# Patient Record
Sex: FEMALE | Race: WHITE | NOT HISPANIC OR LATINO | Employment: OTHER | URBAN - METROPOLITAN AREA
[De-identification: names, ages, dates, MRNs, and addresses within clinical notes are randomized per-mention and may not be internally consistent; named-entity substitution may affect disease eponyms.]

---

## 2017-03-13 ENCOUNTER — ALLSCRIPTS OFFICE VISIT (OUTPATIENT)
Dept: OTHER | Facility: OTHER | Age: 67
End: 2017-03-13

## 2017-04-26 ENCOUNTER — APPOINTMENT (OUTPATIENT)
Dept: LAB | Age: 67
End: 2017-04-26
Payer: MEDICARE

## 2017-04-26 ENCOUNTER — TRANSCRIBE ORDERS (OUTPATIENT)
Dept: LAB | Age: 67
End: 2017-04-26

## 2017-04-26 DIAGNOSIS — E78.5 HYPERLIPIDEMIA: ICD-10-CM

## 2017-04-26 DIAGNOSIS — E55.9 VITAMIN D DEFICIENCY: ICD-10-CM

## 2017-04-26 DIAGNOSIS — E78.5 OTHER AND UNSPECIFIED HYPERLIPIDEMIA: ICD-10-CM

## 2017-04-26 DIAGNOSIS — E78.5 OTHER AND UNSPECIFIED HYPERLIPIDEMIA: Primary | ICD-10-CM

## 2017-04-26 LAB
25(OH)D3 SERPL-MCNC: 33.4 NG/ML (ref 30–100)
ALBUMIN SERPL BCP-MCNC: 3.7 G/DL (ref 3.5–5)
ALP SERPL-CCNC: 42 U/L (ref 46–116)
ALT SERPL W P-5'-P-CCNC: 14 U/L (ref 12–78)
ANION GAP SERPL CALCULATED.3IONS-SCNC: 6 MMOL/L (ref 4–13)
AST SERPL W P-5'-P-CCNC: 15 U/L (ref 5–45)
BILIRUB SERPL-MCNC: 0.5 MG/DL (ref 0.2–1)
BUN SERPL-MCNC: 13 MG/DL (ref 5–25)
CALCIUM SERPL-MCNC: 9.2 MG/DL (ref 8.3–10.1)
CHLORIDE SERPL-SCNC: 100 MMOL/L (ref 100–108)
CHOLEST SERPL-MCNC: 169 MG/DL (ref 50–200)
CO2 SERPL-SCNC: 29 MMOL/L (ref 21–32)
CREAT SERPL-MCNC: 0.84 MG/DL (ref 0.6–1.3)
ERYTHROCYTE [DISTWIDTH] IN BLOOD BY AUTOMATED COUNT: 13.5 % (ref 11.6–15.1)
GFR SERPL CREATININE-BSD FRML MDRD: >60 ML/MIN/1.73SQ M
GLUCOSE P FAST SERPL-MCNC: 99 MG/DL (ref 65–99)
HCT VFR BLD AUTO: 38.2 % (ref 34.8–46.1)
HDLC SERPL-MCNC: 74 MG/DL (ref 40–60)
HGB BLD-MCNC: 12.8 G/DL (ref 11.5–15.4)
LDLC SERPL CALC-MCNC: 76 MG/DL (ref 0–100)
MCH RBC QN AUTO: 31.2 PG (ref 26.8–34.3)
MCHC RBC AUTO-ENTMCNC: 33.5 G/DL (ref 31.4–37.4)
MCV RBC AUTO: 93 FL (ref 82–98)
PLATELET # BLD AUTO: 230 THOUSANDS/UL (ref 149–390)
PMV BLD AUTO: 10.3 FL (ref 8.9–12.7)
POTASSIUM SERPL-SCNC: 4.1 MMOL/L (ref 3.5–5.3)
PROT SERPL-MCNC: 6.7 G/DL (ref 6.4–8.2)
RBC # BLD AUTO: 4.1 MILLION/UL (ref 3.81–5.12)
SODIUM SERPL-SCNC: 135 MMOL/L (ref 136–145)
T4 SERPL-MCNC: 10 UG/DL (ref 4.7–13.3)
TRIGL SERPL-MCNC: 93 MG/DL
TSH SERPL DL<=0.05 MIU/L-ACNC: 2 UIU/ML (ref 0.36–3.74)
WBC # BLD AUTO: 6.02 THOUSAND/UL (ref 4.31–10.16)

## 2017-04-26 PROCEDURE — 80061 LIPID PANEL: CPT

## 2017-04-26 PROCEDURE — 84436 ASSAY OF TOTAL THYROXINE: CPT

## 2017-04-26 PROCEDURE — 85027 COMPLETE CBC AUTOMATED: CPT

## 2017-04-26 PROCEDURE — 36415 COLL VENOUS BLD VENIPUNCTURE: CPT

## 2017-04-26 PROCEDURE — 80053 COMPREHEN METABOLIC PANEL: CPT

## 2017-04-26 PROCEDURE — 82306 VITAMIN D 25 HYDROXY: CPT

## 2017-04-26 PROCEDURE — 84443 ASSAY THYROID STIM HORMONE: CPT

## 2017-05-02 ENCOUNTER — TRANSCRIBE ORDERS (OUTPATIENT)
Dept: LAB | Age: 67
End: 2017-05-02

## 2017-05-02 DIAGNOSIS — E78.5 HYPERLIPIDEMIA: ICD-10-CM

## 2017-05-11 ENCOUNTER — ALLSCRIPTS OFFICE VISIT (OUTPATIENT)
Dept: OTHER | Facility: OTHER | Age: 67
End: 2017-05-11

## 2017-10-05 ENCOUNTER — GENERIC CONVERSION - ENCOUNTER (OUTPATIENT)
Dept: OTHER | Facility: OTHER | Age: 67
End: 2017-10-05

## 2018-01-13 NOTE — PROGRESS NOTES
Assessment    1  Acute bronchitis (466 0) (J20 9)   2  Tobacco abuse (305 1) (Z72 0)   3  Hyperlipidemia (272 4) (E78 5)   4  Encounter for preventive health examination (V70 0) (Z00 00)   5  Vitamin D deficiency (268 9) (E55 9)    Plan  Acute bronchitis    · Cheratussin -10 MG/5ML Oral Syrup; TAKE 1 TEASPOONFUL EVERY 4 TO  6 HOURS AS NEEDED   · LevoFLOXacin 500 MG Oral Tablet (Levaquin); Take 1 tablet daily  Vitamin D deficiency    · Follow-up visit in 4 Months Evaluation and Treatment  Follow-up  Status: Hold For -  Scheduling  Requested for: 43UIR9020   · Eat a low fat and low cholesterol diet ; Status:Complete;   Done: 85JGC4814 02:52PM   · You need to quit smoking ; Status:Complete;   Done: 32MQE3440 02:52PM    Discussion/Summary    #1  Acute bronchitis  Patient does have an acute upper respiratory tract infection that is producing a large amount of yellowish mucous in his having a very spastic cough  Patient states she is allergic to sulfa, erythromycin and penicillins  We did place the patient on Levaquin 500 milligrams daily for 10 days and also gave her a prescription for Cheratussin cough medicine to help with her cough  She was informed that she should call the office if she has worsening of her symptoms and would consider placing her on a tapering dose of prednisone which we have done in the past     #2  Tobacco abuse  We have had long discussions in the past about the importance of quitting smoking and patient is refusing  Patient was told when she decides that she needs to quit we be more than happy to provide her with more information about smoking aids, different methods in order to quit  #3  Hyperlipidemia  Patient's cholesterol is showing relatively good control  We did discuss with the patient importance of a low-fat, low-cholesterol diet  #4  Vitamin D deficiency with osteopenia  Patient is up-to-date with her routine DEXA scans and she does continue with her vitamin D supplements  We did discuss the importance of quitting smoking to prevent further progression to osteoporosis which is another risk factor with this patient  #5  Health maintenance  Patient is refusing to go for colonoscopy but did complete the Hemoccult and we do not have the results as of yet  Patient has not seen a gynecologist in over 2 years and we did stress the importance of routine examinations  Patient is up-to-date with her mammograms  We did review the results of recent labs with the patient and her   Impression: Subsequent Annual Wellness Visit, with preventive exam as well as age and risk appropriate counseling completed  Cardiovascular screening and counseling: the risks and benefits of screening were discussed and screening is current  Diabetes screening and counseling: the risks and benefits of screening were discussed and screening is current  Colorectal cancer screening and counseling: the risks and benefits of screening were discussed, due for fecal occult blood testing and Dx - V76 51 Screen for CRC  Breast cancer screening and counseling: the risks and benefits of screening were discussed and screening is current  Cervical cancer screening and counseling: the risks and benefits of screening were discussed and the patient declines screening  Osteoporosis screening and counseling: the risks and benefits of screening were discussed and screening is current  Abdominal aortic aneurysm screening and counseling: screening not indicated and Dx - V81 2 Screen for CV Disorder  Glaucoma screening and counseling: the risks and benefits of screening were discussed and screening is current  HIV screening and counseling: screening not indicated   Immunizations: the risks and benefits of influenza vaccination were discussed with the patient, influenza vaccine is up to date this year, the risks and benefits of pneumococcal vaccination were discussed with the patient, the lifetime pneumococcal vaccine has been completed, hepatitis B vaccination series is not indicated at this time due to the patient's low risk of jonathan the disease, the patient declines the Zostavax vaccine, the risks and benefits of the Td vaccine were discussed with the patient and Td vaccination status is unknown  Advance Directive Planning: not complete, she was encouraged to follow-up with me to discuss her questions and/or decisions  Patient Discussion: plan discussed with the patient, follow-up visit needed in one year  Educational resources provided: Information on low-fat, low-cholesterol diet, patient was told again given instruction to quit smoking now  Possible side effects of new medications were reviewed with the patient/guardian today  The treatment plan was reviewed with the patient/guardian  The patient/guardian understands and agrees with the treatment plan      Chief Complaint  Patient is here today for an annual wellness exam w/ labs      History of Present Illness  HPI: Patient is a 49-year-old female with a history of hyperlipidemia, chronic insomnia, vitamin D deficiency, history of classic migraine headaches with aura  Patient is here today for Medicare wellness visit  Patient did have complete labs prior to visit today and we did discuss the results  Her happy to tell the patient that all her labs were essentially normal  Patient states that she's doing relatively well and she has no new complaints or problems  She continues to work 3 days a week at [employer]  She's further relates that her migraines seem to be under control  She is complaining of an upper respiratory tract infection with the production of large amounts of yellow mucus  She denies any fever or chills or increasing shortness of breath but she does have a very spastic cough   Welcome to [employer] and Wellness Visits: The patient is being seen for the subsequent annual wellness visit     Co-Managers and Medical Equipment/Suppliers: See Patient Care Team   Reviewed Updated [employer]:   Last Medicare Wellness Visit Information was reviewed, patient interviewed, no change since last AW  Preventive Quality Program 65 and Older: Falls Risk: The patient fell times in the past 12 months  The patient is currently asymptomatic Symptoms Include: The patient currently has no urinary incontinence symptoms  Date of last glaucoma screen was Patient states she seen the eye doctor every 2 years and has glaucoma screening with each visit      Review of Systems    Constitutional: negative  Eyes: negative  ENT: scratchy throat and hoarseness, but no earache, no hearing loss, no nasal congestion, no nasal discharge, no sneezing, no sore throat and no white patches in the mouth  Cardiovascular: negative  Respiratory: cough, productive cough and colored sputum, but negative, no shortness of breath, no wheezing, not sleeping upright or with extra pillows, no dry cough, no clear sputum and not vomiting blood  Gastrointestinal: negative  Genitourinary: negative  Musculoskeletal: negative  Integumentary and Breasts: negative  Neurological: headache and Chronic migraines, but no confusion, no dizziness, no fainting, no paresthesias, no saddle paresthesia, no leg numbness, no leg weakness, no tingling and no difficulty walking  Psychiatric: negative  Endocrine: negative  Hematologic and Lymphatic: negative  Active Problems    1  Acute bronchitis (466 0) (J20 9)   2  Acute upper respiratory infection (465 9) (J06 9)   3  Chronic migraine without aura (346 70) (G43 709)   4  Classic migraine with aura (346 00) (G43 109)   5  Common migraine without aura (346 10) (G43 009)   6  Depression (311) (F32 9)   7  Dysuria (788 1) (R30 0)   8  Encounter for Pap smear (V76 9) (Z12 9)   9  Encounter for routine gynecological examination with Papanicolaou smear of cervix   (V72 31,V76 2) (Z01 419)   10   Encounter for screening mammogram for breast cancer (V76 12) (Z12 31)   11  Hyperlipidemia (272 4) (E78 5)   12  Insomnia disorder (780 52) (G47 00)   13  Need for pneumococcal vaccination (V03 82) (Z23)   14  Osteopenia (733 90) (M85 80)   15  Pruritus (698 9) (L29 9)   16  Sleep disorder (780 50) (G47 9)   17  Tobacco abuse (305 1) (Z72 0)   18  URI (upper respiratory infection) (465 9) (J06 9)   19  Vaginitis (616 10) (N76 0)   20  Visit for screening mammogram (V76 12) (Z12 31)   21  Vitamin D deficiency (268 9) (E55 9)    Past Medical History    · History of headache (V13 89) (Z87 898)   · History of migraine (V12 49) (Z86 69)    Surgical History    · History of Nasal Septal Deviation Repair   · History of Shoulder Surgery    Family History  Mother    · Family history of Cancer   · Family history of Mother  At Age ___  Father    · Family history of Father  At Age ___   · Family history of Heart Disease (V17 49)    Social History    · Being A Social Drinker   · Caffeine Use   · Current Every Day Smoker (305 1)   · Denied: History of Drug Use   · Denied: History of Home environment domestic violence    Current Meds   1  B2 TABS; Therapy: (Recorded:2016) to Recorded   2  Citalopram Hydrobromide 20 MG Oral Tablet; take 1 tablet by mouth daily; Therapy: 48XKC0331 to (Last ZD:36LGM0199)  Requested for: 50SED4653 Ordered   3  Divalproex Sodium  MG Oral Tablet Extended Release 24 Hour; TAKE TWO   TABLETS   BY MOUTH   DAILY; Therapy: 38YHV3111 to (Evaluate:82Ivx9310)  Requested for: 2017; Last   Rx:2017 Ordered   4  Magnesium 500 MG Oral Capsule; Take 2 pills daily Recorded   5  Naproxen 500 MG Oral Tablet; TAKE 1 TABLET as needed with sumatriptan; Therapy: 88AOU8961 to (Evaluate:69Wue0080)  Requested for: 67QEV9350; Last   Rx:2017 Ordered   6  Simvastatin 40 MG Oral Tablet; Take 1 tablet by mouth   daily; Therapy: 71QZX2334 to (Last Rx:2017)  Requested for: 2017 Ordered   7   SUMAtriptan Succinate 100 MG Oral Tablet; TAKE 1 TABLET AT ONSET OF MIGRAINE   HEADACHE  MAY REPEAT IN 2 HOURS IF NEEDED, no more than 2 in 24   and no more than 3 in week; Therapy: 50KSF7214 to (Last Rx:13Mar2017)  Requested for: 14ESJ4022 Ordered   8  Vitamin B12 TABS; Therapy: (Recorded:10Mar2016) to Recorded   9  Vitamin D3 TABS; Therapy: (Recorded:10Mar2016) to Recorded   10  Zolpidem Tartrate 5 MG Oral Tablet; TAKE 1 TABLET AT  BEDTIME AS NEEDED FOR    INSOMNIA; Therapy: 08HUL8482 to (Last Rx:23Ors5102) Ordered    Allergies    1  Penicillins   2  Sular TB24    Immunizations   1    PCV  31-Mar-2016     Vitals  Signs    Temperature: 98 F  Heart Rate: 71  Systolic: 456  Diastolic: 80  Height: 5 ft 5 in  Weight: 136 lb 8 oz  BMI Calculated: 22 71  BSA Calculated: 1 68  O2 Saturation: 98    Physical Exam    Constitutional Very energetic 61-year-old female who is awake alert in no acute distress and smells heavily of tobacco smoke  Head and Face   Head and face: Normal     Palpation of the face and sinuses: No sinus tenderness  Eyes   Conjunctiva and lids: No swelling, erythema or discharge  Pupils and irises: Equal, round, reactive to light  Ophthalmoscopic examination: Normal fundi and optic discs  Ears, Nose, Mouth, and Throat   External inspection of ears and nose: Normal     Otoscopic examination: Tympanic membranes translucent with normal light reflex  Canals patent without erythema  Hearing: Normal     Nasal mucosa, septum, and turbinates: Normal without edema or erythema  Lips, teeth, and gums: Normal, good dentition  Oropharynx: Abnormal   Some generalized erythema to oral mucosa  Neck   Neck: Supple, symmetric, trachea midline, no masses  Thyroid: Normal, no thyromegaly  Pulmonary   Respiratory effort: No increased work of breathing or signs of respiratory distress      Percussion of chest: Normal     Palpation of chest: Normal     Auscultation of lungs: Abnormal   Decreased breath sounds but clear to auscultation anterior and posteriorly  Cardiovascular   Palpation of heart: Normal PMI, no thrills  Auscultation of heart: Normal rate and rhythm, normal S1 and S2, no murmurs  Carotid pulses: 2+ bilaterally  Abdominal aorta: Normal     Femoral pulses: 2+ bilaterally  Pedal pulses: 2+ bilaterally  Peripheral vascular exam: Normal     Examination of extremities for edema and/or varicosities: Normal     Chest Patient states she is scheduled for routine mammogram and breast evaluation by her gynecologist    Abdomen   Abdomen: Non-tender, no masses  Liver and spleen: No hepatomegaly or splenomegaly  Examination for hernias: No hernia appreciated  Genitourinary Patient states she's making arrangements for gynecologic evaluation, routine Pap and pelvic exam    Lymphatic   Palpation of lymph nodes in neck: No lymphadenopathy  Palpation of lymph nodes in axillae: No lymphadenopathy  Palpation of lymph nodes in groin: No lymphadenopathy  Palpation of lymph nodes in other areas: No lymphadenopathy  Musculoskeletal   Gait and station: Normal     Digits and nails: Normal without clubbing or cyanosis  Joints, bones, and muscles: Abnormal   Some minor arthritic changes to the hands and digits  Range of motion: Normal     Stability: Normal     Muscle strength/tone: Normal     Skin   Skin and subcutaneous tissue: Normal without rashes or lesions  Palpation of skin and subcutaneous tissue: Normal turgor  Neurologic   Cranial nerves: Cranial nerves II-XII intact  Cortical function: Normal mental status  Reflexes: 2+ and symmetric  Sensation: No sensory loss  Coordination: Normal finger to nose and heel to shin  Psychiatric   Judgment and insight: Normal     Orientation to person, place, and time: Normal     Recent and remote memory: Intact      Mood and affect: Normal        Future Appointments    Date/Time Provider Specialty Site   05/17/2018 01:30 PM Steph Janice Mora DO Internal Medicine [facility] INTERNAL MED   03/15/2018 02:00 PM Laila Alvarez MD Neurology [address]     Signatures   Electronically signed by : Sabiha Silvestre DO; May 11 2017  2:57PM EST                       (Author)

## 2018-01-14 VITALS
SYSTOLIC BLOOD PRESSURE: 134 MMHG | HEART RATE: 74 BPM | BODY MASS INDEX: 22.39 KG/M2 | OXYGEN SATURATION: 97 % | WEIGHT: 134.56 LBS | DIASTOLIC BLOOD PRESSURE: 70 MMHG

## 2018-01-17 NOTE — PROGRESS NOTES
Assessment    1  Classic migraine with aura (346 00) (G43 109)   2  Tobacco abuse (305 1) (Z72 0)   3  Hyperlipidemia (272 4) (E78 5)    Plan  Acute bronchitis, Hyperlipidemia    · Follow-up visit in 1 month Evaluation and Treatment  Follow-up  Status: Hold For -  Scheduling  Requested for: 55THC7069   · Eat a low fat and low cholesterol diet ; Status:Complete;   Done: 72HHB6537 04:20PM   · We recommend you quit smoking  Time spent counseling today was greater than 10  minutes ; Status:Complete;   Done: 84OFX9427 04:20PM  Hyperlipidemia    · (1) CBC/PLT/DIFF; Status:Active; Requested for:31Mar2016;    · (1) COMPREHENSIVE METABOLIC PANEL; Status:Active; Requested for:31Mar2016;    · (1) LIPID PANEL, FASTING; Status:Active; Requested for:31Mar2016;    · (1) T4, FREE; Status:Active; Requested for:31Mar2016;    · (1) TSH; Status:Active; Requested for:31Mar2016;    · (Q) FECAL GLOBIN BY IMMUNOCHEM  (MEDICARE); Status:Active; Requested  for:31Mar2016;    · (Q) URINALYSIS, SCREEN; Status:Active; Requested for:31Mar2016;    · COLONOSCOPY; Status:Active; Requested for:31Mar2016;   Need for pneumococcal vaccination    · Prevnar 13 Intramuscular Suspension  Visit for screening mammogram    · MAMMO DIAGNOSTIC BILATERAL W CAD; Status:Hold For - Scheduling; Requested  for:31Mar2016;   Vitamin D deficiency    · * DXA BONE DENSITY SPINE HIP AND PELVIS; Status:Hold For - Scheduling;  Requested for:31Mar2016;     Discussion/Summary    #1  Hyperlipidemia  Patient states she's not been compliant with her diet and we have not had a recent lipid profile performed  Patient was given a slip to check a lipid profile and she was given instruction sheet on a low-fat, low-cholesterol diet  We will modify her treatment with the patient depending on the results of her testing  Most importantly patient is also a cigarette smoker so increase in cholesterol does make her at higher risk for heart disease  Patient understands      #2  Migraine headaches with aura  Patient is stable present medication but she states the medication is become very costly  We do not have any samples of her medication in the office today patient will check with her neurologist as to whether they have samples or not  #3  Osteopenia  Patient is going for both of vitamin D level and a DEXA scan and we will discuss the results with her next visit  #4  Chronic tobacco abuse  I discussed with the patient today her high risk for other medical problems they can be associated with long-term tobacco abuse  Patient understands but does not agreed to his quitting smoking now  I told her that if she wishes to have any resources as far as quitting smoking we'll be more than happy to provide them including medication  Impression: Subsequent Annual Wellness Visit, with preventive exam as well as age and risk appropriate counseling completed  Cardiovascular screening and counseling: the risks and benefits of screening were discussed, screening is current, counseling was given on maintaining a healthy diet, counseling was given on ways to improve cholesterol, counseling was given on tobacco cessation, due for a lipid panel and Dx - V81 2 Screen for CV Disorder  Diabetes screening and counseling: the risks and benefits of screening were discussed, screening is current, counseling was given on maintaining a healthy diet, counseling was given on maintaining a healthy weight, counseling was given on ways to improve physical activity, due for blood glucose and Dx - V77 1 Screen for DM  Colorectal cancer screening and counseling: the risks and benefits of screening were discussed, counseling was given on ways to eat a high fiber diet and due for a colonoscopy (low risk)     Breast cancer screening and counseling: the risks and benefits of screening were discussed and due for a screening mammogram    Cervical cancer screening and counseling: the risks and benefits of screening were discussed and due for a cervical pap smear  Osteoporosis screening and counseling: due for DXA axial skeleton  Abdominal aortic aneurysm screening and counseling: screening not indicated and Dx - V81 2 Screen for CV Disorder  Glaucoma screening and counseling: the risks and benefits of screening were discussed and ophthalmologist referral    HIV screening and counseling: screening not indicated  Immunizations: the risks and benefits of influenza vaccination were discussed with the patient, influenza vaccine is up to date this year, the risks and benefits of pneumococcal vaccination were discussed with the patient, pneumococcal vaccine due today, hepatitis B prevention counseling was provided, hepatitis B vaccination series is not indicated at this time due to the patient's low risk of jonathan the disease, the risks and benefits of the Zostavax vaccine were discussed with the patient, Zostavax vaccine needed today, the risks and benefits of the Td vaccine were discussed with the patient and the patient declines the Td vaccine  Advance Directive Planning: not complete, she was encouraged to follow-up with me to discuss her questions and/or decisions  Patient Discussion: plan discussed with the patient, follow-up visit needed in One month  Chief Complaint  patient is here for an annual wellness visit  History of Present Illness  HPI: Patient is a 68-year-old female with a history of hyperlipidemia, chronic migraine headaches, osteopenia, chronic tobacco abuse  Patient is here today for general Medicare wellness exam  Patient states that physically she is doing well but emotionally she's been under a lot of stress with difficulties that she's had with her son  Apparently he was involved in his very severe motor vehicle accident a few years ago and his been in extended treatment and rehabilitation and has just finally moved out of his own   Patient states that he did live with her  and her for approximately one year which was very taxing on the relationship  She states that she sometimes has problems sleeping secondary to anxiety and the Ambien that she takes does help  She denies any chest pain or pressure and no increasing shortness of breath with exertion  She continues to smoke approximately one pack per day and has no thoughts as far as quitting smoking  She is due for routine colonoscopy, mammogram, pelvic examination with her gynecologist and these hopefully will be arranged in the near future  Welcome to [employer] and Wellness Visits: The patient is being seen for the subsequent annual wellness visit  Medicare Screening and Risk Factors   Hospitalizations: no previous hospitalizations  Once per lifetime medicare screening tests: ECG has not been done  Medicare Screening Tests Risk Questions   Abdominal aortic aneurysm risk assessment: none indicated  Osteoporosis risk assessment: , female gender, over 48years of age and tobacco use  HIV risk assessment: none indicated  Drug and Alcohol Use: The patient is a current cigarette smoker  She has smoked for Approximately 50 pack years year(s)  She is not ready to quit using tobacco  The patient reports never drinking alcohol  Alcohol concern:   The patient has no concerns about alcohol abuse  She has never used illicit drugs  Diet and Physical Activity: Current diet includes unhealthy food choices  The patient does not exercise  Mood Disorder and Cognitive Impairment Screening: Geriatric Depression Scale   Depression screening score was Had a total score of 4 on the geriatric depression scale   mild to moderate symptoms  She denies feeling down, depressed, or hopeless over the past two weeks  She denies feeling little interest or pleasure in doing things over the past two weeks     Cognitive impairment screening: denies difficulty learning/retaining new information, denies difficulty handling complex tasks, denies difficulty with reasoning, denies difficulty with spatial ability and orientation, denies difficulty with language, denies difficulty with behavior and Total score 28 on the Mini-Mental Status exam  Patient failed with a short-term memory questions but states she was not paying attention  Functional Ability/Level of Safety: Hearing is normal bilaterally  The patient is currently able to do activities of daily living without limitations, able to do instrumental activities of daily living without limitations, able to participate in social activities without limitations and able to drive without limitations  Activities of daily living details: does not need help using the phone, no transportation help needed, does not need help shopping, no meal preparation help needed, does not need help doing housework, does not need help doing laundry, does not need help managing medications and does not need help managing money  Fall risk factors: The patient fell 0 times in the past 12 months , no polypharmacy, no alcohol use, no mobility impairment, no antidepressant use, no deconditioning, no postural hypotension, no sedative use, no visual impairment, no urinary incontinence, no antihypertensive use, no cognitive impairment, up and go test was normal and no previous fall  Home safety risk factors:  no unfamiliar surroundings, no loose rugs, no poor household lighting, no uneven floors, no household clutter, grab bars in the bathroom and handrails on the stairs  Advance Directives: Advance directives: no living will, no durable power of  for health care directives and no advance directives  end of life decisions were reviewed with the patient and I agree with the patient's decisions  Concerns with the patient's end of life decisions: Both the patient and her  have living blanco but they have not had them notarized as of yet     Co-Managers and Medical Equipment/Suppliers: See Patient Care Team      Review of Systems    Constitutional: negative  Eyes: negative  ENT: negative  Cardiovascular: negative  Respiratory: cough and dry cough, but negative, no shortness of breath, no wheezing, not sleeping upright or with extra pillows, no productive cough, no clear sputum, no colored sputum and not vomiting blood  Gastrointestinal: negative  Genitourinary: negative  Musculoskeletal: negative  Integumentary and Breasts: negative  Neurological: negative  Psychiatric: negative  Endocrine: negative  Hematologic and Lymphatic: negative  Active Problems    1  Acute bronchitis (466 0) (J20 9)   2  Acute upper respiratory infection (465 9) (J06 9)   3  Chronic migraine without aura (346 70) (G43 709)   4  Classic migraine with aura (346 00) (G43 109)   5  Common migraine without aura (346 10) (G43 009)   6  Depression (311) (F32 9)   7  Dysuria (788 1) (R30 0)   8  Encounter for Pap smear (V76 9) (Z12 9)   9  Encounter for routine gynecological examination with Papanicolaou smear of cervix   (V72 31,V76 2) (Z01 419)   10  Hyperlipidemia (272 4) (E78 5)   11  Osteopenia (733 90) (M85 80)   12  Pruritus (698 9) (L29 9)   13  Sleep disorder (780 50) (G47 9)   14  Tobacco abuse (305 1) (Z72 0)   15  URI (upper respiratory infection) (465 9) (J06 9)   16  Vaginitis (616 10) (N76 0)   17  Visit for screening mammogram (V76 12) (Z12 31)   18   Vitamin D deficiency (268 9) (E55 9)    Past Medical History    · History of headache (V13 89) (Z87 898)   · History of migraine (V12 49) (Z86 69)    Surgical History    · History of Nasal Septal Deviation Repair   · History of Shoulder Surgery    Family History    · Family history of Cancer   · Family history of Mother  At Age ___    · Family history of Father  At Age ___   · Family history of Heart Disease (V17 49)    Social History    · Being A Social Drinker   · Caffeine Use   · Current Every Day Smoker (305 1)   · Denied: History of Drug Use   · Denied: History of Home environment domestic violence    Current Meds   1  B2 TABS; Therapy: (Recorded:10Mar2016) to Recorded   2  Citalopram Hydrobromide 20 MG Oral Tablet; Take 1 tablet by mouth   daily; Therapy: 11HND3833 to (Last Rx:10Apr2015)  Requested for: 10Apr2015 Ordered   3  Divalproex Sodium  MG Oral Tablet Extended Release 24 Hour; Take 2 tablets   daily; Therapy: 50ASH4171 to (Last Salomon Jest)  Requested for: 15Iti1701 Ordered   4  Magnesium 500 MG Oral Capsule; Take 2 pills daily Recorded   5  Simvastatin 40 MG Oral Tablet; Take 1 tablet by mouth   daily; Therapy: 46LTP6295 to (Last Rx:14Jan2016)  Requested for: 41PYQ2395 Ordered   6  Treximet  MG Oral Tablet; TAKE 1 TABLET AT ONSET OF HEADACHE  MAY   REPEAT ONCE IN 2 HOURS AS NEEDED  MAXIMUM  2 TABLETS IN 24 HOURS; Therapy: 90MXA2826 to (Evaluate:85Ufn5665)  Requested for: 63KOZ2911; Last   Rx:15Mar2016 Ordered   7  Vitamin B12 TABS; Therapy: (Recorded:10Mar2016) to Recorded   8  Vitamin D3 TABS; Therapy: (Recorded:10Mar2016) to Recorded    Allergies    1  Penicillins   2  Sular TB24    Vitals  Signs [Data Includes: Current Encounter]    Temperature: 98 2 F  Heart Rate: 74  Respiration: 16  Systolic: 292  Diastolic: 66  Height: 5 ft 5 in  Weight: 136 lb 0 64 oz  BMI Calculated: 22 64  BSA Calculated: 1 68  O2 Saturation: 97    Physical Exam    Constitutional Very energetic 61-year-old female who is awake alert in no acute distress and smells heavily of tobacco smoke  Head and Face   Head and face: Normal     Palpation of the face and sinuses: No sinus tenderness  Eyes   Conjunctiva and lids: No swelling, erythema or discharge  Pupils and irises: Equal, round, reactive to light  Ophthalmoscopic examination: Normal fundi and optic discs  Ears, Nose, Mouth, and Throat   External inspection of ears and nose: Normal     Otoscopic examination: Tympanic membranes translucent with normal light reflex   Canals patent without erythema  Hearing: Normal     Nasal mucosa, septum, and turbinates: Normal without edema or erythema  Lips, teeth, and gums: Normal, good dentition  Oropharynx: Abnormal   Some generalized erythema to oral mucosa  Neck   Neck: Supple, symmetric, trachea midline, no masses  Thyroid: Normal, no thyromegaly  Pulmonary   Respiratory effort: No increased work of breathing or signs of respiratory distress  Percussion of chest: Normal     Palpation of chest: Normal     Auscultation of lungs: Abnormal   Decreased breath sounds but clear to auscultation anterior and posteriorly  Cardiovascular   Palpation of heart: Normal PMI, no thrills  Auscultation of heart: Normal rate and rhythm, normal S1 and S2, no murmurs  Carotid pulses: 2+ bilaterally  Abdominal aorta: Normal     Femoral pulses: 2+ bilaterally  Pedal pulses: 2+ bilaterally  Peripheral vascular exam: Normal     Examination of extremities for edema and/or varicosities: Normal     Chest Patient states she is scheduled for routine mammogram and breast evaluation by her gynecologist    Abdomen   Abdomen: Non-tender, no masses  Liver and spleen: No hepatomegaly or splenomegaly  Examination for hernias: No hernia appreciated  Genitourinary Patient states she's making arrangements for gynecologic evaluation, routine Pap and pelvic exam    Lymphatic   Palpation of lymph nodes in neck: No lymphadenopathy  Palpation of lymph nodes in axillae: No lymphadenopathy  Palpation of lymph nodes in groin: No lymphadenopathy  Palpation of lymph nodes in other areas: No lymphadenopathy  Musculoskeletal   Gait and station: Normal     Digits and nails: Normal without clubbing or cyanosis  Joints, bones, and muscles: Abnormal   Some minor arthritic changes to the hands and digits     Range of motion: Normal     Stability: Normal     Muscle strength/tone: Normal     Skin   Skin and subcutaneous tissue: Normal without rashes or lesions  Palpation of skin and subcutaneous tissue: Normal turgor  Neurologic   Cranial nerves: Cranial nerves II-XII intact  Cortical function: Normal mental status  Reflexes: 2+ and symmetric  Sensation: No sensory loss  Coordination: Normal finger to nose and heel to shin  Psychiatric   Judgment and insight: Normal     Orientation to person, place, and time: Normal     Recent and remote memory: Intact  Mood and affect: Normal        Procedure    Procedure: Visual Acuity Test    Indication: routine screening  Inforrmation supplied by jw a Snellen chart  Results: 20/50 in both eyes without corrective device, 20/50 in the right eye without corrective device, 20/50 in the left eye without corrective device, 20/30 in both eyes with corrective device, 20/40 in the right eye with corrective device, 20/25 in the left eye with corrective device normal in both eyes     Color vision was and the results were normal       Future Appointments    Date/Time Provider Specialty Site   04/28/2016 01:00 PM Quincy Blackburn DO Internal Medicine [facility] INTERNAL MED   03/13/2017 02:00 PM Yani Reyna [facility] Neurology [facility] [place]     Signatures   Electronically signed by : Renny Sy DO; Mar 31 2016  4:25PM EST                       (Author)

## 2018-01-22 VITALS
WEIGHT: 136.5 LBS | OXYGEN SATURATION: 98 % | HEIGHT: 65 IN | SYSTOLIC BLOOD PRESSURE: 116 MMHG | TEMPERATURE: 98 F | BODY MASS INDEX: 22.74 KG/M2 | HEART RATE: 71 BPM | DIASTOLIC BLOOD PRESSURE: 80 MMHG

## 2018-01-22 VITALS
BODY MASS INDEX: 23.35 KG/M2 | HEIGHT: 65 IN | WEIGHT: 140.13 LBS | OXYGEN SATURATION: 90 % | SYSTOLIC BLOOD PRESSURE: 134 MMHG | HEART RATE: 92 BPM | DIASTOLIC BLOOD PRESSURE: 80 MMHG | TEMPERATURE: 97.9 F

## 2018-02-08 ENCOUNTER — TELEPHONE (OUTPATIENT)
Dept: INTERNAL MEDICINE CLINIC | Facility: CLINIC | Age: 68
End: 2018-02-08

## 2018-02-11 ENCOUNTER — OFFICE VISIT (OUTPATIENT)
Dept: URGENT CARE | Age: 68
End: 2018-02-11
Payer: MEDICARE

## 2018-02-11 VITALS
RESPIRATION RATE: 20 BRPM | TEMPERATURE: 99.7 F | HEART RATE: 86 BPM | WEIGHT: 140 LBS | BODY MASS INDEX: 22.5 KG/M2 | OXYGEN SATURATION: 95 % | SYSTOLIC BLOOD PRESSURE: 136 MMHG | DIASTOLIC BLOOD PRESSURE: 64 MMHG | HEIGHT: 66 IN

## 2018-02-11 DIAGNOSIS — R68.89 FLU-LIKE SYMPTOMS: Primary | ICD-10-CM

## 2018-02-11 PROCEDURE — 99213 OFFICE O/P EST LOW 20 MIN: CPT | Performed by: FAMILY MEDICINE

## 2018-02-11 PROCEDURE — G0463 HOSPITAL OUTPT CLINIC VISIT: HCPCS | Performed by: FAMILY MEDICINE

## 2018-02-11 RX ORDER — DIVALPROEX SODIUM 250 MG/1
TABLET, EXTENDED RELEASE ORAL
COMMUNITY
Start: 2014-12-04 | End: 2018-07-20 | Stop reason: SDUPTHER

## 2018-02-11 RX ORDER — FOLIC ACID 0.8 MG
TABLET ORAL DAILY
COMMUNITY

## 2018-02-11 RX ORDER — OSELTAMIVIR PHOSPHATE 75 MG/1
75 CAPSULE ORAL EVERY 12 HOURS SCHEDULED
Qty: 10 CAPSULE | Refills: 0 | Status: SHIPPED | OUTPATIENT
Start: 2018-02-11 | End: 2018-02-16

## 2018-02-11 RX ORDER — THIAMINE HCL 100 MG
TABLET ORAL DAILY
COMMUNITY

## 2018-02-11 RX ORDER — CITALOPRAM 20 MG/1
20 TABLET ORAL
COMMUNITY
End: 2018-04-18 | Stop reason: SDUPTHER

## 2018-02-11 NOTE — PROGRESS NOTES
[address] [facility]        NAME: Wellington Ramsey is a 69 y o  female  : 1950    MRN: [id number]  DATE: 2018  TIME: 1:17 PM    Assessment and Plan   Flu-like symptoms [R68 89]  1  Flu-like symptoms           Patient Instructions     There are no Patient Instructions on file for this visit  Chief Complaint     Chief Complaint   Patient presents with    Fever     Last night she started with s/s-  had the flu last week    Cold Like Symptoms    Fatigue    Cough    Generalized Body Aches         History of Present Illness   Wellington Ramsey presents to the clinic c/o    This is a 69year old female here today with cough, body aches, chills, night sweats, fever and nasal congestion  She has headache but has history of headaches  She states cough is dry   diagnosed with influenza last week  Review of Systems   Review of Systems   Constitutional: Positive for activity change, chills, fatigue and fever  HENT: Positive for congestion and ear pain  Negative for sinus pain and sore throat  Respiratory: Positive for cough  Negative for chest tightness, shortness of breath and wheezing  Cardiovascular: Negative  Skin: Negative  Neurological: Positive for headaches           Current Medications     Long-Term Prescriptions   Medication Sig Dispense Refill    citalopram (CeleXA) 20 mg tablet Take 20 mg by mouth      cyanocobalamin ([employer] VITAMIN B-12) 1000 MCG tablet Take 1,000 mcg by mouth      divalproex sodium (DEPAKOTE ER) 250 mg 24 hr tablet Take by mouth      Magnesium 500 MG CAPS Take by mouth      Riboflavin (B2) 100 MG TABS Take by mouth         Current Allergies     Allergies as of 2018 - Reviewed 2018   Allergen Reaction Noted    Penicillins Anaphylaxis 10/16/2013    Nisoldipine  2012            The following portions of the patient's history were reviewed and updated as appropriate: allergies, current medications, past family history, past medical history, past social history, past surgical history and problem list     Objective   /64 (BP Location: Right arm, Patient Position: Sitting, Cuff Size: Standard)   Pulse 86   Temp 99 7 °F (37 6 °C) (Temporal)   Resp 20   Ht 5' 6" (1 676 m)   Wt 63 5 kg (140 lb)   SpO2 95%   BMI 22 60 kg/m²        Physical Exam     Physical Exam   Constitutional: She is oriented to person, place, and time  She appears well-developed and well-nourished  Appears mildly ill    HENT:   Right Ear: External ear normal    Left Ear: External ear normal    Mouth/Throat: Oropharynx is clear and moist  No oropharyngeal exudate  Posterior pharynx slightly erythemic   Neck: Normal range of motion  Neck supple  Cardiovascular: Normal rate and regular rhythm  Pulmonary/Chest: Effort normal and breath sounds normal    Neurological: She is alert and oriented to person, place, and time  Skin: Skin is warm  Psychiatric: She has a normal mood and affect  Nursing note and vitals reviewed

## 2018-02-11 NOTE — PATIENT INSTRUCTIONS
This appears to be the flu  Rest and drink extra fluids  Start Tamiflu  Tylenol as needed for pain or fever  OTC cough and cold medications as needed  Follow up with PCP if no improvement  Go to ER with worsening symptoms

## 2018-02-11 NOTE — LETTER
February 11, 2018     Patient: Sheila Miller   YOB: 1950   Date of Visit: 2/11/2018       To Whom It May Concern: It is my medical opinion that Sheila Miller may return to work on 02/19/2018  If you have any questions or concerns, please don't hesitate to call           Sincerely,        [facility]    CC: No Recipients

## 2018-03-15 ENCOUNTER — OFFICE VISIT (OUTPATIENT)
Dept: NEUROLOGY | Facility: CLINIC | Age: 68
End: 2018-03-15
Payer: MEDICARE

## 2018-03-15 VITALS
HEART RATE: 75 BPM | SYSTOLIC BLOOD PRESSURE: 132 MMHG | WEIGHT: 146.6 LBS | DIASTOLIC BLOOD PRESSURE: 60 MMHG | HEIGHT: 66 IN | BODY MASS INDEX: 23.56 KG/M2

## 2018-03-15 DIAGNOSIS — G43.009 MIGRAINE WITHOUT AURA AND WITHOUT STATUS MIGRAINOSUS, NOT INTRACTABLE: Primary | ICD-10-CM

## 2018-03-15 PROCEDURE — 99213 OFFICE O/P EST LOW 20 MIN: CPT | Performed by: PSYCHIATRY & NEUROLOGY

## 2018-03-15 RX ORDER — SUMATRIPTAN 100 MG/1
1 TABLET, FILM COATED ORAL
COMMUNITY
Start: 2016-11-01 | End: 2018-11-26 | Stop reason: SDUPTHER

## 2018-03-15 RX ORDER — DEXAMETHASONE 2 MG/1
TABLET ORAL
Qty: 5 TABLET | Refills: 2 | Status: SHIPPED | OUTPATIENT
Start: 2018-03-15 | End: 2019-11-05

## 2018-03-15 RX ORDER — NAPROXEN 500 MG/1
500 TABLET ORAL AS NEEDED
COMMUNITY
Start: 2016-11-01 | End: 2019-02-21 | Stop reason: SDUPTHER

## 2018-03-15 RX ORDER — ALBUTEROL SULFATE 90 UG/1
1-2 AEROSOL, METERED RESPIRATORY (INHALATION)
COMMUNITY
Start: 2017-10-05 | End: 2018-12-27 | Stop reason: SDUPTHER

## 2018-03-15 RX ORDER — ZOLPIDEM TARTRATE 5 MG/1
1 TABLET ORAL
COMMUNITY
Start: 2016-07-14 | End: 2018-06-28 | Stop reason: SDUPTHER

## 2018-03-15 RX ORDER — PYRIDOXINE HCL (VITAMIN B6) 100 MG
100 TABLET ORAL DAILY
COMMUNITY

## 2018-03-15 RX ORDER — OLANZAPINE 5 MG/1
TABLET ORAL
Qty: 10 TABLET | Refills: 3 | Status: SHIPPED | OUTPATIENT
Start: 2018-03-15 | End: 2022-05-10 | Stop reason: CLARIF

## 2018-03-15 RX ORDER — SIMVASTATIN 40 MG
1 TABLET ORAL DAILY
COMMUNITY
Start: 2012-09-07 | End: 2018-04-26 | Stop reason: SDUPTHER

## 2018-03-15 NOTE — PATIENT INSTRUCTIONS
Preventive therapy for headaches  -  She is to take Depakote 250 mg 2 tabs at bedtime daily  -  Continue magnesium 500 mg and vitamin B2 100 mg 2 tabs in a m  Daily    Abortive therapy for headaches   at the onset of headaches she is to take sumatriptan 100 mg and naproxen 500 mg  That night she has to take olanzapine 5 mg at bedtime along with her Depakote 500 mg  If she wakes up in the morning and still has a headache she has to take Decadron 2 mg  This should help abort the headache from coming on the next day and that same day  However if she feels like she has the need for sumatriptan she may repeat it that day if needed    I do not want her to have headache more than 1 day

## 2018-03-15 NOTE — PROGRESS NOTES
Patient ID: Kelsey Lennon is a 69 y o  female  Assessment/Plan:   Problem List Items Addressed This Visit     None      Visit Diagnoses     Migraine without aura and without status migrainosus, not intractable    -  Primary    Relevant Medications    naproxen (NAPROSYN) 500 mg tablet    SUMAtriptan (IMITREX) 100 mg tablet    OLANZapine (ZyPREXA) 5 mg tablet    dexamethasone (DECADRON) 2 mg tablet    Other Relevant Orders    Vitamin B12         Preventive therapy for headaches  -  She is to take Depakote 250 mg 2 tabs at bedtime daily  -  Continue magnesium 500 mg and vitamin B2 100 mg 2 tabs in a m  Daily     Abortive therapy for headaches   at the onset of headaches she is to take sumatriptan 100 mg and naproxen 500 mg  That night she has to take olanzapine 5 mg at bedtime along with her Depakote 500 mg  If she wakes up in the morning and still has a headache she has to take Decadron 2 mg  This should help abort the headache from coming on the next day and that same day  However if she feels like she has the need for sumatriptan she may repeat it that day if needed  I do not want her to have headache more than 1 day    Subjective:  HPI   We had the pleasure of evaluating Giovanna Rodriguez in neurological follow up today  As you know she is a 69year old right handed female  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 [employer]  Here today with her   Migraine headaches without aura:   PREVENTIVE: Citalopram, Topamax, depakote  ABORTIVE: Naratriptan, Sumavel, Dexamethasone, maxalt, imtrex   Headache trigger: Stress/Tension, Weather change    How often do the headaches occur - 5 - 7 month  What time of the day do the headaches start - morning   How long do the headaches last - with medication 30 min but then comes on the next day and this will occur for 3 days in a row     Location of headache: right temporal/frontal  What is the intensity of pain - average pain level 9/10  Describe your usual headache - Throbbing, Pounding, sharp  Associated symptoms: photophobia, phonophobia, nausea, vomiting    The following portions of the patient's history were reviewed and updated as appropriate: allergies, current medications, past family history, past medical history, past social history, past surgical history and problem list      Objective:    Blood pressure 132/60, pulse 75, height 5' 6" (1 676 m), weight 66 5 kg (146 lb 9 6 oz)  Physical Exam   Constitutional: She appears well-developed  Eyes: EOM are normal  Pupils are equal, round, and reactive to light  Neck: Normal range of motion  Neck supple  Cardiovascular: Normal rate  Pulmonary/Chest: Effort normal    Abdominal: Soft  Musculoskeletal: Normal range of motion  Neurological: She has normal strength and normal reflexes  Gait and coordination normal    Skin: Skin is warm  Psychiatric: She has a normal mood and affect  Her speech is normal        Neurological Exam    Mental Status  The patient is alert and oriented to person, place and time  Her speech is normal  Her language is fluent with no aphasia  She has normal attention span and concentration  Cranial Nerves    CN II: The patient's visual acuity and visual fields are normal   CN III, IV, VI: The patient's pupils are equally round and reactive to light and ocular movements are normal   CN V: The patient has normal facial sensation  CN VII:  The patient has symmetric facial movement  CN VIII:  The patient's hearing is normal   CN IX, X: The patient has symmetric palate movement and normal gag reflex  CN XI: The patient's shoulder shrug strength is normal   CN XII: The patient's tongue is midline without atrophy or fasciculations  Motor  The patient has normal muscle bulk throughout  Her overall muscle tone is normal throughout  Her strength is 5/5 throughout all four extremities  Sensory  The patient's sensation is normal in all four extremities      Reflexes  Deep tendon reflexes are 2+ and symmetric in all four extremities with downgoing toes bilaterally  Gait and Coordination  The patient has normal gait and station and normal casual, toe, heel, and tandem gait  She has normal coordination bilaterally  ROS:  Review of Systems   Constitutional: Negative  HENT: Positive for ear pain  Eyes: Negative  Respiratory: Negative  Cardiovascular: Negative  Gastrointestinal: Negative  Endocrine: Negative  Genitourinary: Negative  Musculoskeletal: Negative  Skin: Negative  Allergic/Immunologic: Negative  Neurological: Negative  Hematological: Negative  Psychiatric/Behavioral: Negative

## 2018-03-28 ENCOUNTER — OFFICE VISIT (OUTPATIENT)
Dept: INTERNAL MEDICINE CLINIC | Facility: CLINIC | Age: 68
End: 2018-03-28
Payer: MEDICARE

## 2018-03-28 ENCOUNTER — LAB (OUTPATIENT)
Dept: LAB | Age: 68
End: 2018-03-28
Payer: MEDICARE

## 2018-03-28 VITALS
BODY MASS INDEX: 23.46 KG/M2 | HEART RATE: 72 BPM | HEIGHT: 66 IN | SYSTOLIC BLOOD PRESSURE: 158 MMHG | DIASTOLIC BLOOD PRESSURE: 86 MMHG | WEIGHT: 146 LBS | OXYGEN SATURATION: 96 % | TEMPERATURE: 96.9 F

## 2018-03-28 DIAGNOSIS — Z11.59 NEED FOR HEPATITIS C SCREENING TEST: ICD-10-CM

## 2018-03-28 DIAGNOSIS — E78.01 FAMILIAL HYPERCHOLESTEROLEMIA: ICD-10-CM

## 2018-03-28 DIAGNOSIS — Z12.11 COLON CANCER SCREENING: ICD-10-CM

## 2018-03-28 DIAGNOSIS — Z72.0 TOBACCO ABUSE: ICD-10-CM

## 2018-03-28 DIAGNOSIS — J06.9 ACUTE UPPER RESPIRATORY INFECTION: ICD-10-CM

## 2018-03-28 DIAGNOSIS — I10 ESSENTIAL HYPERTENSION: ICD-10-CM

## 2018-03-28 DIAGNOSIS — G43.709 CHRONIC MIGRAINE WITHOUT AURA WITHOUT STATUS MIGRAINOSUS, NOT INTRACTABLE: ICD-10-CM

## 2018-03-28 DIAGNOSIS — J01.91 ACUTE RECURRENT SINUSITIS, UNSPECIFIED LOCATION: ICD-10-CM

## 2018-03-28 DIAGNOSIS — G43.009 MIGRAINE WITHOUT AURA AND WITHOUT STATUS MIGRAINOSUS, NOT INTRACTABLE: ICD-10-CM

## 2018-03-28 DIAGNOSIS — J01.91 ACUTE RECURRENT SINUSITIS, UNSPECIFIED LOCATION: Primary | ICD-10-CM

## 2018-03-28 DIAGNOSIS — J41.0 SIMPLE CHRONIC BRONCHITIS (HCC): ICD-10-CM

## 2018-03-28 LAB
ALBUMIN SERPL BCP-MCNC: 3.7 G/DL (ref 3.5–5)
ALP SERPL-CCNC: 45 U/L (ref 46–116)
ALT SERPL W P-5'-P-CCNC: 12 U/L (ref 12–78)
ANION GAP SERPL CALCULATED.3IONS-SCNC: 6 MMOL/L (ref 4–13)
AST SERPL W P-5'-P-CCNC: 11 U/L (ref 5–45)
BILIRUB SERPL-MCNC: 0.38 MG/DL (ref 0.2–1)
BUN SERPL-MCNC: 17 MG/DL (ref 5–25)
CALCIUM SERPL-MCNC: 9.1 MG/DL (ref 8.3–10.1)
CHLORIDE SERPL-SCNC: 105 MMOL/L (ref 100–108)
CHOLEST SERPL-MCNC: 159 MG/DL (ref 50–200)
CO2 SERPL-SCNC: 29 MMOL/L (ref 21–32)
CREAT SERPL-MCNC: 0.8 MG/DL (ref 0.6–1.3)
ERYTHROCYTE [DISTWIDTH] IN BLOOD BY AUTOMATED COUNT: 14 % (ref 11.6–15.1)
GFR SERPL CREATININE-BSD FRML MDRD: 75 ML/MIN/1.73SQ M
GLUCOSE P FAST SERPL-MCNC: 100 MG/DL (ref 65–99)
HCT VFR BLD AUTO: 41.7 % (ref 34.8–46.1)
HCV AB SER QL: NORMAL
HDLC SERPL-MCNC: 63 MG/DL (ref 40–60)
HGB BLD-MCNC: 13.4 G/DL (ref 11.5–15.4)
LDLC SERPL CALC-MCNC: 76 MG/DL (ref 0–100)
MCH RBC QN AUTO: 30.6 PG (ref 26.8–34.3)
MCHC RBC AUTO-ENTMCNC: 32.1 G/DL (ref 31.4–37.4)
MCV RBC AUTO: 95 FL (ref 82–98)
PLATELET # BLD AUTO: 254 THOUSANDS/UL (ref 149–390)
PMV BLD AUTO: 10.3 FL (ref 8.9–12.7)
POTASSIUM SERPL-SCNC: 4.2 MMOL/L (ref 3.5–5.3)
PROT SERPL-MCNC: 7 G/DL (ref 6.4–8.2)
RBC # BLD AUTO: 4.38 MILLION/UL (ref 3.81–5.12)
SODIUM SERPL-SCNC: 140 MMOL/L (ref 136–145)
TRIGL SERPL-MCNC: 100 MG/DL
TSH SERPL DL<=0.05 MIU/L-ACNC: 1.2 UIU/ML (ref 0.36–3.74)
VIT B12 SERPL-MCNC: 2181 PG/ML (ref 100–900)
WBC # BLD AUTO: 8.12 THOUSAND/UL (ref 4.31–10.16)

## 2018-03-28 PROCEDURE — 86803 HEPATITIS C AB TEST: CPT

## 2018-03-28 PROCEDURE — 82607 VITAMIN B-12: CPT

## 2018-03-28 PROCEDURE — 80061 LIPID PANEL: CPT

## 2018-03-28 PROCEDURE — 99214 OFFICE O/P EST MOD 30 MIN: CPT | Performed by: INTERNAL MEDICINE

## 2018-03-28 PROCEDURE — 85027 COMPLETE CBC AUTOMATED: CPT

## 2018-03-28 PROCEDURE — 80053 COMPREHEN METABOLIC PANEL: CPT

## 2018-03-28 PROCEDURE — 84443 ASSAY THYROID STIM HORMONE: CPT

## 2018-03-28 PROCEDURE — 36415 COLL VENOUS BLD VENIPUNCTURE: CPT

## 2018-03-28 RX ORDER — METHYLPREDNISOLONE 4 MG/1
TABLET ORAL
Qty: 21 TABLET | Refills: 0 | Status: SHIPPED | OUTPATIENT
Start: 2018-03-28 | End: 2019-11-05

## 2018-03-28 RX ORDER — AZITHROMYCIN 250 MG/1
TABLET, FILM COATED ORAL
Qty: 6 TABLET | Refills: 0 | Status: SHIPPED | OUTPATIENT
Start: 2018-03-28 | End: 2018-04-02

## 2018-03-28 NOTE — ASSESSMENT & PLAN NOTE
Hyperlipidemia  Patient admits the fact that she is not watching her diet  She was given a slip to check on a lipid profile and we will discuss the results with her when she returns to the office in a few months for Medicare wellness visit    She continues to take her simvastatin as directed

## 2018-03-28 NOTE — ASSESSMENT & PLAN NOTE
Upper respiratory infection/sinusitis/bronchitis  Patient states she has been ill for at least 5-6 days with nasal congestion, sore throat, production of a yellowish mucus from the nares  She states she has had some episodic low-grade temperatures  She has no GI symptoms and she states that she has had some mild shortness of breath and wheezing  Patient continues to smoke and has no commitment in order to quit  The decision today was to place the patient on a Zithromax Z-Ricky and because of her respiratory symptoms and wheezing she was also placed on a Medrol Dosepak, tapering dose of steroids    She is to call if not improving

## 2018-03-28 NOTE — ASSESSMENT & PLAN NOTE
Tobacco abuse  Patient continues to smoke up to 1 pack per day  She states she has cut down somewhat if just secondary to the cost of the cigarettes  Again patient on questioning has no intention of quitting smoking at this point time

## 2018-03-28 NOTE — ASSESSMENT & PLAN NOTE
Chronic migraine headaches  Patient was recently seen by Neurology and has had some mild changes in her medications which we reviewed with her    She is hoping the new changes will reduce her frequency of migraine headaches

## 2018-03-28 NOTE — PROGRESS NOTES
Assessment/Plan:    Acute upper respiratory infection  Upper respiratory infection/sinusitis/bronchitis  Patient states she has been ill for at least 5-6 days with nasal congestion, sore throat, production of a yellowish mucus from the nares  She states she has had some episodic low-grade temperatures  She has no GI symptoms and she states that she has had some mild shortness of breath and wheezing  Patient continues to smoke and has no commitment in order to quit  The decision today was to place the patient on a Zithromax Z-Ricky and because of her respiratory symptoms and wheezing she was also placed on a Medrol Dosepak, tapering dose of steroids  She is to call if not improving    Tobacco abuse  Tobacco abuse  Patient continues to smoke up to 1 pack per day  She states she has cut down somewhat if just secondary to the cost of the cigarettes  Again patient on questioning has no intention of quitting smoking at this point time  Migraine without aura and without status migrainosus, not intractable  Chronic migraine headaches  Patient was recently seen by Neurology and has had some mild changes in her medications which we reviewed with her  She is hoping the new changes will reduce her frequency of migraine headaches    Hyperlipidemia  Hyperlipidemia  Patient admits the fact that she is not watching her diet  She was given a slip to check on a lipid profile and we will discuss the results with her when she returns to the office in a few months for Medicare wellness visit  She continues to take her simvastatin as directed       Diagnoses and all orders for this visit:    Acute recurrent sinusitis, unspecified location  -     azithromycin (ZITHROMAX) 250 mg tablet; Take 2 tablets today then 1 tablet daily x 4 days  -     Comprehensive metabolic panel;  Future    Simple chronic bronchitis (HCC)  -     Methylprednisolone 4 MG TBPK; Use as directed on package    Essential hypertension  -     CBC and Platelet; Future  -     TSH, 3rd generation with T4 reflex; Future    Familial hypercholesterolemia  -     Lipid panel; Future  -     TSH, 3rd generation with T4 reflex; Future    Colon cancer screening  -     Occult blood 1-3, stool; Future    Need for hepatitis C screening test  -     Hepatitis C antibody; Future    Acute upper respiratory infection    Migraine without aura and without status migrainosus, not intractable    Chronic migraine without aura without status migrainosus, not intractable    Tobacco abuse          Subjective:      Patient ID: Liyah Toledo is a 69 y o  female  Patient is a 57-year-old female with a history of chronic tobacco abuse, chronic migraines without aura, recurrent upper respiratory tract infections, patient is here today for evaluation of an upper respiratory tract infection  As noted patient has been ill for approximately 5-6 days with nasal congestion, production of a thick, yellowish mucus from the nares, frontal sinus headache, low-grade temperatures, chronic cough  Patient is concerned because her  also has a similar symptoms and has been sick for approximately 2 weeks  Patient continues to smoke and patient relates that she was just seen by her neurologist and had some changes in her medication to help her with migraine headaches  Patient is due to have a Medicare wellness visit and we did give her slip for labs to have performed prior to the visit  The following portions of the patient's history were reviewed and updated as appropriate:   She  has no past medical history on file  She   Patient Active Problem List    Diagnosis Date Noted    Migraine without aura and without status migrainosus, not intractable 03/15/2018    Tobacco abuse 07/16/2015    Chronic migraine without aura 12/04/2014    Acute upper respiratory infection 10/16/2013    Hyperlipidemia 09/07/2012     She  has no past surgical history on file    Her family history includes Diabetes in her sister; Heart disease in her father; No Known Problems in her mother  She  reports that she has been smoking  She started smoking about 53 years ago  She has a 26 50 pack-year smoking history  She has never used smokeless tobacco  She reports that she does not drink alcohol or use drugs  Current Outpatient Prescriptions   Medication Sig Dispense Refill    albuterol (VENTOLIN HFA) 90 mcg/act inhaler Inhale 1-2 puffs      Cholecalciferol (VITAMIN D3 PO) Take by mouth      citalopram (CeleXA) 20 mg tablet Take 20 mg by mouth      cyanocobalamin ([facility] VITAMIN B-12) 1000 MCG tablet Take 1,000 mcg by mouth      dexamethasone (DECADRON) 2 mg tablet One in am as needed  5 tablet 2    divalproex sodium (DEPAKOTE ER) 250 mg 24 hr tablet Take by mouth      Magnesium 500 MG CAPS Take by mouth      naproxen (NAPROSYN) 500 mg tablet Take by mouth      OLANZapine (ZyPREXA) 5 mg tablet At bedtime only as needed 10 tablet 3    pyridoxine (B-6) 100 MG tablet Take 100 mg by mouth      Riboflavin (B2) 100 MG TABS Take by mouth      simvastatin (ZOCOR) 40 mg tablet Take 1 tablet by mouth daily      SUMAtriptan (IMITREX) 100 mg tablet Take 1 tablet by mouth      zolpidem (AMBIEN) 5 mg tablet Take 1 tablet by mouth      azithromycin (ZITHROMAX) 250 mg tablet Take 2 tablets today then 1 tablet daily x 4 days 6 tablet 0    Methylprednisolone 4 MG TBPK Use as directed on package 21 tablet 0     No current facility-administered medications for this visit  Current Outpatient Prescriptions on File Prior to Visit   Medication Sig    albuterol (VENTOLIN HFA) 90 mcg/act inhaler Inhale 1-2 puffs    Cholecalciferol (VITAMIN D3 PO) Take by mouth    citalopram (CeleXA) 20 mg tablet Take 20 mg by mouth    cyanocobalamin ([facility] VITAMIN B-12) 1000 MCG tablet Take 1,000 mcg by mouth    dexamethasone (DECADRON) 2 mg tablet One in am as needed      divalproex sodium (DEPAKOTE ER) 250 mg 24 hr tablet Take by mouth  Magnesium 500 MG CAPS Take by mouth    naproxen (NAPROSYN) 500 mg tablet Take by mouth    OLANZapine (ZyPREXA) 5 mg tablet At bedtime only as needed    pyridoxine (B-6) 100 MG tablet Take 100 mg by mouth    Riboflavin (B2) 100 MG TABS Take by mouth    simvastatin (ZOCOR) 40 mg tablet Take 1 tablet by mouth daily    SUMAtriptan (IMITREX) 100 mg tablet Take 1 tablet by mouth    zolpidem (AMBIEN) 5 mg tablet Take 1 tablet by mouth     No current facility-administered medications on file prior to visit  She is allergic to penicillins and nisoldipine       Review of Systems   Constitutional: Positive for fever  Negative for activity change, appetite change, chills, diaphoresis, fatigue and unexpected weight change  HENT: Positive for congestion, postnasal drip, rhinorrhea, sinus pain, sinus pressure and sore throat  Negative for dental problem, drooling, ear discharge, ear pain, facial swelling, hearing loss, mouth sores, nosebleeds, sneezing, tinnitus, trouble swallowing and voice change  Eyes: Negative for photophobia, pain, discharge, redness, itching and visual disturbance  Respiratory: Positive for cough and wheezing  Negative for apnea, choking, chest tightness and shortness of breath  Cardiovascular: Negative for chest pain, palpitations and leg swelling  Gastrointestinal: Negative for abdominal distention, abdominal pain, anal bleeding, blood in stool, constipation, diarrhea, nausea, rectal pain and vomiting  Endocrine: Negative  Genitourinary: Negative  Musculoskeletal: Positive for arthralgias  Negative for back pain, gait problem, joint swelling and myalgias  Skin: Negative for color change, pallor, rash and wound  Allergic/Immunologic: Negative for environmental allergies, food allergies and immunocompromised state  Neurological: Positive for headaches   Negative for dizziness, tremors, seizures, syncope, facial asymmetry, speech difficulty, light-headedness and numbness  Hematological: Negative for adenopathy  Does not bruise/bleed easily  Psychiatric/Behavioral: Negative for agitation, behavioral problems, confusion, decreased concentration, dysphoric mood, hallucinations, self-injury, sleep disturbance and suicidal ideas  The patient is not nervous/anxious and is not hyperactive  Objective:      /86   Pulse 72   Temp (!) 96 9 °F (36 1 °C)   Ht 5' 6" (1 676 m)   Wt 66 2 kg (146 lb)   SpO2 96%   BMI 23 57 kg/m²          Physical Exam   Constitutional: She is oriented to person, place, and time  She appears well-developed and well-nourished  No distress  Mildly ill-appearing, congested-sounding 58-year-old female who is awake alert   HENT:   Head: Normocephalic and atraumatic  Right Ear: External ear normal    Left Ear: External ear normal    Mouth/Throat: Oropharyngeal exudate present  Patient does have severe erythema to her nares with enlarged turbinates and a thick yellow to greenish mucus discharge  Patient again has diffuse erythema to posterior airway with a thick yellow postnasal drip   Eyes: Conjunctivae and EOM are normal  Pupils are equal, round, and reactive to light  Right eye exhibits no discharge  Left eye exhibits no discharge  No scleral icterus  Neck: Normal range of motion  No JVD present  No tracheal deviation present  No thyromegaly present  Cardiovascular: Normal rate, regular rhythm, normal heart sounds and intact distal pulses  Exam reveals no gallop and no friction rub  No murmur heard  Pulmonary/Chest: No stridor  No respiratory distress  She has wheezes  She has no rales  She exhibits no tenderness  Patient on examination has decreased breath sounds anterior and posteriorly with faint rhonchi heard anteriorly that dissipated with cough  Patient does have bilaterally a very soft and expiratory wheeze anterior and posteriorly   Abdominal: Soft  Bowel sounds are normal  She exhibits no distension   There is no tenderness  There is no rebound and no guarding  Musculoskeletal: Normal range of motion  She exhibits no edema, tenderness or deformity  Neurological: She is alert and oriented to person, place, and time  She has normal reflexes  No cranial nerve deficit  She exhibits normal muscle tone  Coordination normal    Skin: Skin is warm and dry  No rash noted  She is not diaphoretic  No erythema  No pallor  Psychiatric: She has a normal mood and affect  Her behavior is normal  Judgment and thought content normal    Nursing note and vitals reviewed

## 2018-04-18 DIAGNOSIS — F41.9 ANXIETY: Primary | ICD-10-CM

## 2018-04-18 RX ORDER — CITALOPRAM 20 MG/1
20 TABLET ORAL DAILY
Qty: 90 TABLET | Refills: 1 | Status: SHIPPED | OUTPATIENT
Start: 2018-04-18 | End: 2018-10-16 | Stop reason: SDUPTHER

## 2018-04-26 DIAGNOSIS — E78.00 PURE HYPERCHOLESTEROLEMIA: Primary | ICD-10-CM

## 2018-04-26 RX ORDER — SIMVASTATIN 40 MG
40 TABLET ORAL DAILY
Qty: 90 TABLET | Refills: 1 | Status: SHIPPED | OUTPATIENT
Start: 2018-04-26 | End: 2018-10-25 | Stop reason: SDUPTHER

## 2018-05-14 ENCOUNTER — TELEPHONE (OUTPATIENT)
Dept: INTERNAL MEDICINE CLINIC | Facility: CLINIC | Age: 68
End: 2018-05-14

## 2018-05-17 ENCOUNTER — OFFICE VISIT (OUTPATIENT)
Dept: INTERNAL MEDICINE CLINIC | Facility: CLINIC | Age: 68
End: 2018-05-17
Payer: MEDICARE

## 2018-05-17 VITALS
BODY MASS INDEX: 23.95 KG/M2 | TEMPERATURE: 98 F | SYSTOLIC BLOOD PRESSURE: 124 MMHG | OXYGEN SATURATION: 95 % | WEIGHT: 149 LBS | HEIGHT: 66 IN | HEART RATE: 67 BPM | DIASTOLIC BLOOD PRESSURE: 80 MMHG

## 2018-05-17 DIAGNOSIS — J06.9 ACUTE UPPER RESPIRATORY INFECTION: Primary | ICD-10-CM

## 2018-05-17 DIAGNOSIS — G43.709 CHRONIC MIGRAINE WITHOUT AURA WITHOUT STATUS MIGRAINOSUS, NOT INTRACTABLE: ICD-10-CM

## 2018-05-17 DIAGNOSIS — Z00.00 MEDICARE ANNUAL WELLNESS VISIT, SUBSEQUENT: ICD-10-CM

## 2018-05-17 DIAGNOSIS — E78.01 FAMILIAL HYPERCHOLESTEROLEMIA: ICD-10-CM

## 2018-05-17 DIAGNOSIS — Z72.0 TOBACCO ABUSE: ICD-10-CM

## 2018-05-17 DIAGNOSIS — Z00.00 HEALTHCARE MAINTENANCE: ICD-10-CM

## 2018-05-17 DIAGNOSIS — G43.009 MIGRAINE WITHOUT AURA AND WITHOUT STATUS MIGRAINOSUS, NOT INTRACTABLE: ICD-10-CM

## 2018-05-17 PROCEDURE — G0439 PPPS, SUBSEQ VISIT: HCPCS | Performed by: INTERNAL MEDICINE

## 2018-05-17 NOTE — ASSESSMENT & PLAN NOTE
Hyperlipidemia  Patient is not taking any medications for her elevated cholesterol level and at this point time her cholesterol showing excellent control with just diet alone  We will continue to monitor this and treat accordingly

## 2018-05-17 NOTE — ASSESSMENT & PLAN NOTE
Chronic migraine without aura  Patient continues to be followed closely by her neurologist   She was placed on Depakote to help reduce frequency of migraine headaches and she states the medication is not really food ineffective    Patient will continue present follow-up treatment and does have medication for acute episode specifically Imitrex which she states are healthy

## 2018-05-17 NOTE — ASSESSMENT & PLAN NOTE
Tobacco abuse  Patient continues to smoke and she states again as in the past that she has no thoughts of stopping at any point in time    We did discuss possible side effects of long-term tobacco abuse but patient still is not considering quitting at this time

## 2018-05-17 NOTE — PROGRESS NOTES
HPI:  Shira Beasley is a 77 y o  female here for her Subsequent Wellness Visit  Patient Active Problem List   Diagnosis    Migraine without aura and without status migrainosus, not intractable    Acute upper respiratory infection    Chronic migraine without aura    Hyperlipidemia    Tobacco abuse     Past Medical History:   Diagnosis Date    Migraine      Past Surgical History:   Procedure Laterality Date    NASAL SEPTUM SURGERY      deviation repair    SHOULDER SURGERY       Family History   Problem Relation Age of Onset    Cancer Mother     Heart disease Father     Diabetes Sister      History   Smoking Status    Current Every Day Smoker    Packs/day: 0 50    Years: 53 00    Start date: 1965   Smokeless Tobacco    Never Used     History   Alcohol Use    Yes     Comment: social       History   Drug Use No     /80   Pulse 67   Temp 98 °F (36 7 °C)   Ht 5' 6" (1 676 m)   Wt 67 6 kg (149 lb)   SpO2 95%   BMI 24 05 kg/m²       Current Outpatient Prescriptions   Medication Sig Dispense Refill    albuterol (VENTOLIN HFA) 90 mcg/act inhaler Inhale 1-2 puffs      Cholecalciferol (VITAMIN D3 PO) Take by mouth      citalopram (CeleXA) 20 mg tablet Take 1 tablet (20 mg total) by mouth daily 90 tablet 1    cyanocobalamin ([employer] VITAMIN B-12) 1000 MCG tablet Take 1,000 mcg by mouth      dexamethasone (DECADRON) 2 mg tablet One in am as needed   5 tablet 2    divalproex sodium (DEPAKOTE ER) 250 mg 24 hr tablet Take by mouth      Magnesium 500 MG CAPS Take by mouth      Methylprednisolone 4 MG TBPK Use as directed on package 21 tablet 0    naproxen (NAPROSYN) 500 mg tablet Take by mouth      OLANZapine (ZyPREXA) 5 mg tablet At bedtime only as needed 10 tablet 3    pyridoxine (B-6) 100 MG tablet Take 100 mg by mouth      Riboflavin (B2) 100 MG TABS Take by mouth      simvastatin (ZOCOR) 40 mg tablet Take 1 tablet (40 mg total) by mouth daily 90 tablet 1    SUMAtriptan (IMITREX) 100 mg tablet Take 1 tablet by mouth      zolpidem (AMBIEN) 5 mg tablet Take 1 tablet by mouth       No current facility-administered medications for this visit        Allergies   Allergen Reactions    Penicillins Anaphylaxis    Nisoldipine      Reaction Date: [id number];      Immunization History   Administered Date(s) Administered    Pneumococcal Conjugate 13-Valent 03/31/2016       Patient Care Team:  Talib Mcginnis DO as PCP - General    Medicare Screening Tests and Risk Assessments:  AWV Clinical     ISAR:   Previous hospitalizations?:  No       Once in a Lifetime Medicare Screening:       Medicare Screening Tests and Risk Assessment:   AAA Risk Assessment    None Indicated:  Yes    Osteoporosis Risk Assessment    HIV Risk Assessment        Drug and Alcohol Use:   Tobacco use    Cigarettes:  current smoker    Smokeless:  never used smokeless tobacco    Tobacco use duration    Tobacco Cessation Readiness    Alcohol use    Alcohol use:  occasional use    Alcohol Treatment Readiness   Illicit Drug Use    Drug use:  never        Diet & Exercise:   Diet   What is your diet?:  Regular   How many servings a day of the following:   Fruits and Vegetables:  3-4 Meat:  1-2   Whole Grains:  2 Simple Carbs:  1   Dairy:  1 Soda:  3   Coffee:  0 Tea:  0   Exercise    Do you currently exercise?:  currently not exercising       Cognitive Impairment Screening:   Depression screening preformed:  Yes Depression screen score:  0   Cognitive Impairment Screening    Do you have difficulty learning or retaining new information?:  No Do you have difficulty handling new tasks?:  No   Do you have difficulty with reasoning?:  No Do you have difficulty with spatial ability and orientation?:  No   Do you have difficulty with language?:  No Do you have difficulty with behavior?:  No       Functional Ability/Level of Safety:   Hearing    Hearing difficulties:  No    Hearing aid:  No    Hearing Impairment Assessment    Hearing status:  No impairment   Current Activities    Status:  unlimited driving, unlimited social activities, unlimited ADL's, unlimited IADL's   Help needed with the folllowing:    Using the phone:  No Transportation:  No   Shopping:  No Preparing Meals:  No   Doing Housework:  No Doing Laundry:  No   Managing Medications:  No Managing Money:  No   ADL    Feeding:  Independant   Oral hygiene and Facial grooming:  Independant   Bathing:  Independant   Upper Body Dressing:  Independant   Lower Body Dressing:  Independant   Toileting:  Independant   Bed Mobility:  Independant   Fall Risk   Have you fallen in the last 12 months?:  No Are you unsteady on your feet?:  No    Are you taking any medications that may cause fatigue or dizziness?:  No    Do you rush to the bathroom potentially risking a fall?:  No   Injury History       Home Safety:   Are there hazards in your environment?:  No   If you fell, would you need help to get back up from the ground?:  No Do you have problems or concerns getting in/out of a bed, chair, tub, or toilet?:  No   Do you feel unsteady when walking?:  No Is your activity limited by pain?:  No   Do you have handrails and grab-bars in the home?:  Yes    Are you and/or family members aware of the dangers of smoking in bed?:  No    Do you have working smoke alarms and fire extinguisher?:  Yes Do all household members know how to use them?:  Yes   Have you left the stove on unsupervised?:  No    Home Safety Risk Factors   Unfamilar with surroundings:  No Uneven floors:  No   Stairs or handrail saftey risk:  No Loose rugs:  No   Household clutter:  No Poor household lighting:  No   No grab bars in bathroom:  No Further evaluation needed:  No       Advanced Directives:   Advanced Directives    Living Will:  No Durable POA for healthcare:  No   Advanced directive:  No    Patient's End of Life Decisions        Urinary Incontinence:   Do you have urinary incontinence?:  No Do you have incomplete emptying?:  Yes   Do you urinate frequently?:  No Do you have urinary urgency?:  No   Do you have urinary hesitancy?:  No Do you have dysuria (painful and/or difficult urination)?:  No   Do you have nocturia (waking up to urinate)?:  Yes Do you strain when urinating (have to push to urinate)?:  No   Do you have a weak stream when urinating?:  No Do you have intermittent streaming when urinating?:  No   Do you dribble urine after finishing?:  No    Do you have vaginal pressure?:  No Do you have vaginal dryness?:  No       Glaucoma:            Provider Screening     Preventative Screening/Counseling:   Cardiovascular Screening/Counseling:   (Labs Q5 years, EKG optional one-time)   General:  Risks and Benefits Discussed, Screening Current Counseling:  Healthy Diet, Healthy Weight, Improve Cholesterol, Improve Blood Pressure, Improve Exercise Tolerance, Tobacco Cessation          Diabetes Screening/Counseling:   (2 tests/year if Pre-Diabetes or 1 test/year if no Diabetes)   General:  Risks and Benefits Discussed, Screening Current Counseling:  Healthy Diet, Healthy Weight, Improve Physical Activity          Colorectal Cancer Screening/Counseling:   (FOBT Q1 yr; Flex Sig Q4 yrs or Q10 yrs after Screening Colonoscopy; Screening Colonoscpy Q2 yrs High Risk or Q10 yrs Low Risk; Barium Enema Q2 yrs High Risk or Q4 yrs Low Risk)   General:  Risks and Benefits Discussed, Patient Declines           Prostate Cancer Screening/Counseling:   (Annual)          Breast Cancer Screening/Counseling:   (Baseline Age 28 - 43; Annual Age 36+)   General:  Risks and Benefits Discussed, Screening Current          Cervical Cancer Screening/Counseling:   (Annual for High Risk or Childbearing Age with Abnormal Pap in Last 3 yrs;  Every 2 all others)   General:  Risks and Benefits Discussed Due for: Studies:  Cervical Pap Smear, Vaginal Pap Smear          Osteoporosis Screening/Counseling:   (Every 2 Yrs if at risk or more if medically necessary)   General:  Risks and Benefits Discussed, Screening Current           AAA Screening/Counseling:   (Once per Lifetime with risk factors)    General:  Screening Not Indicated           Glaucoma Screening/Counseling:   (Annual)   General:  Patient Declines, Screening Not Indicated          HIV Screening/Counseling:   (Voluntary; Once annually for high risk OR 3 times for Pregnancy at diagnosis of IUP; 3rd trimester; and at Labor   General:  Screening Not Indicated           Hepatitis C Screening:             Immunizations:   Influenza (annual):  Risks & Benefits Discussed, Influenza UTD This Year   Pneumococcal (Once in a Lifetime):  Risks & Benefits Discussed, Lifetime Vaccine Completed   Hepatitis B Series (medium to high risk patients scheduled series):  Risks & Benefits Discussedj, Finish Hep B Series   Zostavax (Medicare D Coverage, Pt >66 yo):  Risks & Benefits Discussed, Vaccine Status Unknown   TD (Non-Medicare Wellness  Visit required):  Vaccine Status Unknown   Tdap (Non-Medicare Wellness Visit required):  Vaccine Status Unknown       Other Preventative Couseling (Non-Medicare Wellness Visit Required):       Referrals (Non-Medicare Wellness Visit Required):       Medical Equipment/Suppliers:           No exam data present    Physical Exam :  Physical Exam   Constitutional: She is oriented to person, place, and time  She appears well-developed and well-nourished  No distress  Pleasant, cheerful 55-year-old female who is alert in no acute distress x3   HENT:   Head: Normocephalic and atraumatic  Right Ear: External ear normal    Left Ear: External ear normal    Nose: Nose normal    Some diffuse erythema to oral airway but no lesions were seen  Patient continues to smoke   Eyes: Conjunctivae and EOM are normal  Pupils are equal, round, and reactive to light  Right eye exhibits no discharge  Left eye exhibits no discharge  No scleral icterus  Neck: Normal range of motion  No JVD present  No tracheal deviation present   No thyromegaly present  Cardiovascular: Normal rate, regular rhythm, normal heart sounds and intact distal pulses  Exam reveals no gallop and no friction rub  No murmur heard  Pulmonary/Chest: Effort normal  No stridor  No respiratory distress  She has no wheezes  She has no rales  She exhibits no tenderness  On exam patient has decreased breath sounds anterior and posteriorly with no rales rhonchi or wheezes heard on examination  Abdominal: Soft  Bowel sounds are normal  She exhibits no distension and no mass  There is no tenderness  There is no rebound and no guarding  No hernia  Musculoskeletal: She exhibits deformity  She exhibits no edema or tenderness  Patient has some mild arthritic changes of the hands and digits but complains of no acute arthritic aches or pains   Lymphadenopathy:     She has no cervical adenopathy  Neurological: She is alert and oriented to person, place, and time  She displays normal reflexes  No cranial nerve deficit or sensory deficit  She exhibits normal muscle tone  Coordination normal    Skin: Skin is warm and dry  Capillary refill takes less than 2 seconds  No rash noted  She is not diaphoretic  No erythema  No pallor  Psychiatric: She has a normal mood and affect  Her behavior is normal  Judgment and thought content normal    Nursing note and vitals reviewed  Reviewed Updated [facility] Prior Wellness Visits:   Last Medicare wellness visit information was reviewed, patient interviewed , no change since last AWVyes  Last Medicare wellness visit information was reviewed, patient interviewed and updates made to the record today yes    Assessment and Plan:  1  Acute upper respiratory infection     2  Migraine without aura and without status migrainosus, not intractable     3  Chronic migraine without aura without status migrainosus, not intractable     4  Familial hypercholesterolemia     5  Tobacco abuse     6   Medicare annual wellness visit, subsequent Health Maintenance Due   Topic Date Due    COLONOSCOPY  1950    DTaP,Tdap,and Td Vaccines (1 - Tdap) 04/20/1969    Urinary Incontinence Screening  04/20/2013    PNEUMOCOCCAL POLYSACCHARIDE VACCINE AGE 72 AND OVER  04/20/2013    GLAUCOMA SCREENING 67+ YR  04/20/2015

## 2018-05-17 NOTE — ASSESSMENT & PLAN NOTE
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

## 2018-05-17 NOTE — PATIENT INSTRUCTIONS
Thank you for enrolling in [facility]  Please follow the instructions below to securely access your online medical record  [employer] allows you to send messages to your doctor, view your test results, renew your prescriptions, schedule appointments, and more  [facility] uses Single Sign on (SSO) Technology for you to log in and access our [facility], including [employer]  No more remembering multiple user names and passwords! We are going to guide you through, step by step, to help you set up your [employer] account which will provide access to your DocVuet account  How Do I Sign Up? 1  In your Internet browser, go to [email or web address]       2  Click on the   [employer] patient account and then click Dont have an                 Account? Create one now      3  Enter your demographic information and chose a user name (email address) and password  Think of one that is secure and easy to remember  Enter a Referral code if you have one (this is not your [employer] code ) Accept the Terms and Conditions and the Privacy Policy  4  Select your security questions that you will use to reset your password should you forget it  Click Submit  5  Enter your DocVuet Activation Code exactly as it appears below  You will not need to use this code after you have completed the sign-up process  If you do not sign up before the expiration date, you must request a new code  [employer] Activation Code: [email or web address]  Expires: 5/20/2018  1:07 PM    6  Confirm your email address  An email confirmation was sent to you  Please open that email and click Confirm your Email   You should then be redirected to our [employer] Single sign on page, where you will log on with the user name and password you created! Proceed to the [employer] Icon to view your personal health information          Additional Information  If you have questions, you can e-mail [email or web address] or call [phone] to talk to our customer support staff  Remember, MyChart is NOT to be used for urgent needs  For medical emergencies, dial 911

## 2018-06-28 DIAGNOSIS — F51.01 PRIMARY INSOMNIA: Primary | ICD-10-CM

## 2018-06-28 RX ORDER — ZOLPIDEM TARTRATE 5 MG/1
5 TABLET ORAL
Qty: 30 TABLET | Refills: 2 | Status: SHIPPED | OUTPATIENT
Start: 2018-06-28 | End: 2018-11-26 | Stop reason: SDUPTHER

## 2018-07-20 DIAGNOSIS — G43.709 CHRONIC MIGRAINE WITHOUT AURA WITHOUT STATUS MIGRAINOSUS, NOT INTRACTABLE: Primary | ICD-10-CM

## 2018-07-20 RX ORDER — DIVALPROEX SODIUM 250 MG/1
250 TABLET, EXTENDED RELEASE ORAL 2 TIMES DAILY
Qty: 60 TABLET | Refills: 2 | Status: SHIPPED | OUTPATIENT
Start: 2018-07-20 | End: 2018-10-22 | Stop reason: SDUPTHER

## 2018-10-16 DIAGNOSIS — F41.9 ANXIETY: ICD-10-CM

## 2018-10-16 RX ORDER — CITALOPRAM 20 MG/1
20 TABLET ORAL DAILY
Qty: 90 TABLET | Refills: 0 | Status: SHIPPED | OUTPATIENT
Start: 2018-10-16 | End: 2019-01-16 | Stop reason: SDUPTHER

## 2018-10-22 DIAGNOSIS — G43.709 CHRONIC MIGRAINE WITHOUT AURA WITHOUT STATUS MIGRAINOSUS, NOT INTRACTABLE: ICD-10-CM

## 2018-10-22 RX ORDER — DIVALPROEX SODIUM 250 MG/1
250 TABLET, EXTENDED RELEASE ORAL 2 TIMES DAILY
Qty: 60 TABLET | Refills: 4 | Status: SHIPPED | OUTPATIENT
Start: 2018-10-22 | End: 2019-02-21 | Stop reason: SDUPTHER

## 2018-10-25 DIAGNOSIS — E78.00 PURE HYPERCHOLESTEROLEMIA: ICD-10-CM

## 2018-10-25 RX ORDER — SIMVASTATIN 40 MG
40 TABLET ORAL DAILY
Qty: 90 TABLET | Refills: 0 | Status: SHIPPED | OUTPATIENT
Start: 2018-10-25 | End: 2019-01-21 | Stop reason: SDUPTHER

## 2018-11-26 DIAGNOSIS — F51.01 PRIMARY INSOMNIA: ICD-10-CM

## 2018-11-26 DIAGNOSIS — G43.709 CHRONIC MIGRAINE WITHOUT AURA WITHOUT STATUS MIGRAINOSUS, NOT INTRACTABLE: Primary | ICD-10-CM

## 2018-11-26 RX ORDER — SUMATRIPTAN 100 MG/1
TABLET, FILM COATED ORAL
Qty: 12 TABLET | Refills: 1 | Status: SHIPPED | OUTPATIENT
Start: 2018-11-26 | End: 2019-02-21 | Stop reason: SDUPTHER

## 2018-11-26 RX ORDER — ZOLPIDEM TARTRATE 5 MG/1
5 TABLET ORAL
Qty: 30 TABLET | Refills: 0 | Status: SHIPPED | OUTPATIENT
Start: 2018-11-26 | End: 2018-12-21 | Stop reason: SDUPTHER

## 2018-12-21 ENCOUNTER — OFFICE VISIT (OUTPATIENT)
Dept: INTERNAL MEDICINE CLINIC | Facility: CLINIC | Age: 68
End: 2018-12-21
Payer: MEDICARE

## 2018-12-21 VITALS
OXYGEN SATURATION: 91 % | HEIGHT: 66 IN | DIASTOLIC BLOOD PRESSURE: 88 MMHG | BODY MASS INDEX: 23.95 KG/M2 | HEART RATE: 85 BPM | WEIGHT: 149 LBS | SYSTOLIC BLOOD PRESSURE: 146 MMHG | TEMPERATURE: 98.9 F

## 2018-12-21 DIAGNOSIS — J40 BRONCHITIS: Primary | ICD-10-CM

## 2018-12-21 DIAGNOSIS — F51.01 PRIMARY INSOMNIA: ICD-10-CM

## 2018-12-21 PROCEDURE — 99214 OFFICE O/P EST MOD 30 MIN: CPT | Performed by: NURSE PRACTITIONER

## 2018-12-21 RX ORDER — ALBUTEROL SULFATE 90 UG/1
2 AEROSOL, METERED RESPIRATORY (INHALATION) EVERY 6 HOURS PRN
Qty: 1 INHALER | Refills: 0 | Status: SHIPPED | OUTPATIENT
Start: 2018-12-21 | End: 2020-11-09 | Stop reason: SDUPTHER

## 2018-12-21 RX ORDER — PREDNISONE 1 MG/1
TABLET ORAL
Qty: 42 TABLET | Refills: 0 | Status: SHIPPED | OUTPATIENT
Start: 2018-12-21 | End: 2019-11-05

## 2018-12-21 RX ORDER — ALBUTEROL SULFATE 2.5 MG/3ML
2.5 SOLUTION RESPIRATORY (INHALATION) ONCE
Status: COMPLETED | OUTPATIENT
Start: 2018-12-21 | End: 2018-12-21

## 2018-12-21 RX ORDER — ZOLPIDEM TARTRATE 5 MG/1
5 TABLET ORAL
Qty: 90 TABLET | Refills: 0 | Status: SHIPPED | OUTPATIENT
Start: 2018-12-21 | End: 2019-06-27 | Stop reason: SDUPTHER

## 2018-12-21 RX ORDER — AZITHROMYCIN 250 MG/1
TABLET, FILM COATED ORAL
Qty: 6 TABLET | Refills: 0 | Status: SHIPPED | OUTPATIENT
Start: 2018-12-21 | End: 2018-12-25

## 2018-12-21 RX ADMIN — ALBUTEROL SULFATE 2.5 MG: 2.5 SOLUTION RESPIRATORY (INHALATION) at 16:41

## 2018-12-21 NOTE — PROGRESS NOTES
Assessment/Plan:    Bronchitis  Patient symptoms and exam consistent with acute bronchitis  Patient also appears statin overlying sinus infection as well  She is unable to take Augmentin due to penicillin allergy  I have prescribed a five-day course of azithromycin as well as a tapering dose prednisone  Patient is given a prescription for now or inhaler to use as needed for return of wheezing or chest tightness  Patient is encouraged to increase her fluid intake  She should call the office if his symptoms worsen or persist   She is scheduled to be seen for regular visit next week and will follow up with her at that time  Diagnoses and all orders for this visit:    Bronchitis  -     albuterol inhalation solution 2 5 mg; Take 3 mL (2 5 mg total) by nebulization once   -     predniSONE 5 mg tablet; Begin treatment with 30 mg (6 tablets) per day in divided doses  Decrease dose by 5 mg every 2 days  12 days total   Take with food  -     azithromycin (ZITHROMAX) 250 mg tablet; Take 2 tablets today then 1 tablet daily x 4 days  -     albuterol (VENTOLIN HFA) 90 mcg/act inhaler; Inhale 2 puffs every 6 (six) hours as needed for wheezing    Primary insomnia  -     zolpidem (AMBIEN) 5 mg tablet; Take 1 tablet (5 mg total) by mouth daily at bedtime as needed for sleep          Subjective:      Patient ID: Primo Gonzales is a 77 y o  female  Pt is a 77 y o  y/o female who is seen today for evaluation of URI symptoms that started Monday  Symptoms started with fever/chills and diarrhea  She also has sinus congestion with post nasal drip and a cough productive of clear sputum  She denies shortness of breath, headache, appetite loss, n/v            The following portions of the patient's history were reviewed and updated as appropriate: allergies, current medications, past family history, past medical history, past social history, past surgical history and problem list     Review of Systems   Constitutional: Positive for chills, fatigue and fever  Negative for appetite change  HENT: Positive for congestion, postnasal drip and sinus pressure  Negative for ear pain and sore throat  Respiratory: Positive for cough  Negative for chest tightness, shortness of breath and wheezing  Cardiovascular: Negative for chest pain, palpitations and leg swelling  Gastrointestinal: Positive for diarrhea  Negative for abdominal pain, nausea and vomiting  Genitourinary: Negative for dysuria  Musculoskeletal: Positive for myalgias  Neurological: Negative for dizziness and headaches  Hematological: Negative for adenopathy  Psychiatric/Behavioral: Negative for sleep disturbance  Objective:      /88 (BP Location: Left arm, Patient Position: Sitting, Cuff Size: Adult)   Pulse 85   Temp 98 9 °F (37 2 °C) (Tympanic)   Ht 5' 6" (1 676 m)   Wt 67 6 kg (149 lb)   SpO2 91%   BMI 24 05 kg/m²          Physical Exam   Constitutional: She is oriented to person, place, and time  Vital signs are normal  She appears well-developed and well-nourished  She is cooperative  HENT:   Right Ear: Hearing, tympanic membrane, external ear and ear canal normal  Tympanic membrane is not bulging  No middle ear effusion  Left Ear: Hearing, tympanic membrane, external ear and ear canal normal  Tympanic membrane is not bulging  No middle ear effusion  Nose: Mucosal edema and rhinorrhea present  Mouth/Throat: Mucous membranes are not dry  No oropharyngeal exudate, posterior oropharyngeal edema, posterior oropharyngeal erythema or tonsillar abscesses  Swelling and erythema of the nasal mucosa  Eyes: Conjunctivae are normal    Cardiovascular: Normal rate, regular rhythm, normal heart sounds and intact distal pulses  No murmur heard  Pulmonary/Chest: Effort normal  No accessory muscle usage  No respiratory distress  She has no decreased breath sounds   She has wheezes in the left upper field, the left middle field and the left lower field  She has no rhonchi  She has no rales  Wheezing throughout the left lung  Lungs are clear following albuterol nebulizer  Musculoskeletal: She exhibits no edema  Lymphadenopathy:        Head (right side): No submental, no submandibular, no tonsillar, no preauricular, no posterior auricular and no occipital adenopathy present  Head (left side): No submental, no submandibular, no tonsillar, no preauricular, no posterior auricular and no occipital adenopathy present  She has no cervical adenopathy  Neurological: She is alert and oriented to person, place, and time  Skin: Skin is warm, dry and intact  Psychiatric: She has a normal mood and affect  Her speech is normal and behavior is normal  Judgment and thought content normal  Cognition and memory are normal    Vitals reviewed

## 2018-12-22 NOTE — ASSESSMENT & PLAN NOTE
Patient symptoms and exam consistent with acute bronchitis  Patient also appears statin overlying sinus infection as well  She is unable to take Augmentin due to penicillin allergy  I have prescribed a five-day course of azithromycin as well as a tapering dose prednisone  Patient is given a prescription for now or inhaler to use as needed for return of wheezing or chest tightness  Patient is encouraged to increase her fluid intake  She should call the office if his symptoms worsen or persist   She is scheduled to be seen for regular visit next week and will follow up with her at that time

## 2018-12-27 ENCOUNTER — OFFICE VISIT (OUTPATIENT)
Dept: INTERNAL MEDICINE CLINIC | Facility: CLINIC | Age: 68
End: 2018-12-27
Payer: MEDICARE

## 2018-12-27 VITALS
HEART RATE: 73 BPM | DIASTOLIC BLOOD PRESSURE: 70 MMHG | WEIGHT: 146 LBS | TEMPERATURE: 97.5 F | HEIGHT: 66 IN | BODY MASS INDEX: 23.46 KG/M2 | SYSTOLIC BLOOD PRESSURE: 140 MMHG | OXYGEN SATURATION: 93 %

## 2018-12-27 DIAGNOSIS — F51.01 PRIMARY INSOMNIA: ICD-10-CM

## 2018-12-27 DIAGNOSIS — Z00.00 HEALTHCARE MAINTENANCE: ICD-10-CM

## 2018-12-27 DIAGNOSIS — G43.709 CHRONIC MIGRAINE WITHOUT AURA WITHOUT STATUS MIGRAINOSUS, NOT INTRACTABLE: ICD-10-CM

## 2018-12-27 DIAGNOSIS — J06.9 ACUTE UPPER RESPIRATORY INFECTION: ICD-10-CM

## 2018-12-27 DIAGNOSIS — E78.01 FAMILIAL HYPERCHOLESTEROLEMIA: ICD-10-CM

## 2018-12-27 DIAGNOSIS — Z12.11 COLON CANCER SCREENING: Primary | ICD-10-CM

## 2018-12-27 DIAGNOSIS — Z72.0 TOBACCO ABUSE: ICD-10-CM

## 2018-12-27 DIAGNOSIS — J40 BRONCHITIS: ICD-10-CM

## 2018-12-27 DIAGNOSIS — G43.009 MIGRAINE WITHOUT AURA AND WITHOUT STATUS MIGRAINOSUS, NOT INTRACTABLE: ICD-10-CM

## 2018-12-27 PROCEDURE — 99214 OFFICE O/P EST MOD 30 MIN: CPT | Performed by: INTERNAL MEDICINE

## 2018-12-27 RX ORDER — ZOLPIDEM TARTRATE 5 MG/1
5 TABLET ORAL
Qty: 30 TABLET | Refills: 2 | Status: SHIPPED | OUTPATIENT
Start: 2018-12-27 | End: 2019-06-27 | Stop reason: SDUPTHER

## 2018-12-27 NOTE — ASSESSMENT & PLAN NOTE
Patient did not have labs performed prior to being seen today  She remains on Zocor 40 mg daily because of her history of hyperlipidemia  We did discuss with the patient today the importance of also working on her diet and limiting her intake of fats and cholesterol with her diet  She was given a slip to check on a lipid profile with her next visit and we will adjust or modify treatment accordingly  Again we discussed with the patient that she is at higher risk for heart disease because of history of hyperlipidemia and her chronic tobacco abuse

## 2018-12-27 NOTE — ASSESSMENT & PLAN NOTE
Patient continues to smoke approximately a half a pack of cigarettes per day  We did discuss with the patient the importance of quitting now  We have offered her different modalities in order to help her quit the past but she is not interested in this  Again we reviewed with the patient today the risks involved with continued tobacco abuse including lung disease, cancer, heart disease, stroke

## 2018-12-27 NOTE — PROGRESS NOTES
Assessment/Plan:    Bronchitis  Patient was recently seen in the office for an upper respiratory tract infection/bronchitis  Patient was placed on azithromycin Z-Ricky but she states she was unable to complete this because she ended up with urticaria diffusely  She states despite the fact that she is taking prednisone in a tapering dose for her upper respiratory tract infection she still has some itching and areas of urticaria  She was told to keep herself well hydrated  As far as her respiratory tract infections concern she has improved and has less cough no fever or chills  She was told the most important part of treatment is for the patient to stop smoking now  Hyperlipidemia  Patient did not have labs performed prior to being seen today  She remains on Zocor 40 mg daily because of her history of hyperlipidemia  We did discuss with the patient today the importance of also working on her diet and limiting her intake of fats and cholesterol with her diet  She was given a slip to check on a lipid profile with her next visit and we will adjust or modify treatment accordingly  Again we discussed with the patient that she is at higher risk for heart disease because of history of hyperlipidemia and her chronic tobacco abuse  Tobacco abuse  Patient continues to smoke approximately a half a pack of cigarettes per day  We did discuss with the patient the importance of quitting now  We have offered her different modalities in order to help her quit the past but she is not interested in this  Again we reviewed with the patient today the risks involved with continued tobacco abuse including lung disease, cancer, heart disease, stroke         Diagnoses and all orders for this visit:    Colon cancer screening  -     Occult Blood, Fecal Immunochemical; Future    Acute upper respiratory infection    Bronchitis    Migraine without aura and without status migrainosus, not intractable    Chronic migraine without aura without status migrainosus, not intractable  -     Comprehensive metabolic panel; Future  -     TSH, 3rd generation with Free T4 reflex; Future    Familial hypercholesterolemia  -     Comprehensive metabolic panel; Future  -     CBC and differential; Future  -     Lipid panel; Future  -     TSH, 3rd generation with Free T4 reflex; Future  -     Urinalysis with reflex to microscopic; Future    Tobacco abuse    Healthcare maintenance  -     Comprehensive metabolic panel; Future  -     CBC and differential; Future  -     TSH, 3rd generation with Free T4 reflex; Future  -     Urinalysis with reflex to microscopic; Future    Primary insomnia  -     zolpidem (AMBIEN) 5 mg tablet; Take 1 tablet (5 mg total) by mouth daily at bedtime as needed for sleep  -     Lipid panel; Future          Subjective:      Patient ID: Nenita Mccullough is a 77 y o  female  Patient is a 40-year-old female with a history of multiple medical problems as outlined previously  Patient is here today for routine follow-up after 6 month period of time  Patient was recently seen in the office for an acute upper respiratory tract infection/bronchitis  She did have an adverse event including the development of urticaria from the antibiotic treatment  She states she still has a slight cough but overall is feeling improved        The following portions of the patient's history were reviewed and updated as appropriate:   She  has a past medical history of Migraine  She   Patient Active Problem List    Diagnosis Date Noted    Bronchitis 12/21/2018    Healthcare maintenance 05/17/2018    Migraine without aura and without status migrainosus, not intractable 03/15/2018    Tobacco abuse 07/16/2015    Chronic migraine without aura 12/04/2014    Acute upper respiratory infection 10/16/2013    Hyperlipidemia 09/07/2012     She  has a past surgical history that includes Nasal septum surgery and Shoulder surgery    Her family history includes Cancer in her mother; Diabetes in her sister; Heart disease in her father  She  reports that she has been smoking  She started smoking about 54 years ago  She has a 26 50 pack-year smoking history  She has never used smokeless tobacco  She reports that she drinks alcohol  She reports that she does not use drugs  Current Outpatient Prescriptions   Medication Sig Dispense Refill    albuterol (VENTOLIN HFA) 90 mcg/act inhaler Inhale 2 puffs every 6 (six) hours as needed for wheezing 1 Inhaler 0    Cholecalciferol (VITAMIN D3 PO) Take by mouth      citalopram (CeleXA) 20 mg tablet Take 1 tablet (20 mg total) by mouth daily 90 tablet 0    cyanocobalamin ([employer] VITAMIN B-12) 1000 MCG tablet Take 1,000 mcg by mouth      dexamethasone (DECADRON) 2 mg tablet One in am as needed  5 tablet 2    divalproex sodium (DEPAKOTE ER) 250 mg 24 hr tablet Take 1 tablet (250 mg total) by mouth 2 (two) times a day 60 tablet 4    Magnesium 500 MG CAPS Take by mouth      Methylprednisolone 4 MG TBPK Use as directed on package 21 tablet 0    naproxen (NAPROSYN) 500 mg tablet Take by mouth      OLANZapine (ZyPREXA) 5 mg tablet At bedtime only as needed 10 tablet 3    pyridoxine (B-6) 100 MG tablet Take 100 mg by mouth      Riboflavin (B2) 100 MG TABS Take by mouth      simvastatin (ZOCOR) 40 mg tablet Take 1 tablet (40 mg total) by mouth daily 90 tablet 0    SUMAtriptan (IMITREX) 100 mg tablet Take 1 tablet at onset of migraine headache  May repeat in 2 hours if needed, no more than 2 in 24 hours and no more than 3 in a week 12 tablet 1    zolpidem (AMBIEN) 5 mg tablet Take 1 tablet (5 mg total) by mouth daily at bedtime as needed for sleep 90 tablet 0    predniSONE 5 mg tablet Begin treatment with 30 mg (6 tablets) per day in divided doses  Decrease dose by 5 mg every 2 days  12 days total   Take with food   (Patient not taking: Reported on 12/27/2018 ) 42 tablet 0    zolpidem (AMBIEN) 5 mg tablet Take 1 tablet (5 mg total) by mouth daily at bedtime as needed for sleep 30 tablet 2     No current facility-administered medications for this visit  Current Outpatient Prescriptions on File Prior to Visit   Medication Sig    albuterol (VENTOLIN HFA) 90 mcg/act inhaler Inhale 2 puffs every 6 (six) hours as needed for wheezing    Cholecalciferol (VITAMIN D3 PO) Take by mouth    citalopram (CeleXA) 20 mg tablet Take 1 tablet (20 mg total) by mouth daily    cyanocobalamin ([employer] VITAMIN B-12) 1000 MCG tablet Take 1,000 mcg by mouth    dexamethasone (DECADRON) 2 mg tablet One in am as needed   divalproex sodium (DEPAKOTE ER) 250 mg 24 hr tablet Take 1 tablet (250 mg total) by mouth 2 (two) times a day    Magnesium 500 MG CAPS Take by mouth    Methylprednisolone 4 MG TBPK Use as directed on package    naproxen (NAPROSYN) 500 mg tablet Take by mouth    OLANZapine (ZyPREXA) 5 mg tablet At bedtime only as needed    pyridoxine (B-6) 100 MG tablet Take 100 mg by mouth    Riboflavin (B2) 100 MG TABS Take by mouth    simvastatin (ZOCOR) 40 mg tablet Take 1 tablet (40 mg total) by mouth daily    SUMAtriptan (IMITREX) 100 mg tablet Take 1 tablet at onset of migraine headache  May repeat in 2 hours if needed, no more than 2 in 24 hours and no more than 3 in a week    zolpidem (AMBIEN) 5 mg tablet Take 1 tablet (5 mg total) by mouth daily at bedtime as needed for sleep    [DISCONTINUED] albuterol (VENTOLIN HFA) 90 mcg/act inhaler Inhale 1-2 puffs    predniSONE 5 mg tablet Begin treatment with 30 mg (6 tablets) per day in divided doses  Decrease dose by 5 mg every 2 days  12 days total   Take with food  (Patient not taking: Reported on 12/27/2018 )     No current facility-administered medications on file prior to visit  She is allergic to penicillins and nisoldipine       Review of Systems   Constitutional: Negative  HENT: Positive for congestion (Some mild nasal congestion but not severe) and postnasal drip   Negative for dental problem, drooling, ear discharge, ear pain, facial swelling, hearing loss, mouth sores, nosebleeds, rhinorrhea, sinus pain, sinus pressure, sneezing, sore throat, tinnitus and trouble swallowing  Eyes: Negative  Respiratory: Positive for cough  Negative for apnea, choking, chest tightness, shortness of breath, wheezing and stridor  Cardiovascular: Negative  Gastrointestinal: Negative  Endocrine: Negative  Genitourinary: Negative  Musculoskeletal: Negative  Skin: Negative  Allergic/Immunologic: Negative  Neurological: Positive for headaches ( history of chronic migraine headaches but none recently)  Negative for dizziness, tremors, seizures, syncope, facial asymmetry, speech difficulty, weakness, light-headedness and numbness  Hematological: Negative  Psychiatric/Behavioral: Positive for sleep disturbance ( chronic history of insomnia and patient is taking Ambien at 5 mg nightly)  Negative for agitation, behavioral problems, confusion, decreased concentration, dysphoric mood, hallucinations, self-injury and suicidal ideas  The patient is not nervous/anxious and is not hyperactive  Objective:      /70   Pulse 73   Temp 97 5 °F (36 4 °C)   Ht 5' 6" (1 676 m)   Wt 66 2 kg (146 lb)   SpO2 93%   BMI 23 57 kg/m²          Physical Exam   Constitutional: She is oriented to person, place, and time  She appears well-developed and well-nourished  No distress  Pleasant, slightly hyperactive 49-year-old female who is awake alert in no acute distress, mildly congested-sounding   HENT:   Head: Normocephalic and atraumatic  Right Ear: External ear normal    Left Ear: External ear normal    Diffuse erythema to oral mucosa, erythema to posterior airway with a thick whitish postnasal drip, generalized erythema to nasal mucosa with slightly enlarged turbinates   Eyes: Pupils are equal, round, and reactive to light  Conjunctivae and EOM are normal  Right eye exhibits no discharge   Left eye exhibits no discharge  No scleral icterus  Neck: Normal range of motion  Neck supple  No JVD present  No tracheal deviation present  No thyromegaly present  Cardiovascular: Normal rate, regular rhythm, normal heart sounds and intact distal pulses  Exam reveals no gallop and no friction rub  No murmur heard  Pulmonary/Chest: Effort normal  No stridor  No respiratory distress  She has no wheezes  She has no rales  She exhibits no tenderness  Decreased breath sounds anteriorly and posteriorly but no rales rhonchi or wheezes could be appreciated today on exam   Abdominal: Soft  Bowel sounds are normal  She exhibits no distension and no mass  There is no tenderness  There is no rebound and no guarding  Musculoskeletal: Normal range of motion  She exhibits no edema, tenderness or deformity  Lymphadenopathy:     She has no cervical adenopathy  Neurological: She is alert and oriented to person, place, and time  She has normal reflexes  She displays normal reflexes  No cranial nerve deficit  She exhibits normal muscle tone  Coordination normal    Skin: Skin is warm and dry  No rash noted  She is not diaphoretic  No erythema  No pallor  Psychiatric: She has a normal mood and affect  Her behavior is normal  Judgment and thought content normal    Nursing note and vitals reviewed

## 2018-12-27 NOTE — ASSESSMENT & PLAN NOTE
Patient was recently seen in the office for an upper respiratory tract infection/bronchitis  Patient was placed on azithromycin Z-Ricky but she states she was unable to complete this because she ended up with urticaria diffusely  She states despite the fact that she is taking prednisone in a tapering dose for her upper respiratory tract infection she still has some itching and areas of urticaria  She was told to keep herself well hydrated  As far as her respiratory tract infections concern she has improved and has less cough no fever or chills  She was told the most important part of treatment is for the patient to stop smoking now

## 2019-01-16 DIAGNOSIS — F41.9 ANXIETY: ICD-10-CM

## 2019-01-17 RX ORDER — CITALOPRAM 20 MG/1
20 TABLET ORAL DAILY
Qty: 90 TABLET | Refills: 1 | Status: SHIPPED | OUTPATIENT
Start: 2019-01-17 | End: 2019-07-22 | Stop reason: SDUPTHER

## 2019-01-21 DIAGNOSIS — E78.00 PURE HYPERCHOLESTEROLEMIA: ICD-10-CM

## 2019-01-21 RX ORDER — SIMVASTATIN 40 MG
40 TABLET ORAL DAILY
Qty: 90 TABLET | Refills: 1 | Status: SHIPPED | OUTPATIENT
Start: 2019-01-21 | End: 2019-07-22 | Stop reason: SDUPTHER

## 2019-02-21 ENCOUNTER — OFFICE VISIT (OUTPATIENT)
Dept: NEUROLOGY | Facility: CLINIC | Age: 69
End: 2019-02-21
Payer: MEDICARE

## 2019-02-21 VITALS
BODY MASS INDEX: 24.59 KG/M2 | HEIGHT: 66 IN | WEIGHT: 153 LBS | SYSTOLIC BLOOD PRESSURE: 138 MMHG | HEART RATE: 78 BPM | DIASTOLIC BLOOD PRESSURE: 68 MMHG

## 2019-02-21 DIAGNOSIS — G43.009 MIGRAINE WITHOUT AURA AND WITHOUT STATUS MIGRAINOSUS, NOT INTRACTABLE: Primary | ICD-10-CM

## 2019-02-21 DIAGNOSIS — G43.709 CHRONIC MIGRAINE WITHOUT AURA WITHOUT STATUS MIGRAINOSUS, NOT INTRACTABLE: ICD-10-CM

## 2019-02-21 PROCEDURE — 99213 OFFICE O/P EST LOW 20 MIN: CPT | Performed by: PHYSICIAN ASSISTANT

## 2019-02-21 RX ORDER — DIVALPROEX SODIUM 250 MG/1
250 TABLET, EXTENDED RELEASE ORAL 2 TIMES DAILY
Qty: 180 TABLET | Refills: 3 | Status: SHIPPED | OUTPATIENT
Start: 2019-02-21 | End: 2019-09-19 | Stop reason: SDUPTHER

## 2019-02-21 RX ORDER — SUMATRIPTAN 100 MG/1
TABLET, FILM COATED ORAL
Qty: 12 TABLET | Refills: 0 | Status: SHIPPED | OUTPATIENT
Start: 2019-02-21 | End: 2019-03-27 | Stop reason: SDUPTHER

## 2019-02-21 RX ORDER — NAPROXEN 500 MG/1
500 TABLET ORAL AS NEEDED
Qty: 10 TABLET | Refills: 0 | Status: SHIPPED | OUTPATIENT
Start: 2019-02-21 | End: 2019-07-24 | Stop reason: SDUPTHER

## 2019-02-21 NOTE — PATIENT INSTRUCTIONS
Migraine without aura and without status migrainosus, not intractable  Pt reports stability of migraine headaches over the past year  She would like to try and stop the Depakote ER and see if the migraines are still controlled  She does understand that they may worsen off medication  If they do she will restart Depakote ER  She will continue Naproxen and sumatriptan for abortive treatment of migraines  Fifteen minutes were spent with patient reviewing history, examining and formulating a treatment plan  Pt was agreeable to the treatment plan  She will follow-up in a year

## 2019-02-21 NOTE — ASSESSMENT & PLAN NOTE
Pt reports stability of migraine headaches over the past year  She would like to try and stop the Depakote ER and see if the migraines are still controlled  She does understand that they may worsen off medication  If they do she will restart Depakote ER  She will continue Naproxen and sumatriptan for abortive treatment of migraines  Fifteen minutes were spent with patient reviewing history, examining and formulating a treatment plan  Pt was agreeable to the treatment plan  She will follow-up in a year

## 2019-02-21 NOTE — PROGRESS NOTES
Patient ID: David Majano is a 77 y o  female  Assessment/Plan:    Migraine without aura and without status migrainosus, not intractable  Pt reports stability of migraine headaches over the past year  She would like to try and stop the Depakote ER and see if the migraines are still controlled  She does understand that they may worsen off medication  If they do she will restart Depakote ER  She will continue Naproxen and sumatriptan for abortive treatment of migraines  Fifteen minutes were spent with patient reviewing history, examining and formulating a treatment plan  Pt was agreeable to the treatment plan  She will follow-up in a year  Problem List Items Addressed This Visit        Cardiovascular and Mediastinum    Migraine without aura and without status migrainosus, not intractable - Primary     Pt reports stability of migraine headaches over the past year  She would like to try and stop the Depakote ER and see if the migraines are still controlled  She does understand that they may worsen off medication  If they do she will restart Depakote ER  She will continue Naproxen and sumatriptan for abortive treatment of migraines  Fifteen minutes were spent with patient reviewing history, examining and formulating a treatment plan  Pt was agreeable to the treatment plan  She will follow-up in a year  Relevant Medications    divalproex sodium (DEPAKOTE ER) 250 mg 24 hr tablet    naproxen (NAPROSYN) 500 mg tablet    SUMAtriptan (IMITREX) 100 mg tablet    Chronic migraine without aura    Relevant Medications    divalproex sodium (DEPAKOTE ER) 250 mg 24 hr tablet    naproxen (NAPROSYN) 500 mg tablet    SUMAtriptan (IMITREX) 100 mg tablet             Subjective:    HPI    We had the pleasure of evaluating Rosemary in neurological follow up today  As you know she is a 77year old right handed female  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 [facility]   She is asking if she can try to come off of the Depakote ER  She believes that it is causing weight gain  Migraine headaches without aura:   PREVENTIVE: Citalopram, Topamax, depakote  ABORTIVE: Naratriptan, Sumavel, Dexamethasone, maxalt, imtrex   Headache trigger: Stress/Tension, Weather change     How often do the headaches occur - 5 - 7 month  What time of the day do the headaches start - morning   How long do the headaches last - with medication 30 min but then comes on the next day and this will occur for 3 days in a row  Location of headache: right temporal/frontal  What is the intensity of pain - average pain level 9/10  Describe your usual headache - Throbbing, Pounding, sharp  Associated symptoms: photophobia, phonophobia, nausea, vomiting      The following portions of the patient's history were reviewed and updated as appropriate: allergies, current medications, past family history, past medical history, past social history, past surgical history and problem list          Objective:    Blood pressure 138/68, pulse 78, height 5' 6" (1 676 m), weight 69 4 kg (153 lb)  Physical Exam   Eyes: Pupils are equal, round, and reactive to light  Lids are normal    Neurological: She has normal strength and normal reflexes  Coordination normal    Psychiatric: Her speech is normal    Vitals reviewed  Neurological Exam  Mental Status   Oriented to person, place, time and situation  Recent and remote memory are intact  Speech is normal  Language is fluent with no aphasia  Attention and concentration are normal  Fund of knowledge is appropriate for level of education  Apraxia absent  Cranial Nerves  CN II: Visual acuity is normal  Visual fields full to confrontation  CN III, IV, VI: Extraocular movements intact bilaterally  Normal lids and orbits bilaterally  Pupils equal round and reactive to light bilaterally  CN V: Facial sensation is normal   CN VII: Full and symmetric facial movement    CN VIII: Hearing is normal   CN IX, X: Palate elevates symmetrically  Normal gag reflex  CN XI: Shoulder shrug strength is normal   CN XII: Tongue midline without atrophy or fasciculations  Motor   Strength is 5/5 throughout all four extremities  Sensory  Sensation is intact to light touch, pinprick, vibration and proprioception in all four extremities  Reflexes  Deep tendon reflexes are 2+ and symmetric in all four extremities with downgoing toes bilaterally  Coordination  Finger-to-nose, rapid alternating movements and heel-to-shin normal bilaterally without dysmetria  Gait  Normal casual, toe, heel and tandem gait  ROS:    Review of Systems   Constitutional: Negative  Negative for appetite change and fever  HENT: Negative  Negative for hearing loss, tinnitus, trouble swallowing and voice change  Eyes: Negative  Negative for photophobia and pain  Respiratory: Negative  Negative for shortness of breath  Cardiovascular: Negative  Negative for palpitations  Gastrointestinal: Negative for vomiting  Endocrine: Negative  Negative for cold intolerance and heat intolerance  Genitourinary: Negative  Negative for dysuria, frequency and urgency  Musculoskeletal: Negative  Negative for myalgias and neck pain  Skin: Negative  Negative for rash  Neurological: Positive for headaches  Negative for dizziness, tremors, seizures, syncope, facial asymmetry, speech difficulty, weakness, light-headedness and numbness  Hematological: Negative  Does not bruise/bleed easily  Psychiatric/Behavioral: Negative  Negative for confusion, hallucinations and sleep disturbance  Review of systems personally reviewed

## 2019-03-22 ENCOUNTER — TELEPHONE (OUTPATIENT)
Dept: NEUROLOGY | Facility: CLINIC | Age: 69
End: 2019-03-22

## 2019-03-22 NOTE — TELEPHONE ENCOUNTER
Pt called requesting refill of divalproex  Script was printed on 2/21  Pt states that she does not know where this script is    rx called in to [facility]

## 2019-03-27 ENCOUNTER — TELEPHONE (OUTPATIENT)
Dept: NEUROLOGY | Facility: CLINIC | Age: 69
End: 2019-03-27

## 2019-03-27 DIAGNOSIS — G43.709 CHRONIC MIGRAINE WITHOUT AURA WITHOUT STATUS MIGRAINOSUS, NOT INTRACTABLE: ICD-10-CM

## 2019-03-27 RX ORDER — SUMATRIPTAN 100 MG/1
TABLET, FILM COATED ORAL
Qty: 9 TABLET | Refills: 0 | Status: SHIPPED | OUTPATIENT
Start: 2019-03-27 | End: 2019-07-24 | Stop reason: SDUPTHER

## 2019-03-27 NOTE — TELEPHONE ENCOUNTER
[facility] sent a fax stating pt has a quantity limit of #9 sumatriptan a month  You prescribed #12, they are asking if there is a medical reason she requires more? If not please send updated rx to pharmacy for #9 instead of 12 and we will call patient and make her aware

## 2019-04-18 DIAGNOSIS — F51.01 PRIMARY INSOMNIA: Primary | ICD-10-CM

## 2019-04-18 RX ORDER — ZOLPIDEM TARTRATE 5 MG/1
TABLET ORAL
Qty: 30 TABLET | Refills: 1 | Status: SHIPPED | OUTPATIENT
Start: 2019-04-18 | End: 2019-06-27 | Stop reason: SDUPTHER

## 2019-06-03 ENCOUNTER — APPOINTMENT (OUTPATIENT)
Dept: LAB | Age: 69
End: 2019-06-03
Payer: MEDICARE

## 2019-06-03 DIAGNOSIS — G43.709 CHRONIC MIGRAINE WITHOUT AURA WITHOUT STATUS MIGRAINOSUS, NOT INTRACTABLE: ICD-10-CM

## 2019-06-03 DIAGNOSIS — Z00.00 HEALTHCARE MAINTENANCE: ICD-10-CM

## 2019-06-03 DIAGNOSIS — F51.01 PRIMARY INSOMNIA: ICD-10-CM

## 2019-06-03 DIAGNOSIS — E78.01 FAMILIAL HYPERCHOLESTEROLEMIA: ICD-10-CM

## 2019-06-03 LAB
ALBUMIN SERPL BCP-MCNC: 3.6 G/DL (ref 3.5–5)
ALP SERPL-CCNC: 45 U/L (ref 46–116)
ALT SERPL W P-5'-P-CCNC: 16 U/L (ref 12–78)
ANION GAP SERPL CALCULATED.3IONS-SCNC: 6 MMOL/L (ref 4–13)
AST SERPL W P-5'-P-CCNC: 19 U/L (ref 5–45)
BACTERIA UR QL AUTO: ABNORMAL /HPF
BASOPHILS # BLD AUTO: 0.03 THOUSANDS/ΜL (ref 0–0.1)
BASOPHILS NFR BLD AUTO: 1 % (ref 0–1)
BILIRUB SERPL-MCNC: 0.41 MG/DL (ref 0.2–1)
BILIRUB UR QL STRIP: NEGATIVE
BUN SERPL-MCNC: 14 MG/DL (ref 5–25)
CALCIUM SERPL-MCNC: 9.2 MG/DL (ref 8.3–10.1)
CHLORIDE SERPL-SCNC: 98 MMOL/L (ref 100–108)
CHOLEST SERPL-MCNC: 163 MG/DL (ref 50–200)
CLARITY UR: CLEAR
CO2 SERPL-SCNC: 27 MMOL/L (ref 21–32)
COLOR UR: YELLOW
CREAT SERPL-MCNC: 0.83 MG/DL (ref 0.6–1.3)
EOSINOPHIL # BLD AUTO: 0.11 THOUSAND/ΜL (ref 0–0.61)
EOSINOPHIL NFR BLD AUTO: 2 % (ref 0–6)
ERYTHROCYTE [DISTWIDTH] IN BLOOD BY AUTOMATED COUNT: 13.3 % (ref 11.6–15.1)
GFR SERPL CREATININE-BSD FRML MDRD: 71 ML/MIN/1.73SQ M
GLUCOSE P FAST SERPL-MCNC: 96 MG/DL (ref 65–99)
GLUCOSE UR STRIP-MCNC: NEGATIVE MG/DL
HCT VFR BLD AUTO: 39.6 % (ref 34.8–46.1)
HDLC SERPL-MCNC: 59 MG/DL (ref 40–60)
HGB BLD-MCNC: 13 G/DL (ref 11.5–15.4)
HGB UR QL STRIP.AUTO: ABNORMAL
HYALINE CASTS #/AREA URNS LPF: ABNORMAL /LPF
IMM GRANULOCYTES # BLD AUTO: 0.03 THOUSAND/UL (ref 0–0.2)
IMM GRANULOCYTES NFR BLD AUTO: 1 % (ref 0–2)
KETONES UR STRIP-MCNC: NEGATIVE MG/DL
LDLC SERPL CALC-MCNC: 85 MG/DL (ref 0–100)
LEUKOCYTE ESTERASE UR QL STRIP: ABNORMAL
LYMPHOCYTES # BLD AUTO: 1.88 THOUSANDS/ΜL (ref 0.6–4.47)
LYMPHOCYTES NFR BLD AUTO: 31 % (ref 14–44)
MCH RBC QN AUTO: 30.9 PG (ref 26.8–34.3)
MCHC RBC AUTO-ENTMCNC: 32.8 G/DL (ref 31.4–37.4)
MCV RBC AUTO: 94 FL (ref 82–98)
MONOCYTES # BLD AUTO: 0.6 THOUSAND/ΜL (ref 0.17–1.22)
MONOCYTES NFR BLD AUTO: 10 % (ref 4–12)
NEUTROPHILS # BLD AUTO: 3.41 THOUSANDS/ΜL (ref 1.85–7.62)
NEUTS SEG NFR BLD AUTO: 55 % (ref 43–75)
NITRITE UR QL STRIP: NEGATIVE
NON-SQ EPI CELLS URNS QL MICRO: ABNORMAL /HPF
NONHDLC SERPL-MCNC: 104 MG/DL
NRBC BLD AUTO-RTO: 0 /100 WBCS
PH UR STRIP.AUTO: 6 [PH]
PLATELET # BLD AUTO: 235 THOUSANDS/UL (ref 149–390)
PMV BLD AUTO: 10.8 FL (ref 8.9–12.7)
POTASSIUM SERPL-SCNC: 4.5 MMOL/L (ref 3.5–5.3)
PROT SERPL-MCNC: 6.8 G/DL (ref 6.4–8.2)
PROT UR STRIP-MCNC: NEGATIVE MG/DL
RBC # BLD AUTO: 4.21 MILLION/UL (ref 3.81–5.12)
RBC #/AREA URNS AUTO: ABNORMAL /HPF
SODIUM SERPL-SCNC: 131 MMOL/L (ref 136–145)
SP GR UR STRIP.AUTO: 1.01 (ref 1–1.03)
TRIGL SERPL-MCNC: 94 MG/DL
TSH SERPL DL<=0.05 MIU/L-ACNC: 1.68 UIU/ML (ref 0.36–3.74)
UROBILINOGEN UR QL STRIP.AUTO: 0.2 E.U./DL
WBC # BLD AUTO: 6.06 THOUSAND/UL (ref 4.31–10.16)
WBC #/AREA URNS AUTO: ABNORMAL /HPF

## 2019-06-03 PROCEDURE — 80053 COMPREHEN METABOLIC PANEL: CPT

## 2019-06-03 PROCEDURE — 36415 COLL VENOUS BLD VENIPUNCTURE: CPT

## 2019-06-03 PROCEDURE — 85025 COMPLETE CBC W/AUTO DIFF WBC: CPT

## 2019-06-03 PROCEDURE — 81001 URINALYSIS AUTO W/SCOPE: CPT

## 2019-06-03 PROCEDURE — 80061 LIPID PANEL: CPT

## 2019-06-03 PROCEDURE — 84443 ASSAY THYROID STIM HORMONE: CPT

## 2019-06-18 ENCOUNTER — APPOINTMENT (OUTPATIENT)
Dept: LAB | Age: 69
End: 2019-06-18
Payer: MEDICARE

## 2019-06-18 DIAGNOSIS — Z12.11 COLON CANCER SCREENING: ICD-10-CM

## 2019-06-18 LAB — HEMOCCULT STL QL IA: POSITIVE

## 2019-06-18 PROCEDURE — G0328 FECAL BLOOD SCRN IMMUNOASSAY: HCPCS

## 2019-06-27 ENCOUNTER — OFFICE VISIT (OUTPATIENT)
Dept: INTERNAL MEDICINE CLINIC | Facility: CLINIC | Age: 69
End: 2019-06-27
Payer: MEDICARE

## 2019-06-27 VITALS
HEIGHT: 66 IN | WEIGHT: 149 LBS | HEART RATE: 68 BPM | BODY MASS INDEX: 23.95 KG/M2 | DIASTOLIC BLOOD PRESSURE: 70 MMHG | TEMPERATURE: 97.8 F | SYSTOLIC BLOOD PRESSURE: 136 MMHG | OXYGEN SATURATION: 97 %

## 2019-06-27 DIAGNOSIS — Z72.0 TOBACCO ABUSE: ICD-10-CM

## 2019-06-27 DIAGNOSIS — F51.01 PRIMARY INSOMNIA: ICD-10-CM

## 2019-06-27 DIAGNOSIS — R19.5 OCCULT BLOOD IN STOOLS: Primary | ICD-10-CM

## 2019-06-27 DIAGNOSIS — R09.89 BRUIT OF RIGHT CAROTID ARTERY: ICD-10-CM

## 2019-06-27 DIAGNOSIS — Z00.00 HEALTHCARE MAINTENANCE: ICD-10-CM

## 2019-06-27 DIAGNOSIS — E78.01 FAMILIAL HYPERCHOLESTEROLEMIA: ICD-10-CM

## 2019-06-27 DIAGNOSIS — G43.709 CHRONIC MIGRAINE WITHOUT AURA WITHOUT STATUS MIGRAINOSUS, NOT INTRACTABLE: ICD-10-CM

## 2019-06-27 PROCEDURE — G0439 PPPS, SUBSEQ VISIT: HCPCS | Performed by: INTERNAL MEDICINE

## 2019-06-27 PROCEDURE — 99214 OFFICE O/P EST MOD 30 MIN: CPT | Performed by: INTERNAL MEDICINE

## 2019-06-27 RX ORDER — ZOLPIDEM TARTRATE 5 MG/1
TABLET ORAL
Qty: 30 TABLET | Refills: 3 | Status: SHIPPED | OUTPATIENT
Start: 2019-06-27 | End: 2020-01-08 | Stop reason: SDUPTHER

## 2019-07-15 ENCOUNTER — HOSPITAL ENCOUNTER (OUTPATIENT)
Dept: NON INVASIVE DIAGNOSTICS | Facility: CLINIC | Age: 69
Discharge: HOME | End: 2019-07-15
Payer: MEDICARE

## 2019-07-15 DIAGNOSIS — R09.89 BRUIT OF RIGHT CAROTID ARTERY: ICD-10-CM

## 2019-07-15 PROCEDURE — 93880 EXTRACRANIAL BILAT STUDY: CPT | Performed by: SURGERY

## 2019-07-15 PROCEDURE — 93880 EXTRACRANIAL BILAT STUDY: CPT

## 2019-07-22 ENCOUNTER — OFFICE VISIT (OUTPATIENT)
Dept: INTERNAL MEDICINE CLINIC | Facility: CLINIC | Age: 69
End: 2019-07-22
Payer: MEDICARE

## 2019-07-22 VITALS
SYSTOLIC BLOOD PRESSURE: 144 MMHG | OXYGEN SATURATION: 96 % | TEMPERATURE: 98 F | BODY MASS INDEX: 23.78 KG/M2 | WEIGHT: 148 LBS | HEIGHT: 66 IN | DIASTOLIC BLOOD PRESSURE: 80 MMHG | HEART RATE: 77 BPM

## 2019-07-22 DIAGNOSIS — J40 BRONCHITIS: ICD-10-CM

## 2019-07-22 DIAGNOSIS — Z12.39 BREAST CANCER SCREENING: Primary | ICD-10-CM

## 2019-07-22 DIAGNOSIS — F41.9 ANXIETY: ICD-10-CM

## 2019-07-22 DIAGNOSIS — J41.0 SIMPLE CHRONIC BRONCHITIS (HCC): ICD-10-CM

## 2019-07-22 DIAGNOSIS — I65.23 BILATERAL CAROTID ARTERY STENOSIS: ICD-10-CM

## 2019-07-22 DIAGNOSIS — E78.00 PURE HYPERCHOLESTEROLEMIA: ICD-10-CM

## 2019-07-22 DIAGNOSIS — R19.5 OCCULT BLOOD IN STOOLS: ICD-10-CM

## 2019-07-22 DIAGNOSIS — Z72.0 TOBACCO ABUSE: ICD-10-CM

## 2019-07-22 DIAGNOSIS — R09.89 BRUIT OF RIGHT CAROTID ARTERY: ICD-10-CM

## 2019-07-22 PROCEDURE — 99214 OFFICE O/P EST MOD 30 MIN: CPT | Performed by: INTERNAL MEDICINE

## 2019-07-22 RX ORDER — CITALOPRAM 20 MG/1
20 TABLET ORAL DAILY
Qty: 90 TABLET | Refills: 0 | Status: SHIPPED | OUTPATIENT
Start: 2019-07-22 | End: 2019-10-14 | Stop reason: SDUPTHER

## 2019-07-22 RX ORDER — CITALOPRAM 20 MG/1
20 TABLET ORAL DAILY
Qty: 90 TABLET | Refills: 0 | Status: SHIPPED | OUTPATIENT
Start: 2019-07-22 | End: 2019-07-22 | Stop reason: SDUPTHER

## 2019-07-22 RX ORDER — SIMVASTATIN 40 MG
40 TABLET ORAL DAILY
Qty: 90 TABLET | Refills: 0 | Status: SHIPPED | OUTPATIENT
Start: 2019-07-22 | End: 2019-07-22 | Stop reason: SDUPTHER

## 2019-07-22 RX ORDER — SIMVASTATIN 40 MG
40 TABLET ORAL DAILY
Qty: 90 TABLET | Refills: 0 | Status: SHIPPED | OUTPATIENT
Start: 2019-07-22 | End: 2019-10-14 | Stop reason: SDUPTHER

## 2019-07-22 NOTE — ASSESSMENT & PLAN NOTE
Patient has scheduled a colonoscopy in will undergo the procedure in the near future    Patient denies any black stools or blood in the stools and she states her energy level is normal

## 2019-07-22 NOTE — PROGRESS NOTES
Assessment/Plan:    Bruit of right carotid artery  With her last appointment in the office patient was found to have a carotid bruit on the right   Patient did undergo carotid Dopplers which showed fibromuscular dysplasia without significant stenosis  I discussed the findings with the patient and patient will have an appointment made to be seen by vascular surgery for further evaluation if they feel is necessary patient was told this fibromuscular dysplasia may be associated with other abnormalities in her vascular system and they may wish for the patient to undergo further testing  Tobacco abuse  I did tell the patient that with her fibromuscular dysplasia she still is a candidate for further vascular disease secondary to her chronic tobacco abuse    Occult blood in stools  Patient has scheduled a colonoscopy in will undergo the procedure in the near future  Patient denies any black stools or blood in the stools and she states her energy level is normal    Bronchitis  Patient is complaining of cough over the past few weeks  On evaluation patient does have some rhonchi and wheezes heard throughout both lung fields  She has no fever or chills  Her O2 sat was adequate  Patient was told to restart using her albuterol inhaler at least twice a day to see if this helps and call if worsening symptoms  Quit smoking now       Diagnoses and all orders for this visit:    Breast cancer screening  -     Mammo screening bilateral w cad; Future    Simple chronic bronchitis (HCC)    Bilateral carotid artery stenosis  -     Ambulatory referral to Vascular Surgery; Future    Bruit of right carotid artery    Tobacco abuse    Occult blood in stools    Bronchitis          Subjective:      Patient ID: Jayesh Perez is a 68 y o  female  68-year-old female who is here today to discuss the results of recent carotid Dopplers after discovery of a bruit to the carotid artery on the right with recent visit    Patient also states that she has been having problems with cough recently but no fever or chills and no increasing shortness of breath      The following portions of the patient's history were reviewed and updated as appropriate: She  has a past medical history of Migraine  She   Patient Active Problem List    Diagnosis Date Noted    Simple chronic bronchitis ([address] ) 07/22/2019    Bilateral carotid artery stenosis 07/22/2019    Occult blood in stools 06/27/2019    Primary insomnia 06/27/2019    Bruit of right carotid artery 06/27/2019    Bronchitis 12/21/2018    Healthcare maintenance 05/17/2018    Migraine without aura and without status migrainosus, not intractable 03/15/2018    Tobacco abuse 07/16/2015    Chronic migraine without aura 12/04/2014    Acute upper respiratory infection 10/16/2013    Hyperlipidemia 09/07/2012     She  has a past surgical history that includes Nasal septum surgery and Shoulder surgery  Her family history includes Cancer in her mother; Diabetes in her sister; Heart disease in her father  She  reports that she has been smoking  She started smoking about 54 years ago  She has a 26 50 pack-year smoking history  She has never used smokeless tobacco  She reports that she drinks alcohol  She reports that she does not use drugs  Current Outpatient Medications   Medication Sig Dispense Refill    albuterol (VENTOLIN HFA) 90 mcg/act inhaler Inhale 2 puffs every 6 (six) hours as needed for wheezing 1 Inhaler 0    Cholecalciferol (VITAMIN D3 PO) Take by mouth      citalopram (CeleXA) 20 mg tablet Take 1 tablet (20 mg total) by mouth daily 90 tablet 0    cyanocobalamin ([employer] VITAMIN B-12) 1000 MCG tablet Take 1,000 mcg by mouth      dexamethasone (DECADRON) 2 mg tablet One in am as needed   5 tablet 2    divalproex sodium (DEPAKOTE ER) 250 mg 24 hr tablet Take 1 tablet (250 mg total) by mouth 2 (two) times a day 180 tablet 3    Magnesium 500 MG CAPS Take by mouth      naproxen (NAPROSYN) 500 mg tablet Take 1 tablet (500 mg total) by mouth as needed for headaches 10 tablet 0    pyridoxine (B-6) 100 MG tablet Take 100 mg by mouth      Riboflavin (B2) 100 MG TABS Take by mouth      simvastatin (ZOCOR) 40 mg tablet Take 1 tablet (40 mg total) by mouth daily 90 tablet 0    SUMAtriptan (IMITREX) 100 mg tablet Take 1 tablet at onset of migraine headache  May repeat in 2 hours if needed, no more than 2 in 24 hours and no more than 3 in a week 9 tablet 0    zolpidem (AMBIEN) 5 mg tablet One pill at bedtime as needed to help with sleep 30 tablet 3    Methylprednisolone 4 MG TBPK Use as directed on package (Patient not taking: Reported on 7/22/2019) 21 tablet 0    OLANZapine (ZyPREXA) 5 mg tablet At bedtime only as needed (Patient not taking: Reported on 7/22/2019) 10 tablet 3    predniSONE 5 mg tablet Begin treatment with 30 mg (6 tablets) per day in divided doses  Decrease dose by 5 mg every 2 days  12 days total   Take with food  (Patient not taking: Reported on 7/22/2019) 42 tablet 0     No current facility-administered medications for this visit  Current Outpatient Medications on File Prior to Visit   Medication Sig    albuterol (VENTOLIN HFA) 90 mcg/act inhaler Inhale 2 puffs every 6 (six) hours as needed for wheezing    Cholecalciferol (VITAMIN D3 PO) Take by mouth    cyanocobalamin ([employer] VITAMIN B-12) 1000 MCG tablet Take 1,000 mcg by mouth    dexamethasone (DECADRON) 2 mg tablet One in am as needed   divalproex sodium (DEPAKOTE ER) 250 mg 24 hr tablet Take 1 tablet (250 mg total) by mouth 2 (two) times a day    Magnesium 500 MG CAPS Take by mouth    naproxen (NAPROSYN) 500 mg tablet Take 1 tablet (500 mg total) by mouth as needed for headaches    pyridoxine (B-6) 100 MG tablet Take 100 mg by mouth    Riboflavin (B2) 100 MG TABS Take by mouth    SUMAtriptan (IMITREX) 100 mg tablet Take 1 tablet at onset of migraine headache   May repeat in 2 hours if needed, no more than 2 in 24 hours and no more than 3 in a week    zolpidem (AMBIEN) 5 mg tablet One pill at bedtime as needed to help with sleep    Methylprednisolone 4 MG TBPK Use as directed on package (Patient not taking: Reported on 7/22/2019)    OLANZapine (ZyPREXA) 5 mg tablet At bedtime only as needed (Patient not taking: Reported on 7/22/2019)    predniSONE 5 mg tablet Begin treatment with 30 mg (6 tablets) per day in divided doses  Decrease dose by 5 mg every 2 days  12 days total   Take with food  (Patient not taking: Reported on 7/22/2019)    [DISCONTINUED] citalopram (CeleXA) 20 mg tablet TAKE 1 TABLET (20 MG TOTAL) BY MOUTH DAILY    [DISCONTINUED] simvastatin (ZOCOR) 40 mg tablet Take 1 tablet (40 mg total) by mouth daily     No current facility-administered medications on file prior to visit  She is allergic to penicillins and nisoldipine       Review of Systems   Constitutional: Negative  HENT: Positive for postnasal drip (Patient does complain of some postnasal drip, slight sore throat) and sore throat  Negative for congestion, dental problem, drooling, ear discharge, ear pain, facial swelling, hearing loss, mouth sores, nosebleeds, rhinorrhea, sinus pressure, sinus pain, sneezing, tinnitus, trouble swallowing and voice change  Eyes: Negative  Respiratory: Positive for cough and wheezing  Negative for apnea, choking, chest tightness, shortness of breath and stridor  Cardiovascular: Negative  Gastrointestinal: Negative  Endocrine: Negative  Genitourinary: Negative  Musculoskeletal: Negative  Skin: Negative  Allergic/Immunologic: Negative  Neurological: Positive for headaches ( history of migraine headaches but no increase in symptoms or headaches recently)  Negative for dizziness, tremors, seizures, syncope, facial asymmetry, speech difficulty, weakness, light-headedness and numbness  Hematological: Negative  Psychiatric/Behavioral: Negative            Objective:      /80   Pulse 77   Temp 98 °F (36 7 °C)   Ht 5' 6" (1 676 m)   Wt 67 1 kg (148 lb)   SpO2 96%   BMI 23 89 kg/m²          Physical Exam   Constitutional: She is oriented to person, place, and time  She appears well-developed and well-nourished  No distress  Pleasant 64-year-old female who is awake alert no acute distress and oriented x3   HENT:   Head: Normocephalic and atraumatic  Right Ear: External ear normal    Left Ear: External ear normal    Mouth/Throat: Oropharyngeal exudate present  Patient on examination does have some mild diffuse erythema to oral mucosa, slight erythema to posterior airway with a thick whitish postnasal drip   Eyes: Pupils are equal, round, and reactive to light  Conjunctivae and EOM are normal  Right eye exhibits no discharge  Left eye exhibits no discharge  No scleral icterus  Neck: Normal range of motion  Neck supple  No JVD present  No tracheal deviation present  No thyromegaly present  Persistent carotid bruit on the right   Cardiovascular: Normal rate, regular rhythm, normal heart sounds and intact distal pulses  Exam reveals no gallop and no friction rub  No murmur heard  Pulmonary/Chest: Effort normal  No stridor  No respiratory distress  She has wheezes  She has no rales  She exhibits no tenderness  Decreased breath sounds anteriorly and posteriorly with faint rhonchi heard through all lung fields  Likewise end-expiratory wheezes   Abdominal: Soft  Bowel sounds are normal  She exhibits no distension and no mass  There is no tenderness  There is no rebound and no guarding  No hernia  Musculoskeletal: Normal range of motion  Lymphadenopathy:     She has no cervical adenopathy  Neurological: She is alert and oriented to person, place, and time  She displays normal reflexes  No cranial nerve deficit or sensory deficit  She exhibits normal muscle tone  Coordination normal    Skin: Skin is warm and dry  Capillary refill takes less than 2 seconds  No rash noted   She is not diaphoretic  No erythema  No pallor  Psychiatric: She has a normal mood and affect  Her behavior is normal  Judgment and thought content normal    Nursing note and vitals reviewed

## 2019-07-22 NOTE — ASSESSMENT & PLAN NOTE
Patient is complaining of cough over the past few weeks  On evaluation patient does have some rhonchi and wheezes heard throughout both lung fields  She has no fever or chills  Her O2 sat was adequate  Patient was told to restart using her albuterol inhaler at least twice a day to see if this helps and call if worsening symptoms    Quit smoking now

## 2019-07-22 NOTE — ASSESSMENT & PLAN NOTE
I did tell the patient that with her fibromuscular dysplasia she still is a candidate for further vascular disease secondary to her chronic tobacco abuse

## 2019-07-22 NOTE — ASSESSMENT & PLAN NOTE
With her last appointment in the office patient was found to have a carotid bruit on the right   Patient did undergo carotid Dopplers which showed fibromuscular dysplasia without significant stenosis  I discussed the findings with the patient and patient will have an appointment made to be seen by vascular surgery for further evaluation if they feel is necessary patient was told this fibromuscular dysplasia may be associated with other abnormalities in her vascular system and they may wish for the patient to undergo further testing

## 2019-07-24 DIAGNOSIS — G43.009 MIGRAINE WITHOUT AURA AND WITHOUT STATUS MIGRAINOSUS, NOT INTRACTABLE: ICD-10-CM

## 2019-07-24 DIAGNOSIS — G43.709 CHRONIC MIGRAINE WITHOUT AURA WITHOUT STATUS MIGRAINOSUS, NOT INTRACTABLE: ICD-10-CM

## 2019-07-24 RX ORDER — NAPROXEN 500 MG/1
500 TABLET ORAL AS NEEDED
Qty: 10 TABLET | Refills: 0 | Status: SHIPPED | OUTPATIENT
Start: 2019-07-24 | End: 2019-10-24 | Stop reason: SDUPTHER

## 2019-07-24 RX ORDER — SUMATRIPTAN 100 MG/1
TABLET, FILM COATED ORAL
Qty: 9 TABLET | Refills: 0 | Status: SHIPPED | OUTPATIENT
Start: 2019-07-24 | End: 2019-10-24 | Stop reason: SDUPTHER

## 2019-08-12 ENCOUNTER — APPOINTMENT (OUTPATIENT)
Dept: LAB | Age: 69
End: 2019-08-12
Payer: MEDICARE

## 2019-08-12 ENCOUNTER — CONSULT (OUTPATIENT)
Dept: VASCULAR SURGERY | Facility: CLINIC | Age: 69
End: 2019-08-12
Payer: MEDICARE

## 2019-08-12 VITALS
SYSTOLIC BLOOD PRESSURE: 128 MMHG | BODY MASS INDEX: 23.95 KG/M2 | WEIGHT: 149 LBS | TEMPERATURE: 98.3 F | HEIGHT: 66 IN | DIASTOLIC BLOOD PRESSURE: 72 MMHG | HEART RATE: 78 BPM

## 2019-08-12 DIAGNOSIS — I65.23 BILATERAL CAROTID ARTERY STENOSIS: ICD-10-CM

## 2019-08-12 LAB
ANION GAP SERPL CALCULATED.3IONS-SCNC: 4 MMOL/L (ref 4–13)
BUN SERPL-MCNC: 11 MG/DL (ref 5–25)
CALCIUM SERPL-MCNC: 9.2 MG/DL (ref 8.3–10.1)
CHLORIDE SERPL-SCNC: 97 MMOL/L (ref 100–108)
CO2 SERPL-SCNC: 30 MMOL/L (ref 21–32)
CREAT SERPL-MCNC: 0.84 MG/DL (ref 0.6–1.3)
GFR SERPL CREATININE-BSD FRML MDRD: 70 ML/MIN/1.73SQ M
GLUCOSE P FAST SERPL-MCNC: 89 MG/DL (ref 65–99)
POTASSIUM SERPL-SCNC: 4.2 MMOL/L (ref 3.5–5.3)
SODIUM SERPL-SCNC: 131 MMOL/L (ref 136–145)

## 2019-08-12 PROCEDURE — 36415 COLL VENOUS BLD VENIPUNCTURE: CPT

## 2019-08-12 PROCEDURE — 99203 OFFICE O/P NEW LOW 30 MIN: CPT | Performed by: SURGERY

## 2019-08-12 PROCEDURE — 80048 BASIC METABOLIC PNL TOTAL CA: CPT

## 2019-08-12 NOTE — PROGRESS NOTES
Assessment/Plan:    Bilateral carotid artery stenosis  Asymptomatic bilateral carotid stenosis with flow pattern suggestive of fibromuscular dysplasia  Will proceed with CTA to better delineate the vascular pathology present  Patient will return in 3 weeks to review the CTA results and discuss future plans  Patient takes Aleve daily for migraines  I have asked her to initiate 81 mg aspirin  She is on a statin  We have had a discussion regarding the importance of smoking cessation in helping to prevent disease progression  Diagnoses and all orders for this visit:    Bilateral carotid artery stenosis  -     Ambulatory referral to Vascular Surgery  -     CTA head and neck w wo contrast; Future  -     Basic metabolic panel; Future          Subjective:      Patient ID: Roya Han is a 68 y o  female  Pt is new to the practice  She had CV 7/15  Pt has chronic migraines  Pt is a current smoker 1/2 PPD  75-year-old female presents for evaluation of an abnormal carotid duplex performed on 07/15/2019  The carotid duplex was performed after a right cervical bruit was appreciated on clinical examination  Elevated velocities were noted an abnormal flow patterns consistent with fibromuscular dysplasia  The velocities within the right internal carotid artery were noted to be 220 cm per 2nd with an end-diastolic velocity of 51 centimeters/second  On the left the peak systolic velocity was 773 centimeters/second with an end-diastolic velocity of 89 centimeters/second  Patient denies history of amaurosis, unilateral weakness or numbness or other signs or symptoms of TIA/CVA  Patient denies history of hypertension  Will obtain CTA to better delineate the carotid pathology and to determine whether significant stenosis is in fact present  If fibromuscular dysplasia is confirmed on CTA will obtain a baseline renal artery duplex        The following portions of the patient's history were reviewed and updated as appropriate: allergies, current medications, past family history, past medical history, past social history, past surgical history and problem list     Review of Systems   Constitutional: Negative  HENT: Positive for ear pain  Eyes: Negative  Respiratory: Positive for shortness of breath (with activity)  Cardiovascular: Negative  Gastrointestinal: Negative  Endocrine: Negative  Genitourinary: Negative  Musculoskeletal: Negative  Skin: Negative  Allergic/Immunologic: Negative  Neurological: Positive for headaches  Hematological: Negative  Psychiatric/Behavioral: Negative  Objective:      /72 (BP Location: Left arm, Patient Position: Sitting, Cuff Size: Adult)   Pulse 78   Temp 98 3 °F (36 8 °C) (Tympanic)   Ht 5' 6" (1 676 m)   Wt 67 6 kg (149 lb)   BMI 24 05 kg/m²          Physical Exam   Constitutional: She is oriented to person, place, and time  She appears well-developed and well-nourished  HENT:   Head: Normocephalic and atraumatic  Mouth/Throat: Oropharynx is clear and moist    Eyes: Pupils are equal, round, and reactive to light  Conjunctivae and EOM are normal    Neck: Normal range of motion  Neck supple  No JVD present  Cardiovascular: Normal rate, regular rhythm, normal heart sounds and intact distal pulses  Pulmonary/Chest: Effort normal and breath sounds normal  No stridor  No respiratory distress  She has no wheezes  She has no rales  She exhibits no tenderness  Abdominal: Soft  She exhibits no distension and no mass  There is no tenderness  There is no rebound and no guarding  Musculoskeletal: Normal range of motion  She exhibits no tenderness or deformity  Neurological: She is alert and oriented to person, place, and time  Skin: Skin is warm and dry  No rash noted  No erythema  Psychiatric: She has a normal mood and affect  Her behavior is normal  Thought content normal    Nursing note and vitals reviewed

## 2019-08-12 NOTE — LETTER
August 12, 2019     Surekha IsraelDO  [address]  [address] [place] [address] [place] [address]    Patient: Marcio Rowell   YOB: 1950   Date of Visit: 8/12/2019       Dear Dr Rodriguez Del Rosario: Thank you for referring Marcio Rowell to me for evaluation  Below are the relevant portions of my assessment and plan of care  68-year-old female presents for evaluation of an abnormal carotid duplex performed on 07/15/2019  The carotid duplex was performed after a right cervical bruit was appreciated on clinical examination  Elevated velocities were noted an abnormal flow patterns consistent with fibromuscular dysplasia  The velocities within the right internal carotid artery were noted to be 220 cm per 2nd with an end-diastolic velocity of 51 centimeters/second  On the left the peak systolic velocity was 205 centimeters/second with an end-diastolic velocity of 89 centimeters/second  Patient denies history of amaurosis, unilateral weakness or numbness or other signs or symptoms of TIA/CVA  Patient denies history of hypertension  Will obtain CTA to better delineate the carotid pathology and to determine whether significant stenosis is in fact present  If fibromuscular dysplasia is confirmed on CTA will obtain a baseline renal artery duplex  Assessment/Plan:    Bilateral carotid artery stenosis  Asymptomatic bilateral carotid stenosis with flow pattern suggestive of fibromuscular dysplasia  Will proceed with CTA to better delineate the vascular pathology present  Patient will return in 3 weeks to review the CTA results and discuss future plans  Patient takes Aleve daily for migraines  I have asked her to initiate 81 mg aspirin  She is on a statin  We have had a discussion regarding the importance of smoking cessation in helping to prevent disease progression  If you have questions, please do not hesitate to call me  I look forward to following Nerissa Anderson along with you           Sincerely,        Tiago Rock MD Min        CC: No Recipients

## 2019-08-12 NOTE — ASSESSMENT & PLAN NOTE
Asymptomatic bilateral carotid stenosis with flow pattern suggestive of fibromuscular dysplasia  Will proceed with CTA to better delineate the vascular pathology present  Patient will return in 3 weeks to review the CTA results and discuss future plans  Patient takes Aleve daily for migraines  I have asked her to initiate 81 mg aspirin  She is on a statin  We have had a discussion regarding the importance of smoking cessation in helping to prevent disease progression

## 2019-08-21 ENCOUNTER — HOSPITAL ENCOUNTER (OUTPATIENT)
Dept: RADIOLOGY | Age: 69
Discharge: HOME | End: 2019-08-21
Payer: MEDICARE

## 2019-08-21 DIAGNOSIS — I65.23 BILATERAL CAROTID ARTERY STENOSIS: ICD-10-CM

## 2019-08-21 PROCEDURE — 70496 CT ANGIOGRAPHY HEAD: CPT

## 2019-08-21 PROCEDURE — 70498 CT ANGIOGRAPHY NECK: CPT

## 2019-08-21 RX ADMIN — IOHEXOL 85 ML: 350 INJECTION, SOLUTION INTRAVENOUS at 09:41

## 2019-08-26 NOTE — PROGRESS NOTES
Assessment/Plan:    Fibromuscular dysplasia ([facility] [address] )   CTA consistent with FMD, no atherosclerotic disease noted  There are subtle irregularities involving the walls of the mid cervical internal carotid arteries with tortuosity and small pseudoaneurysms are identified bilaterally  No evidence of dissection  No intracranial aneurysms are noted  [place] from [place] for FMD shows signifcant prevalence of synchronous associated arterial pathology, namely renal artery aneurysms  Thus, patients with FMD found to have aneurysmal or dissection are recommended  to undergo complete cross-sectional CTA from head to pelvis  Thus, we will obtain a CTA of the abdomen and pelvis  As for the cervical carotid pathology, the pseudoaneurysms are quite small, thus I would not recommend intervention at this time  She will undergo a repeat carotid duplex in 6 months  If the pseudoaneurysms are not visualized on the repeat duplex, will consider repeat cervical CTA at 1 year intervals  Will initiate antiplatelet therapy - discussed with pt and her accompanying   Discussed importance of smoking cessation  Diagnoses and all orders for this visit:    Fibromuscular dysplasia ([facility] [address] )  -     CTA abdomen pelvis w wo contrast; Future  -     Basic metabolic panel; Future          Subjective:      Patient ID: Lawyer Inman is a 68 y o  female  Ms Luisa Guzman returns to discuss her CTA head/neck performed on 8/21/19  She denies TIA/CVA symptoms  Pt is taking ASA and statin  Pt continues to smoke 1/2 PPD     70-year-old female underwent carotid duplex performed on 07/15/2019 for right cervical bruit, appreciated on clinical examination  Elevated velocities were noted with abnormal flow patterns consistent with fibromuscular dysplasia  The velocities within the right internal carotid artery were noted to be 220 cm/s with an end-diastolic velocity of 51 centimeters/second    On the left the peak systolic velocity was 261 centimeters/second with an end-diastolic velocity of 89 centimeters/second  Patient denies history of amaurosis, unilateral weakness or numbness or other signs or symptoms of TIA/CVA  Patient denies history of hypertension  CTA reveals: No atherosclerotic disease or occlusion noted within the cervical vasculature, however, there are subtle irregularities involving the walls of the mid cervical internal carotid arteries with tortuosity  Small pseudoaneurysms are identified bilaterally  Pattern is suggestive of fibromuscular dysplasia  No evidence of dissection  No intracranial aneurysms are noted  CT of the brain without contrast demonstrates a small area of low density within the superior aspect of the right lentiform nucleus which is new compared to the prior study from 9 years ago and may represent a chronic or subacute lacunar infarct  [place] from [place]  Registry for FMD shows signifcant prevalence of other associated pathology, namely renal artery aneurysms, it is recommended for patients to undergo complete cross-sectional CTA from head to pelvis  Thus, we will obtain a CTA of the abdomen and pelvis  As for the cervical carotid pathology, the pseudoaneurysms are quite small, thus I would not recommend intervention at this time  She will undergo a repeat duplex in 6 months time period  If the pseudoaneurysms are not visualized on the repeat duplex will consider repeat cervical CTA at 1 year intervals  The following portions of the patient's history were reviewed and updated as appropriate: allergies, current medications, past family history, past medical history, past social history, past surgical history and problem list     Review of Systems   Constitutional: Negative  HENT: Positive for tinnitus  Eyes: Negative  Respiratory: Positive for cough  Cardiovascular: Negative  Gastrointestinal: Negative  Endocrine: Negative  Genitourinary: Negative      Musculoskeletal: Negative  Skin: Negative  Allergic/Immunologic: Negative  Neurological: Positive for headaches  Hematological: Negative  Psychiatric/Behavioral: Negative  Objective:      /70 (BP Location: Left arm, Patient Position: Sitting, Cuff Size: Adult)   Pulse 97   Temp (!) 96 4 °F (35 8 °C) (Tympanic)   Ht 5' 6" (1 676 m)   Wt 68 kg (150 lb)   BMI 24 21 kg/m²          Physical Exam   Constitutional: She is oriented to person, place, and time  She appears well-developed and well-nourished  HENT:   Head: Normocephalic and atraumatic  Mouth/Throat: Oropharynx is clear and moist    Eyes: Pupils are equal, round, and reactive to light  Conjunctivae and EOM are normal    Neck: Normal range of motion  Neck supple  No JVD present  Cardiovascular: Normal rate, regular rhythm and normal heart sounds  Pulmonary/Chest: Effort normal and breath sounds normal  No stridor  No respiratory distress  She has no wheezes  She has no rales  She exhibits no tenderness  Abdominal: Soft  She exhibits no distension and no mass  There is no tenderness  There is no rebound and no guarding  Musculoskeletal: Normal range of motion  She exhibits no tenderness or deformity  Neurological: She is alert and oriented to person, place, and time  Skin: Skin is warm and dry  No rash noted  No erythema  Psychiatric: She has a normal mood and affect  Her behavior is normal  Thought content normal    Nursing note and vitals reviewed

## 2019-08-27 ENCOUNTER — APPOINTMENT (OUTPATIENT)
Dept: LAB | Age: 69
End: 2019-08-27
Payer: MEDICARE

## 2019-08-27 ENCOUNTER — OFFICE VISIT (OUTPATIENT)
Dept: VASCULAR SURGERY | Facility: CLINIC | Age: 69
End: 2019-08-27
Payer: MEDICARE

## 2019-08-27 VITALS
WEIGHT: 150 LBS | SYSTOLIC BLOOD PRESSURE: 138 MMHG | TEMPERATURE: 96.4 F | BODY MASS INDEX: 24.11 KG/M2 | DIASTOLIC BLOOD PRESSURE: 70 MMHG | HEART RATE: 97 BPM | HEIGHT: 66 IN

## 2019-08-27 DIAGNOSIS — I77.3 FIBROMUSCULAR DYSPLASIA (HCC): Primary | ICD-10-CM

## 2019-08-27 DIAGNOSIS — I77.3 FIBROMUSCULAR DYSPLASIA (HCC): ICD-10-CM

## 2019-08-27 LAB
ANION GAP SERPL CALCULATED.3IONS-SCNC: 6 MMOL/L (ref 4–13)
BUN SERPL-MCNC: 15 MG/DL (ref 5–25)
CALCIUM SERPL-MCNC: 9.6 MG/DL (ref 8.3–10.1)
CHLORIDE SERPL-SCNC: 105 MMOL/L (ref 100–108)
CO2 SERPL-SCNC: 29 MMOL/L (ref 21–32)
CREAT SERPL-MCNC: 0.79 MG/DL (ref 0.6–1.3)
GFR SERPL CREATININE-BSD FRML MDRD: 76 ML/MIN/1.73SQ M
GLUCOSE P FAST SERPL-MCNC: 95 MG/DL (ref 65–99)
POTASSIUM SERPL-SCNC: 4.2 MMOL/L (ref 3.5–5.3)
SODIUM SERPL-SCNC: 140 MMOL/L (ref 136–145)

## 2019-08-27 PROCEDURE — 80048 BASIC METABOLIC PNL TOTAL CA: CPT

## 2019-08-27 PROCEDURE — 36415 COLL VENOUS BLD VENIPUNCTURE: CPT

## 2019-08-27 PROCEDURE — 99214 OFFICE O/P EST MOD 30 MIN: CPT | Performed by: SURGERY

## 2019-08-27 NOTE — LETTER
August 28, 2019     Kenya AudreyDO  [address]  [address] [place] [address] [place] [address]    Patient: Hilda Lowe   YOB: 1950   Date of Visit: 8/27/2019       Dear Dr Cheyenne Fox: Thank you for referring Hilda Lowe to me for evaluation  Below are the relevant portions of my assessment and plan of care  Ms Judy Galdamez returns to discuss her CTA head/neck performed on 8/21/19  She denies TIA/CVA symptoms  Pt is taking ASA and statin  Pt continues to smoke 1/2 PPD          Ms Judy Galdamez is a 55-year-old female, accompanied by her , who underwent carotid duplex on 07/15/2019 for right cervical bruit, appreciated on clinical examination  Elevated velocities were noted with abnormal flow patterns consistent with fibromuscular dysplasia  The velocities within the right internal carotid artery were noted to be 220 cm/s with an end-diastolic velocity of 51 centimeters/second  On the left the peak systolic velocity was 127 centimeters/second with an end-diastolic velocity of 89 centimeters/second  Patient denies history of amaurosis, unilateral weakness or numbness or other signs or symptoms of TIA/CVA  Patient denies history of hypertension  CTA reveals: No atherosclerotic disease or occlusion noted within the cervical vasculature, however, there are subtle irregularities involving the walls of the mid cervical internal carotid arteries with tortuosity  Small pseudoaneurysms are identified bilaterally  Pattern is suggestive of fibromuscular dysplasia  No evidence of dissection  No intracranial aneurysms are noted  CT of the brain without contrast demonstrates a small area of low density within the superior aspect of the right lentiform nucleus which is new compared to the prior study from 9 years ago and may represent a chronic or subacute lacunar infarct  Assessment/Plan:    Fibromuscular dysplasia (HCC)  CTA consistent with FMD, no atherosclerotic disease noted   There are subtle irregularities involving the walls of the mid cervical internal carotid arteries with tortuosity and small pseudoaneurysms are identified bilaterally  No evidence of dissection  No intracranial aneurysms are noted  [place] from the [place] for FMD shows signifcant prevalence of synchronous associated arterial pathology, namely renal artery aneurysms  Thus, patients with FMD found to have aneurysmal or dissection are recommended  to undergo complete cross-sectional CTA from head to pelvis  Thus, we will obtain a CTA of the abdomen and pelvis  As for the cervical carotid pathology, the pseudoaneurysms are quite small, thus I would not recommend intervention at this time  She will undergo a repeat carotid duplex in 6 months  If the pseudoaneurysms are not visualized on the repeat duplex, will consider repeat cervical CTA at 1 year intervals  Will initiate antiplatelet therapy - discussed with pt and her accompanying   Discussed importance of smoking cessation  If you have questions, please do not hesitate to call me  I look forward to following Kyle Jacobson along with you           Sincerely,        Elana Miramontes MD        CC: No Recipients

## 2019-08-28 NOTE — ASSESSMENT & PLAN NOTE
CTA consistent with FMD, no atherosclerotic disease noted  There are subtle irregularities involving the walls of the mid cervical internal carotid arteries with tortuosity and small pseudoaneurysms are identified bilaterally  No evidence of dissection  No intracranial aneurysms are noted  Data from the [place]  Registry for FMD shows signifcant prevalence of synchronous associated arterial pathology, namely renal artery aneurysms  Thus, patients with FMD found to have aneurysmal or dissection are recommended  to undergo complete cross-sectional CTA from head to pelvis  Thus, we will obtain a CTA of the abdomen and pelvis  As for the cervical carotid pathology, the pseudoaneurysms are quite small, thus I would not recommend intervention at this time  She will undergo a repeat carotid duplex in 6 months  If the pseudoaneurysms are not visualized on the repeat duplex, will consider repeat cervical CTA at 1 year intervals  Will initiate antiplatelet therapy - discussed with pt and her accompanying   Discussed importance of smoking cessation

## 2019-09-09 ENCOUNTER — HOSPITAL ENCOUNTER (OUTPATIENT)
Dept: RADIOLOGY | Age: 69
Discharge: HOME | End: 2019-09-09
Payer: MEDICARE

## 2019-09-09 DIAGNOSIS — I77.3 FIBROMUSCULAR DYSPLASIA (HCC): ICD-10-CM

## 2019-09-09 PROCEDURE — 74174 CTA ABD&PLVS W/CONTRAST: CPT

## 2019-09-09 RX ADMIN — IOHEXOL 100 ML: 350 INJECTION, SOLUTION INTRAVENOUS at 11:00

## 2019-09-16 ENCOUNTER — OFFICE VISIT (OUTPATIENT)
Dept: VASCULAR SURGERY | Facility: CLINIC | Age: 69
End: 2019-09-16
Payer: MEDICARE

## 2019-09-16 VITALS
DIASTOLIC BLOOD PRESSURE: 60 MMHG | BODY MASS INDEX: 23.78 KG/M2 | HEIGHT: 66 IN | WEIGHT: 148 LBS | HEART RATE: 76 BPM | SYSTOLIC BLOOD PRESSURE: 102 MMHG

## 2019-09-16 DIAGNOSIS — I77.3 FIBROMUSCULAR DYSPLASIA OF CERVICOCRANIAL ARTERY (HCC): Primary | ICD-10-CM

## 2019-09-16 PROCEDURE — 99212 OFFICE O/P EST SF 10 MIN: CPT | Performed by: SURGERY

## 2019-09-16 NOTE — LETTER
September 16, 2019     Lamolly DO Jael  [address]  [address] [place] [address] [place] [address]    Patient: Aron Dancer   YOB: 1950   Date of Visit: 9/16/2019       Dear Dr Jose Carlos Brown: Thank you for referring Aron Dancer to me for evaluation  Below are the relevant portions of my assessment and plan of care  Pt had CTA abdomen/pelvis 9/9/19 after being diagnosed with carotid fibromuscular dysplasia with associated pseudoaneurysms  Patient remains asymptomatic  Patient underwent CT of the abdomen and pelvis to rule out renal and/or mesenteric fibromuscular dysplasia  CT performed on 09/09/2019 shows no evidence of intra-abdominal fibromuscular dysplasia  Assessment/Plan:    Fibromuscular dysplasia of cervicocranial artery (HCC)  Surveillance carotid duplex in 6 months  Continue aspirin/statin therapy  If aneurysms are not visualized on duplex, will repeat CTA at one year  If you have questions, please do not hesitate to call me  I look forward to following Marleni Carvajal along with you           Sincerely,        Bharath Martin MD        CC: No Recipients

## 2019-09-16 NOTE — ASSESSMENT & PLAN NOTE
Surveillance carotid duplex in 6 months  Continue aspirin/statin therapy  If aneurysms are not visualized on duplex, will repeat CTA at one year

## 2019-09-16 NOTE — PROGRESS NOTES
Assessment/Plan:    Fibromuscular dysplasia of cervicocranial artery (HCC)  Surveillance carotid duplex in 6 months  Continue aspirin/statin therapy  If aneurysms are not visualized on duplex, will repeat CTA at one year  Diagnoses and all orders for this visit:    Fibromuscular dysplasia of cervicocranial artery (HCC)          Subjective:      Patient ID: Zachary Cuba is a 68 y o  female  Pt had CTA abdomen/pelvis 9/9/19 after being diagnosed with carotid fibromuscular dysplasia with associated pseudoaneurysms  Patient remains asymptomatic  Patient underwent CT of the abdomen and pelvis to rule out renal and/or mesenteric fibromuscular dysplasia  CT performed on 09/09/2019 shows no evidence of intra-abdominal fibromuscular dysplasia  The following portions of the patient's history were reviewed and updated as appropriate: allergies, current medications, past family history, past medical history, past social history, past surgical history and problem list     Review of Systems   Constitutional: Negative  HENT: Negative  Eyes: Negative  Respiratory: Positive for cough  Cardiovascular: Negative  Gastrointestinal: Negative  Endocrine: Negative  Genitourinary: Negative  Musculoskeletal: Negative  Skin: Negative  Allergic/Immunologic: Negative  Neurological: Negative  Hematological: Negative  Psychiatric/Behavioral: Negative  Objective:      /60 (BP Location: Left arm, Patient Position: Sitting, Cuff Size: Adult)   Pulse 76   Ht 5' 6" (1 676 m)   Wt 67 1 kg (148 lb)   BMI 23 89 kg/m²          Physical Exam   Nursing note and vitals reviewed

## 2019-09-19 DIAGNOSIS — G43.709 CHRONIC MIGRAINE WITHOUT AURA WITHOUT STATUS MIGRAINOSUS, NOT INTRACTABLE: ICD-10-CM

## 2019-09-19 RX ORDER — DIVALPROEX SODIUM 250 MG/1
250 TABLET, EXTENDED RELEASE ORAL 2 TIMES DAILY
Qty: 180 TABLET | Refills: 1 | Status: SHIPPED | OUTPATIENT
Start: 2019-09-19 | End: 2019-12-23 | Stop reason: SDUPTHER

## 2019-10-14 DIAGNOSIS — F32.89 OTHER DEPRESSION: ICD-10-CM

## 2019-10-14 DIAGNOSIS — E78.00 PURE HYPERCHOLESTEROLEMIA: ICD-10-CM

## 2019-10-14 DIAGNOSIS — F41.9 ANXIETY: ICD-10-CM

## 2019-10-14 DIAGNOSIS — E78.01 FAMILIAL HYPERCHOLESTEROLEMIA: Primary | ICD-10-CM

## 2019-10-14 DIAGNOSIS — B00.9 HERPES SIMPLEX: ICD-10-CM

## 2019-10-14 RX ORDER — CITALOPRAM 20 MG/1
20 TABLET ORAL DAILY
Qty: 90 TABLET | Refills: 0 | Status: SHIPPED | OUTPATIENT
Start: 2019-10-14 | End: 2020-01-08 | Stop reason: SDUPTHER

## 2019-10-14 RX ORDER — SIMVASTATIN 40 MG
40 TABLET ORAL DAILY
Qty: 90 TABLET | Refills: 0 | Status: SHIPPED | OUTPATIENT
Start: 2019-10-14 | End: 2020-01-08 | Stop reason: SDUPTHER

## 2019-10-23 ENCOUNTER — TELEPHONE (OUTPATIENT)
Dept: INTERNAL MEDICINE CLINIC | Facility: CLINIC | Age: 69
End: 2019-10-23

## 2019-10-24 DIAGNOSIS — G43.009 MIGRAINE WITHOUT AURA AND WITHOUT STATUS MIGRAINOSUS, NOT INTRACTABLE: ICD-10-CM

## 2019-10-24 DIAGNOSIS — G43.709 CHRONIC MIGRAINE WITHOUT AURA WITHOUT STATUS MIGRAINOSUS, NOT INTRACTABLE: ICD-10-CM

## 2019-10-24 RX ORDER — NAPROXEN 500 MG/1
500 TABLET ORAL AS NEEDED
Qty: 10 TABLET | Refills: 0 | Status: SHIPPED | OUTPATIENT
Start: 2019-10-24 | End: 2019-12-11 | Stop reason: SDUPTHER

## 2019-10-24 RX ORDER — SUMATRIPTAN 100 MG/1
TABLET, FILM COATED ORAL
Qty: 9 TABLET | Refills: 0 | Status: SHIPPED | OUTPATIENT
Start: 2019-10-24 | End: 2019-12-11 | Stop reason: SDUPTHER

## 2019-10-24 NOTE — TELEPHONE ENCOUNTER
Medication refill check list    Correct patient? yes   Correct medication name, dose, and pill size? yes   Correct provider? yes   Last and Next appt  scheduled? Yes, last date 02/21/19  & next date 2/26/20   Right pharmacy listed? yes   Correct quantity for 30 or 90 days? yes   Is the patient out of refills? When was it last prescribed? Yes,    Directions match what the patient says they are taking?  yes   Enough refills? (none for controlled substances, 1 year for routine medications) yes

## 2019-11-05 ENCOUNTER — OFFICE VISIT (OUTPATIENT)
Dept: INTERNAL MEDICINE CLINIC | Facility: CLINIC | Age: 69
End: 2019-11-05
Payer: MEDICARE

## 2019-11-05 VITALS
TEMPERATURE: 97.9 F | HEIGHT: 66 IN | OXYGEN SATURATION: 97 % | WEIGHT: 147 LBS | BODY MASS INDEX: 23.63 KG/M2 | HEART RATE: 73 BPM | SYSTOLIC BLOOD PRESSURE: 130 MMHG | DIASTOLIC BLOOD PRESSURE: 62 MMHG

## 2019-11-05 DIAGNOSIS — G43.709 CHRONIC MIGRAINE WITHOUT AURA WITHOUT STATUS MIGRAINOSUS, NOT INTRACTABLE: ICD-10-CM

## 2019-11-05 DIAGNOSIS — M54.50 ACUTE LEFT-SIDED LOW BACK PAIN WITHOUT SCIATICA: ICD-10-CM

## 2019-11-05 DIAGNOSIS — Z72.0 TOBACCO ABUSE: ICD-10-CM

## 2019-11-05 DIAGNOSIS — I65.23 BILATERAL CAROTID ARTERY STENOSIS: ICD-10-CM

## 2019-11-05 DIAGNOSIS — H35.30 MACULAR DEGENERATION OF RIGHT EYE, UNSPECIFIED TYPE: Primary | ICD-10-CM

## 2019-11-05 DIAGNOSIS — E78.01 FAMILIAL HYPERCHOLESTEROLEMIA: ICD-10-CM

## 2019-11-05 PROCEDURE — 99214 OFFICE O/P EST MOD 30 MIN: CPT | Performed by: INTERNAL MEDICINE

## 2019-11-05 NOTE — ASSESSMENT & PLAN NOTE
History of bilateral carotid artery stenosis, first diagnosed when bruit was heard to the right carotid artery and patient did undergo vascular evaluation  Patient does have fibromuscular dysplasia and continues to follow-up with vascular surgery    She was told major risk factor is her continuing to smoke

## 2019-11-05 NOTE — ASSESSMENT & PLAN NOTE
Patient does have a history of hyperlipidemia  She is admitting that she is not always compliant with her diet and does not watch her intake of fats and cholesterol  Patient also was a smoker  , vascular disease  She was told the importance of watching her cholesterol closely, intake of fats and cholesterol in her diet

## 2019-11-05 NOTE — ASSESSMENT & PLAN NOTE
Patient states that she had recent eye evaluation by optometrist and was told that she has macular degeneration to right eye  Patient is requesting referral to be seen by Ophthalmology    She relates that she has had no visual changes

## 2019-11-05 NOTE — ASSESSMENT & PLAN NOTE
Patient continues to follow-up with Neurology regarding her migraine headaches  She states with present medication she is having less problems with migraines and does have relief with her Imitrex when she takes this medication for her migraines    She will continue to follow up with Neurology

## 2019-11-05 NOTE — ASSESSMENT & PLAN NOTE
Again we discussed with the patient the importance of quitting smoking now  Continue to smoke which are at higher risk for further progression of vascular disease, lung disease  Patient states he has no intention of stopping at this time

## 2019-11-05 NOTE — PROGRESS NOTES
Assessment/Plan:    Acute left-sided low back pain without sciatica  Patient is complaining of pain in the low back on the left hand side  The area of the SI joint  She felt was a problem with her hip but on examination patient has no evidence of any abnormalities associated with the hip but some low back pain  We discussed sending her to physical therapy for evaluation and she refuses a  Evaluation by Orthopedics but she refuses  Patient will go to pain management for evaluation  She was told that most likely they will start by having evaluation by physical therapy  She states the pain is worse when she sleeps on her left side  No difficulties with ambulation  No weakness in the leg  Chronic migraine without aura  Patient continues to follow-up with Neurology regarding her migraine headaches  She states with present medication she is having less problems with migraines and does have relief with her Imitrex when she takes this medication for her migraines  She will continue to follow up with Neurology    Bilateral carotid artery stenosis  History of bilateral carotid artery stenosis, first diagnosed when bruit was heard to the right carotid artery and patient did undergo vascular evaluation  Patient does have fibromuscular dysplasia and continues to follow-up with vascular surgery  She was told major risk factor is her continuing to smoke    Tobacco abuse  Again we discussed with the patient the importance of quitting smoking now  Continue to smoke which are at higher risk for further progression of vascular disease, lung disease  Patient states he has no intention of stopping at this time  Macular degeneration of right eye  Patient states that she had recent eye evaluation by optometrist and was told that she has macular degeneration to right eye  Patient is requesting referral to be seen by Ophthalmology    She relates that she has had no visual changes    Hyperlipidemia  Patient does have a history of hyperlipidemia  She is admitting that she is not always compliant with her diet and does not watch her intake of fats and cholesterol  Patient also was a smoker  , vascular disease  She was told the importance of watching her cholesterol closely, intake of fats and cholesterol in her diet  Diagnoses and all orders for this visit:    Macular degeneration of right eye, unspecified type  -     Ambulatory referral to Ophthalmology; Future    Familial hypercholesterolemia    Acute left-sided low back pain without sciatica  -     Ambulatory referral to Pain Management; Future    Tobacco abuse    Chronic migraine without aura without status migrainosus, not intractable    Bilateral carotid artery stenosis          Subjective:      Patient ID: Woody Baker is a 68 y o  female  Patient is a 57-year-old female with a history of multiple medical problems as outlined previously  Patient is here today for follow-up after four-month period of time  She states that she recently was seen by an optometrist who diagnosed her with macular degeneration in the right eye and she was to see an ophthalmologist   Patient is also complaining of right hip pain  Difficulty with sleeping at night with pain on the right side  No accident or injury  No weakness in the leg  The following portions of the patient's history were reviewed and updated as appropriate: She  has a past medical history of Migraine    She   Patient Active Problem List    Diagnosis Date Noted    Macular degeneration of right eye 11/05/2019    Acute left-sided low back pain without sciatica 11/05/2019    Herpes simplex 10/14/2019    Fibromuscular dysplasia of cervicocranial artery (HCC) 08/27/2019    Simple chronic bronchitis ([address] ) 07/22/2019    Bilateral carotid artery stenosis 07/22/2019    Occult blood in stools 06/27/2019    Primary insomnia 06/27/2019    Bruit of right carotid artery 06/27/2019    Bronchitis 12/21/2018    Healthcare maintenance 05/17/2018    Migraine without aura and without status migrainosus, not intractable 03/15/2018    Tobacco abuse 07/16/2015    Chronic migraine without aura 12/04/2014    Acute upper respiratory infection 10/16/2013    Hyperlipidemia 09/07/2012    Depression 09/07/2012     She  has a past surgical history that includes Nasal septum surgery and Shoulder surgery  Her family history includes Cancer in her mother; Diabetes in her sister; Heart disease in her father  She  reports that she has been smoking  She started smoking about 54 years ago  She has a 26 50 pack-year smoking history  She has never used smokeless tobacco  She reports that she drinks alcohol  She reports that she does not use drugs  Current Outpatient Medications   Medication Sig Dispense Refill    albuterol (VENTOLIN HFA) 90 mcg/act inhaler Inhale 2 puffs every 6 (six) hours as needed for wheezing 1 Inhaler 0    Cholecalciferol (VITAMIN D3 PO) Take by mouth      citalopram (CeleXA) 20 mg tablet Take 1 tablet (20 mg total) by mouth daily 90 tablet 0    cyanocobalamin ([employer] VITAMIN B-12) 1000 MCG tablet Take 1,000 mcg by mouth      divalproex sodium (DEPAKOTE ER) 250 mg 24 hr tablet Take 1 tablet (250 mg total) by mouth 2 (two) times a day 180 tablet 1    Magnesium 500 MG CAPS Take by mouth      naproxen (NAPROSYN) 500 mg tablet Take 1 tablet (500 mg total) by mouth as needed for headaches 10 tablet 0    OLANZapine (ZyPREXA) 5 mg tablet At bedtime only as needed 10 tablet 3    pyridoxine (B-6) 100 MG tablet Take 100 mg by mouth      Riboflavin (B2) 100 MG TABS Take by mouth      simvastatin (ZOCOR) 40 mg tablet Take 1 tablet (40 mg total) by mouth daily 90 tablet 0    SUMAtriptan (IMITREX) 100 mg tablet Take 1 tablet at onset of migraine headache   May repeat in 2 hours if needed, no more than 2 in 24 hours and no more than 3 in a week 9 tablet 0    zolpidem (AMBIEN) 5 mg tablet One pill at bedtime as needed to help with sleep 30 tablet 3     No current facility-administered medications for this visit  Current Outpatient Medications on File Prior to Visit   Medication Sig    albuterol (VENTOLIN HFA) 90 mcg/act inhaler Inhale 2 puffs every 6 (six) hours as needed for wheezing    Cholecalciferol (VITAMIN D3 PO) Take by mouth    citalopram (CeleXA) 20 mg tablet Take 1 tablet (20 mg total) by mouth daily    cyanocobalamin ([employer] VITAMIN B-12) 1000 MCG tablet Take 1,000 mcg by mouth    divalproex sodium (DEPAKOTE ER) 250 mg 24 hr tablet Take 1 tablet (250 mg total) by mouth 2 (two) times a day    Magnesium 500 MG CAPS Take by mouth    naproxen (NAPROSYN) 500 mg tablet Take 1 tablet (500 mg total) by mouth as needed for headaches    OLANZapine (ZyPREXA) 5 mg tablet At bedtime only as needed    pyridoxine (B-6) 100 MG tablet Take 100 mg by mouth    Riboflavin (B2) 100 MG TABS Take by mouth    simvastatin (ZOCOR) 40 mg tablet Take 1 tablet (40 mg total) by mouth daily    SUMAtriptan (IMITREX) 100 mg tablet Take 1 tablet at onset of migraine headache  May repeat in 2 hours if needed, no more than 2 in 24 hours and no more than 3 in a week    zolpidem (AMBIEN) 5 mg tablet One pill at bedtime as needed to help with sleep    [DISCONTINUED] dexamethasone (DECADRON) 2 mg tablet One in am as needed   [DISCONTINUED] Methylprednisolone 4 MG TBPK Use as directed on package    [DISCONTINUED] predniSONE 5 mg tablet Begin treatment with 30 mg (6 tablets) per day in divided doses  Decrease dose by 5 mg every 2 days  12 days total   Take with food  No current facility-administered medications on file prior to visit  She is allergic to penicillins; azithromycin; nisoldipine; and sulfa antibiotics       Review of Systems   Constitutional: Negative  HENT: Negative  Eyes: Negative  Respiratory: Negative  Cardiovascular: Negative  Gastrointestinal: Negative  Endocrine: Negative      Genitourinary: Negative  Musculoskeletal: Positive for back pain  Negative for arthralgias, gait problem, joint swelling, myalgias, neck pain and neck stiffness  Skin: Negative  Allergic/Immunologic: Negative  Neurological: Negative  Hematological: Negative  Psychiatric/Behavioral: Negative  Objective:      /62   Pulse 73   Temp 97 9 °F (36 6 °C)   Ht 5' 6" (1 676 m)   Wt 66 7 kg (147 lb)   SpO2 97%   BMI 23 73 kg/m²          Physical Exam   Constitutional: She is oriented to person, place, and time  She appears well-developed and well-nourished  No distress  Cheerful 17-year-old female who is awake alert no acute distress  Accompanied to the visit today by her    HENT:   Head: Normocephalic and atraumatic  Right Ear: External ear normal    Left Ear: External ear normal    Nose: Nose normal    Mouth/Throat: No oropharyngeal exudate  Mild diffuse erythema to oral mucosa   Eyes: Pupils are equal, round, and reactive to light  Conjunctivae and EOM are normal  Right eye exhibits no discharge  Left eye exhibits no discharge  No scleral icterus  Neck: Normal range of motion  Neck supple  No JVD present  No tracheal deviation present  No thyromegaly present  Cardiovascular: Normal rate, regular rhythm, normal heart sounds and intact distal pulses  Exam reveals no gallop and no friction rub  No murmur heard  Pulmonary/Chest: Effort normal  No stridor  No respiratory distress  She has no wheezes  She has no rales  She exhibits no tenderness  Decreased breath sounds anteriorly and posteriorly but no rales rhonchi or wheezes could be appreciated on exam   Abdominal: Soft  Bowel sounds are normal  She exhibits no distension and no mass  There is no tenderness  There is no rebound and no guarding  No hernia  Musculoskeletal: Normal range of motion  She exhibits tenderness and deformity  She exhibits no edema     Evaluation of her back shows a flattening to her lumbar curve, with maneuvers to the back itself patient does have some discomfort with side bending to the right with pain low back on the left approximately at the SI joint  Otherwise freely movable  With maneuvers with her hip she has no pain with internal-external rotation, no pain to the greater trochanter on palpation  Patient does have some mild diffuse degenerative changes to the hands and digits but nothing acute   Lymphadenopathy:     She has no cervical adenopathy  Neurological: She is alert and oriented to person, place, and time  She displays normal reflexes  No cranial nerve deficit or sensory deficit  She exhibits normal muscle tone  Coordination normal    Skin: Skin is warm and dry  Capillary refill takes less than 2 seconds  No rash noted  She is not diaphoretic  No erythema  No pallor  Psychiatric: She has a normal mood and affect  Her behavior is normal  Judgment and thought content normal    Nursing note and vitals reviewed

## 2019-11-05 NOTE — ASSESSMENT & PLAN NOTE
Patient is complaining of pain in the low back on the left hand side  The area of the SI joint  She felt was a problem with her hip but on examination patient has no evidence of any abnormalities associated with the hip but some low back pain  We discussed sending her to physical therapy for evaluation and she refuses a  Evaluation by Orthopedics but she refuses  Patient will go to pain management for evaluation  She was told that most likely they will start by having evaluation by physical therapy  She states the pain is worse when she sleeps on her left side  No difficulties with ambulation  No weakness in the leg

## 2019-12-11 DIAGNOSIS — G43.709 CHRONIC MIGRAINE WITHOUT AURA WITHOUT STATUS MIGRAINOSUS, NOT INTRACTABLE: ICD-10-CM

## 2019-12-11 DIAGNOSIS — G43.009 MIGRAINE WITHOUT AURA AND WITHOUT STATUS MIGRAINOSUS, NOT INTRACTABLE: ICD-10-CM

## 2019-12-11 RX ORDER — SUMATRIPTAN 100 MG/1
TABLET, FILM COATED ORAL
Qty: 12 TABLET | Refills: 0 | Status: SHIPPED | OUTPATIENT
Start: 2019-12-11 | End: 2020-02-03 | Stop reason: SDUPTHER

## 2019-12-11 RX ORDER — NAPROXEN 500 MG/1
500 TABLET ORAL AS NEEDED
Qty: 10 TABLET | Refills: 0 | Status: SHIPPED | OUTPATIENT
Start: 2019-12-11 | End: 2020-02-03 | Stop reason: SDUPTHER

## 2019-12-16 ENCOUNTER — HOSPITAL ENCOUNTER (OUTPATIENT)
Dept: RADIOLOGY | Age: 69
Discharge: HOME | End: 2019-12-16
Payer: MEDICARE

## 2019-12-16 VITALS — WEIGHT: 147 LBS | HEIGHT: 66 IN | BODY MASS INDEX: 23.63 KG/M2

## 2019-12-16 DIAGNOSIS — Z12.39 BREAST CANCER SCREENING: ICD-10-CM

## 2019-12-16 PROCEDURE — 77067 SCR MAMMO BI INCL CAD: CPT

## 2019-12-23 DIAGNOSIS — G43.709 CHRONIC MIGRAINE WITHOUT AURA WITHOUT STATUS MIGRAINOSUS, NOT INTRACTABLE: ICD-10-CM

## 2019-12-24 RX ORDER — DIVALPROEX SODIUM 250 MG/1
250 TABLET, EXTENDED RELEASE ORAL 2 TIMES DAILY
Qty: 180 TABLET | Refills: 1 | Status: SHIPPED | OUTPATIENT
Start: 2019-12-24 | End: 2020-03-24 | Stop reason: SDUPTHER

## 2020-01-08 DIAGNOSIS — F51.01 PRIMARY INSOMNIA: ICD-10-CM

## 2020-01-08 DIAGNOSIS — F41.9 ANXIETY: ICD-10-CM

## 2020-01-08 DIAGNOSIS — E78.00 PURE HYPERCHOLESTEROLEMIA: ICD-10-CM

## 2020-01-08 RX ORDER — SIMVASTATIN 40 MG
40 TABLET ORAL DAILY
Qty: 90 TABLET | Refills: 0 | Status: SHIPPED | OUTPATIENT
Start: 2020-01-08 | End: 2020-04-14 | Stop reason: SDUPTHER

## 2020-01-08 RX ORDER — CITALOPRAM 20 MG/1
20 TABLET ORAL DAILY
Qty: 90 TABLET | Refills: 0 | Status: SHIPPED | OUTPATIENT
Start: 2020-01-08 | End: 2020-04-14 | Stop reason: SDUPTHER

## 2020-01-08 RX ORDER — ZOLPIDEM TARTRATE 5 MG/1
TABLET ORAL
Qty: 30 TABLET | Refills: 3 | Status: SHIPPED | OUTPATIENT
Start: 2020-01-08 | End: 2020-06-22 | Stop reason: SDUPTHER

## 2020-02-03 DIAGNOSIS — G43.009 MIGRAINE WITHOUT AURA AND WITHOUT STATUS MIGRAINOSUS, NOT INTRACTABLE: ICD-10-CM

## 2020-02-03 DIAGNOSIS — G43.709 CHRONIC MIGRAINE WITHOUT AURA WITHOUT STATUS MIGRAINOSUS, NOT INTRACTABLE: ICD-10-CM

## 2020-02-03 RX ORDER — SUMATRIPTAN 100 MG/1
TABLET, FILM COATED ORAL
Qty: 12 TABLET | Refills: 0 | Status: SHIPPED | OUTPATIENT
Start: 2020-02-03 | End: 2020-03-17 | Stop reason: SDUPTHER

## 2020-02-03 RX ORDER — NAPROXEN 500 MG/1
500 TABLET ORAL AS NEEDED
Qty: 10 TABLET | Refills: 0 | Status: SHIPPED | OUTPATIENT
Start: 2020-02-03 | End: 2020-03-17 | Stop reason: SDUPTHER

## 2020-02-11 ENCOUNTER — TELEPHONE (OUTPATIENT)
Dept: NEUROLOGY | Facility: CLINIC | Age: 70
End: 2020-02-11

## 2020-02-11 NOTE — TELEPHONE ENCOUNTER
Received a letter from [facility] in regards to Sumatriptan, explaining they will only approve a temporary supply of this medication  Scanned copy of letter into media attached to this encounter

## 2020-02-21 ENCOUNTER — HOSPITAL ENCOUNTER (OUTPATIENT)
Dept: RADIOLOGY | Facility: HOSPITAL | Age: 70
Discharge: HOME | End: 2020-02-21
Payer: MEDICARE

## 2020-02-21 ENCOUNTER — TRANSCRIBE ORDERS (OUTPATIENT)
Dept: LAB | Facility: HOSPITAL | Age: 70
End: 2020-02-21

## 2020-02-21 ENCOUNTER — OFFICE VISIT (OUTPATIENT)
Dept: INTERNAL MEDICINE CLINIC | Facility: CLINIC | Age: 70
End: 2020-02-21
Payer: MEDICARE

## 2020-02-21 ENCOUNTER — TELEPHONE (OUTPATIENT)
Dept: NEUROLOGY | Facility: CLINIC | Age: 70
End: 2020-02-21

## 2020-02-21 ENCOUNTER — APPOINTMENT (OUTPATIENT)
Dept: LAB | Facility: HOSPITAL | Age: 70
End: 2020-02-21
Payer: MEDICARE

## 2020-02-21 VITALS
SYSTOLIC BLOOD PRESSURE: 120 MMHG | TEMPERATURE: 99.4 F | OXYGEN SATURATION: 96 % | HEIGHT: 66 IN | BODY MASS INDEX: 23.63 KG/M2 | WEIGHT: 147 LBS | RESPIRATION RATE: 16 BRPM | DIASTOLIC BLOOD PRESSURE: 76 MMHG | HEART RATE: 89 BPM

## 2020-02-21 DIAGNOSIS — R10.32 LLQ ABDOMINAL PAIN: ICD-10-CM

## 2020-02-21 DIAGNOSIS — K57.92 ACUTE DIVERTICULITIS: Primary | ICD-10-CM

## 2020-02-21 DIAGNOSIS — R10.32 LLQ ABDOMINAL PAIN: Primary | ICD-10-CM

## 2020-02-21 LAB
ANION GAP SERPL CALCULATED.3IONS-SCNC: 5 MMOL/L (ref 4–13)
BUN SERPL-MCNC: 10 MG/DL (ref 5–25)
CALCIUM SERPL-MCNC: 9.1 MG/DL (ref 8.3–10.1)
CHLORIDE SERPL-SCNC: 98 MMOL/L (ref 100–108)
CO2 SERPL-SCNC: 29 MMOL/L (ref 21–32)
CREAT SERPL-MCNC: 0.81 MG/DL (ref 0.6–1.3)
GFR SERPL CREATININE-BSD FRML MDRD: 73 ML/MIN/1.73SQ M
GLUCOSE SERPL-MCNC: 93 MG/DL (ref 65–140)
POTASSIUM SERPL-SCNC: 4.5 MMOL/L (ref 3.5–5.3)
SODIUM SERPL-SCNC: 132 MMOL/L (ref 136–145)

## 2020-02-21 PROCEDURE — 3074F SYST BP LT 130 MM HG: CPT | Performed by: NURSE PRACTITIONER

## 2020-02-21 PROCEDURE — 3008F BODY MASS INDEX DOCD: CPT | Performed by: NURSE PRACTITIONER

## 2020-02-21 PROCEDURE — 80048 BASIC METABOLIC PNL TOTAL CA: CPT

## 2020-02-21 PROCEDURE — 3078F DIAST BP <80 MM HG: CPT | Performed by: NURSE PRACTITIONER

## 2020-02-21 PROCEDURE — 36415 COLL VENOUS BLD VENIPUNCTURE: CPT

## 2020-02-21 PROCEDURE — 4040F PNEUMOC VAC/ADMIN/RCVD: CPT | Performed by: NURSE PRACTITIONER

## 2020-02-21 PROCEDURE — 1160F RVW MEDS BY RX/DR IN RCRD: CPT | Performed by: NURSE PRACTITIONER

## 2020-02-21 PROCEDURE — 99214 OFFICE O/P EST MOD 30 MIN: CPT | Performed by: NURSE PRACTITIONER

## 2020-02-21 PROCEDURE — 74177 CT ABD & PELVIS W/CONTRAST: CPT

## 2020-02-21 RX ORDER — METRONIDAZOLE 500 MG/1
500 TABLET ORAL EVERY 8 HOURS SCHEDULED
Qty: 21 TABLET | Refills: 0 | Status: SHIPPED | OUTPATIENT
Start: 2020-02-21 | End: 2020-02-28

## 2020-02-21 RX ORDER — CIPROFLOXACIN 500 MG/1
500 TABLET, FILM COATED ORAL EVERY 12 HOURS SCHEDULED
Qty: 14 TABLET | Refills: 0 | Status: SHIPPED | OUTPATIENT
Start: 2020-02-21 | End: 2020-02-28

## 2020-02-21 RX ADMIN — IOHEXOL 50 ML: 240 INJECTION, SOLUTION INTRATHECAL; INTRAVASCULAR; INTRAVENOUS; ORAL at 14:30

## 2020-02-21 RX ADMIN — IOHEXOL 100 ML: 350 INJECTION, SOLUTION INTRAVENOUS at 16:40

## 2020-02-21 NOTE — ASSESSMENT & PLAN NOTE
6 day hx of llq pain, cramping, and diarrhea  Tender on exam with mildly elevated temperature  Normal bowel sounds  Reviewed her colonoscopy from September which showed severe diverticulosis of the sigmoid colon  Based on her symptoms and exam, would like to get a CT to evaluate for diverticulitis  Will treat accordingly based on report  For the time, encouraged fluid replacement and PO intake as tolerated

## 2020-02-21 NOTE — LETTER
February 21, 2020     Patient: Crow Diaz   YOB: 1950   Date of Visit: 2/21/2020       To Whom it May Concern:    Crow Diaz is under my professional care  She was seen in my office on 2/21/2020, unable to work 2/20-2/21/20  She may return to work on 2/24/20  If you have any questions or concerns, please don't hesitate to call           Sincerely,          JOSEY Deutsch        CC: No Recipients

## 2020-02-21 NOTE — PROGRESS NOTES
Reviewed ct scan with pt, acute diverticulitis  Abx prescribed cipro/flagyl x 7 days  Advised clear liquids through the weekend, advance as tolerated  Will call the office Monday for update and to schedule f/u

## 2020-02-21 NOTE — PROGRESS NOTES
Assessment/Plan:    No problem-specific Assessment & Plan notes found for this encounter  There are no diagnoses linked to this encounter  Subjective:      Patient ID: Nichelle Brown is a 68 y o  female  Pt is a 68 y o  y/o female who is seen today for evaluation of LLQ pain  She reports LLQ cramping and diarrhea 7-10 x per day, symptoms started on Saturday  She continued with symptoms all week long, though she has not had diarrhea yet today  No blood in stool  She is tolerating PO intake, denies nausea/vomiting  She started with low grade temperature yesterday and today and body aches which started 2-3 days ago  She has a headache currently  She does have a history of severe diverticulosis but never diverticulitis  The following portions of the patient's history were reviewed and updated as appropriate: allergies, current medications, past family history, past medical history, past social history, past surgical history and problem list     Review of Systems   Constitutional: Positive for chills, fatigue and fever  Negative for activity change, appetite change and unexpected weight change  Respiratory: Negative for cough, chest tightness and shortness of breath  Cardiovascular: Negative for chest pain, palpitations and leg swelling  Gastrointestinal: Positive for abdominal pain and diarrhea  Negative for abdominal distention, blood in stool, constipation, nausea and vomiting  Genitourinary: Negative for difficulty urinating, dysuria and frequency  Musculoskeletal: Positive for myalgias  Allergic/Immunologic: Negative for environmental allergies  Neurological: Positive for headaches  Negative for dizziness, weakness and numbness  Psychiatric/Behavioral: Negative for sleep disturbance           Objective:      /76   Pulse 89   Temp 99 4 °F (37 4 °C)   Resp 16   Ht 5' 6" (1 676 m)   Wt 66 7 kg (147 lb)   SpO2 96%   BMI 23 73 kg/m²          Physical Exam Constitutional: She is oriented to person, place, and time  Vital signs are normal  She appears well-developed and well-nourished  She is cooperative  Cardiovascular: Normal rate, regular rhythm, normal heart sounds and intact distal pulses  No murmur heard  Pulmonary/Chest: Effort normal and breath sounds normal    Abdominal: Soft  Normal appearance and bowel sounds are normal  There is no hepatosplenomegaly  There is tenderness in the left lower quadrant  There is no rigidity, no rebound, no guarding, no tenderness at McBurney's point and negative Pimentel's sign  Neurological: She is alert and oriented to person, place, and time  Skin: Skin is warm, dry and intact  Psychiatric: She has a normal mood and affect  Her behavior is normal  Judgment and thought content normal    Vitals reviewed

## 2020-02-24 ENCOUNTER — TELEPHONE (OUTPATIENT)
Dept: INTERNAL MEDICINE CLINIC | Facility: CLINIC | Age: 70
End: 2020-02-24

## 2020-02-24 NOTE — TELEPHONE ENCOUNTER
Pt would like a work note to excuse her from 2/20-2/26  Pt would appreciate a call back at [phone] once ready for p/u  Thank you!

## 2020-02-26 ENCOUNTER — OFFICE VISIT (OUTPATIENT)
Dept: NEUROLOGY | Facility: CLINIC | Age: 70
End: 2020-02-26
Payer: MEDICARE

## 2020-02-26 VITALS
SYSTOLIC BLOOD PRESSURE: 140 MMHG | HEIGHT: 66 IN | HEART RATE: 73 BPM | WEIGHT: 146 LBS | DIASTOLIC BLOOD PRESSURE: 63 MMHG | BODY MASS INDEX: 23.46 KG/M2

## 2020-02-26 DIAGNOSIS — I77.3 FIBROMUSCULAR DYSPLASIA OF CERVICOCRANIAL ARTERY (HCC): ICD-10-CM

## 2020-02-26 DIAGNOSIS — G43.009 MIGRAINE WITHOUT AURA AND WITHOUT STATUS MIGRAINOSUS, NOT INTRACTABLE: Primary | ICD-10-CM

## 2020-02-26 PROCEDURE — 3078F DIAST BP <80 MM HG: CPT | Performed by: PHYSICIAN ASSISTANT

## 2020-02-26 PROCEDURE — 1160F RVW MEDS BY RX/DR IN RCRD: CPT | Performed by: PHYSICIAN ASSISTANT

## 2020-02-26 PROCEDURE — 4040F PNEUMOC VAC/ADMIN/RCVD: CPT | Performed by: PHYSICIAN ASSISTANT

## 2020-02-26 PROCEDURE — 3008F BODY MASS INDEX DOCD: CPT | Performed by: PHYSICIAN ASSISTANT

## 2020-02-26 PROCEDURE — 99214 OFFICE O/P EST MOD 30 MIN: CPT | Performed by: PHYSICIAN ASSISTANT

## 2020-02-26 PROCEDURE — 3077F SYST BP >= 140 MM HG: CPT | Performed by: PHYSICIAN ASSISTANT

## 2020-02-26 RX ORDER — ASPIRIN 325 MG
325 TABLET, DELAYED RELEASE (ENTERIC COATED) ORAL DAILY
Qty: 30 TABLET | Refills: 0
Start: 2020-02-26

## 2020-02-26 NOTE — PROGRESS NOTES
Patient ID: Wendy Guaman is a 68 y o  female  Assessment/Plan:    Migraine without aura and without status migrainosus, not intractable  · Pt will continue Depakote for migraine prevention  · She will continue naproxen and sumatriptan to treat acute migraines  · Cruz Haddad was discussed and she took information to review  I have spent 25 minutes with Patient  today in which greater than 50% of this time was spent in counseling/coordination of care regarding Prognosis, Risks and benefits of tx options, Intructions for management, Patient and family education, Importance of tx compliance, Risk factor reductions and Impressions  She will follow-up in 1 year  Fibromuscular dysplasia of cervicocranial artery (HCC)  · Continue daily Aspirin and yearly carotid dopplers  Problem List Items Addressed This Visit        Cardiovascular and Mediastinum    Migraine without aura and without status migrainosus, not intractable - Primary     · Pt will continue Depakote for migraine prevention  · She will continue naproxen and sumatriptan to treat acute migraines  · Ingaxanderhaylee Tovarwendy was discussed and she took information to review  I have spent 25 minutes with Patient  today in which greater than 50% of this time was spent in counseling/coordination of care regarding Prognosis, Risks and benefits of tx options, Intructions for management, Patient and family education, Importance of tx compliance, Risk factor reductions and Impressions  She will follow-up in 1 year  Relevant Medications    aspirin (ECOTRIN) 325 mg EC tablet    Fibromuscular dysplasia of cervicocranial artery (HCC)     · Continue daily Aspirin and yearly carotid dopplers  Relevant Medications    aspirin (ECOTRIN) 325 mg EC tablet             Subjective:    HPI    We had the pleasure of evaluating Rosemary in neurological follow up today  As you know she is a 68year old right handed female   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  She remains on Depakote  Migraine headaches without aura:   PREVENTIVE: Citalopram, Topamax, depakote  ABORTIVE: Naratriptan, Sumavel, Dexamethasone, maxalt, imtrex   Headache trigger: Stress/Tension, Weather change     How often do the headaches occur - 6-8 per month  What time of the day do the headaches start - morning   How long do the headaches last - Improves with sumatriptan and naproxen but returns the next 2 days  Sumatriptan and naproxen do work the following 2 days as well  Location of headache: right temporal/frontal  What is the intensity of pain - average pain level 9/10  Describe your usual headache - Throbbing, Pounding, sharp  Associated symptoms: photophobia, phonophobia, nausea, vomiting    The following portions of the patient's history were reviewed and updated as appropriate: allergies, current medications, past family history, past medical history, past social history, past surgical history and problem list          Objective:    Blood pressure 140/63, pulse 73, height 5' 6" (1 676 m), weight 66 2 kg (146 lb), not currently breastfeeding  Physical Exam   Eyes: Pupils are equal, round, and reactive to light  Lids are normal    Neurological: She has normal strength and normal reflexes  Coordination normal    Psychiatric: Her speech is normal    Vitals reviewed  Neurological Exam  Mental Status   Oriented to person, place, time and situation  Recent and remote memory are intact  Speech is normal  Language is fluent with no aphasia  Attention and concentration are normal  Fund of knowledge is appropriate for level of education  Apraxia absent  Cranial Nerves  CN II: Visual acuity is normal  Visual fields full to confrontation  CN III, IV, VI: Extraocular movements intact bilaterally  Normal lids and orbits bilaterally  Pupils equal round and reactive to light bilaterally  CN V: Facial sensation is normal   CN VII: Full and symmetric facial movement    CN VIII: Hearing is normal   CN IX, X: Palate elevates symmetrically  Normal gag reflex  CN XI: Shoulder shrug strength is normal   CN XII: Tongue midline without atrophy or fasciculations  Motor   Strength is 5/5 throughout all four extremities  Sensory  Sensation is intact to light touch, pinprick, vibration and proprioception in all four extremities  Reflexes  Deep tendon reflexes are 2+ and symmetric in all four extremities with downgoing toes bilaterally  Coordination  Finger-to-nose, rapid alternating movements and heel-to-shin normal bilaterally without dysmetria  Gait  Normal casual, toe, heel and tandem gait  ROS:    Review of Systems   Constitutional: Negative  Negative for appetite change and fever  HENT: Negative  Negative for hearing loss, tinnitus, trouble swallowing and voice change  Eyes: Negative  Negative for photophobia and pain  Respiratory: Negative  Negative for shortness of breath  Cardiovascular: Negative  Negative for palpitations  Gastrointestinal: Negative  Negative for nausea (when she get headaches) and vomiting  Endocrine: Negative  Negative for cold intolerance and heat intolerance  Genitourinary: Negative  Negative for dysuria, frequency and urgency  Musculoskeletal: Negative  Negative for myalgias and neck pain  Skin: Negative  Negative for rash  Neurological: Negative  Negative for dizziness, tremors, seizures, syncope, facial asymmetry, speech difficulty, weakness, light-headedness, numbness and headaches (little better)  Hematological: Negative  Does not bruise/bleed easily  Psychiatric/Behavioral: Negative  Negative for confusion, hallucinations and sleep disturbance  Review of systems personally reviewed

## 2020-02-26 NOTE — ASSESSMENT & PLAN NOTE
· Pt will continue Depakote for migraine prevention  · She will continue naproxen and sumatriptan to treat acute migraines  · Lizz Blanco was discussed and she took information to review  I have spent 25 minutes with Patient  today in which greater than 50% of this time was spent in counseling/coordination of care regarding Prognosis, Risks and benefits of tx options, Intructions for management, Patient and family education, Importance of tx compliance, Risk factor reductions and Impressions  She will follow-up in 1 year

## 2020-02-26 NOTE — PATIENT INSTRUCTIONS
Migraine without aura and without status migrainosus, not intractable  · Pt will continue Depakote for migraine prevention  · She will continue naproxen and sumatriptan to treat acute migraines  · Martine Lv was discussed and she took information to review  I have spent 25 minutes with Patient  today in which greater than 50% of this time was spent in counseling/coordination of care regarding Prognosis, Risks and benefits of tx options, Intructions for management, Patient and family education, Importance of tx compliance, Risk factor reductions and Impressions  She will follow-up in 1 year  Fibromuscular dysplasia of cervicocranial artery (HCC)  · Continue daily Aspirin and yearly carotid dopplers

## 2020-03-08 ENCOUNTER — ANESTHESIA (EMERGENCY)
Dept: PERIOP | Facility: HOSPITAL | Age: 70
DRG: 329 | End: 2020-03-08
Payer: MEDICARE

## 2020-03-08 ENCOUNTER — ANESTHESIA EVENT (EMERGENCY)
Dept: PERIOP | Facility: HOSPITAL | Age: 70
DRG: 329 | End: 2020-03-08
Payer: MEDICARE

## 2020-03-08 ENCOUNTER — HOSPITAL ENCOUNTER (INPATIENT)
Facility: HOSPITAL | Age: 70
LOS: 3 days | Discharge: HOME | DRG: 329 | End: 2020-03-11
Attending: EMERGENCY MEDICINE | Admitting: COLON & RECTAL SURGERY
Payer: MEDICARE

## 2020-03-08 ENCOUNTER — APPOINTMENT (EMERGENCY)
Dept: RADIOLOGY | Facility: HOSPITAL | Age: 70
DRG: 329 | End: 2020-03-08
Payer: MEDICARE

## 2020-03-08 DIAGNOSIS — K57.80 PERFORATED DIVERTICULUM: Primary | ICD-10-CM

## 2020-03-08 LAB
ABO GROUP BLD: NORMAL
ABO GROUP BLD: NORMAL
ALBUMIN SERPL BCP-MCNC: 3.5 G/DL (ref 3.5–5)
ALP SERPL-CCNC: 37 U/L (ref 46–116)
ALT SERPL W P-5'-P-CCNC: 12 U/L (ref 12–78)
ANION GAP SERPL CALCULATED.3IONS-SCNC: 8 MMOL/L (ref 4–13)
AST SERPL W P-5'-P-CCNC: 11 U/L (ref 5–45)
BACTERIA UR QL AUTO: ABNORMAL /HPF
BASOPHILS # BLD AUTO: 0.03 THOUSANDS/ΜL (ref 0–0.1)
BASOPHILS NFR BLD AUTO: 0 % (ref 0–1)
BILIRUB SERPL-MCNC: 0.57 MG/DL (ref 0.2–1)
BILIRUB UR QL STRIP: NEGATIVE
BLD GP AB SCN SERPL QL: NEGATIVE
BUN SERPL-MCNC: 20 MG/DL (ref 5–25)
CALCIUM SERPL-MCNC: 8.7 MG/DL (ref 8.3–10.1)
CHLORIDE SERPL-SCNC: 100 MMOL/L (ref 100–108)
CLARITY UR: CLEAR
CO2 SERPL-SCNC: 27 MMOL/L (ref 21–32)
COLOR UR: ABNORMAL
CREAT SERPL-MCNC: 0.89 MG/DL (ref 0.6–1.3)
EOSINOPHIL # BLD AUTO: 0 THOUSAND/ΜL (ref 0–0.61)
EOSINOPHIL NFR BLD AUTO: 0 % (ref 0–6)
ERYTHROCYTE [DISTWIDTH] IN BLOOD BY AUTOMATED COUNT: 13.2 % (ref 11.6–15.1)
GFR SERPL CREATININE-BSD FRML MDRD: 65 ML/MIN/1.73SQ M
GLUCOSE SERPL-MCNC: 120 MG/DL (ref 65–140)
GLUCOSE UR STRIP-MCNC: NEGATIVE MG/DL
HCT VFR BLD AUTO: 37.3 % (ref 34.8–46.1)
HGB BLD-MCNC: 12.3 G/DL (ref 11.5–15.4)
HGB UR QL STRIP.AUTO: ABNORMAL
HYALINE CASTS #/AREA URNS LPF: ABNORMAL /LPF
IMM GRANULOCYTES # BLD AUTO: 0.08 THOUSAND/UL (ref 0–0.2)
IMM GRANULOCYTES NFR BLD AUTO: 1 % (ref 0–2)
KETONES UR STRIP-MCNC: NEGATIVE MG/DL
LEUKOCYTE ESTERASE UR QL STRIP: NEGATIVE
LIPASE SERPL-CCNC: 140 U/L (ref 73–393)
LYMPHOCYTES # BLD AUTO: 1.69 THOUSANDS/ΜL (ref 0.6–4.47)
LYMPHOCYTES NFR BLD AUTO: 11 % (ref 14–44)
MCH RBC QN AUTO: 30.8 PG (ref 26.8–34.3)
MCHC RBC AUTO-ENTMCNC: 33 G/DL (ref 31.4–37.4)
MCV RBC AUTO: 93 FL (ref 82–98)
MONOCYTES # BLD AUTO: 0.64 THOUSAND/ΜL (ref 0.17–1.22)
MONOCYTES NFR BLD AUTO: 4 % (ref 4–12)
NEUTROPHILS # BLD AUTO: 12.97 THOUSANDS/ΜL (ref 1.85–7.62)
NEUTS SEG NFR BLD AUTO: 84 % (ref 43–75)
NITRITE UR QL STRIP: NEGATIVE
NON-SQ EPI CELLS URNS QL MICRO: ABNORMAL /HPF
NRBC BLD AUTO-RTO: 0 /100 WBCS
PH UR STRIP.AUTO: 6 [PH]
PLATELET # BLD AUTO: 242 THOUSANDS/UL (ref 149–390)
PMV BLD AUTO: 9.8 FL (ref 8.9–12.7)
POTASSIUM SERPL-SCNC: 4.4 MMOL/L (ref 3.5–5.3)
PROT SERPL-MCNC: 6.5 G/DL (ref 6.4–8.2)
PROT UR STRIP-MCNC: NEGATIVE MG/DL
RBC # BLD AUTO: 4 MILLION/UL (ref 3.81–5.12)
RBC #/AREA URNS AUTO: ABNORMAL /HPF
RH BLD: POSITIVE
RH BLD: POSITIVE
SODIUM SERPL-SCNC: 135 MMOL/L (ref 136–145)
SP GR UR STRIP.AUTO: 1.02 (ref 1–1.03)
SPECIMEN EXPIRATION DATE: NORMAL
UROBILINOGEN UR QL STRIP.AUTO: 0.2 E.U./DL
WBC # BLD AUTO: 15.41 THOUSAND/UL (ref 4.31–10.16)
WBC #/AREA URNS AUTO: ABNORMAL /HPF

## 2020-03-08 PROCEDURE — 99284 EMERGENCY DEPT VISIT MOD MDM: CPT | Performed by: INTERNAL MEDICINE

## 2020-03-08 PROCEDURE — 81001 URINALYSIS AUTO W/SCOPE: CPT | Performed by: INTERNAL MEDICINE

## 2020-03-08 PROCEDURE — 44207 L COLECTOMY/COLOPROCTOSTOMY: CPT | Performed by: COLON & RECTAL SURGERY

## 2020-03-08 PROCEDURE — 96376 TX/PRO/DX INJ SAME DRUG ADON: CPT

## 2020-03-08 PROCEDURE — 96367 TX/PROPH/DG ADDL SEQ IV INF: CPT

## 2020-03-08 PROCEDURE — 99223 1ST HOSP IP/OBS HIGH 75: CPT | Performed by: COLON & RECTAL SURGERY

## 2020-03-08 PROCEDURE — 49060 DRAIN OPEN RETROPERI ABSCESS: CPT | Performed by: COLON & RECTAL SURGERY

## 2020-03-08 PROCEDURE — 45330 DIAGNOSTIC SIGMOIDOSCOPY: CPT | Performed by: COLON & RECTAL SURGERY

## 2020-03-08 PROCEDURE — 74177 CT ABD & PELVIS W/CONTRAST: CPT

## 2020-03-08 PROCEDURE — 96365 THER/PROPH/DIAG IV INF INIT: CPT

## 2020-03-08 PROCEDURE — 0W9G4ZZ DRAINAGE OF PERITONEAL CAVITY, PERCUTANEOUS ENDOSCOPIC APPROACH: ICD-10-PCS | Performed by: COLON & RECTAL SURGERY

## 2020-03-08 PROCEDURE — 80053 COMPREHEN METABOLIC PANEL: CPT | Performed by: EMERGENCY MEDICINE

## 2020-03-08 PROCEDURE — 86901 BLOOD TYPING SEROLOGIC RH(D): CPT | Performed by: SURGERY

## 2020-03-08 PROCEDURE — 86850 RBC ANTIBODY SCREEN: CPT | Performed by: SURGERY

## 2020-03-08 PROCEDURE — 83690 ASSAY OF LIPASE: CPT | Performed by: EMERGENCY MEDICINE

## 2020-03-08 PROCEDURE — 0DTN4ZZ RESECTION OF SIGMOID COLON, PERCUTANEOUS ENDOSCOPIC APPROACH: ICD-10-PCS | Performed by: COLON & RECTAL SURGERY

## 2020-03-08 PROCEDURE — 96361 HYDRATE IV INFUSION ADD-ON: CPT

## 2020-03-08 PROCEDURE — 88307 TISSUE EXAM BY PATHOLOGIST: CPT | Performed by: PATHOLOGY

## 2020-03-08 PROCEDURE — 36415 COLL VENOUS BLD VENIPUNCTURE: CPT | Performed by: EMERGENCY MEDICINE

## 2020-03-08 PROCEDURE — 86920 COMPATIBILITY TEST SPIN: CPT

## 2020-03-08 PROCEDURE — 99285 EMERGENCY DEPT VISIT HI MDM: CPT

## 2020-03-08 PROCEDURE — 0DJD8ZZ INSPECTION OF LOWER INTESTINAL TRACT, VIA NATURAL OR ARTIFICIAL OPENING ENDOSCOPIC: ICD-10-PCS | Performed by: COLON & RECTAL SURGERY

## 2020-03-08 PROCEDURE — 86900 BLOOD TYPING SEROLOGIC ABO: CPT | Performed by: SURGERY

## 2020-03-08 PROCEDURE — 85025 COMPLETE CBC W/AUTO DIFF WBC: CPT | Performed by: EMERGENCY MEDICINE

## 2020-03-08 PROCEDURE — 96375 TX/PRO/DX INJ NEW DRUG ADDON: CPT

## 2020-03-08 RX ORDER — MORPHINE SULFATE 4 MG/ML
4 INJECTION, SOLUTION INTRAMUSCULAR; INTRAVENOUS ONCE
Status: COMPLETED | OUTPATIENT
Start: 2020-03-08 | End: 2020-03-08

## 2020-03-08 RX ORDER — HYDROMORPHONE HCL/PF 1 MG/ML
0.5 SYRINGE (ML) INJECTION ONCE
Status: COMPLETED | OUTPATIENT
Start: 2020-03-08 | End: 2020-03-08

## 2020-03-08 RX ORDER — SODIUM CHLORIDE, SODIUM LACTATE, POTASSIUM CHLORIDE, CALCIUM CHLORIDE 600; 310; 30; 20 MG/100ML; MG/100ML; MG/100ML; MG/100ML
INJECTION, SOLUTION INTRAVENOUS CONTINUOUS PRN
Status: DISCONTINUED | OUTPATIENT
Start: 2020-03-08 | End: 2020-03-08 | Stop reason: SURG

## 2020-03-08 RX ORDER — SODIUM CHLORIDE 9 MG/ML
INJECTION, SOLUTION INTRAVENOUS CONTINUOUS PRN
Status: DISCONTINUED | OUTPATIENT
Start: 2020-03-08 | End: 2020-03-08 | Stop reason: SURG

## 2020-03-08 RX ORDER — ALBUMIN, HUMAN INJ 5% 5 %
SOLUTION INTRAVENOUS CONTINUOUS PRN
Status: DISCONTINUED | OUTPATIENT
Start: 2020-03-08 | End: 2020-03-08 | Stop reason: SURG

## 2020-03-08 RX ORDER — CITALOPRAM 20 MG/1
20 TABLET ORAL DAILY
Status: DISCONTINUED | OUTPATIENT
Start: 2020-03-08 | End: 2020-03-11 | Stop reason: HOSPADM

## 2020-03-08 RX ORDER — SODIUM CHLORIDE, SODIUM LACTATE, POTASSIUM CHLORIDE, CALCIUM CHLORIDE 600; 310; 30; 20 MG/100ML; MG/100ML; MG/100ML; MG/100ML
125 INJECTION, SOLUTION INTRAVENOUS CONTINUOUS
Status: DISCONTINUED | OUTPATIENT
Start: 2020-03-08 | End: 2020-03-09

## 2020-03-08 RX ORDER — FENTANYL CITRATE 50 UG/ML
INJECTION, SOLUTION INTRAMUSCULAR; INTRAVENOUS AS NEEDED
Status: DISCONTINUED | OUTPATIENT
Start: 2020-03-08 | End: 2020-03-08 | Stop reason: SURG

## 2020-03-08 RX ORDER — ONDANSETRON 2 MG/ML
4 INJECTION INTRAMUSCULAR; INTRAVENOUS ONCE AS NEEDED
Status: DISCONTINUED | OUTPATIENT
Start: 2020-03-08 | End: 2020-03-08 | Stop reason: HOSPADM

## 2020-03-08 RX ORDER — DIVALPROEX SODIUM 250 MG/1
250 TABLET, EXTENDED RELEASE ORAL 2 TIMES DAILY
Status: DISCONTINUED | OUTPATIENT
Start: 2020-03-08 | End: 2020-03-11 | Stop reason: HOSPADM

## 2020-03-08 RX ORDER — ROCURONIUM BROMIDE 10 MG/ML
INJECTION, SOLUTION INTRAVENOUS AS NEEDED
Status: DISCONTINUED | OUTPATIENT
Start: 2020-03-08 | End: 2020-03-08 | Stop reason: SURG

## 2020-03-08 RX ORDER — ONDANSETRON 2 MG/ML
INJECTION INTRAMUSCULAR; INTRAVENOUS AS NEEDED
Status: DISCONTINUED | OUTPATIENT
Start: 2020-03-08 | End: 2020-03-08 | Stop reason: SURG

## 2020-03-08 RX ORDER — OLANZAPINE 2.5 MG/1
2.5 TABLET ORAL
Status: DISCONTINUED | OUTPATIENT
Start: 2020-03-08 | End: 2020-03-11 | Stop reason: HOSPADM

## 2020-03-08 RX ORDER — CIPROFLOXACIN 2 MG/ML
400 INJECTION, SOLUTION INTRAVENOUS EVERY 12 HOURS
Status: DISCONTINUED | OUTPATIENT
Start: 2020-03-08 | End: 2020-03-10

## 2020-03-08 RX ORDER — HEPARIN SODIUM 5000 [USP'U]/ML
5000 INJECTION, SOLUTION INTRAVENOUS; SUBCUTANEOUS EVERY 8 HOURS SCHEDULED
Status: DISCONTINUED | OUTPATIENT
Start: 2020-03-08 | End: 2020-03-11 | Stop reason: HOSPADM

## 2020-03-08 RX ORDER — DEXAMETHASONE SODIUM PHOSPHATE 4 MG/ML
INJECTION, SOLUTION INTRA-ARTICULAR; INTRALESIONAL; INTRAMUSCULAR; INTRAVENOUS; SOFT TISSUE AS NEEDED
Status: DISCONTINUED | OUTPATIENT
Start: 2020-03-08 | End: 2020-03-08 | Stop reason: SURG

## 2020-03-08 RX ORDER — ASPIRIN 325 MG
325 TABLET, DELAYED RELEASE (ENTERIC COATED) ORAL DAILY
Status: DISCONTINUED | OUTPATIENT
Start: 2020-03-09 | End: 2020-03-11 | Stop reason: HOSPADM

## 2020-03-08 RX ORDER — LABETALOL 20 MG/4 ML (5 MG/ML) INTRAVENOUS SYRINGE
AS NEEDED
Status: DISCONTINUED | OUTPATIENT
Start: 2020-03-08 | End: 2020-03-08 | Stop reason: SURG

## 2020-03-08 RX ORDER — PRAVASTATIN SODIUM 80 MG/1
80 TABLET ORAL
Status: DISCONTINUED | OUTPATIENT
Start: 2020-03-08 | End: 2020-03-11 | Stop reason: HOSPADM

## 2020-03-08 RX ORDER — ALBUTEROL SULFATE 90 UG/1
2 AEROSOL, METERED RESPIRATORY (INHALATION) EVERY 6 HOURS PRN
Status: DISCONTINUED | OUTPATIENT
Start: 2020-03-08 | End: 2020-03-11 | Stop reason: HOSPADM

## 2020-03-08 RX ORDER — MAGNESIUM HYDROXIDE 1200 MG/15ML
LIQUID ORAL AS NEEDED
Status: DISCONTINUED | OUTPATIENT
Start: 2020-03-08 | End: 2020-03-08 | Stop reason: HOSPADM

## 2020-03-08 RX ORDER — ONDANSETRON 2 MG/ML
4 INJECTION INTRAMUSCULAR; INTRAVENOUS EVERY 6 HOURS PRN
Status: DISCONTINUED | OUTPATIENT
Start: 2020-03-08 | End: 2020-03-11 | Stop reason: HOSPADM

## 2020-03-08 RX ORDER — HYDROMORPHONE HCL/PF 1 MG/ML
SYRINGE (ML) INJECTION AS NEEDED
Status: DISCONTINUED | OUTPATIENT
Start: 2020-03-08 | End: 2020-03-08 | Stop reason: SURG

## 2020-03-08 RX ORDER — SUCCINYLCHOLINE/SOD CL,ISO/PF 100 MG/5ML
SYRINGE (ML) INTRAVENOUS AS NEEDED
Status: DISCONTINUED | OUTPATIENT
Start: 2020-03-08 | End: 2020-03-08 | Stop reason: SURG

## 2020-03-08 RX ORDER — LIDOCAINE HYDROCHLORIDE 10 MG/ML
INJECTION, SOLUTION EPIDURAL; INFILTRATION; INTRACAUDAL; PERINEURAL AS NEEDED
Status: DISCONTINUED | OUTPATIENT
Start: 2020-03-08 | End: 2020-03-08 | Stop reason: SURG

## 2020-03-08 RX ORDER — PROPOFOL 10 MG/ML
INJECTION, EMULSION INTRAVENOUS AS NEEDED
Status: DISCONTINUED | OUTPATIENT
Start: 2020-03-08 | End: 2020-03-08 | Stop reason: SURG

## 2020-03-08 RX ORDER — CIPROFLOXACIN 2 MG/ML
400 INJECTION, SOLUTION INTRAVENOUS ONCE
Status: COMPLETED | OUTPATIENT
Start: 2020-03-08 | End: 2020-03-08

## 2020-03-08 RX ORDER — HYDROMORPHONE HCL/PF 1 MG/ML
0.5 SYRINGE (ML) INJECTION
Status: DISCONTINUED | OUTPATIENT
Start: 2020-03-08 | End: 2020-03-08 | Stop reason: HOSPADM

## 2020-03-08 RX ORDER — EPHEDRINE SULFATE 50 MG/ML
INJECTION INTRAVENOUS AS NEEDED
Status: DISCONTINUED | OUTPATIENT
Start: 2020-03-08 | End: 2020-03-08 | Stop reason: SURG

## 2020-03-08 RX ADMIN — PHENYLEPHRINE HYDROCHLORIDE 200 MCG: 10 INJECTION INTRAVENOUS at 09:50

## 2020-03-08 RX ADMIN — LABETALOL 20 MG/4 ML (5 MG/ML) INTRAVENOUS SYRINGE 10 MG: at 10:30

## 2020-03-08 RX ADMIN — MORPHINE SULFATE 4 MG: 4 INJECTION INTRAVENOUS at 04:32

## 2020-03-08 RX ADMIN — PHENYLEPHRINE HYDROCHLORIDE 200 MCG: 10 INJECTION INTRAVENOUS at 10:15

## 2020-03-08 RX ADMIN — PHENYLEPHRINE HYDROCHLORIDE 50 MCG/MIN: 10 INJECTION INTRAVENOUS at 09:52

## 2020-03-08 RX ADMIN — ROCURONIUM BROMIDE 20 MG: 50 INJECTION, SOLUTION INTRAVENOUS at 10:22

## 2020-03-08 RX ADMIN — ONDANSETRON 4 MG: 2 INJECTION INTRAMUSCULAR; INTRAVENOUS at 12:04

## 2020-03-08 RX ADMIN — ROCURONIUM BROMIDE 20 MG: 50 INJECTION, SOLUTION INTRAVENOUS at 11:17

## 2020-03-08 RX ADMIN — FENTANYL CITRATE 100 MCG: 50 INJECTION, SOLUTION INTRAMUSCULAR; INTRAVENOUS at 09:45

## 2020-03-08 RX ADMIN — SODIUM CHLORIDE, SODIUM LACTATE, POTASSIUM CHLORIDE, AND CALCIUM CHLORIDE: .6; .31; .03; .02 INJECTION, SOLUTION INTRAVENOUS at 11:29

## 2020-03-08 RX ADMIN — LIDOCAINE HYDROCHLORIDE 50 MG: 10 INJECTION, SOLUTION EPIDURAL; INFILTRATION; INTRACAUDAL; PERINEURAL at 09:50

## 2020-03-08 RX ADMIN — CIPROFLOXACIN 400 MG: 2 INJECTION, SOLUTION INTRAVENOUS at 07:33

## 2020-03-08 RX ADMIN — METRONIDAZOLE 500 MG: 500 INJECTION, SOLUTION INTRAVENOUS at 07:01

## 2020-03-08 RX ADMIN — HEPARIN SODIUM 5000 UNITS: 5000 INJECTION INTRAVENOUS; SUBCUTANEOUS at 17:12

## 2020-03-08 RX ADMIN — Medication: at 13:00

## 2020-03-08 RX ADMIN — ALBUMIN (HUMAN): 12.5 SOLUTION INTRAVENOUS at 11:17

## 2020-03-08 RX ADMIN — HYDROMORPHONE HYDROCHLORIDE 1 MG: 1 INJECTION, SOLUTION INTRAMUSCULAR; INTRAVENOUS; SUBCUTANEOUS at 10:11

## 2020-03-08 RX ADMIN — SUGAMMADEX 300 MG: 100 INJECTION, SOLUTION INTRAVENOUS at 12:20

## 2020-03-08 RX ADMIN — EPHEDRINE SULFATE 10 MG: 50 INJECTION, SOLUTION INTRAVENOUS at 12:17

## 2020-03-08 RX ADMIN — PRAVASTATIN SODIUM 80 MG: 80 TABLET ORAL at 15:56

## 2020-03-08 RX ADMIN — PROPOFOL 200 MG: 10 INJECTION, EMULSION INTRAVENOUS at 09:50

## 2020-03-08 RX ADMIN — DIVALPROEX SODIUM 250 MG: 250 TABLET, EXTENDED RELEASE ORAL at 17:12

## 2020-03-08 RX ADMIN — CIPROFLOXACIN 400 MG: 2 INJECTION, SOLUTION INTRAVENOUS at 15:59

## 2020-03-08 RX ADMIN — HYDROMORPHONE HYDROCHLORIDE 0.5 MG: 1 INJECTION, SOLUTION INTRAMUSCULAR; INTRAVENOUS; SUBCUTANEOUS at 06:59

## 2020-03-08 RX ADMIN — ALBUMIN (HUMAN): 12.5 SOLUTION INTRAVENOUS at 10:13

## 2020-03-08 RX ADMIN — CITALOPRAM HYDROBROMIDE 20 MG: 20 TABLET, FILM COATED ORAL at 15:57

## 2020-03-08 RX ADMIN — IOHEXOL 100 ML: 350 INJECTION, SOLUTION INTRAVENOUS at 06:10

## 2020-03-08 RX ADMIN — KETAMINE HYDROCHLORIDE 0.2 MG/KG/HR: 50 INJECTION, SOLUTION INTRAMUSCULAR; INTRAVENOUS at 10:28

## 2020-03-08 RX ADMIN — SODIUM CHLORIDE, SODIUM LACTATE, POTASSIUM CHLORIDE, AND CALCIUM CHLORIDE 125 ML/HR: .6; .31; .03; .02 INJECTION, SOLUTION INTRAVENOUS at 15:37

## 2020-03-08 RX ADMIN — DEXAMETHASONE SODIUM PHOSPHATE 4 MG: 4 INJECTION, SOLUTION INTRAMUSCULAR; INTRAVENOUS at 10:13

## 2020-03-08 RX ADMIN — EPHEDRINE SULFATE 10 MG: 50 INJECTION, SOLUTION INTRAVENOUS at 10:03

## 2020-03-08 RX ADMIN — METRONIDAZOLE 500 MG: 500 INJECTION, SOLUTION INTRAVENOUS at 15:37

## 2020-03-08 RX ADMIN — PHENYLEPHRINE HYDROCHLORIDE 200 MCG: 10 INJECTION INTRAVENOUS at 10:08

## 2020-03-08 RX ADMIN — ROCURONIUM BROMIDE 50 MG: 50 INJECTION, SOLUTION INTRAVENOUS at 09:58

## 2020-03-08 RX ADMIN — Medication 100 MG: at 09:50

## 2020-03-08 RX ADMIN — SODIUM CHLORIDE 1000 ML: 0.9 INJECTION, SOLUTION INTRAVENOUS at 07:01

## 2020-03-08 RX ADMIN — SODIUM CHLORIDE: 0.9 INJECTION, SOLUTION INTRAVENOUS at 09:53

## 2020-03-08 RX ADMIN — PHENYLEPHRINE HYDROCHLORIDE 200 MCG: 10 INJECTION INTRAVENOUS at 09:53

## 2020-03-08 RX ADMIN — HYDROMORPHONE HYDROCHLORIDE 0.5 MG: 1 INJECTION, SOLUTION INTRAMUSCULAR; INTRAVENOUS; SUBCUTANEOUS at 08:52

## 2020-03-08 RX ADMIN — HYDROMORPHONE HYDROCHLORIDE 0.5 MG: 1 INJECTION, SOLUTION INTRAMUSCULAR; INTRAVENOUS; SUBCUTANEOUS at 05:16

## 2020-03-08 RX ADMIN — METRONIDAZOLE 500 MG: 500 INJECTION, SOLUTION INTRAVENOUS at 23:08

## 2020-03-08 RX ADMIN — PHENYLEPHRINE HYDROCHLORIDE 200 MCG: 10 INJECTION INTRAVENOUS at 09:59

## 2020-03-08 RX ADMIN — EPHEDRINE SULFATE 10 MG: 50 INJECTION, SOLUTION INTRAVENOUS at 11:23

## 2020-03-08 NOTE — ANESTHESIA PROCEDURE NOTES
Arterial Line Insertion  Performed by: Pinky Brown MD  Authorized by: Pinky Brown MD   Consent: The procedure was performed in an emergent situation  Verbal consent obtained  Written consent obtained  Risks and benefits: risks, benefits and alternatives were discussed  Consent given by: patient and parent  Patient understanding: patient states understanding of the procedure being performed  Patient consent: the patient's understanding of the procedure matches consent given  Patient identity confirmed: verbally with patient and arm band  Preparation: Patient was prepped and draped in the usual sterile fashion    Indications: hemodynamic monitoring  Orientation:  Left  Location: radial artery  Procedure Details:  Needle gauge: 20    Post-procedure:  Post-procedure: dressing applied  Patient tolerance: Patient tolerated the procedure well with no immediate complications

## 2020-03-08 NOTE — PROGRESS NOTES
Progress Note - Kiana Trujillo 68 y o  female MRN: [id number]    Unit/Bed#: [facility] 093-58 Encounter: [id number]      Assessment:  Patient is a 58-year-old female with perforated diverticulitis, status post laparoscopic drainage of intraperitoneal abscess, sigmoid resection earlier today  Patient is doing well, vital signs stable  Afebrile  Dressings are clean dry and intact, abdomen is soft, nontender, nondistended  NG tube is in place, with 0 output  Plan:  Maintain NPO  Continue iv abx  Continue IV fluids  Maintain NG tube  Maintain Johnson, document strict I/Os  Maintain pca dilaudid  Prn nausea control  SCD/ dvt ppx sqh  Subjective:   No complaints, sitting up in bed, denied any fevers, chills, chest pain, shortness of breath, nausea, or vomiting status post surgery  Objective:     Vitals: Blood pressure 105/56, pulse 83, temperature 98 5 °F (36 9 °C), resp  rate 14, height 5' 6" (1 676 m), weight 66 7 kg (147 lb), SpO2 94 %, not currently breastfeeding  ,Body mass index is 23 73 kg/m²  Intake/Output Summary (Last 24 hours) at 3/8/2020 1511  Last data filed at 3/8/2020 1351  Gross per 24 hour   Intake 3200 ml   Output 375 ml   Net 2825 ml       Physical Exam  General: NAD  HEENT: NC/AT  MMM  NGT in place  Cv: RRR     Lungs: normal effort  Ab: Soft, NT/ND  Ex: no CCE  Neuro: AAOx3    Scheduled Meds:  Current Facility-Administered Medications:  albuterol 2 puff Inhalation Q6H PRN Yaneth Orona MD   [START ON 3/9/2020] aspirin 325 mg Oral Daily Yaneth Orona MD   ciprofloxacin 400 mg Intravenous Q12H Yaneth Orona MD   citalopram 20 mg Oral Daily Yaneth Orona MD   divalproex sodium 250 mg Oral BID Yaneth Orona MD   heparin (porcine) 5,000 Units Subcutaneous [facility] Yaneth Orona MD   HYDROmorphone  Intravenous Continuous Yaneth Orona MD   lactated ringers 1,000 mL Intravenous Once PRN Yaneth Orona MD   And       lactated ringers 1,000 mL Intravenous Once PRN Lawrence Piter Chakraborty MD   lactated ringers 125 mL/hr Intravenous Continuous Joey Nickerson MD   metroNIDAZOLE 500 mg Intravenous Priandre Boswell MD   OLANZapine 2 5 mg Oral HS PRN Joey Nickerson MD   ondansetron 4 mg Intravenous Q6H PRN Joey Nickerson MD   pravastatin 80 mg Oral Daily With Juan Benavidez MD   sodium chloride 1,000 mL Intravenous Once PRN Joey Nickerson MD   And       sodium chloride 1,000 mL Intravenous Once PRN Joey Nickerson MD     Continuous Infusions:  HYDROmorphone    lactated ringers 125 mL/hr     PRN Meds:   albuterol    lactated ringers **AND** lactated ringers    OLANZapine    ondansetron    sodium chloride **AND** sodium chloride      Invasive Devices     Peripheral Intravenous Line            Peripheral IV 03/08/20 Right Antecubital less than 1 day          Drain            NG/OG/Enteral Tube Nasogastric 18 Fr Right nares less than 1 day    Urethral Catheter Latex; Double-lumen 16 Fr  less than 1 day                Lab, Imaging and other studies: I have personally reviewed pertinent reports      VTE Pharmacologic Prophylaxis: Heparin  VTE Mechanical Prophylaxis: sequential compression device

## 2020-03-08 NOTE — ED ATTENDING ATTESTATION
3/8/2020  IChante MD, saw and evaluated the patient  I have discussed the patient with the resident/non-physician practitioner and agree with the resident's/non-physician practitioner's findings, Plan of Care, and MDM as documented in the resident's/non-physician practitioner's note, except where noted  All available labs and Radiology studies were reviewed  I was present for key portions of any procedure(s) performed by the resident/non-physician practitioner and I was immediately available to provide assistance  At this point I agree with the current assessment done in the Emergency Department  I have conducted an independent evaluation of this patient a history and physical is as follows:    ED Course     Emergency Department Note- Zoie Zee 68 y o  female MRN: [id number]    Unit/Bed#: ED 24 Encounter: [id number]    Zoie Zee is a 68 y o  female who presents with   Chief Complaint   Patient presents with    Abdominal Pain     pt c/o abd pain getting more severe tonight          History of Present Illness   HPI:  Zoie Zee is a 68 y o  female who presents for evaluation of:  Abdominal pain for several days that is worse tonight  Patient also notes some dysuria  Patient has had some associated diarrhea but no nausea or vomiting  Abdominal pain is moderate to severe in intensity; patient denies provoking and palliating factors  Review of Systems   Constitutional: Positive for chills  Negative for fever  HENT: Negative for congestion and sinus pain  Respiratory: Negative for cough and shortness of breath  Cardiovascular: Negative for chest pain and palpitations  Gastrointestinal: Positive for abdominal pain and diarrhea  Negative for nausea and vomiting  Genitourinary: Negative for dysuria and hematuria  All other systems reviewed and are negative        Historical Information   Past Medical History:   Diagnosis Date    Migraine      Past Surgical History:   Procedure Laterality Date    BREAST EXCISIONAL BIOPSY Right 1986    HYSTERECTOMY  1978    age 25   Shahzad Bautista NASAL SEPTUM SURGERY      deviation repair    SHOULDER SURGERY       Social History   Social History     Substance and Sexual Activity   Alcohol Use Yes    Comment: social      Social History     Substance and Sexual Activity   Drug Use No     Social History     Tobacco Use   Smoking Status Current Every Day Smoker    Packs/day: 0 50    Years: 53 00    Pack years: 26 50    Start date: 1965   Smokeless Tobacco Never Used     Family History: non-contributory    Meds/Allergies   all medications and allergies reviewed  Allergies   Allergen Reactions    Penicillins Anaphylaxis    Azithromycin Hives    Nisoldipine      Reaction Date: 14Apr2011;     Sulfa Antibiotics Hives       Objective   First Vitals:   Blood Pressure: 142/65 (03/08/20 0338)  Pulse: 94 (03/08/20 0338)  Temperature: 98 1 °F (36 7 °C) (03/08/20 0338)  Temp Source: Oral (03/08/20 0338)  Respirations: 18 (03/08/20 0338)  Weight - Scale: 66 7 kg (147 lb) (03/08/20 0338)  SpO2: 98 % (03/08/20 0338)    Current Vitals:   Blood Pressure: 142/65 (03/08/20 0338)  Pulse: 94 (03/08/20 0338)  Temperature: 98 1 °F (36 7 °C) (03/08/20 0338)  Temp Source: Oral (03/08/20 0338)  Respirations: 18 (03/08/20 0338)  Weight - Scale: 66 7 kg (147 lb) (03/08/20 0338)  SpO2: 98 % (03/08/20 0338)    No intake or output data in the 24 hours ending 03/08/20 0427    Invasive Devices     Peripheral Intravenous Line            Peripheral IV 03/08/20 Right Antecubital less than 1 day                Physical Exam   Constitutional: She is oriented to person, place, and time  She appears well-developed and well-nourished  HENT:   Head: Normocephalic and atraumatic  Abdominal: Soft  Bowel sounds are increased  There is generalized tenderness  Neurological: She is alert and oriented to person, place, and time  Skin: Skin is warm and dry  Capillary refill takes less than 2 seconds  Psychiatric: She has a normal mood and affect  Her behavior is normal    Nursing note and vitals reviewed  69 yo female presents in discomfort    Medical Decision Makin   Acute abdominal pain: CTAP r/o diverticulitis and SBO    Recent Results (from the past 36 hour(s))   CBC and differential    Collection Time: 20  3:48 AM   Result Value Ref Range    WBC 15 41 (H) 4 31 - 10 16 Thousand/uL    RBC 4 00 3 81 - 5 12 Million/uL    Hemoglobin 12 3 11 5 - 15 4 g/dL    Hematocrit 37 3 34 8 - 46 1 %    MCV 93 82 - 98 fL    MCH 30 8 26 8 - 34 3 pg    MCHC 33 0 31 4 - 37 4 g/dL    RDW 13 2 11 6 - 15 1 %    MPV 9 8 8 9 - 12 7 fL    Platelets 781 120 - 643 Thousands/uL    nRBC 0 /100 WBCs    Neutrophils Relative 84 (H) 43 - 75 %    Immat GRANS % 1 0 - 2 %    Lymphocytes Relative 11 (L) 14 - 44 %    Monocytes Relative 4 4 - 12 %    Eosinophils Relative 0 0 - 6 %    Basophils Relative 0 0 - 1 %    Neutrophils Absolute 12 97 (H) 1 85 - 7 62 Thousands/µL    Immature Grans Absolute 0 08 0 00 - 0 20 Thousand/uL    Lymphocytes Absolute 1 69 0 60 - 4 47 Thousands/µL    Monocytes Absolute 0 64 0 17 - 1 22 Thousand/µL    Eosinophils Absolute 0 00 0 00 - 0 61 Thousand/µL    Basophils Absolute 0 03 0 00 - 0 10 Thousands/µL   Comprehensive metabolic panel    Collection Time: 20  3:48 AM   Result Value Ref Range    Sodium 135 (L) 136 - 145 mmol/L    Potassium 4 4 3 5 - 5 3 mmol/L    Chloride 100 100 - 108 mmol/L    CO2 27 21 - 32 mmol/L    ANION GAP 8 4 - 13 mmol/L    BUN 20 5 - 25 mg/dL    Creatinine 0 89 0 60 - 1 30 mg/dL    Glucose 120 65 - 140 mg/dL    Calcium 8 7 8 3 - 10 1 mg/dL    AST 11 5 - 45 U/L    ALT 12 12 - 78 U/L    Alkaline Phosphatase 37 (L) 46 - 116 U/L    Total Protein 6 5 6 4 - 8 2 g/dL    Albumin 3 5 3 5 - 5 0 g/dL    Total Bilirubin 0 57 0 20 - 1 00 mg/dL    eGFR 65 ml/min/1 73sq m   Lipase    Collection Time: 20  3:48 AM   Result Value Ref Range    Lipase 140 73 - 393 u/L     No orders to display         Portions of the record may have been created with voice recognition software  Occasional wrong word or "sound a like" substitutions may have occurred due to the inherent limitations of voice recognition software  Read the chart carefully and recognize, using context, where substitutions have occurred          Critical Care Time  Procedures

## 2020-03-08 NOTE — H&P
H&P Exam - Colorectal Surgery   Jocelyn Cantu 68 y o  female MRN: [id number]  Unit/Bed#: ED 24 Encounter: [id number]    Assessment/Plan     Assessment:  69F w/perforated diverticulitis    Plan:  - OR for diagnostic laparoscopy, possible exploratory laparotomy with Dunaway's procedure  - admit to colorectal surgery  - broad spectrum abx  - PRN analgesia  - wound care to be consulted post op  - type and screen      History of Present Illness      HPI:  Jocelyn Cantu is a 68 y o  female who presents with abdominal pain which started two days ago  She describes it as starting in the left lower quadrant of her abdomen, but has since started to become painful throughout  No other associated symptoms; she denies fever/chills, nausea/vomiting, constipation/diarrhea, lightheadedness, SOB, CP  She has had a hysterectomy in the past but no other surgical history  She does have a history of diverticulosis and has had one prior episode of diverticulitis last month which did not require hospitalization  Last colonoscopy was in 8/2019 with Dr Jonnathan Johnson and demonstrated sigmoid diverticular disease but otherwise unremarkable  She does take an  daily for history of stroke  Review of Systems   Constitutional: Negative  Negative for chills and fever  HENT: Negative  Eyes: Negative  Respiratory: Negative  Negative for cough and shortness of breath  Cardiovascular: Negative  Negative for chest pain and palpitations  Gastrointestinal: Positive for abdominal pain  Negative for blood in stool, constipation, diarrhea, nausea and vomiting  Endocrine: Negative  Genitourinary: Negative  Negative for difficulty urinating  Musculoskeletal: Negative  Skin: Negative  Allergic/Immunologic: Negative  Neurological: Negative  Negative for dizziness, weakness and light-headedness  Hematological: Negative  Psychiatric/Behavioral: Negative          Historical Information   Past Medical History:   Diagnosis Date    Migraine      Past Surgical History:   Procedure Laterality Date    BREAST EXCISIONAL BIOPSY Right 1986    HYSTERECTOMY  1978    age 25   [facility] NASAL SEPTUM SURGERY      deviation repair    SHOULDER SURGERY       Social History   Social History     Substance and Sexual Activity   Alcohol Use Yes    Comment: social      Social History     Substance and Sexual Activity   Drug Use No     Social History     Tobacco Use   Smoking Status Current Every Day Smoker    Packs/day: 0 50    Years: 53 00    Pack years: 26 50    Start date: 1965   Smokeless Tobacco Never Used     E-Cigarette/Vaping     E-Cigarette/Vaping Substances     Family History: non-contributory    Meds/Allergies   all medications and allergies reviewed  Allergies   Allergen Reactions    Penicillins Anaphylaxis    Azithromycin Hives    Nisoldipine      Reaction Date: 14Apr2011;     Sulfa Antibiotics Hives       Objective   First Vitals:   Blood Pressure: 142/65 (03/08/20 0338)  Pulse: 94 (03/08/20 0338)  Temperature: 98 1 °F (36 7 °C) (03/08/20 0338)  Temp Source: Oral (03/08/20 0338)  Respirations: 18 (03/08/20 0338)  Weight - Scale: 66 7 kg (147 lb) (03/08/20 0338)  SpO2: 98 % (03/08/20 0338)    Current Vitals:   Blood Pressure: 153/67 (03/08/20 0700)  Pulse: 82 (03/08/20 0700)  Temperature: 98 1 °F (36 7 °C) (03/08/20 0338)  Temp Source: Oral (03/08/20 0338)  Respirations: 16 (03/08/20 0700)  Weight - Scale: 66 7 kg (147 lb) (03/08/20 0338)  SpO2: 97 % (03/08/20 0700)    No intake or output data in the 24 hours ending 03/08/20 0816    Invasive Devices     Peripheral Intravenous Line            Peripheral IV 03/08/20 Right Antecubital less than 1 day                Physical Exam   Constitutional: She is oriented to person, place, and time  She appears well-developed and well-nourished  She appears distressed (uncomfortable)  HENT:   Head: Normocephalic and atraumatic  Eyes: Pupils are equal, round, and reactive to light   EOM are normal  No scleral icterus  Neck: No JVD present  Cardiovascular: Normal rate, regular rhythm, normal heart sounds and intact distal pulses  Pulmonary/Chest: Effort normal  No stridor  No respiratory distress  Abdominal: Soft  She exhibits no distension  There is tenderness  There is rebound and guarding  Patient with dora peritonitis   Musculoskeletal: She exhibits no edema  Neurological: She is alert and oriented to person, place, and time  No cranial nerve deficit  Skin: Skin is warm and dry  She is not diaphoretic  Psychiatric: She has a normal mood and affect  Lab Results:   CBC:   Lab Results   Component Value Date    WBC 15 41 (H) 03/08/2020    HGB 12 3 03/08/2020    HCT 37 3 03/08/2020    MCV 93 03/08/2020     03/08/2020    MCH 30 8 03/08/2020    MCHC 33 0 03/08/2020    RDW 13 2 03/08/2020    MPV 9 8 03/08/2020    NRBC 0 03/08/2020   , CMP:   Lab Results   Component Value Date    SODIUM 135 (L) 03/08/2020    K 4 4 03/08/2020     03/08/2020    CO2 27 03/08/2020    BUN 20 03/08/2020    CREATININE 0 89 03/08/2020    CALCIUM 8 7 03/08/2020    AST 11 03/08/2020    ALT 12 03/08/2020    ALKPHOS 37 (L) 03/08/2020    EGFR 65 03/08/2020     Imaging: I have personally reviewed pertinent reports  CT abdomen pelvis with contrast  Narrative: CT ABDOMEN AND PELVIS WITH IV CONTRAST    INDICATION:   Abdominal pain, acute, nonlocalized  Suspected diverticulitis  COMPARISON:  CT abdomen/pelvis dated February 21, 2020  TECHNIQUE:  CT examination of the abdomen and pelvis was performed  Axial, sagittal, and coronal 2D reformatted images were created from the source data and submitted for interpretation  Radiation dose length product (DLP) for this visit:  339 mGy-cm     This examination, like all CT scans performed in the [facility], was performed utilizing techniques to minimize radiation dose exposure, including the use of iterative   reconstruction and automated exposure control  IV Contrast:  100 mL of iohexol (OMNIPAQUE)  Enteric Contrast:  Enteric contrast was not administered  FINDINGS:    ABDOMEN    LOWER CHEST:  Atelectatic changes are noted at the lung bases  LIVER/BILIARY TREE:  Unremarkable  GALLBLADDER:  No calcified gallstones  No pericholecystic inflammatory change  SPLEEN:  Unremarkable  PANCREAS:  Unremarkable  ADRENAL GLANDS:  Unremarkable  KIDNEYS/URETERS:  One or more simple renal cyst(s) is noted  Otherwise unremarkable kidneys  No hydronephrosis  STOMACH AND BOWEL:  Again noted is mild to moderate wall thickening of the proximal sigmoid colon with multiple associated diverticula most compatible with the patient's known history of acute diverticulitis  There is no evidence of large or small bowel   obstruction  There is a small amount of liquid stool within the colon  APPENDIX:  No findings to suggest appendicitis  ABDOMINOPELVIC CAVITY:  There is a small amount of free intraperitoneal air noted within the upper and anterior abdomen suspicious for a perforated viscus  A small amount of pelvic free fluid  No encapsulated fluid collection to suggest an abscess  VESSELS:  Unremarkable for patient's age  PELVIS    REPRODUCTIVE ORGANS:  Unremarkable for patient's age  URINARY BLADDER:  Unremarkable  ABDOMINAL WALL/INGUINAL REGIONS:  Unremarkable  OSSEOUS STRUCTURES:  No acute fracture or destructive osseous lesion  Again noted is grade 2 anterolisthesis of L5 on S1  Impression: Small amount of free intraperitoneal air within the anterior upper abdomen suspicious for a perforated viscus  Reidentified is mild to moderate colonic wall thickening and pericolonic inflammatory stranding at the sigmoid colon with multiple associated   diverticula most compatible with acute diverticulitis  This is the suspected site of perforation  Surgical consultation is advised  Small amount of pelvic free fluid    No encapsulated fluid collection to suggest an abscess  I personally discussed this study with Dr Abbie Bautista on 3/8/2020 at 6:29 AM     Workstation performed: [id number]      EKG, Pathology, and Other Studies: I have personally reviewed pertinent reports  Code Status: No Order  Advance Directive and Living Will:      Power of :    POLST:      Counseling / Coordination of Care  Total floor / unit time spent today 30 minutes  Greater than 50% of total time was spent with the patient and / or family counseling and / or coordination of care  A description of the counseling / coordination of care: discussion with patient and her  at bedside

## 2020-03-08 NOTE — ANESTHESIA POSTPROCEDURE EVALUATION
Post-Op Assessment Note    CV Status:  Stable  Pain Score: 0    Pain management: adequate     Mental Status:  Sleepy   Hydration Status:  Euvolemic   PONV Controlled:  Controlled   Airway Patency:  Patent   Post Op Vitals Reviewed: Yes      Staff: CRNA   Comments: report to RN, 117/48, 88, 16  93%  6 L FM  exchanging well          BP      Temp      Pulse     Resp      SpO2

## 2020-03-08 NOTE — ANESTHESIA PREPROCEDURE EVALUATION
Review of Systems/Medical History  Patient summary reviewed  Chart reviewed  No history of anesthetic complications     Cardiovascular  Hyperlipidemia,    Pulmonary  Smoker , Asthma ,        GI/Hepatic      Comment: Perforated diverticulum     Negative  ROS        Endo/Other  Negative endo/other ROS      GYN  Negative gynecology ROS          Hematology  Negative hematology ROS      Musculoskeletal  Negative musculoskeletal ROS        Neurology  Negative neurology ROS   Headaches,    Psychology   Depression ,              Physical Exam    Airway    Mallampati score: II  TM Distance: >3 FB  Neck ROM: full     Dental       Cardiovascular      Pulmonary      Other Findings        Anesthesia Plan  ASA Score- 2 Emergent    Anesthesia Type- general with ASA Monitors  Additional Monitors: arterial line  Airway Plan: ETT  Comment: Possible TAP Block for post op pain per surgeon request      Plan Factors-    Induction- intravenous  Postoperative Plan-     Informed Consent- Anesthetic plan and risks discussed with patient  I personally reviewed this patient with the CRNA  Discussed and agreed on the Anesthesia Plan with the CRNA  Sergio Cedeño

## 2020-03-08 NOTE — OP NOTE
OPERATIVE REPORT  PATIENT NAME: Juan Manuel Hernández    :  1950  MRN: [id number]  Pt Location: [facility] OR ROOM 05    SURGERY DATE: 3/8/2020    Surgeon(s) and Role:     * Bandar Altman MD - Primary     * Cadence Woods MD - Assisting    Preop Diagnosis:  Perforated diverticulum [K57 80]  Intraperitoneal abscess    Post-Op Diagnosis Codes:     * Perforated diverticulum [K57 80]  Intraperitoneal abscess    Procedure(s) (LRB):  Laparoscopic drainage of intra peritoneal abscess, sigmoid rescetion, (N/A)  LAPAROSCOPY DIAGNOSTIC (N/A)  SIGMOIDOSCOPY FLEXIBLE (N/A)    Specimen(s):  ID Type Source Tests Collected by Time Destination   1 : sigmoid colon  Tissue Large Intestine, Sigmoid Colon TISSUE EXAM Bandar Altman MD 3/8/2020 1156        Estimated Blood Loss:   Minimal    Drains:  Urethral Catheter Latex; Double-lumen 16 Fr  (Active)   Number of days: 0       Anesthesia Type:   General    Operative Indications:  Perforated diverticulum [K57 80]    Operative Findings:     * Perforated diverticulum [K57 80], sigmoid  Sigmoid diverticulitis with phlegmon and scarring  Intraperitoneal abscess  Turbid fluid throughout peritoneum and including the right subdiaphragmatic space  Complications:   None    Procedure and Technique:  The patient was placed in a supine position with the legs in [employer]  The arms were wrapped at her sides around egg crate material at the elbows and wrists and another wrap layer of soft egg crate material inside sheet  She was taped into position using a bandolier technique to prevent slippage on the table  The abdomen was prepped widely using ChloraPrep and allowed to dry completely  She was draped in a sterile manner  A time-out was done  A low midline, short incision was created to place a GelPort device  The skin and fascia were opened using cautery  The peritoneum was entered and a blunt expansion of the incision was used  No significant fluid was seen    Passing a suction catheter into the pelvis revealed turbid but not particulate or feculent liquid in the suction tubing  The GelPort was positioned and trocars were positioned  A left lower quadrant, a right supraumbilical, and a right lateral umbilical level trocar were positioned  A 12 mm trocar was position of the right lower quadrant  These were all placed after incising the skin alone  Blunt placement of muscle-splitting trocars were used  Laparoscopic surgery was then performed  We inspected the abdomen for gross contamination and found none  The sigmoid colon was clearly inflamed and phlegmonous  Mild adhesions between the small intestine and sigmoid were easily  with simple passage of the hand  No other intra-abdominal adhesions were present except for at the sigmoid colon itself where there was dense adhesion to the lateral pelvic sidewall  Because of these dense adhesions, a medial approach was attempted  The mesentery of the sigmoid colon was relatively supple, despite the nearby infection  The peritoneum was somewhat thickened but was not severe at all  The rectum was palpable distally and was supple  The bowel proximal to the phlegmon was supple  All the small bowel was unremarkable except for minimal exudative rind on the bowel that was adjacent to the sigmoid  This was very minor  The medial approach of the sigmoid colon mesentery allowed for opening an avascular window posterior to the superior rectal artery  Blunt dissection allowed identification of the left ureter  We extended dissection along the avascular plane between the retrorectal space and the area lateral to the inferior mesenteric artery  Dissection was kept close to the superior rectal artery to avoid damage to retroperitoneal structures  The lateral peritoneum of the sigmoid mesentery was penetrated and were able to separate tissues away 1 from the other      Lateral dissection then allowed for mobilization of the sigmoid colon and descending colon away from the retroperitoneum  Just medial to the white line of Toldt, the peritoneum was incised and the avascular plane was identified  The descending and proximal sigmoid colon were rolled medially, reexpose Ng left ureter area  Dissection was then directed to the area of the sigmoid attachment to the lateral pelvic sidewall  There was dense adhesion to the pelvic and abdominal sidewall in that region, including the left side tubo-ovarian complex  Meticulous dissection allowed for separation of these structures without damage to the ureter  The retro rectum was re-entered from the left side and the lateral peritoneal attachments of the rectum were dissected just a few cm into the pelvis to separate the surrounding structures and allow for mobility of the rectum  The mesorectum was then sequentially divided using EnSeal technique  The rectosigmoid colon was skeletonized for creation of a transection and a distal side of the anastomosis  A blue loaded echelon stapler was used to pass over the relatively small rectum  The tissue was supple and otherwise unremarkable here  No evidence of acute inflammation was present and there was no thickening or exudate on the surface of the rectum  An echelon stapler was fired across the bowel in a single fire, completing the transection  The superior rectal artery was then surrounded at the takeoff from the inferior mesenteric artery and was transected using a vascular loaded stapler  The mesentery was sequentially divided out to the descending/sigmoid colon junction, proximal to the staple line on the superior rectal artery         Bowel was delivered through the GelPort sheath acting as a wound protector  The bowel was clamped using a pursestring device and a 2 0 Prolene pursestring was applied to the proximal side of the anastomosis    The bowel was then transected proximal to a Kocher clamp and this specimen was sent from the field for pathologic evaluation  The anvil of a 29 mm [facility] stapler device was positioned in the proximal side of the anastomosis and was tied into position using a double wrap of full-thickness, lengthy collar of bowel without any difficulty or inflammatory changes  The bowel was supple  The bowel was cleansed and returned to the abdominal cavity  An anastomosis was created using the [facility] 29 stapler  We had to empty the rectum of some feces to allow for an easy anastomosis  We were unable to pass the handle of the stapler to the staple line at the rectosigmoid junction due to narrowing of the rectum that was not associated with acute inflammation  For that reason, an anti mesenteric rectal side to descending/sigmoid colon and anastomosis was created  This was done without any difficulty and under no tension whatsoever  The bowel proximal and distal to the anastomosis was supple and well-vascularized  Sigmoidoscopy revealed pink and viable tissue proximal and distal to the anastomosis with minimal feces in the lumen  The anastomosis was circumferentially intact and had a wide lumen  The donuts were circumferentially intact  Sigmoidoscopy was done with the anastomosis under water and no air leak was identified  At this time, the GelPort lid was removed  I positioned the hand and the pool tip suction up to the level of the right subdiaphragmatic space and the left subdiaphragmatic space after irrigating in cleansing the area is  There were re-irrigated and dried  Irrigation was poured into the abdomen and the interloop areas were inspected for any evidence of turbid fluid or other contents  None was seen  We reinspected the subdiaphragmatic spaces and found them to be dry  The dissection planes were inspected and were found to be free of surgical bleeding  The omentum was brought inferiorly and the small bowel was position in a fashion to avoid torsion or herniation      Trocars and the [employer] were removed  The right lower quadrant incision was closed at the fascial level using a figure-of-eight 0 Vicryl suture  The midline fascia was closed using running 1 PDS suture  Wounds were irrigated and dried  There closed using skin staples  Sponge needle instrument counts were correct  Wand technique revealed no retained gauze      I was present for the entire procedure    Patient Disposition:  PACU     SIGNATURE: Genesis Sanchez MD  DATE: March 8, 2020  TIME: 12:31 PM

## 2020-03-08 NOTE — ED PROVIDER NOTES
History  Chief Complaint   Patient presents with    Abdominal Pain     pt c/o abd pain getting more severe tonight      HPI  10-year-old woman presents with lower abdominal pain and diarrhea  Patient reports symptoms for the last 30 hours  She states the pain is in her lower abdomen and feels very similar to the last time she has had diverticulitis which was 3 weeks ago  She also believes she may have a UTI  She reports fevers and chills  She reports her pain is severe and cramping  She reports some nausea without any emesis  She denies any sick contacts or recent travels  Prior to Admission Medications   Prescriptions Last Dose Informant Patient Reported? Taking? Cholecalciferol (VITAMIN D3 PO)  Self Yes Yes   Sig: Take by mouth   Magnesium 500 MG CAPS  Self Yes Yes   Sig: Take by mouth   OLANZapine (ZyPREXA) 5 mg tablet  Self No Yes   Sig: At bedtime only as needed   Riboflavin (B2) 100 MG TABS  Self Yes Yes   Sig: Take by mouth   SUMAtriptan (IMITREX) 100 mg tablet   No Yes   Sig: Take 1 tablet at onset of migraine headache   May repeat in 2 hours if needed, no more than 2 in 24 hours and no more than 3 in a week   albuterol (VENTOLIN HFA) 90 mcg/act inhaler  Self No Yes   Sig: Inhale 2 puffs every 6 (six) hours as needed for wheezing   aspirin (ECOTRIN) 325 mg EC tablet   No Yes   Sig: Take 1 tablet (325 mg total) by mouth daily   citalopram (CeleXA) 20 mg tablet   No Yes   Sig: Take 1 tablet (20 mg total) by mouth daily   cyanocobalamin ([employer] VITAMIN B-12) 1000 MCG tablet  Self Yes Yes   Sig: Take 1,000 mcg by mouth   divalproex sodium (DEPAKOTE ER) 250 mg 24 hr tablet   No Yes   Sig: Take 1 tablet (250 mg total) by mouth 2 (two) times a day   naproxen (NAPROSYN) 500 mg tablet   No Yes   Sig: Take 1 tablet (500 mg total) by mouth as needed for headaches   pyridoxine (B-6) 100 MG tablet  Self Yes Yes   Sig: Take 100 mg by mouth   simvastatin (ZOCOR) 40 mg tablet   No Yes   Sig: Take 1 tablet (40 mg total) by mouth daily   zolpidem (AMBIEN) 5 mg tablet   No Yes   Sig: One pill at bedtime as needed to help with sleep      Facility-Administered Medications: None       Past Medical History:   Diagnosis Date    Migraine        Past Surgical History:   Procedure Laterality Date    BREAST EXCISIONAL BIOPSY Right 1986    HYSTERECTOMY  1978    age 25   Raegan Granger NASAL SEPTUM SURGERY      deviation repair    SHOULDER SURGERY         Family History   Problem Relation Age of Onset    Breast cancer Mother 48    Heart disease Father     Diabetes Sister     No Known Problems Maternal Grandmother     No Known Problems Maternal Grandfather     No Known Problems Paternal Grandmother     No Known Problems Paternal Grandfather     No Known Problems Sister     Breast cancer Maternal Aunt 48    No Known Problems Maternal Aunt     No Known Problems Paternal Aunt     Colon cancer Maternal Uncle     No Known Problems Son     No Known Problems Son     Lung cancer Other 52     I have reviewed and agree with the history as documented  E-Cigarette/Vaping     E-Cigarette/Vaping Substances     Social History     Tobacco Use    Smoking status: Current Every Day Smoker     Packs/day: 0 50     Years: 53 00     Pack years: 26 50     Start date: 1965    Smokeless tobacco: Never Used   Substance Use Topics    Alcohol use: Yes     Comment: social     Drug use: No        Review of Systems  REVIEW OF SYSTEMS  Constitutional:  Reports fever and chills  Denies fatigue or rash   HEENT:  Denies change in visual acuity  Denies nasal congestion or sore throat   Respiratory:  Denies cough or shortness of breath   Cardiovascular:  Denies chest pain or edema   GI:  Reports abdominal pain and diarrhea  Denies nausea or emesis  :  Reports dysuria  Denies frequency, hesitancy      Musculoskeletal:  Denies back pain or joint pain   Neurologic:  Denies headache, focal weakness or sensory changes   Endocrine:  Denies polyuria or polydipsia Lymphatic:  Denies swollen glands   Psychiatric:  Denies depression or anxiety     Physical Exam  ED Triage Vitals   Temperature Pulse Respirations Blood Pressure SpO2   03/08/20 0338 03/08/20 0338 03/08/20 0338 03/08/20 0338 03/08/20 0338   98 1 °F (36 7 °C) 94 18 142/65 98 %      Temp Source Heart Rate Source Patient Position - Orthostatic VS BP Location FiO2 (%)   03/08/20 0338 03/08/20 0338 -- -- --   Oral Monitor         Pain Score       03/08/20 0339       9             Orthostatic Vital Signs  Vitals:    03/08/20 0338 03/08/20 0430   BP: 142/65    Pulse: 94 80       Physical Exam   PHYSICAL EXAM  Constitutional:  Acutely distressed, uncomfortable  HEENT:  Conjunctiva normal  Oropharynx moist  Respiratory:  No respiratory distress, normal breath sounds  Cardiovascular:  Normal rate, normal rhythm, no murmurs  GI:  Soft, nondistended, normal bowel sounds, TTP over lower abdomen   :  No costovertebral angle tenderness   Musculoskeletal:  No edema, no tenderness, no deformities     Integument:  Well hydrated, no rash   Lymphatic:  No lymphadenopathy noted   Neurologic:  Alert & oriented, normal motor function, normal sensory function, no focal deficits noted   Psychiatric:  Anxious       ED Medications  Medications   metroNIDAZOLE (FLAGYL) IVPB (premix) 500 mg (has no administration in time range)   ciprofloxacin (CIPRO) IVPB (premix) 400 mg (has no administration in time range)   sodium chloride 0 9 % bolus 1,000 mL (has no administration in time range)   HYDROmorphone (DILAUDID) injection 0 5 mg (has no administration in time range)   morphine (PF) 4 mg/mL injection 4 mg (4 mg Intravenous Given 3/8/20 0432)   HYDROmorphone (DILAUDID) injection 0 5 mg (0 5 mg Intravenous Given 3/8/20 0516)   iohexol (OMNIPAQUE) 350 MG/ML injection (SINGLE-DOSE) 100 mL (100 mL Intravenous Given 3/8/20 0610)       Diagnostic Studies  Results Reviewed     Procedure Component Value Units Date/Time    Urine Microscopic [[id number]] (Abnormal) Collected:  03/08/20 0501    Lab Status:  Final result Specimen:  Urine, Clean Catch Updated:  03/08/20 0515     RBC, UA 4-10 /hpf      WBC, UA None Seen /hpf      Epithelial Cells None Seen /hpf      Bacteria, UA None Seen /hpf      Hyaline Casts, UA None Seen /lpf     UA w Reflex to Microscopic w Reflex to Culture [[id number]]  (Abnormal) Collected:  03/08/20 0501    Lab Status:  Final result Specimen:  Urine, Clean Catch Updated:  03/08/20 0514     Color, UA Dk Yellow     Clarity, UA Clear     Specific Gravity, UA 1 024     pH, UA 6 0     Leukocytes, UA Negative     Nitrite, UA Negative     Protein, UA Negative mg/dl      Glucose, UA Negative mg/dl      Ketones, UA Negative mg/dl      Urobilinogen, UA 0 2 E U /dl      Bilirubin, UA Negative     Blood, UA Small    Comprehensive metabolic panel [[id number]]  (Abnormal) Collected:  03/08/20 0348    Lab Status:  Final result Specimen:  Blood from Arm, Right Updated:  03/08/20 0410     Sodium 135 mmol/L      Potassium 4 4 mmol/L      Chloride 100 mmol/L      CO2 27 mmol/L      ANION GAP 8 mmol/L      BUN 20 mg/dL      Creatinine 0 89 mg/dL      Glucose 120 mg/dL      Calcium 8 7 mg/dL      AST 11 U/L      ALT 12 U/L      Alkaline Phosphatase 37 U/L      Total Protein 6 5 g/dL      Albumin 3 5 g/dL      Total Bilirubin 0 57 mg/dL      eGFR 65 ml/min/1 73sq m     Narrative:       [place] guidelines for Chronic Kidney Disease (CKD):     Stage 1 with normal or high GFR (GFR > 90 mL/min/1 73 square meters)    Stage 2 Mild CKD (GFR = 60-89 mL/min/1 73 square meters)    Stage 3A Moderate CKD (GFR = 45-59 mL/min/1 73 square meters)    Stage 3B Moderate CKD (GFR = 30-44 mL/min/1 73 square meters)    Stage 4 Severe CKD (GFR = 15-29 mL/min/1 73 square meters)    Stage 5 End Stage CKD (GFR <15 mL/min/1 73 square meters)  Note: GFR calculation is accurate only with a steady state creatinine    Lipase [[id number]]  (Normal) Collected: 03/08/20 0348    Lab Status:  Final result Specimen:  Blood from Arm, Right Updated:  03/08/20 0410     Lipase 140 u/L     CBC and differential [[id number]]  (Abnormal) Collected:  03/08/20 0348    Lab Status:  Final result Specimen:  Blood from Arm, Right Updated:  03/08/20 0355     WBC 15 41 Thousand/uL      RBC 4 00 Million/uL      Hemoglobin 12 3 g/dL      Hematocrit 37 3 %      MCV 93 fL      MCH 30 8 pg      MCHC 33 0 g/dL      RDW 13 2 %      MPV 9 8 fL      Platelets 946 Thousands/uL      nRBC 0 /100 WBCs      Neutrophils Relative 84 %      Immat GRANS % 1 %      Lymphocytes Relative 11 %      Monocytes Relative 4 %      Eosinophils Relative 0 %      Basophils Relative 0 %      Neutrophils Absolute 12 97 Thousands/µL      Immature Grans Absolute 0 08 Thousand/uL      Lymphocytes Absolute 1 69 Thousands/µL      Monocytes Absolute 0 64 Thousand/µL      Eosinophils Absolute 0 00 Thousand/µL      Basophils Absolute 0 03 Thousands/µL                  CT abdomen pelvis with contrast   Final Result by Omkar Phipps MD (03/08 0636)      Small amount of free intraperitoneal air within the anterior upper abdomen suspicious for a perforated viscus  Reidentified is mild to moderate colonic wall thickening and pericolonic inflammatory stranding at the sigmoid colon with multiple associated    diverticula most compatible with acute diverticulitis  This is the suspected site of perforation  Surgical consultation is advised  Small amount of pelvic free fluid  No encapsulated fluid collection to suggest an abscess  I personally discussed this study with Dr Bryon Disla on 3/8/2020 at 6:29 AM                Workstation performed: [id number]               Procedures  Procedures      ED Course                               MDM  Number of Diagnoses or Management Options  Diagnosis management comments: 58-year-old female presents with lower abdominal pain and diarrhea    She has found have a leukocytosis and perforated diverticulitis with a small amount of intra-abdominal air  VSS, abdomen tender but benign  Started IV fluids, IV antibiotics, pain control and antipyretics in the ED  Surgery team consulted, pt will likely need admission  Amount and/or Complexity of Data Reviewed  Clinical lab tests: ordered and reviewed  Tests in the radiology section of CPT®: ordered and reviewed  Discussion of test results with the performing providers: yes  Review and summarize past medical records: yes  Discuss the patient with other providers: yes    Risk of Complications, Morbidity, and/or Mortality  Presenting problems: moderate  Diagnostic procedures: moderate  Management options: moderate    Patient Progress  Patient progress: stable        Disposition  Final diagnoses:   None     ED Disposition     None      Follow-up Information    None         Patient's Medications   Discharge Prescriptions    No medications on file     No discharge procedures on file  PDMP Review       Value Time User    PDMP Reviewed  Yes 1/8/2020 12:48 PM Edgar Guerin DO           ED Provider  Attending physically available and evaluated Rosina Rodríguez I managed the patient along with the ED Attending      Electronically Signed by         Brooks Taylor MD  03/08/20 9118

## 2020-03-09 LAB
ANION GAP SERPL CALCULATED.3IONS-SCNC: 4 MMOL/L (ref 4–13)
BASOPHILS # BLD AUTO: 0.01 THOUSANDS/ΜL (ref 0–0.1)
BASOPHILS NFR BLD AUTO: 0 % (ref 0–1)
BUN SERPL-MCNC: 12 MG/DL (ref 5–25)
CALCIUM SERPL-MCNC: 7.8 MG/DL (ref 8.3–10.1)
CHLORIDE SERPL-SCNC: 107 MMOL/L (ref 100–108)
CO2 SERPL-SCNC: 25 MMOL/L (ref 21–32)
CREAT SERPL-MCNC: 0.61 MG/DL (ref 0.6–1.3)
EOSINOPHIL # BLD AUTO: 0 THOUSAND/ΜL (ref 0–0.61)
EOSINOPHIL NFR BLD AUTO: 0 % (ref 0–6)
ERYTHROCYTE [DISTWIDTH] IN BLOOD BY AUTOMATED COUNT: 13.7 % (ref 11.6–15.1)
GFR SERPL CREATININE-BSD FRML MDRD: 92 ML/MIN/1.73SQ M
GLUCOSE SERPL-MCNC: 124 MG/DL (ref 65–140)
HCT VFR BLD AUTO: 27.3 % (ref 34.8–46.1)
HGB BLD-MCNC: 8.1 G/DL (ref 11.5–15.4)
HGB BLD-MCNC: 8.6 G/DL (ref 11.5–15.4)
IMM GRANULOCYTES # BLD AUTO: 0.03 THOUSAND/UL (ref 0–0.2)
IMM GRANULOCYTES NFR BLD AUTO: 0 % (ref 0–2)
LYMPHOCYTES # BLD AUTO: 0.95 THOUSANDS/ΜL (ref 0.6–4.47)
LYMPHOCYTES NFR BLD AUTO: 10 % (ref 14–44)
MCH RBC QN AUTO: 31 PG (ref 26.8–34.3)
MCHC RBC AUTO-ENTMCNC: 31.5 G/DL (ref 31.4–37.4)
MCV RBC AUTO: 99 FL (ref 82–98)
MONOCYTES # BLD AUTO: 0.52 THOUSAND/ΜL (ref 0.17–1.22)
MONOCYTES NFR BLD AUTO: 6 % (ref 4–12)
NEUTROPHILS # BLD AUTO: 7.71 THOUSANDS/ΜL (ref 1.85–7.62)
NEUTS SEG NFR BLD AUTO: 84 % (ref 43–75)
NRBC BLD AUTO-RTO: 0 /100 WBCS
PLATELET # BLD AUTO: 168 THOUSANDS/UL (ref 149–390)
PLATELET # BLD AUTO: 179 THOUSANDS/UL (ref 149–390)
PMV BLD AUTO: 10.4 FL (ref 8.9–12.7)
PMV BLD AUTO: 10.5 FL (ref 8.9–12.7)
POTASSIUM SERPL-SCNC: 4 MMOL/L (ref 3.5–5.3)
RBC # BLD AUTO: 2.77 MILLION/UL (ref 3.81–5.12)
SODIUM SERPL-SCNC: 136 MMOL/L (ref 136–145)
WBC # BLD AUTO: 9.22 THOUSAND/UL (ref 4.31–10.16)

## 2020-03-09 PROCEDURE — 85025 COMPLETE CBC W/AUTO DIFF WBC: CPT | Performed by: SURGERY

## 2020-03-09 PROCEDURE — 97167 OT EVAL HIGH COMPLEX 60 MIN: CPT

## 2020-03-09 PROCEDURE — 85018 HEMOGLOBIN: CPT | Performed by: PHYSICIAN ASSISTANT

## 2020-03-09 PROCEDURE — 97163 PT EVAL HIGH COMPLEX 45 MIN: CPT

## 2020-03-09 PROCEDURE — 85049 AUTOMATED PLATELET COUNT: CPT | Performed by: SURGERY

## 2020-03-09 PROCEDURE — 80048 BASIC METABOLIC PNL TOTAL CA: CPT | Performed by: SURGERY

## 2020-03-09 RX ORDER — DEXTROSE, SODIUM CHLORIDE, AND POTASSIUM CHLORIDE 5; .45; .15 G/100ML; G/100ML; G/100ML
50 INJECTION INTRAVENOUS CONTINUOUS
Status: DISCONTINUED | OUTPATIENT
Start: 2020-03-09 | End: 2020-03-11

## 2020-03-09 RX ORDER — LANOLIN ALCOHOL/MO/W.PET/CERES
6 CREAM (GRAM) TOPICAL
Status: DISCONTINUED | OUTPATIENT
Start: 2020-03-09 | End: 2020-03-11 | Stop reason: HOSPADM

## 2020-03-09 RX ADMIN — HEPARIN SODIUM 5000 UNITS: 5000 INJECTION INTRAVENOUS; SUBCUTANEOUS at 05:12

## 2020-03-09 RX ADMIN — DIVALPROEX SODIUM 250 MG: 250 TABLET, EXTENDED RELEASE ORAL at 17:38

## 2020-03-09 RX ADMIN — DEXTROSE, SODIUM CHLORIDE, AND POTASSIUM CHLORIDE 125 ML/HR: 5; .45; .15 INJECTION INTRAVENOUS at 08:01

## 2020-03-09 RX ADMIN — METRONIDAZOLE 500 MG: 500 INJECTION, SOLUTION INTRAVENOUS at 07:50

## 2020-03-09 RX ADMIN — MELATONIN 6 MG: at 22:01

## 2020-03-09 RX ADMIN — CITALOPRAM HYDROBROMIDE 20 MG: 20 TABLET, FILM COATED ORAL at 08:10

## 2020-03-09 RX ADMIN — ASPIRIN 325 MG: 325 TABLET, COATED ORAL at 08:10

## 2020-03-09 RX ADMIN — DIVALPROEX SODIUM 250 MG: 250 TABLET, EXTENDED RELEASE ORAL at 08:10

## 2020-03-09 RX ADMIN — CIPROFLOXACIN 400 MG: 2 INJECTION, SOLUTION INTRAVENOUS at 04:09

## 2020-03-09 RX ADMIN — DEXTROSE, SODIUM CHLORIDE, AND POTASSIUM CHLORIDE 125 ML/HR: 5; .45; .15 INJECTION INTRAVENOUS at 22:50

## 2020-03-09 RX ADMIN — SODIUM CHLORIDE, SODIUM LACTATE, POTASSIUM CHLORIDE, AND CALCIUM CHLORIDE 125 ML/HR: .6; .31; .03; .02 INJECTION, SOLUTION INTRAVENOUS at 00:58

## 2020-03-09 RX ADMIN — DEXTROSE, SODIUM CHLORIDE, AND POTASSIUM CHLORIDE 125 ML/HR: 5; .45; .15 INJECTION INTRAVENOUS at 13:57

## 2020-03-09 RX ADMIN — METRONIDAZOLE 500 MG: 500 INJECTION, SOLUTION INTRAVENOUS at 16:25

## 2020-03-09 RX ADMIN — SODIUM CHLORIDE 1000 ML: 0.9 INJECTION, SOLUTION INTRAVENOUS at 03:09

## 2020-03-09 RX ADMIN — CIPROFLOXACIN 400 MG: 2 INJECTION, SOLUTION INTRAVENOUS at 17:35

## 2020-03-09 RX ADMIN — SODIUM CHLORIDE 1000 ML: 0.9 INJECTION, SOLUTION INTRAVENOUS at 00:00

## 2020-03-09 RX ADMIN — PRAVASTATIN SODIUM 80 MG: 80 TABLET ORAL at 17:37

## 2020-03-09 RX ADMIN — HEPARIN SODIUM 5000 UNITS: 5000 INJECTION INTRAVENOUS; SUBCUTANEOUS at 13:19

## 2020-03-09 RX ADMIN — METRONIDAZOLE 500 MG: 500 INJECTION, SOLUTION INTRAVENOUS at 22:47

## 2020-03-09 RX ADMIN — HEPARIN SODIUM 5000 UNITS: 5000 INJECTION INTRAVENOUS; SUBCUTANEOUS at 21:44

## 2020-03-09 NOTE — OCCUPATIONAL THERAPY NOTE
Occupational Therapy Evaluation     Patient Name: Timmy Gary  [facility] Date: 3/9/2020  Problem List  Principal Problem:    Perforated diverticulum    Past Medical History  Past Medical History:   Diagnosis Date    Migraine      Past Surgical History  Past Surgical History:   Procedure Laterality Date    BREAST EXCISIONAL BIOPSY Right 1986    HARTMANS PROCEDURE N/A 3/8/2020    Procedure: Laparoscopic drainage of intra peritoneal abscess, sigmoid rescetion,;  Surgeon: Genesis Sanchez MD;  [facility];  Service: Colorectal    HYSTERECTOMY  1978    age 25   Joyce Hurd NASAL SEPTUM SURGERY      deviation repair    NM LAP,DIAGNOSTIC ABDOMEN N/A 3/8/2020    Procedure: LAPAROSCOPY DIAGNOSTIC;  Surgeon: Genesis Sanchez MD;  [facility];  Service: Colorectal    NM SIGMOIDOSCOPY FLX DX W/COLLJ SPEC BR/WA IF PFRMD N/A 3/8/2020    Procedure: Te Walsh;  Surgeon: Genesis Sanchez MD;  [facility];  Service: Colorectal    SHOULDER SURGERY               03/09/20 0830   Note Type   Note type Eval only  (+PCA pump, urethral catheter)   Restrictions/Precautions   Weight Bearing Precautions Per Order No   Pain Assessment   Pain Assessment No/denies pain   Pain Score No Pain   Home Living   Type of Home Other (Comment)  (Modular home )   Home Layout One level;Ramped entrance; Able to live on main level with bedroom/bathroom; Performs ADLs on one level   Bathroom Shower/Tub Tub/shower unit   Bathroom Toilet Standard   Bathroom Equipment Grab bars in shower   Bathroom Accessibility Accessible   Prior Function   Level of [place] Independent with ADLs and functional mobility   Lives With Spouse   Receives Help From Family   ADL Assistance Independent   IADLs Independent   Falls in the last 6 months 0   Vocational Part time employment   Lifestyle   Autonomy I ADL's/IADL's shares homemaking tasks with retired spouse, +drives, works part time   Reciprocal Relationships spouse   Service to Others part time -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   [address] ([address]) W[address]   ADL   Where Assessed Chair   Eating Assistance 7  Independent   Grooming Assistance 7  Independent   Grooming Deficit Brushing hair   UB Bathing Assistance 5  Supervision/Setup   UB Bathing Deficit Setup   LB Bathing Assistance 5  Supervision/Setup   LB Bathing Deficit Setup   UB Dressing Assistance 5  Supervision/Setup   UB Dressing Deficit Setup   LB Dressing Assistance 5  Supervision/Setup   LB Dressing Deficit Setup;Don/doff R sock; Don/doff L sock   Toileting Assistance  5  Supervision/Setup   Bed Mobility   Additional Comments pt left in chair as received with all needs met   Transfers   Sit to Stand 7  Independent   Stand to Sit 7  Independent   Functional Mobility   Functional Mobility 5  Supervision   Additional Comments S with RW short distance functional mobility   Additional items   (no AD)   Balance   Static Sitting Normal   Dynamic Sitting Good   Static Standing Fair +   Dynamic Standing Fair +   Ambulatory Fair   Activity Tolerance   Activity Tolerance Patient tolerated treatment well   Medical Staff Made Aware P    Nurse Made Aware RN cleared pt for therapy   RUE Assessment   RUE Assessment WFL   LUE Assessment   LUE Assessment WFL   Hand Function   Gross Motor Coordination Functional   Fine Motor Coordination Functional   Vision-Basic Assessment   Current Vision Wears glasses all the time   Cognition   Overall Cognitive Status [facility]   Arousal/Participation Alert; Cooperative   Attention Within functional limits   Orientation Level Oriented X4   Memory Within functional limits   Following Commands Follows all commands and directions without difficulty   Comments pleasant and cooperative, good memory, direction following, and safety  Assessment   Limitation Decreased high-level ADLs; Decreased endurance   Prognosis Good   Assessment Pt is a 68 y o  female who was admitted to [facility] [place] on 3/8/2020 with Perforated diverticulum s/p sigmoid resection, laprascopic drainage of intra peritoneal abcess on 3/8/20  Pt's problem list also includes PMH of  Migraine, hyperlipidemia, tobacco use, primary insomnia, depression, herpes simplex, macular degeneration, LLQ abdominal pain   At baseline pt was completing ADL's/IADL's with I, no AD with functional ambulation, +drives, works full time  Pt lives with spouse in a one story modular  Home with ramped entrance  Currently pt requires S/set-up for overall ADLS and S without AD  for functional mobility, mod I with out AD transfers  Pt currently presents with impairments in the following categories -difficulty performing IADLS  activity tolerance and endurance  These impairments, as well as pt's fatigue and pain  limit pt's ability to safely engage in all baseline areas of occupation, includingcommunity mobility, laundry , driving, house maintenance, medication management, meal prep, cleaning and work/volunteer work  From [employer] standpoint, recommend home with family support no bathroom DME needs upon D/C  No further acute OT needs indicated at this time - Recommend continued oob for meals, ambulation to/from BR, setup for self care tasks and mobility in hallway with nursing/restorative - d/c from caseload with above recommendations     Goals   Patient Goals go home   Recommendation   OT Discharge Recommendation Home with family support   OT - OK to Discharge Yes   Sean Tyson MOT, OTR/L

## 2020-03-09 NOTE — SOCIAL WORK
Patient reviewed in care coordination rounds  Patient not medically clear  Johnson discontinued  Likely d/c Wednesday

## 2020-03-09 NOTE — PROGRESS NOTES
Progress Note - Colorectal   Wendy Port 68 y o  female MRN: [id number]  Unit/Bed#: [facility] 815-01 Encounter: [id number]      Objective:  Patient states she feels fine this morning  Her pain is controlled with her PCA of Dilaudid  Does complain of pain and soreness of her throat from the nasogastric tube  Minimal drainage from NG tube  No flatus, patient's urine is clear vicky yellow  [address]    Blood pressure 116/52, pulse 87, temperature 98 8 °F (37 1 °C), resp  rate 18, height 5' 6" (1 676 m), weight 66 7 kg (147 lb), SpO2 95 %, not currently breastfeeding  ,Body mass index is 23 73 kg/m²  Intake/Output Summary (Last 24 hours) at 3/9/2020 0519  Last data filed at 3/9/2020 0501  Gross per 24 hour   Intake 4368 75 ml   Output 925 ml   Net 3443 75 ml       Invasive Devices     Peripheral Intravenous Line            Peripheral IV 03/08/20 Right Antecubital 1 day    Peripheral IV 03/08/20 Right Wrist less than 1 day          Drain            NG/OG/Enteral Tube Nasogastric 18 Fr Right nares less than 1 day    Urethral Catheter Latex; Double-lumen 16 Fr  less than 1 day                Physical Exam:   Abdomen:  Soft, slightly distended, abdominal incisions are clean and dry, staples are intact, incisional tenderness midline wound no bowel sounds  Extremities:  No calf tenderness, no pedal edema    Lab, Imaging and other studies:  Pending  VTE Pharmacologic Prophylaxis: Heparin  VTE Mechanical Prophylaxis: sequential compression device    Assessment:  POD # 1 laparoscopic hand assisted sigmoidectomy for perforated diverticulitis    Plan:  1  Continue IV fluids  2  Continue NG tube at the present time  3  Do not hold the patient's Depakote  4  Incentive spirometry  5  Out of bed today and ambulating the halls  6  Check a m  Labs  7   Continue the Cipro Flagyl for 4 more days

## 2020-03-09 NOTE — PHYSICAL THERAPY NOTE
Physical Therapy Evaluation    Patient's Name: Jasmyne Marie    Admitting Diagnosis  Abdominal pain [R10 9]  Perforated diverticulum [K57 80]    Problem List  Patient Active Problem List   Diagnosis    Migraine without aura and without status migrainosus, not intractable    Acute upper respiratory infection    Chronic migraine without aura    Hyperlipidemia    Tobacco abuse    Healthcare maintenance    Bronchitis    Occult blood in stools    Primary insomnia    Bruit of right carotid artery    Simple chronic bronchitis (HCC)    Bilateral carotid artery stenosis    Fibromuscular dysplasia of cervicocranial artery (HCC)    Depression    Herpes simplex    Macular degeneration of right eye    Acute left-sided low back pain without sciatica    LLQ abdominal pain    Perforated diverticulum       Past Medical History  Past Medical History:   Diagnosis Date    Migraine        Past Surgical History  Past Surgical History:   Procedure Laterality Date    BREAST EXCISIONAL BIOPSY Right 1986    HYSTERECTOMY  1978    age 25   [facility]      deviation repair    SHOULDER SURGERY          03/09/20 0841   Note Type   Note type Eval only   Pain Assessment   Pain Assessment No/denies pain   Pain Score No Pain   Home Living   Type of [address] [place] [address] One level;Ramped entrance   Prior Function   Level of [place] Independent with ADLs and functional mobility   Lives With Spouse   ADL Assistance Independent   IADLs Independent   Falls in the last 6 months 0   Vocational Part time employment   Comments Works part time as CNA, 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      Restrictions/Precautions   Weight Bearing Precautions Per Order No   Cognition   Overall Cognitive Status WFL   Arousal/Participation Alert   Orientation Level  --    Memory Within functional limits   Following Commands Follows all commands and directions without difficulty   RLE Assessment   RLE Assessment WNL   LLE Assessment   LLE Assessment WNL   Light Touch   RLE Light Touch Grossly intact   LLE Light Touch Grossly intact   Bed Mobility   Additional Comments Pt up in chair upon PT arrival    Transfers   Sit to Stand 7  Independent   Stand to Sit 7  Independent   Ambulation/Elevation   Gait pattern   (increased pace )   Gait Assistance 5  Supervision   Additional items Assist x 1   Assistive Device None   Distance 250 ftx1, 100 ftx1, occassional drop in O2 level to 85%, pt asymptomatic and quickly returned to 94% with stop of mobility  Balance   Static Sitting Normal   Dynamic Sitting Normal   Static Standing Good   Dynamic Standing Fair +   Ambulatory Fair +   Higher level balance   (able to  object off the ground)   Endurance Deficit   Endurance Deficit No   Activity Tolerance   Activity Tolerance Patient tolerated treatment well   Medical Staff Made Aware OT, [address] communication with RN before and after PT session, pt seated upright in chair upon departure, O2 97% on RA  Assessment   Assessment Pt is a 68 y o  female seen for PT evaluation s/p admit to [facility] on 3/8/2020  Pt was admitted with a primary dx of: perforated diverticulum s/p laparoscopic drainage of abscess, sigmoid resection performed on 3/8  PT now consulted for assessment of mobility and d/c needs  Pt with Up as tolerated orders  Pts current co morbidities and personal factors effecting treatment include: CVA, diverticulitis, works as a CNA involving lifting/transfering patients  Pts current clinical presentation is Unstable/ Unpredictable (high complexity) due to Ongoing medical management for primary dx, Ongoing telemetry monitoring, Continuous pulse oximetry monitoring    Prior to admission, pt was independent with all mobility  Upon evaluation, pt currently is independent with transfers and requires supervision for ambulation 250 ft     Pt is near functional baseline, encouraged continued mobility with nursing staff during hospitalization  No further acute PT needs, pt in agreement  At conclusion of PT session pt returned back in chair with phone and call bell within reach  Pt denies any further questions at this time  Recommend home with family care upon hospital D/C     Goals   Patient Goals "to go home"   Plan   PT Frequency One time visit   Recommendation   Recommendation Home with family support   PT - OK to Discharge Yes   Barthel Index   Feeding 10   Bathing 5   Grooming Score 5   Dressing Score 10   Bladder Score 0   Bowels Score 10   Toilet Use Score 10   Transfers (Bed/Chair) Score 15   Mobility (Level Surface) Score 10   Stairs Score 5   Barthel Index Score 80       Victoria Lamas, PT, DPT

## 2020-03-10 LAB
ANION GAP SERPL CALCULATED.3IONS-SCNC: 5 MMOL/L (ref 4–13)
BASOPHILS # BLD AUTO: 0.01 THOUSANDS/ΜL (ref 0–0.1)
BASOPHILS NFR BLD AUTO: 0 % (ref 0–1)
BUN SERPL-MCNC: 5 MG/DL (ref 5–25)
CALCIUM SERPL-MCNC: 8.3 MG/DL (ref 8.3–10.1)
CHLORIDE SERPL-SCNC: 105 MMOL/L (ref 100–108)
CO2 SERPL-SCNC: 27 MMOL/L (ref 21–32)
CREAT SERPL-MCNC: 0.63 MG/DL (ref 0.6–1.3)
EOSINOPHIL # BLD AUTO: 0.03 THOUSAND/ΜL (ref 0–0.61)
EOSINOPHIL NFR BLD AUTO: 1 % (ref 0–6)
ERYTHROCYTE [DISTWIDTH] IN BLOOD BY AUTOMATED COUNT: 13.6 % (ref 11.6–15.1)
GFR SERPL CREATININE-BSD FRML MDRD: 91 ML/MIN/1.73SQ M
GLUCOSE SERPL-MCNC: 141 MG/DL (ref 65–140)
HCT VFR BLD AUTO: 24.1 % (ref 34.8–46.1)
HGB BLD-MCNC: 7.6 G/DL (ref 11.5–15.4)
IMM GRANULOCYTES # BLD AUTO: 0.03 THOUSAND/UL (ref 0–0.2)
IMM GRANULOCYTES NFR BLD AUTO: 1 % (ref 0–2)
LYMPHOCYTES # BLD AUTO: 0.74 THOUSANDS/ΜL (ref 0.6–4.47)
LYMPHOCYTES NFR BLD AUTO: 11 % (ref 14–44)
MAGNESIUM SERPL-MCNC: 2 MG/DL (ref 1.6–2.6)
MCH RBC QN AUTO: 30.8 PG (ref 26.8–34.3)
MCHC RBC AUTO-ENTMCNC: 31.5 G/DL (ref 31.4–37.4)
MCV RBC AUTO: 98 FL (ref 82–98)
MONOCYTES # BLD AUTO: 0.55 THOUSAND/ΜL (ref 0.17–1.22)
MONOCYTES NFR BLD AUTO: 8 % (ref 4–12)
NEUTROPHILS # BLD AUTO: 5.21 THOUSANDS/ΜL (ref 1.85–7.62)
NEUTS SEG NFR BLD AUTO: 79 % (ref 43–75)
NRBC BLD AUTO-RTO: 0 /100 WBCS
PLATELET # BLD AUTO: 155 THOUSANDS/UL (ref 149–390)
PMV BLD AUTO: 10.7 FL (ref 8.9–12.7)
POTASSIUM SERPL-SCNC: 3.9 MMOL/L (ref 3.5–5.3)
RBC # BLD AUTO: 2.47 MILLION/UL (ref 3.81–5.12)
SODIUM SERPL-SCNC: 137 MMOL/L (ref 136–145)
WBC # BLD AUTO: 6.57 THOUSAND/UL (ref 4.31–10.16)

## 2020-03-10 PROCEDURE — 85025 COMPLETE CBC W/AUTO DIFF WBC: CPT | Performed by: PHYSICIAN ASSISTANT

## 2020-03-10 PROCEDURE — 83735 ASSAY OF MAGNESIUM: CPT | Performed by: PHYSICIAN ASSISTANT

## 2020-03-10 PROCEDURE — 80048 BASIC METABOLIC PNL TOTAL CA: CPT | Performed by: PHYSICIAN ASSISTANT

## 2020-03-10 RX ORDER — NAPROXEN 500 MG/1
500 TABLET ORAL AS NEEDED
Status: DISCONTINUED | OUTPATIENT
Start: 2020-03-10 | End: 2020-03-10

## 2020-03-10 RX ORDER — POTASSIUM CHLORIDE 20 MEQ/1
20 TABLET, EXTENDED RELEASE ORAL ONCE
Status: COMPLETED | OUTPATIENT
Start: 2020-03-10 | End: 2020-03-10

## 2020-03-10 RX ORDER — NAPROXEN 500 MG/1
500 TABLET ORAL 2 TIMES DAILY PRN
Status: DISCONTINUED | OUTPATIENT
Start: 2020-03-10 | End: 2020-03-11 | Stop reason: HOSPADM

## 2020-03-10 RX ORDER — SUMATRIPTAN 50 MG/1
100 TABLET, FILM COATED ORAL 2 TIMES DAILY PRN
Status: DISCONTINUED | OUTPATIENT
Start: 2020-03-10 | End: 2020-03-11 | Stop reason: HOSPADM

## 2020-03-10 RX ORDER — SUMATRIPTAN 50 MG/1
100 TABLET, FILM COATED ORAL ONCE
Status: COMPLETED | OUTPATIENT
Start: 2020-03-10 | End: 2020-03-10

## 2020-03-10 RX ORDER — CIPROFLOXACIN 2 MG/ML
400 INJECTION, SOLUTION INTRAVENOUS EVERY 12 HOURS
Status: DISCONTINUED | OUTPATIENT
Start: 2020-03-10 | End: 2020-03-11 | Stop reason: HOSPADM

## 2020-03-10 RX ORDER — NAPROXEN 500 MG/1
500 TABLET ORAL ONCE
Status: COMPLETED | OUTPATIENT
Start: 2020-03-10 | End: 2020-03-10

## 2020-03-10 RX ADMIN — CIPROFLOXACIN 400 MG: 2 INJECTION, SOLUTION INTRAVENOUS at 03:40

## 2020-03-10 RX ADMIN — DIVALPROEX SODIUM 250 MG: 250 TABLET, EXTENDED RELEASE ORAL at 18:20

## 2020-03-10 RX ADMIN — MELATONIN 6 MG: at 21:05

## 2020-03-10 RX ADMIN — SUMATRIPTAN SUCCINATE 100 MG: 50 TABLET ORAL at 08:37

## 2020-03-10 RX ADMIN — DIVALPROEX SODIUM 250 MG: 250 TABLET, EXTENDED RELEASE ORAL at 08:35

## 2020-03-10 RX ADMIN — NAPROXEN 500 MG: 500 TABLET ORAL at 04:40

## 2020-03-10 RX ADMIN — HEPARIN SODIUM 5000 UNITS: 5000 INJECTION INTRAVENOUS; SUBCUTANEOUS at 21:05

## 2020-03-10 RX ADMIN — NAPROXEN 500 MG: 500 TABLET ORAL at 08:37

## 2020-03-10 RX ADMIN — Medication: at 15:45

## 2020-03-10 RX ADMIN — METRONIDAZOLE 500 MG: 500 INJECTION, SOLUTION INTRAVENOUS at 22:30

## 2020-03-10 RX ADMIN — METRONIDAZOLE 500 MG: 500 INJECTION, SOLUTION INTRAVENOUS at 10:39

## 2020-03-10 RX ADMIN — METRONIDAZOLE 500 MG: 500 INJECTION, SOLUTION INTRAVENOUS at 15:55

## 2020-03-10 RX ADMIN — ONDANSETRON 4 MG: 2 INJECTION INTRAMUSCULAR; INTRAVENOUS at 03:57

## 2020-03-10 RX ADMIN — POTASSIUM CHLORIDE 20 MEQ: 1500 TABLET, EXTENDED RELEASE ORAL at 10:35

## 2020-03-10 RX ADMIN — CIPROFLOXACIN 400 MG: 2 INJECTION, SOLUTION INTRAVENOUS at 17:07

## 2020-03-10 RX ADMIN — SUMATRIPTAN SUCCINATE 100 MG: 50 TABLET ORAL at 04:40

## 2020-03-10 RX ADMIN — PRAVASTATIN SODIUM 80 MG: 80 TABLET ORAL at 18:20

## 2020-03-10 RX ADMIN — DEXTROSE, SODIUM CHLORIDE, AND POTASSIUM CHLORIDE 100 ML/HR: 5; .45; .15 INJECTION INTRAVENOUS at 09:16

## 2020-03-10 RX ADMIN — HEPARIN SODIUM 5000 UNITS: 5000 INJECTION INTRAVENOUS; SUBCUTANEOUS at 16:15

## 2020-03-10 RX ADMIN — ASPIRIN 325 MG: 325 TABLET, COATED ORAL at 08:35

## 2020-03-10 RX ADMIN — HEPARIN SODIUM 5000 UNITS: 5000 INJECTION INTRAVENOUS; SUBCUTANEOUS at 05:00

## 2020-03-10 RX ADMIN — CITALOPRAM HYDROBROMIDE 20 MG: 20 TABLET, FILM COATED ORAL at 08:35

## 2020-03-10 NOTE — SOCIAL WORK
Pt reviewed with physician who reported that pt is likely to discharge home on Thursday  Pt was put on clear liquids diet today  No anticipated discharge needs,  department to follow

## 2020-03-10 NOTE — PROGRESS NOTES
Progress Note - Colorectal   Jasmyne Marie 68 y o  female MRN: [id number]  Unit/Bed#: [facility] 815-01 Encounter: [id number]      Objective:  Patient having severe migraine  The only thing that helps  is Naprosyn and Imitrex  Sometimes she has to take another dose of each 2 hours after  Some nausea, she states this from the migraine, feels slightly bloated  Is ambulating the halls well and often  No flatus as of yet  Tolerating only sips  Is voiding well on her own since Johnson was removed  Patient is not heavy coffee drinker  Patient Cipro Flagyl for 3 more days  1650 + 2 UA's    Blood pressure 122/59, pulse 88, temperature 99 8 °F (37 7 °C), resp  rate 18, height 5' 6" (1 676 m), weight 66 7 kg (147 lb), SpO2 93 %, not currently breastfeeding  ,Body mass index is 23 73 kg/m²  Intake/Output Summary (Last 24 hours) at 3/10/2020 0515  Last data filed at 3/10/2020 0454  Gross per 24 hour   Intake 2729 89 ml   Output 1650 ml   Net 1079 89 ml       Invasive Devices     Peripheral Intravenous Line            Peripheral IV 03/09/20 Left Hand less than 1 day                Physical Exam:   Abdomen:  Soft, appears slightly distended, midline surgical incision is clean and dry, trocar site left lower quadrant has stopped draining blood  All staples are intact  Extremities:Venadynes on and functioning, no edema, no calf tenderness    Lab, Imaging and other studies:  Pending  VTE Pharmacologic Prophylaxis: Heparin  VTE Mechanical Prophylaxis: sequential compression device    Assessment:  POD # 1 laparoscopic hand assisted sigmoidectomy for perforated diverticulitis    Plan:  1  Clear liquid diet as tolerates  2  Decrease IV fluids 200 mL/hour  3  Check a m  Labs  4  Continue Cipro Flagyl  5  Continue incentive spirometry  6  Continue ambulating the halls throughout the day  7  Imitrex and Naprosyn ordered for migraine  8   Consider the hemoglobin dilutional and not acute blood loss anemia being there was only 100 mL of blood loss and patient most likely was dehydrated prior to admission

## 2020-03-10 NOTE — PLAN OF CARE
Problem: PAIN - ADULT  Goal: Verbalizes/displays adequate comfort level or baseline comfort level  Description  Interventions:  - Encourage patient to monitor pain and request assistance  - Assess pain using appropriate pain scale  - Administer analgesics based on type and severity of pain and evaluate response  - Implement non-pharmacological measures as appropriate and evaluate response  - Consider cultural and social influences on pain and pain management  - Notify physician/advanced practitioner if interventions unsuccessful or patient reports new pain  Outcome: Progressing     Problem: Potential for Falls  Goal: Patient will remain free of falls  Description  INTERVENTIONS:  - Assess patient frequently for physical needs  -  Identify cognitive and physical deficits and behaviors that affect risk of falls    -  [place] fall precautions as indicated by assessment   - Educate patient/family on patient safety including physical limitations  - Instruct patient to call for assistance with activity based on assessment  - Modify environment to reduce risk of injury  - Consider OT/PT consult to assist with strengthening/mobility  Outcome: Progressing

## 2020-03-10 NOTE — RESTORATIVE TECHNICIAN NOTE
Restorative Specialist Mobility Note       Activity: Ambulate in jang     Assistive Device: Other (Comment)(Pushed IV pole)        Repositioned: Supine              Anti-Embolism Device On:  Bilateral, Sequential compression devices, below knee

## 2020-03-10 NOTE — PLAN OF CARE
Problem: PAIN - ADULT  Goal: Verbalizes/displays adequate comfort level or baseline comfort level  Description  Interventions:  - Encourage patient to monitor pain and request assistance  - Assess pain using appropriate pain scale  - Administer analgesics based on type and severity of pain and evaluate response  - Implement non-pharmacological measures as appropriate and evaluate response  - Consider cultural and social influences on pain and pain management  - Notify physician/advanced practitioner if interventions unsuccessful or patient reports new pain  Outcome: Progressing     Problem: Potential for Falls  Goal: Patient will remain free of falls  Description  INTERVENTIONS:  - Assess patient frequently for physical needs  -  Identify cognitive and physical deficits and behaviors that affect risk of falls    -  [place] fall precautions as indicated by assessment   - Educate patient/family on patient safety including physical limitations  - Instruct patient to call for assistance with activity based on assessment  - Modify environment to reduce risk of injury  - Consider OT/PT consult to assist with strengthening/mobility  Outcome: Progressing

## 2020-03-11 VITALS
OXYGEN SATURATION: 96 % | WEIGHT: 147 LBS | TEMPERATURE: 99.5 F | SYSTOLIC BLOOD PRESSURE: 114 MMHG | HEART RATE: 74 BPM | HEIGHT: 66 IN | DIASTOLIC BLOOD PRESSURE: 58 MMHG | RESPIRATION RATE: 18 BRPM | BODY MASS INDEX: 23.63 KG/M2

## 2020-03-11 LAB
ABO GROUP BLD BPU: NORMAL
ABO GROUP BLD BPU: NORMAL
ANION GAP SERPL CALCULATED.3IONS-SCNC: 2 MMOL/L (ref 4–13)
BASOPHILS # BLD AUTO: 0.01 THOUSANDS/ΜL (ref 0–0.1)
BASOPHILS NFR BLD AUTO: 0 % (ref 0–1)
BPU ID: NORMAL
BPU ID: NORMAL
BUN SERPL-MCNC: 3 MG/DL (ref 5–25)
CALCIUM SERPL-MCNC: 8.5 MG/DL (ref 8.3–10.1)
CHLORIDE SERPL-SCNC: 106 MMOL/L (ref 100–108)
CO2 SERPL-SCNC: 31 MMOL/L (ref 21–32)
CREAT SERPL-MCNC: 0.65 MG/DL (ref 0.6–1.3)
CROSSMATCH: NORMAL
CROSSMATCH: NORMAL
EOSINOPHIL # BLD AUTO: 0.1 THOUSAND/ΜL (ref 0–0.61)
EOSINOPHIL NFR BLD AUTO: 2 % (ref 0–6)
ERYTHROCYTE [DISTWIDTH] IN BLOOD BY AUTOMATED COUNT: 13.5 % (ref 11.6–15.1)
GFR SERPL CREATININE-BSD FRML MDRD: 90 ML/MIN/1.73SQ M
GLUCOSE SERPL-MCNC: 104 MG/DL (ref 65–140)
HCT VFR BLD AUTO: 24.5 % (ref 34.8–46.1)
HGB BLD-MCNC: 7.8 G/DL (ref 11.5–15.4)
IMM GRANULOCYTES # BLD AUTO: 0.03 THOUSAND/UL (ref 0–0.2)
IMM GRANULOCYTES NFR BLD AUTO: 1 % (ref 0–2)
LYMPHOCYTES # BLD AUTO: 1.12 THOUSANDS/ΜL (ref 0.6–4.47)
LYMPHOCYTES NFR BLD AUTO: 20 % (ref 14–44)
MAGNESIUM SERPL-MCNC: 2 MG/DL (ref 1.6–2.6)
MCH RBC QN AUTO: 30.8 PG (ref 26.8–34.3)
MCHC RBC AUTO-ENTMCNC: 31.8 G/DL (ref 31.4–37.4)
MCV RBC AUTO: 97 FL (ref 82–98)
MONOCYTES # BLD AUTO: 0.61 THOUSAND/ΜL (ref 0.17–1.22)
MONOCYTES NFR BLD AUTO: 11 % (ref 4–12)
NEUTROPHILS # BLD AUTO: 3.79 THOUSANDS/ΜL (ref 1.85–7.62)
NEUTS SEG NFR BLD AUTO: 66 % (ref 43–75)
NRBC BLD AUTO-RTO: 0 /100 WBCS
PLATELET # BLD AUTO: 168 THOUSANDS/UL (ref 149–390)
PMV BLD AUTO: 10.4 FL (ref 8.9–12.7)
POTASSIUM SERPL-SCNC: 4 MMOL/L (ref 3.5–5.3)
RBC # BLD AUTO: 2.53 MILLION/UL (ref 3.81–5.12)
SODIUM SERPL-SCNC: 139 MMOL/L (ref 136–145)
UNIT DISPENSE STATUS: NORMAL
UNIT DISPENSE STATUS: NORMAL
UNIT PRODUCT CODE: NORMAL
UNIT PRODUCT CODE: NORMAL
UNIT RH: NORMAL
UNIT RH: NORMAL
WBC # BLD AUTO: 5.66 THOUSAND/UL (ref 4.31–10.16)

## 2020-03-11 PROCEDURE — 80048 BASIC METABOLIC PNL TOTAL CA: CPT | Performed by: PHYSICIAN ASSISTANT

## 2020-03-11 PROCEDURE — 83735 ASSAY OF MAGNESIUM: CPT | Performed by: PHYSICIAN ASSISTANT

## 2020-03-11 PROCEDURE — 85025 COMPLETE CBC W/AUTO DIFF WBC: CPT | Performed by: PHYSICIAN ASSISTANT

## 2020-03-11 RX ORDER — HYDROCODONE BITARTRATE AND ACETAMINOPHEN 5; 325 MG/1; MG/1
2 TABLET ORAL EVERY 6 HOURS PRN
Status: DISCONTINUED | OUTPATIENT
Start: 2020-03-11 | End: 2020-03-11 | Stop reason: HOSPADM

## 2020-03-11 RX ORDER — METRONIDAZOLE 500 MG/1
500 TABLET ORAL EVERY 8 HOURS SCHEDULED
Qty: 5 TABLET | Refills: 0 | Status: SHIPPED | OUTPATIENT
Start: 2020-03-11 | End: 2020-03-13

## 2020-03-11 RX ORDER — CIPROFLOXACIN 500 MG/1
500 TABLET, FILM COATED ORAL EVERY 12 HOURS SCHEDULED
Qty: 3 TABLET | Refills: 0 | Status: SHIPPED | OUTPATIENT
Start: 2020-03-11 | End: 2020-03-13

## 2020-03-11 RX ORDER — HYDROMORPHONE HCL/PF 1 MG/ML
0.5 SYRINGE (ML) INJECTION
Status: DISCONTINUED | OUTPATIENT
Start: 2020-03-11 | End: 2020-03-11 | Stop reason: HOSPADM

## 2020-03-11 RX ORDER — HYDROCODONE BITARTRATE AND ACETAMINOPHEN 5; 325 MG/1; MG/1
1 TABLET ORAL EVERY 6 HOURS PRN
Qty: 20 TABLET | Refills: 0 | Status: SHIPPED | OUTPATIENT
Start: 2020-03-11 | End: 2020-03-16

## 2020-03-11 RX ORDER — HYDROCODONE BITARTRATE AND ACETAMINOPHEN 5; 325 MG/1; MG/1
1 TABLET ORAL EVERY 6 HOURS PRN
Status: DISCONTINUED | OUTPATIENT
Start: 2020-03-11 | End: 2020-03-11 | Stop reason: HOSPADM

## 2020-03-11 RX ADMIN — ASPIRIN 325 MG: 325 TABLET, COATED ORAL at 08:08

## 2020-03-11 RX ADMIN — CITALOPRAM HYDROBROMIDE 20 MG: 20 TABLET, FILM COATED ORAL at 08:08

## 2020-03-11 RX ADMIN — NAPROXEN 500 MG: 500 TABLET ORAL at 03:22

## 2020-03-11 RX ADMIN — METRONIDAZOLE 500 MG: 500 INJECTION, SOLUTION INTRAVENOUS at 08:00

## 2020-03-11 RX ADMIN — DEXTROSE, SODIUM CHLORIDE, AND POTASSIUM CHLORIDE 50 ML/HR: 5; .45; .15 INJECTION INTRAVENOUS at 01:37

## 2020-03-11 RX ADMIN — HEPARIN SODIUM 5000 UNITS: 5000 INJECTION INTRAVENOUS; SUBCUTANEOUS at 05:07

## 2020-03-11 RX ADMIN — DIVALPROEX SODIUM 250 MG: 250 TABLET, EXTENDED RELEASE ORAL at 08:08

## 2020-03-11 RX ADMIN — SUMATRIPTAN SUCCINATE 100 MG: 50 TABLET ORAL at 03:22

## 2020-03-11 RX ADMIN — CIPROFLOXACIN 400 MG: 2 INJECTION, SOLUTION INTRAVENOUS at 05:04

## 2020-03-11 NOTE — SOCIAL WORK
Patient reviewed in care coordination rounds  Patient will likely d/c home Thursday  No anticipated CM needs  CM met with patient at bedside to discuss CM role in dcp  Patient independent pta with adl's/ambulation, no DME, and drives self  , august emergency contact: [phone] and transport home  Patient has no hx with IP STR or HHC  Patient confirms PCP: Andrés Montana DO and pharmacy: [facility]  Patient is using [facility] for d/c, spouse will p/u medications  Patient denies hx of IPMH, MH and drug/alcohol tx  CM reviewed d/c planning process including the following: identifying help at home, patient preference for d/c planning needs, Discharge Lounge, [facility] Meds to Bed program, availability of treatment team to discuss questions or concerns patient and/or family may have regarding understanding medications and recognizing signs and symptoms once discharged  CM also encouraged patient to follow up with all recommended appointments after discharge  Patient advised of importance for patient and family to participate in managing patients medical well being

## 2020-03-11 NOTE — PLAN OF CARE
Problem: PAIN - ADULT  Goal: Verbalizes/displays adequate comfort level or baseline comfort level  Description  Interventions:  - Encourage patient to monitor pain and request assistance  - Assess pain using appropriate pain scale  - Administer analgesics based on type and severity of pain and evaluate response  - Implement non-pharmacological measures as appropriate and evaluate response  - Consider cultural and social influences on pain and pain management  - Notify physician/advanced practitioner if interventions unsuccessful or patient reports new pain  Outcome: Progressing     Problem: Potential for Falls  Goal: Patient will remain free of falls  Description  INTERVENTIONS:  - Assess patient frequently for physical needs  -  Identify cognitive and physical deficits and behaviors that affect risk of falls    -  [place] fall precautions as indicated by assessment   - Educate patient/family on patient safety including physical limitations  - Instruct patient to call for assistance with activity based on assessment  - Modify environment to reduce risk of injury  - Consider OT/PT consult to assist with strengthening/mobility  Outcome: Progressing

## 2020-03-11 NOTE — PLAN OF CARE
Problem: PAIN - ADULT  Goal: Verbalizes/displays adequate comfort level or baseline comfort level  Description  Interventions:  - Encourage patient to monitor pain and request assistance  - Assess pain using appropriate pain scale  - Administer analgesics based on type and severity of pain and evaluate response  - Implement non-pharmacological measures as appropriate and evaluate response  - Consider cultural and social influences on pain and pain management  - Notify physician/advanced practitioner if interventions unsuccessful or patient reports new pain  Outcome: Progressing     Problem: Potential for Falls  Goal: Patient will remain free of falls  Description  INTERVENTIONS:  - Assess patient frequently for physical needs  -  Identify cognitive and physical deficits and behaviors that affect risk of falls    -  [place] fall precautions as indicated by assessment   - Educate patient/family on patient safety including physical limitations  - Instruct patient to call for assistance with activity based on assessment  - Modify environment to reduce risk of injury  - Consider OT/PT consult to assist with strengthening/mobility  Outcome: Progressing

## 2020-03-11 NOTE — DISCHARGE SUMMARY
Discharge Summary - Colorectal Surgery   Eloy Fischer 68 y o  female MRN: [id number]  Unit/Bed#: [facility] 815-01 Encounter: [id number]        Admitting Diagnosis:  Abdominal pain    Admit Date:  March 8th, 2020    Discharge Diagnosis:  Perforated diverticulitis    Discharge Date:  March 11th, 2020    HPI:Rosemary Bhakta is a 68 y o  female who presents with abdominal pain which started two days ago  She describes it as starting in the left lower quadrant of her abdomen, but has since started to become painful throughout  No other associated symptoms; she denies fever/chills, nausea/vomiting, constipation/diarrhea, lightheadedness, SOB, CP  She has had a hysterectomy in the past but no other surgical history  She does have a history of diverticulosis and has had one prior episode of diverticulitis last month which did not require hospitalization  Last colonoscopy was in 8/2019 with Dr Arlene Alex and demonstrated sigmoid diverticular disease but otherwise unremarkable  She does take an  daily for history of stroke  Procedures Performed:  March 8th, 2020 laparoscopic hand assisted sigmoidectomy - Dr Alissa White Course:  Patient has done extremely well postoperatively  She has had no fevers, no chills postoperatively  We have her on Cipro Flagyl intravenously since surgery  Patient's abdominal wounds of stayed clean and dry  There is ecchymoses throughout the incisional areas  Patient was restarted on her 325 mg of aspirin postoperatively  Her abdomen has not been distended, is soft, staples are intact  Patient has been ambulating the halls immediately postoperatively  She is tolerating a low residue diet without any nausea or vomiting  She is using Vicodin for pain management  Patient will be discharged home with 36 hours oral Cipro 500 mg q 12 hours along with Flagyl 500 mg every 8 hours  A script was also sent to [address] [place] [address] for Vicodin 5/325 mg every 4-6 hours as needed for pain  Twenty pills were prescribed with no refills  Patient will follow with Dr Ogden in 7-10 days for staple removal   Discharge instructions were given to the patient  If there is any fevers of 101 5 or higher increasing abdominal pain, shaking chills, she is not hesitate to call the office  She is to finish dollar antibiotics  Pathology pending    Complications:  None      Condition at Discharge: good     Discharge instructions/Information to patient and family:   See after visit summary for information provided to patient and family  Provisions for Follow-Up Care:  See after visit summary for information related to follow-up care and any pertinent home health orders  Disposition: Home    Planned Readmission: No    Discharge Statement   I spent 30 minutes discharging the patient  This time was spent on the day of discharge  I had direct contact with the patient on the day of discharge  Additional documentation is required if more than 30 minutes were spent on discharge  Discharge Medications:  See after visit summary for reconciled discharge medications provided to patient and family

## 2020-03-11 NOTE — PROGRESS NOTES
Progress Note - Colorectal   Vahid Fox 68 y o  female MRN: [id number]  Unit/Bed#: [facility] 815-01 Encounter: [id number]      Objective:  Doing well this morning  Awake alert and watching TV  Patient's sleep well last evening  No nausea, no emesis  Migraine headache has dissipated  Patient is passing lots of flatus  No bowel movement as of yet  She is tolerating clears and toast   She is up and ambulating the halls well  No further bleeding from left lower quadrant trocar site  Patient prefers Vicodin since oxycodone makes her nauseated  3550 UA    Blood pressure 113/60, pulse 78, temperature 99 4 °F (37 4 °C), resp  rate 18, height 5' 6" (1 676 m), weight 66 7 kg (147 lb), SpO2 96 %, not currently breastfeeding  ,Body mass index is 23 73 kg/m²  Intake/Output Summary (Last 24 hours) at 3/11/2020 0516  Last data filed at 3/11/2020 0451  Gross per 24 hour   Intake 3597 83 ml   Output 3700 ml   Net -102 17 ml       Invasive Devices     Peripheral Intravenous Line            Peripheral IV 03/09/20 Left Hand 1 day    Peripheral IV 03/10/20 Right;Ventral (anterior) Hand less than 1 day                Physical Exam:   Abdomen:  Soft, does not appear distended this morning  Trocar site an small incision sites are ecchymotic, staples are intact, minimal incisional tenderness  Extremities:  No calf tenderness, no pedal edema    Lab, Imaging and other studies:  Pending  VTE Pharmacologic Prophylaxis: Heparin  VTE Mechanical Prophylaxis: sequential compression device    Assessment:  POD # 3 laparoscopic hand assisted sigmoidectomy for perforated diverticulitis    Plan:  1  One more day of IV antibiotics  2  Low residue diet  3  Discontinue the PCA of Dilaudid  4  Discontinue IV fluids  5  Vicodin for oral pain control  6  Dilaudid 0 5 mg IV q 3 hours p r n  For breakthrough  7  Continue the incentive spirometry and ambulating halls  8   Possible home tomorrow

## 2020-03-12 ENCOUNTER — TRANSITIONAL CARE MANAGEMENT (OUTPATIENT)
Dept: INTERNAL MEDICINE CLINIC | Facility: CLINIC | Age: 70
End: 2020-03-12

## 2020-03-17 DIAGNOSIS — G43.709 CHRONIC MIGRAINE WITHOUT AURA WITHOUT STATUS MIGRAINOSUS, NOT INTRACTABLE: ICD-10-CM

## 2020-03-17 DIAGNOSIS — G43.009 MIGRAINE WITHOUT AURA AND WITHOUT STATUS MIGRAINOSUS, NOT INTRACTABLE: ICD-10-CM

## 2020-03-17 RX ORDER — SUMATRIPTAN 100 MG/1
TABLET, FILM COATED ORAL
Qty: 12 TABLET | Refills: 0 | Status: SHIPPED | OUTPATIENT
Start: 2020-03-17 | End: 2020-05-19 | Stop reason: SDUPTHER

## 2020-03-17 RX ORDER — NAPROXEN 500 MG/1
500 TABLET ORAL AS NEEDED
Qty: 10 TABLET | Refills: 0 | Status: SHIPPED | OUTPATIENT
Start: 2020-03-17 | End: 2020-05-19 | Stop reason: SDUPTHER

## 2020-03-24 ENCOUNTER — TELEMEDICINE (OUTPATIENT)
Dept: INTERNAL MEDICINE CLINIC | Facility: CLINIC | Age: 70
End: 2020-03-24
Payer: MEDICARE

## 2020-03-24 DIAGNOSIS — G43.709 CHRONIC MIGRAINE WITHOUT AURA WITHOUT STATUS MIGRAINOSUS, NOT INTRACTABLE: ICD-10-CM

## 2020-03-24 DIAGNOSIS — Z72.0 TOBACCO ABUSE: ICD-10-CM

## 2020-03-24 DIAGNOSIS — K57.80 PERFORATED DIVERTICULUM: Primary | ICD-10-CM

## 2020-03-24 PROCEDURE — 99495 TRANSJ CARE MGMT MOD F2F 14D: CPT | Performed by: INTERNAL MEDICINE

## 2020-03-24 RX ORDER — DIVALPROEX SODIUM 250 MG/1
250 TABLET, EXTENDED RELEASE ORAL 2 TIMES DAILY
Qty: 180 TABLET | Refills: 5 | Status: SHIPPED | OUTPATIENT
Start: 2020-03-24 | End: 2021-03-03

## 2020-03-24 NOTE — ASSESSMENT & PLAN NOTE
Despite being sick and hospitalized patient continues to smoke but states she has cut down significantly on her cigarettes consumption    Patient is urged to quit now

## 2020-03-24 NOTE — ASSESSMENT & PLAN NOTE
Patient recently hospitalized with acute abdominal pain discomfort  Ruptured sigmoid diverticuli  Underwent urgent surgical procedure both open and laparoscopic Renetta, resection of the diverticuli  Patient did well postoperatively  [address] [place]  Since home has followed up with the surgeon  No evidence of infection to the operative site healing well except for some mild serosanguineous drainage from the wound site  Patient's appetite is improving  Is slowly increasing her activity level  Patient has no nausea vomiting, fever chills, bowels or yellowish in color which is unusual for her but this may be secondary to the antibiotics

## 2020-03-24 NOTE — PROGRESS NOTES
Virtual Regular Visit    Problem List Items Addressed This Visit        Digestive    Perforated diverticulum - Primary     Patient recently hospitalized with acute abdominal pain discomfort  Ruptured sigmoid diverticuli  Underwent urgent surgical procedure both open and laparoscopic Renetta, resection of the diverticuli  Patient did well postoperatively  [address] [place]  Since home has followed up with the surgeon  No evidence of infection to the operative site healing well except for some mild serosanguineous drainage from the wound site  Patient's appetite is improving  Is slowly increasing her activity level  Patient has no nausea vomiting, fever chills, bowels or yellowish in color which is unusual for her but this may be secondary to the antibiotics  Other    Tobacco abuse     Despite being sick and hospitalized patient continues to smoke but states she has cut down significantly on her cigarettes consumption  Patient is urged to quit now                    Reason for visit is patient is here today for transition of care visit, recent hospitalization, acute a abdominal pain with laparoscopic sigmoid resection  TCM Call (since 2/22/2020)     Date and time call was made  3/13/2020  5:25 PM    Hospital care reviewed  Records reviewed    Patient was hospitial[facility] at  [facility]    Date of Admission  03/08/20    Date of discharge  03/11/20    Diagnosis  Perforated Diverticulum    Disposition  Home    Current Symptoms  None      TCM Call (since 2/22/2020)     Scheduled for follow up?   Yes    I have advised the patient to call PCP with any new or worsening symptoms  Tete Goldsmith CMA    Counseling  Patient    Comments  Patient is scheduled for a virtual visit 3/24/20          Encounter provider Christofer Maloney DO    Provider located at [address]      Recent Visits  No visits were found meeting these conditions  Showing recent visits within past 7 days and meeting all other requirements     Future Appointments  No visits were found meeting these conditions  Showing future appointments within next 150 days and meeting all other requirements        After connecting through Sm[employer], the patient was identified by name and date of birth  Jessica Cam was informed that this is a telemedicine visit and that the visit is being conducted through telephone which may not be secure and therefore, might not be HIPAA-compliant  My office door was closed  No one else was in the room  She acknowledged consent and understanding of privacy and security of the video platform  The patient has agreed to participate and understands they can discontinue the visit at any time  Subjective  Jessica Cam is a 68 y o  female patient is a 25-year-old female  Seen acutely in the hospital for abdominal pain discomfort  Patient having a ruptured diverticulitis  Underwent resection combination of laparoscopic and open  Patient did well postoperatively was on IV antibiotics  Patient placed on 2 weeks antibiotics to take at home  Patient already has had a visit postoperatively with the physician who did the surgery  Only minimal serous sanguinous drainage from the operative site  No evidence of infection  Patient states slowly she is increasing her activity level  No abdominal pain or cramps the bowels are moving  Has not modify diet and there was no modification made in her regime as far as medication was concerned except for placing on antibiotics        Past Medical History:   Diagnosis Date    Migraine        Past Surgical History:   Procedure Laterality Date    BREAST EXCISIONAL BIOPSY Right 1986    HARTMANS PROCEDURE N/A 3/8/2020    Procedure: Laparoscopic drainage of intra peritoneal abscess, sigmoid rescetion,;  Surgeon: Dewayne Jamison MD;  [facility]: [facility];  Service: Colorectal   [facility] age 25   [place] NASAL SEPTUM SURGERY      deviation repair    MI LAP,DIAGNOSTIC ABDOMEN N/A 3/8/2020    Procedure: LAPAROSCOPY DIAGNOSTIC;  Surgeon: Donald Malloy MD;  [facility]: [facility];  Service: Colorectal    MI SIGMOIDOSCOPY FLX DX W/COLLJ SPEC BR/WA IF PFRMD N/A 3/8/2020    Procedure: Lebron Beckford;  Surgeon: Donald Malloy MD;  Location: [facility];  Service: Colorectal    SHOULDER SURGERY         Current Outpatient Medications   Medication Sig Dispense Refill    albuterol (VENTOLIN HFA) 90 mcg/act inhaler Inhale 2 puffs every 6 (six) hours as needed for wheezing 1 Inhaler 0    aspirin (ECOTRIN) 325 mg EC tablet Take 1 tablet (325 mg total) by mouth daily 30 tablet 0    Cholecalciferol (VITAMIN D3 PO) Take by mouth      citalopram (CeleXA) 20 mg tablet Take 1 tablet (20 mg total) by mouth daily 90 tablet 0    cyanocobalamin ([employer] VITAMIN B-12) 1000 MCG tablet Take 1,000 mcg by mouth      divalproex sodium (DEPAKOTE ER) 250 mg 24 hr tablet Take 1 tablet (250 mg total) by mouth 2 (two) times a day 180 tablet 5    Magnesium 500 MG CAPS Take by mouth      naproxen (NAPROSYN) 500 mg tablet Take 1 tablet (500 mg total) by mouth as needed for headaches 10 tablet 0    OLANZapine (ZyPREXA) 5 mg tablet At bedtime only as needed 10 tablet 3    pyridoxine (B-6) 100 MG tablet Take 100 mg by mouth      Riboflavin (B2) 100 MG TABS Take by mouth      simvastatin (ZOCOR) 40 mg tablet Take 1 tablet (40 mg total) by mouth daily 90 tablet 0    SUMAtriptan (IMITREX) 100 mg tablet Take 1 tablet at onset of migraine headache  May repeat in 2 hours if needed, no more than 2 in 24 hours and no more than 3 in a week 12 tablet 0    zolpidem (AMBIEN) 5 mg tablet One pill at bedtime as needed to help with sleep 30 tablet 3     No current facility-administered medications for this visit           Allergies   Allergen Reactions    Penicillins Anaphylaxis    Azithromycin Hives    Nisoldipine      Reaction Date: [id number];     Sulfa Antibiotics Hives       Review of Systems patient states that she having no abdominal pain or cramps  Only took the pain pills for approximately 8 doses and now is without pain or discomfort  Did have follow-up with her colon rectal surgeon  Is slowly increasing her ambulation  A fever chills  Physical Exam   Because the patient was not seen in person unable to do a complete physical examination  I did review the examination performed recently by her colon rectal surgeon  I spent 20 minutes with the patient during this visit

## 2020-03-30 ENCOUNTER — TELEPHONE (OUTPATIENT)
Dept: NEUROLOGY | Facility: CLINIC | Age: 70
End: 2020-03-30

## 2020-03-30 NOTE — TELEPHONE ENCOUNTER
Patient calling to say that she received a letter from [place] [employer] for us to fill out regarding her sumatriptan  States that they need a letter from our office  States that she will drop letter off for us to scan into chart

## 2020-04-02 NOTE — TELEPHONE ENCOUNTER
Ko Ryan, spoke w/ [place] Nolan and states that insurance limits to 12 tabs per 30 days  Last filled on 3/17/20, copay $3 96  Next fill date scheduled 4/17/20, no PA is needed  [address], spoke w/ Elizabeth Calderon and advised of the below and above  States that pt has quantity limit of #9 per month  You prescribed #12  States that if pt requires 12 tabs per month, it will require PA  Called pt and states 9 tabs per 30 days would be ok  Advised pt when she calls to request for future refills to mention that she only need quantity of 9, not 12  Pt verbalized understanding

## 2020-04-14 ENCOUNTER — TELEMEDICINE (OUTPATIENT)
Dept: INTERNAL MEDICINE CLINIC | Facility: CLINIC | Age: 70
End: 2020-04-14
Payer: MEDICARE

## 2020-04-14 DIAGNOSIS — K57.80 PERFORATED DIVERTICULUM: Primary | ICD-10-CM

## 2020-04-14 DIAGNOSIS — F41.9 ANXIETY: ICD-10-CM

## 2020-04-14 DIAGNOSIS — F32.89 OTHER DEPRESSION: ICD-10-CM

## 2020-04-14 DIAGNOSIS — E78.00 PURE HYPERCHOLESTEROLEMIA: ICD-10-CM

## 2020-04-14 PROCEDURE — 99442 PR PHYS/QHP TELEPHONE EVALUATION 11-20 MIN: CPT | Performed by: INTERNAL MEDICINE

## 2020-04-14 RX ORDER — CITALOPRAM 20 MG/1
20 TABLET ORAL DAILY
Qty: 90 TABLET | Refills: 0 | Status: SHIPPED | OUTPATIENT
Start: 2020-04-14 | End: 2020-07-16 | Stop reason: SDUPTHER

## 2020-04-14 RX ORDER — SIMVASTATIN 40 MG
40 TABLET ORAL DAILY
Qty: 90 TABLET | Refills: 0 | Status: SHIPPED | OUTPATIENT
Start: 2020-04-14 | End: 2020-07-16 | Stop reason: SDUPTHER

## 2020-04-17 ENCOUNTER — TELEMEDICINE (OUTPATIENT)
Dept: INTERNAL MEDICINE CLINIC | Facility: CLINIC | Age: 70
End: 2020-04-17
Payer: MEDICARE

## 2020-04-17 DIAGNOSIS — K57.80 PERFORATED DIVERTICULUM: Primary | ICD-10-CM

## 2020-04-17 PROCEDURE — 99441 PR PHYS/QHP TELEPHONE EVALUATION 5-10 MIN: CPT | Performed by: INTERNAL MEDICINE

## 2020-05-19 DIAGNOSIS — G43.709 CHRONIC MIGRAINE WITHOUT AURA WITHOUT STATUS MIGRAINOSUS, NOT INTRACTABLE: ICD-10-CM

## 2020-05-19 DIAGNOSIS — G43.009 MIGRAINE WITHOUT AURA AND WITHOUT STATUS MIGRAINOSUS, NOT INTRACTABLE: ICD-10-CM

## 2020-05-20 RX ORDER — SUMATRIPTAN 100 MG/1
TABLET, FILM COATED ORAL
Qty: 12 TABLET | Refills: 0 | Status: SHIPPED | OUTPATIENT
Start: 2020-05-20 | End: 2020-06-22 | Stop reason: SDUPTHER

## 2020-05-20 RX ORDER — NAPROXEN 500 MG/1
500 TABLET ORAL AS NEEDED
Qty: 10 TABLET | Refills: 0 | Status: SHIPPED | OUTPATIENT
Start: 2020-05-20 | End: 2020-06-22 | Stop reason: SDUPTHER

## 2020-06-22 DIAGNOSIS — G43.009 MIGRAINE WITHOUT AURA AND WITHOUT STATUS MIGRAINOSUS, NOT INTRACTABLE: ICD-10-CM

## 2020-06-22 DIAGNOSIS — G43.709 CHRONIC MIGRAINE WITHOUT AURA WITHOUT STATUS MIGRAINOSUS, NOT INTRACTABLE: ICD-10-CM

## 2020-06-22 DIAGNOSIS — F51.01 PRIMARY INSOMNIA: ICD-10-CM

## 2020-06-22 RX ORDER — ZOLPIDEM TARTRATE 5 MG/1
TABLET ORAL
Qty: 30 TABLET | Refills: 3 | Status: SHIPPED | OUTPATIENT
Start: 2020-06-22 | End: 2020-10-26 | Stop reason: SDUPTHER

## 2020-06-22 RX ORDER — SUMATRIPTAN 100 MG/1
TABLET, FILM COATED ORAL
Qty: 12 TABLET | Refills: 0 | Status: SHIPPED | OUTPATIENT
Start: 2020-06-22 | End: 2020-07-22 | Stop reason: SDUPTHER

## 2020-06-22 RX ORDER — NAPROXEN 500 MG/1
500 TABLET ORAL AS NEEDED
Qty: 10 TABLET | Refills: 0 | Status: SHIPPED | OUTPATIENT
Start: 2020-06-22 | End: 2020-07-22 | Stop reason: SDUPTHER

## 2020-07-16 DIAGNOSIS — E78.00 PURE HYPERCHOLESTEROLEMIA: ICD-10-CM

## 2020-07-16 DIAGNOSIS — F41.9 ANXIETY: ICD-10-CM

## 2020-07-16 RX ORDER — SIMVASTATIN 40 MG
40 TABLET ORAL DAILY
Qty: 90 TABLET | Refills: 3 | Status: SHIPPED | OUTPATIENT
Start: 2020-07-16 | End: 2021-07-08 | Stop reason: SDUPTHER

## 2020-07-16 RX ORDER — CITALOPRAM 20 MG/1
20 TABLET ORAL DAILY
Qty: 90 TABLET | Refills: 0 | Status: SHIPPED | OUTPATIENT
Start: 2020-07-16 | End: 2020-10-12 | Stop reason: SDUPTHER

## 2020-07-20 ENCOUNTER — OFFICE VISIT (OUTPATIENT)
Dept: INTERNAL MEDICINE CLINIC | Facility: CLINIC | Age: 70
End: 2020-07-20
Payer: MEDICARE

## 2020-07-20 VITALS
HEIGHT: 66 IN | OXYGEN SATURATION: 97 % | HEART RATE: 82 BPM | SYSTOLIC BLOOD PRESSURE: 120 MMHG | TEMPERATURE: 98.9 F | BODY MASS INDEX: 23.78 KG/M2 | WEIGHT: 148 LBS | DIASTOLIC BLOOD PRESSURE: 72 MMHG

## 2020-07-20 DIAGNOSIS — Z72.0 TOBACCO ABUSE: ICD-10-CM

## 2020-07-20 DIAGNOSIS — K57.80 PERFORATED DIVERTICULUM: ICD-10-CM

## 2020-07-20 DIAGNOSIS — F51.01 PRIMARY INSOMNIA: ICD-10-CM

## 2020-07-20 DIAGNOSIS — M54.42 ACUTE LEFT-SIDED LOW BACK PAIN WITH LEFT-SIDED SCIATICA: ICD-10-CM

## 2020-07-20 DIAGNOSIS — Z00.00 HEALTHCARE MAINTENANCE: Primary | ICD-10-CM

## 2020-07-20 DIAGNOSIS — E78.01 FAMILIAL HYPERCHOLESTEROLEMIA: ICD-10-CM

## 2020-07-20 DIAGNOSIS — J41.0 SIMPLE CHRONIC BRONCHITIS (HCC): ICD-10-CM

## 2020-07-20 DIAGNOSIS — I65.23 BILATERAL CAROTID ARTERY STENOSIS: ICD-10-CM

## 2020-07-20 PROBLEM — M54.50 ACUTE LEFT-SIDED LOW BACK PAIN WITHOUT SCIATICA: Status: RESOLVED | Noted: 2019-11-05 | Resolved: 2020-07-20

## 2020-07-20 PROBLEM — M54.40 ACUTE LEFT-SIDED LOW BACK PAIN WITH SCIATICA: Status: ACTIVE | Noted: 2020-07-20

## 2020-07-20 PROCEDURE — 1160F RVW MEDS BY RX/DR IN RCRD: CPT | Performed by: INTERNAL MEDICINE

## 2020-07-20 PROCEDURE — 3078F DIAST BP <80 MM HG: CPT | Performed by: INTERNAL MEDICINE

## 2020-07-20 PROCEDURE — 3074F SYST BP LT 130 MM HG: CPT | Performed by: INTERNAL MEDICINE

## 2020-07-20 PROCEDURE — 99214 OFFICE O/P EST MOD 30 MIN: CPT | Performed by: INTERNAL MEDICINE

## 2020-07-20 PROCEDURE — 3008F BODY MASS INDEX DOCD: CPT | Performed by: INTERNAL MEDICINE

## 2020-07-20 PROCEDURE — 4040F PNEUMOC VAC/ADMIN/RCVD: CPT | Performed by: INTERNAL MEDICINE

## 2020-07-20 NOTE — ASSESSMENT & PLAN NOTE
Does have a history of stenosis bilateral carotid arteries  Again she was told chronic tobacco abuse is an aggravating factor and it is extremely important that she work on some type of modality in order to quit smoking to hopefully prevent progression of disease

## 2020-07-20 NOTE — ASSESSMENT & PLAN NOTE
Patient is status post perforated diverticulum with surgical procedure, resection partially of large bowel  Patient does have a follow-up appointment to be seen by her colon rectal specialist in the near future  Patient states that she is having no problems with her bowels, appetite is return to normal, no significant weight loss  Has gained 5 lb since her last visit    Surgical site is clean and dry no evidence of infection

## 2020-07-20 NOTE — ASSESSMENT & PLAN NOTE
Patient does have a chronic bronchitis  She continues to smoke which is an aggravating factor  Patient states that she does use her inhaler but only intermittently and not on a daily basis    We again urged the patient to consider modalities in order to quit smoking how but she refuses

## 2020-07-20 NOTE — ASSESSMENT & PLAN NOTE
Again as noted we have had discussions today in the past about modalities in order to help quit smoking    She states that she is not interested

## 2020-07-20 NOTE — PROGRESS NOTES
Assessment/Plan:    Perforated diverticulum  Patient is status post perforated diverticulum with surgical procedure, resection partially of large bowel  Patient does have a follow-up appointment to be seen by her colon rectal specialist in the near future  Patient states that she is having no problems with her bowels, appetite is return to normal, no significant weight loss  Has gained 5 lb since her last visit  Surgical site is clean and dry no evidence of infection    Simple chronic bronchitis ([address] )  Patient does have a chronic bronchitis  She continues to smoke which is an aggravating factor  Patient states that she does use her inhaler but only intermittently and not on a daily basis  We again urged the patient to consider modalities in order to quit smoking how but she refuses    Bilateral carotid artery stenosis  Does have a history of stenosis bilateral carotid arteries  Again she was told chronic tobacco abuse is an aggravating factor and it is extremely important that she work on some type of modality in order to quit smoking to hopefully prevent progression of disease  Acute left-sided low back pain with sciatica  Patient states that over the past few weeks she has been bothered by some slight low back pain radiation of discomfort down anterior thigh on the left down her left calf  On maneuvers today no pain could be elicited  Other than a flattening to her lumbar spine no gross abnormalities  Patient has no weakness to lower extremities  I did discuss with the patient's sending her for evaluation by physical therapy but she is refusing  Healthcare maintenance  Patient is to be scheduled for routine physical with her next visit  She does have a slip for complete labs to be performed prior to that  She was told in the interim if she has any medical complaints or concerns to please call  Hyperlipidemia  History of hyperlipidemia    Patient admits that she is not watching her diet closely, intake of fats and cholesterol  The she was told that this is extremely important especially with her being at high risk for not only stenosis to carotid arteries but also coronary artery disease  Again we also stressed the importance of quitting smoking  We will check a lipid profile with her next visit    Tobacco abuse  Again as noted we have had discussions today in the past about modalities in order to help quit smoking  She states that she is not interested    Primary insomnia  Patient continues to take Ambien on a nightly basis  He states without this particular medication she cannot sleep  Patient has had no ill effects from the medication  Diagnoses and all orders for this visit:    Healthcare maintenance  -     Comprehensive metabolic panel; Future  -     CBC and differential; Future  -     Lipid panel; Future  -     UA (URINE) with reflex to Scope; Future  -     TSH, 3rd generation with Free T4 reflex; Future    Tobacco abuse    Familial hypercholesterolemia  -     Comprehensive metabolic panel; Future  -     CBC and differential; Future  -     Lipid panel; Future  -     TSH, 3rd generation with Free T4 reflex; Future    Acute left-sided low back pain with left-sided sciatica    Perforated diverticulum    Simple chronic bronchitis (HCC)    Bilateral carotid artery stenosis    Primary insomnia          Subjective:      Patient ID: Liyah Toledo is a 65 y o  female  70-year-old female history of hyperlipidemia, chronic bronchitis secondary to continued tobacco abuse, DJD, peripheral vascular disease  Patient is here today for routine follow-up  The patient did not have any labs prior to the visit today  Patient states in general she is doing well except for some low back pain on the left radiating down her leg over the past few weeks  She has no complaints of weakness  She had no accident or injury  Patient is continuing to work at hospice    Does have concerns over possible exposure to COVID virus  Has had no direct contact with any of the patient's with the virus      The following portions of the patient's history were reviewed and updated as appropriate: She  has a past medical history of Migraine  She   Patient Active Problem List    Diagnosis Date Noted    Acute left-sided low back pain with sciatica 07/20/2020    Perforated diverticulum 03/08/2020    LLQ abdominal pain 02/21/2020    Macular degeneration of right eye 11/05/2019    Herpes simplex 10/14/2019    Fibromuscular dysplasia of cervicocranial artery (HCC) 08/27/2019    Simple chronic bronchitis ([address] ) 07/22/2019    Bilateral carotid artery stenosis 07/22/2019    Occult blood in stools 06/27/2019    Primary insomnia 06/27/2019    Bruit of right carotid artery 06/27/2019    Bronchitis 12/21/2018    Healthcare maintenance 05/17/2018    Migraine without aura and without status migrainosus, not intractable 03/15/2018    Tobacco abuse 07/16/2015    Chronic migraine without aura 12/04/2014    Acute upper respiratory infection 10/16/2013    Hyperlipidemia 09/07/2012    Depression 09/07/2012     She  has a past surgical history that includes Nasal septum surgery; Shoulder surgery; Hysterectomy (1978); Breast excisional biopsy (Right, 1986); pr lap,diagnostic abdomen (N/A, 3/8/2020); pr sigmoidoscopy flx dx w/collj spec br/wa if pfrmd (N/A, 3/8/2020); and HARTMANS PROCEDURE (N/A, 3/8/2020)  Her family history includes Breast cancer (age of onset: 48) in her maternal aunt and mother; Colon cancer in her maternal uncle; Diabetes in her sister; Heart disease in her father; Lung cancer (age of onset: 52) in her other; No Known Problems in her maternal aunt, maternal grandfather, maternal grandmother, paternal aunt, paternal grandfather, paternal grandmother, sister, son, and son  She  reports that she has been smoking  She started smoking about 55 years ago  She has a 26 50 pack-year smoking history   She has never used smokeless tobacco  She reports that she drinks alcohol  She reports that she does not use drugs  Current Outpatient Medications   Medication Sig Dispense Refill    albuterol (VENTOLIN HFA) 90 mcg/act inhaler Inhale 2 puffs every 6 (six) hours as needed for wheezing 1 Inhaler 0    aspirin (ECOTRIN) 325 mg EC tablet Take 1 tablet (325 mg total) by mouth daily 30 tablet 0    Cholecalciferol (VITAMIN D3 PO) Take by mouth      citalopram (CeleXA) 20 mg tablet Take 1 tablet (20 mg total) by mouth daily 90 tablet 0    cyanocobalamin ([employer]S VITAMIN B-12) 1000 MCG tablet Take 1,000 mcg by mouth      divalproex sodium (DEPAKOTE ER) 250 mg 24 hr tablet Take 1 tablet (250 mg total) by mouth 2 (two) times a day 180 tablet 5    Magnesium 500 MG CAPS Take by mouth      naproxen (NAPROSYN) 500 mg tablet Take 1 tablet (500 mg total) by mouth as needed for headaches 10 tablet 0    OLANZapine (ZyPREXA) 5 mg tablet At bedtime only as needed 10 tablet 3    pyridoxine (B-6) 100 MG tablet Take 100 mg by mouth      Riboflavin (B2) 100 MG TABS Take by mouth      simvastatin (ZOCOR) 40 mg tablet Take 1 tablet (40 mg total) by mouth daily 90 tablet 3    zolpidem (AMBIEN) 5 mg tablet One pill at bedtime as needed to help with sleep 30 tablet 3    SUMAtriptan (IMITREX) 100 mg tablet Take 1 tablet at onset of migraine headache  May repeat in 2 hours if needed, no more than 2 in 24 hours and no more than 3 in a week (Patient not taking: Reported on 7/20/2020) 12 tablet 0     No current facility-administered medications for this visit        Current Outpatient Medications on File Prior to Visit   Medication Sig    albuterol (VENTOLIN HFA) 90 mcg/act inhaler Inhale 2 puffs every 6 (six) hours as needed for wheezing    aspirin (ECOTRIN) 325 mg EC tablet Take 1 tablet (325 mg total) by mouth daily    Cholecalciferol (VITAMIN D3 PO) Take by mouth    citalopram (CeleXA) 20 mg tablet Take 1 tablet (20 mg total) by mouth daily    cyanocobalamin (CVS VITAMIN B-12) 1000 MCG tablet Take 1,000 mcg by mouth    divalproex sodium (DEPAKOTE ER) 250 mg 24 hr tablet Take 1 tablet (250 mg total) by mouth 2 (two) times a day    Magnesium 500 MG CAPS Take by mouth    naproxen (NAPROSYN) 500 mg tablet Take 1 tablet (500 mg total) by mouth as needed for headaches    OLANZapine (ZyPREXA) 5 mg tablet At bedtime only as needed    pyridoxine (B-6) 100 MG tablet Take 100 mg by mouth    Riboflavin (B2) 100 MG TABS Take by mouth    simvastatin (ZOCOR) 40 mg tablet Take 1 tablet (40 mg total) by mouth daily    zolpidem (AMBIEN) 5 mg tablet One pill at bedtime as needed to help with sleep    SUMAtriptan (IMITREX) 100 mg tablet Take 1 tablet at onset of migraine headache  May repeat in 2 hours if needed, no more than 2 in 24 hours and no more than 3 in a week (Patient not taking: Reported on 7/20/2020)     No current facility-administered medications on file prior to visit  She is allergic to penicillins; azithromycin; nisoldipine; and sulfa antibiotics       Review of Systems   Constitutional: Negative  HENT: Negative  Eyes: Negative  Respiratory: Positive for cough (Patient does have a chronic cough no complaint of shortness of breath or wheezing)  Negative for apnea, choking, chest tightness, shortness of breath, wheezing and stridor  Cardiovascular: Negative  Gastrointestinal: Negative  Endocrine: Negative  Genitourinary: Negative  Musculoskeletal: Positive for back pain  Negative for arthralgias, gait problem, joint swelling, myalgias, neck pain and neck stiffness  Skin: Negative  Allergic/Immunologic: Negative  Neurological: Negative  Hematological: Negative  Psychiatric/Behavioral: Negative            Objective:      /72   Pulse 82   Temp 98 9 °F (37 2 °C)   Ht 5' 6" (1 676 m)   Wt 67 1 kg (148 lb)   SpO2 97%   BMI 23 89 kg/m²          Physical Exam   Constitutional: She is oriented to person, place, and time  She appears well-developed and well-nourished  No distress  Pleasant 26-year-old female who is awake alert no acute distress, cheerful   HENT:   Head: Normocephalic and atraumatic  Right Ear: External ear normal    Left Ear: External ear normal    Nose: Nose normal    Mouth/Throat: Oropharynx is clear and moist  No oropharyngeal exudate  Eyes: Pupils are equal, round, and reactive to light  Conjunctivae and EOM are normal  Right eye exhibits no discharge  Left eye exhibits no discharge  No scleral icterus  Neck: Normal range of motion  Neck supple  No JVD present  No tracheal deviation present  No thyromegaly present  Bilateral carotid bruits   Cardiovascular: Normal rate, regular rhythm, normal heart sounds and intact distal pulses  Exam reveals no gallop and no friction rub  No murmur heard  Pulmonary/Chest: Effort normal  No stridor  No respiratory distress  She has no wheezes  She has no rales  She exhibits no tenderness  Some decreased breath sounds anteriorly and posteriorly with dry cough episodically  Nonproductive   Abdominal: Soft  Bowel sounds are normal  She exhibits no distension and no mass  There is no tenderness  There is no rebound and no guarding  No hernia  Musculoskeletal: Normal range of motion  She exhibits no edema, tenderness or deformity  Lymphadenopathy:     She has no cervical adenopathy  Neurological: She is alert and oriented to person, place, and time  She displays normal reflexes  No cranial nerve deficit or sensory deficit  She exhibits normal muscle tone  Coordination normal    Skin: Skin is warm and dry  Capillary refill takes less than 2 seconds  No rash noted  She is not diaphoretic  No erythema  No pallor  Psychiatric: She has a normal mood and affect  Her behavior is normal  Judgment and thought content normal    Nursing note and vitals reviewed

## 2020-07-20 NOTE — ASSESSMENT & PLAN NOTE
Patient states that over the past few weeks she has been bothered by some slight low back pain radiation of discomfort down anterior thigh on the left down her left calf  On maneuvers today no pain could be elicited  Other than a flattening to her lumbar spine no gross abnormalities  Patient has no weakness to lower extremities  I did discuss with the patient's sending her for evaluation by physical therapy but she is refusing

## 2020-07-20 NOTE — ASSESSMENT & PLAN NOTE
Patient continues to take Ambien on a nightly basis  He states without this particular medication she cannot sleep  Patient has had no ill effects from the medication

## 2020-07-20 NOTE — ASSESSMENT & PLAN NOTE
Patient is to be scheduled for routine physical with her next visit  She does have a slip for complete labs to be performed prior to that  She was told in the interim if she has any medical complaints or concerns to please call

## 2020-07-22 DIAGNOSIS — G43.009 MIGRAINE WITHOUT AURA AND WITHOUT STATUS MIGRAINOSUS, NOT INTRACTABLE: ICD-10-CM

## 2020-07-22 DIAGNOSIS — G43.709 CHRONIC MIGRAINE WITHOUT AURA WITHOUT STATUS MIGRAINOSUS, NOT INTRACTABLE: ICD-10-CM

## 2020-07-22 RX ORDER — SUMATRIPTAN 100 MG/1
TABLET, FILM COATED ORAL
Qty: 12 TABLET | Refills: 0 | Status: SHIPPED | OUTPATIENT
Start: 2020-07-22 | End: 2020-09-02 | Stop reason: SDUPTHER

## 2020-07-22 RX ORDER — NAPROXEN 500 MG/1
500 TABLET ORAL AS NEEDED
Qty: 10 TABLET | Refills: 0 | Status: SHIPPED | OUTPATIENT
Start: 2020-07-22 | End: 2020-09-02 | Stop reason: SDUPTHER

## 2020-07-22 NOTE — TELEPHONE ENCOUNTER
Pt called requesting sumatriptan and naproxen refill be sent to [facility] drug pharmacy  Rxs entered   Pls review and sign off      thanks

## 2020-07-24 DIAGNOSIS — R10.32 LLQ ABDOMINAL PAIN: ICD-10-CM

## 2020-07-24 DIAGNOSIS — R19.5 OCCULT BLOOD IN STOOLS: ICD-10-CM

## 2020-07-24 PROCEDURE — U0003 INFECTIOUS AGENT DETECTION BY NUCLEIC ACID (DNA OR RNA); SEVERE ACUTE RESPIRATORY SYNDROME CORONAVIRUS 2 (SARS-COV-2) (CORONAVIRUS DISEASE [COVID-19]), AMPLIFIED PROBE TECHNIQUE, MAKING USE OF HIGH THROUGHPUT TECHNOLOGIES AS DESCRIBED BY CMS-2020-01-R: HCPCS

## 2020-07-25 LAB — SARS-COV-2 RNA SPEC QL NAA+PROBE: NOT DETECTED

## 2020-08-02 ENCOUNTER — ANESTHESIA EVENT (OUTPATIENT)
Dept: GASTROENTEROLOGY | Facility: HOSPITAL | Age: 70
End: 2020-08-02

## 2020-08-02 RX ORDER — LIDOCAINE HYDROCHLORIDE 10 MG/ML
0.5 INJECTION, SOLUTION EPIDURAL; INFILTRATION; INTRACAUDAL; PERINEURAL ONCE AS NEEDED
Status: CANCELLED | OUTPATIENT
Start: 2020-08-02

## 2020-08-02 RX ORDER — SODIUM CHLORIDE, SODIUM LACTATE, POTASSIUM CHLORIDE, CALCIUM CHLORIDE 600; 310; 30; 20 MG/100ML; MG/100ML; MG/100ML; MG/100ML
125 INJECTION, SOLUTION INTRAVENOUS CONTINUOUS
Status: CANCELLED | OUTPATIENT
Start: 2020-08-02

## 2020-08-03 ENCOUNTER — ANESTHESIA (OUTPATIENT)
Dept: GASTROENTEROLOGY | Facility: HOSPITAL | Age: 70
End: 2020-08-03

## 2020-08-03 ENCOUNTER — HOSPITAL ENCOUNTER (OUTPATIENT)
Dept: GASTROENTEROLOGY | Facility: HOSPITAL | Age: 70
Setting detail: OUTPATIENT SURGERY
Discharge: HOME | End: 2020-08-03
Attending: COLON & RECTAL SURGERY | Admitting: COLON & RECTAL SURGERY
Payer: MEDICARE

## 2020-08-03 VITALS
BODY MASS INDEX: 23.78 KG/M2 | RESPIRATION RATE: 18 BRPM | WEIGHT: 148 LBS | SYSTOLIC BLOOD PRESSURE: 152 MMHG | TEMPERATURE: 98.5 F | HEIGHT: 66 IN | DIASTOLIC BLOOD PRESSURE: 67 MMHG | HEART RATE: 84 BPM | OXYGEN SATURATION: 95 %

## 2020-08-03 DIAGNOSIS — R19.5 OCCULT BLOOD IN STOOLS: ICD-10-CM

## 2020-08-03 DIAGNOSIS — R10.32 LLQ ABDOMINAL PAIN: ICD-10-CM

## 2020-08-03 PROCEDURE — 45378 DIAGNOSTIC COLONOSCOPY: CPT | Performed by: COLON & RECTAL SURGERY

## 2020-08-03 RX ORDER — SODIUM CHLORIDE, SODIUM LACTATE, POTASSIUM CHLORIDE, CALCIUM CHLORIDE 600; 310; 30; 20 MG/100ML; MG/100ML; MG/100ML; MG/100ML
INJECTION, SOLUTION INTRAVENOUS CONTINUOUS PRN
Status: DISCONTINUED | OUTPATIENT
Start: 2020-08-03 | End: 2020-08-03

## 2020-08-03 RX ORDER — PROPOFOL 10 MG/ML
INJECTION, EMULSION INTRAVENOUS AS NEEDED
Status: DISCONTINUED | OUTPATIENT
Start: 2020-08-03 | End: 2020-08-03

## 2020-08-03 RX ADMIN — PROPOFOL 50 MG: 10 INJECTION, EMULSION INTRAVENOUS at 09:30

## 2020-08-03 RX ADMIN — PROPOFOL 100 MG: 10 INJECTION, EMULSION INTRAVENOUS at 09:18

## 2020-08-03 RX ADMIN — SODIUM CHLORIDE, SODIUM LACTATE, POTASSIUM CHLORIDE, AND CALCIUM CHLORIDE: .6; .31; .03; .02 INJECTION, SOLUTION INTRAVENOUS at 09:09

## 2020-08-03 RX ADMIN — PROPOFOL 50 MG: 10 INJECTION, EMULSION INTRAVENOUS at 09:27

## 2020-08-03 RX ADMIN — PROPOFOL 50 MG: 10 INJECTION, EMULSION INTRAVENOUS at 09:22

## 2020-08-03 NOTE — ANESTHESIA PREPROCEDURE EVALUATION
Procedure:  COLONOSCOPY    Relevant Problems   CARDIO   (+) Chronic migraine without aura   (+) Hyperlipidemia      MUSCULOSKELETAL   (+) Acute left-sided low back pain with sciatica      NEURO/PSYCH   (+) Chronic migraine without aura   (+) Depression      PULMONARY   (+) Acute upper respiratory infection   (+) Smoking        Physical Exam    Airway    Mallampati score: II         Dental   No notable dental hx     Cardiovascular      Pulmonary      Other Findings        Anesthesia Plan  ASA Score- 3     Anesthesia Type- IV sedation with anesthesia with ASA Monitors  Additional Monitors:   Airway Plan:     Comment: I, Dr Xi Murillo, the attending physician, have personally seen and evaluated the patient prior to anesthetic care  I have reviewed the pre-anesthetic record, and other medical records if appropriate to the anesthetic care  If a CRNA is involved in the case, I have reviewed the CRNA assessment, if present, and agree  The patient is in a suitable condition to proceed with my formulated anesthetic plan          Plan Factors-    Chart reviewed  Induction- intravenous  Postoperative Plan-     Informed Consent- Anesthetic plan and risks discussed with patient  I personally reviewed this patient with the CRNA  Discussed and agreed on the Anesthesia Plan with the CRNA  Colton Stevenson

## 2020-08-03 NOTE — ANESTHESIA POSTPROCEDURE EVALUATION
Post-Op Assessment Note    CV Status:  Stable  Pain Score: 0    Pain management: adequate     Mental Status:  Alert   Hydration Status:  Stable   PONV Controlled:  None   Airway Patency:  Patent      Post Op Vitals Reviewed: Yes      Staff: Anesthesiologist, CRNA         No complications documented      /67 (08/03/20 0937)    Temp      Pulse (!) 107 (08/03/20 0937)   Resp 16 (08/03/20 0937)    SpO2 97 % (08/03/20 0937)

## 2020-09-02 DIAGNOSIS — G43.009 MIGRAINE WITHOUT AURA AND WITHOUT STATUS MIGRAINOSUS, NOT INTRACTABLE: ICD-10-CM

## 2020-09-02 DIAGNOSIS — G43.709 CHRONIC MIGRAINE WITHOUT AURA WITHOUT STATUS MIGRAINOSUS, NOT INTRACTABLE: ICD-10-CM

## 2020-09-02 RX ORDER — NAPROXEN 500 MG/1
500 TABLET ORAL AS NEEDED
Qty: 10 TABLET | Refills: 0 | Status: SHIPPED | OUTPATIENT
Start: 2020-09-02 | End: 2020-10-07 | Stop reason: SDUPTHER

## 2020-09-02 RX ORDER — SUMATRIPTAN 100 MG/1
TABLET, FILM COATED ORAL
Qty: 12 TABLET | Refills: 0 | Status: SHIPPED | OUTPATIENT
Start: 2020-09-02 | End: 2020-10-07 | Stop reason: SDUPTHER

## 2020-10-07 DIAGNOSIS — G43.709 CHRONIC MIGRAINE WITHOUT AURA WITHOUT STATUS MIGRAINOSUS, NOT INTRACTABLE: ICD-10-CM

## 2020-10-07 DIAGNOSIS — G43.009 MIGRAINE WITHOUT AURA AND WITHOUT STATUS MIGRAINOSUS, NOT INTRACTABLE: ICD-10-CM

## 2020-10-10 RX ORDER — NAPROXEN 500 MG/1
500 TABLET ORAL AS NEEDED
Qty: 10 TABLET | Refills: 0 | Status: SHIPPED | OUTPATIENT
Start: 2020-10-10 | End: 2020-12-16 | Stop reason: SDUPTHER

## 2020-10-10 RX ORDER — SUMATRIPTAN 100 MG/1
TABLET, FILM COATED ORAL
Qty: 12 TABLET | Refills: 0 | Status: SHIPPED | OUTPATIENT
Start: 2020-10-10 | End: 2020-11-13 | Stop reason: SDUPTHER

## 2020-10-12 DIAGNOSIS — F41.9 ANXIETY: ICD-10-CM

## 2020-10-12 RX ORDER — CITALOPRAM 20 MG/1
20 TABLET ORAL DAILY
Qty: 90 TABLET | Refills: 0 | Status: SHIPPED | OUTPATIENT
Start: 2020-10-12 | End: 2021-01-11 | Stop reason: SDUPTHER

## 2020-10-26 ENCOUNTER — APPOINTMENT (OUTPATIENT)
Dept: LAB | Age: 70
End: 2020-10-26
Payer: MEDICARE

## 2020-10-26 DIAGNOSIS — Z00.00 HEALTHCARE MAINTENANCE: ICD-10-CM

## 2020-10-26 DIAGNOSIS — E78.01 FAMILIAL HYPERCHOLESTEROLEMIA: ICD-10-CM

## 2020-10-26 DIAGNOSIS — F51.01 PRIMARY INSOMNIA: ICD-10-CM

## 2020-10-26 LAB
ALBUMIN SERPL BCP-MCNC: 3.6 G/DL (ref 3.5–5)
ALP SERPL-CCNC: 50 U/L (ref 46–116)
ALT SERPL W P-5'-P-CCNC: 13 U/L (ref 12–78)
ANION GAP SERPL CALCULATED.3IONS-SCNC: 4 MMOL/L (ref 4–13)
AST SERPL W P-5'-P-CCNC: 12 U/L (ref 5–45)
BACTERIA UR QL AUTO: ABNORMAL /HPF
BASOPHILS # BLD AUTO: 0.03 THOUSANDS/ΜL (ref 0–0.1)
BASOPHILS NFR BLD AUTO: 1 % (ref 0–1)
BILIRUB SERPL-MCNC: 0.38 MG/DL (ref 0.2–1)
BILIRUB UR QL STRIP: NEGATIVE
BUN SERPL-MCNC: 22 MG/DL (ref 5–25)
CALCIUM SERPL-MCNC: 9.6 MG/DL (ref 8.3–10.1)
CHLORIDE SERPL-SCNC: 104 MMOL/L (ref 100–108)
CHOLEST SERPL-MCNC: 180 MG/DL (ref 50–200)
CLARITY UR: ABNORMAL
CO2 SERPL-SCNC: 29 MMOL/L (ref 21–32)
COLOR UR: YELLOW
CREAT SERPL-MCNC: 0.85 MG/DL (ref 0.6–1.3)
EOSINOPHIL # BLD AUTO: 0.19 THOUSAND/ΜL (ref 0–0.61)
EOSINOPHIL NFR BLD AUTO: 3 % (ref 0–6)
ERYTHROCYTE [DISTWIDTH] IN BLOOD BY AUTOMATED COUNT: 13.2 % (ref 11.6–15.1)
GFR SERPL CREATININE-BSD FRML MDRD: 69 ML/MIN/1.73SQ M
GLUCOSE P FAST SERPL-MCNC: 96 MG/DL (ref 65–99)
GLUCOSE UR STRIP-MCNC: NEGATIVE MG/DL
HCT VFR BLD AUTO: 40.8 % (ref 34.8–46.1)
HDLC SERPL-MCNC: 62 MG/DL
HGB BLD-MCNC: 13.7 G/DL (ref 11.5–15.4)
HGB UR QL STRIP.AUTO: ABNORMAL
HYALINE CASTS #/AREA URNS LPF: ABNORMAL /LPF
IMM GRANULOCYTES # BLD AUTO: 0.03 THOUSAND/UL (ref 0–0.2)
IMM GRANULOCYTES NFR BLD AUTO: 1 % (ref 0–2)
KETONES UR STRIP-MCNC: NEGATIVE MG/DL
LDLC SERPL CALC-MCNC: 91 MG/DL (ref 0–100)
LEUKOCYTE ESTERASE UR QL STRIP: ABNORMAL
LYMPHOCYTES # BLD AUTO: 2.68 THOUSANDS/ΜL (ref 0.6–4.47)
LYMPHOCYTES NFR BLD AUTO: 41 % (ref 14–44)
MCH RBC QN AUTO: 32.2 PG (ref 26.8–34.3)
MCHC RBC AUTO-ENTMCNC: 33.6 G/DL (ref 31.4–37.4)
MCV RBC AUTO: 96 FL (ref 82–98)
MONOCYTES # BLD AUTO: 0.62 THOUSAND/ΜL (ref 0.17–1.22)
MONOCYTES NFR BLD AUTO: 10 % (ref 4–12)
NEUTROPHILS # BLD AUTO: 2.95 THOUSANDS/ΜL (ref 1.85–7.62)
NEUTS SEG NFR BLD AUTO: 44 % (ref 43–75)
NITRITE UR QL STRIP: NEGATIVE
NON-SQ EPI CELLS URNS QL MICRO: ABNORMAL /HPF
NONHDLC SERPL-MCNC: 118 MG/DL
NRBC BLD AUTO-RTO: 0 /100 WBCS
PH UR STRIP.AUTO: 6 [PH]
PLATELET # BLD AUTO: 269 THOUSANDS/UL (ref 149–390)
PMV BLD AUTO: 10.1 FL (ref 8.9–12.7)
POTASSIUM SERPL-SCNC: 4.5 MMOL/L (ref 3.5–5.3)
PROT SERPL-MCNC: 7.2 G/DL (ref 6.4–8.2)
PROT UR STRIP-MCNC: NEGATIVE MG/DL
RBC # BLD AUTO: 4.26 MILLION/UL (ref 3.81–5.12)
RBC #/AREA URNS AUTO: ABNORMAL /HPF
SODIUM SERPL-SCNC: 137 MMOL/L (ref 136–145)
SP GR UR STRIP.AUTO: 1.02 (ref 1–1.03)
T4 FREE SERPL-MCNC: 0.76 NG/DL (ref 0.76–1.46)
TRIGL SERPL-MCNC: 133 MG/DL
TSH SERPL DL<=0.05 MIU/L-ACNC: 3.79 UIU/ML (ref 0.36–3.74)
UROBILINOGEN UR QL STRIP.AUTO: 0.2 E.U./DL
WBC # BLD AUTO: 6.5 THOUSAND/UL (ref 4.31–10.16)
WBC #/AREA URNS AUTO: ABNORMAL /HPF

## 2020-10-26 PROCEDURE — 85025 COMPLETE CBC W/AUTO DIFF WBC: CPT

## 2020-10-26 PROCEDURE — 36415 COLL VENOUS BLD VENIPUNCTURE: CPT

## 2020-10-26 PROCEDURE — 80053 COMPREHEN METABOLIC PANEL: CPT

## 2020-10-26 PROCEDURE — 84439 ASSAY OF FREE THYROXINE: CPT

## 2020-10-26 PROCEDURE — 84443 ASSAY THYROID STIM HORMONE: CPT

## 2020-10-26 PROCEDURE — 80061 LIPID PANEL: CPT

## 2020-10-26 PROCEDURE — 81001 URINALYSIS AUTO W/SCOPE: CPT

## 2020-10-26 RX ORDER — ZOLPIDEM TARTRATE 5 MG/1
TABLET ORAL
Qty: 30 TABLET | Refills: 3 | Status: SHIPPED | OUTPATIENT
Start: 2020-10-26 | End: 2021-02-19 | Stop reason: SDUPTHER

## 2020-11-09 ENCOUNTER — OFFICE VISIT (OUTPATIENT)
Dept: INTERNAL MEDICINE CLINIC | Facility: CLINIC | Age: 70
End: 2020-11-09
Payer: MEDICARE

## 2020-11-09 VITALS
BODY MASS INDEX: 24.91 KG/M2 | HEART RATE: 77 BPM | SYSTOLIC BLOOD PRESSURE: 136 MMHG | OXYGEN SATURATION: 93 % | TEMPERATURE: 98.4 F | WEIGHT: 155 LBS | DIASTOLIC BLOOD PRESSURE: 74 MMHG | HEIGHT: 66 IN

## 2020-11-09 DIAGNOSIS — R82.81 PYURIA: ICD-10-CM

## 2020-11-09 DIAGNOSIS — J41.0 SIMPLE CHRONIC BRONCHITIS (HCC): ICD-10-CM

## 2020-11-09 DIAGNOSIS — M54.42 ACUTE LEFT-SIDED LOW BACK PAIN WITH LEFT-SIDED SCIATICA: ICD-10-CM

## 2020-11-09 DIAGNOSIS — E03.9 ACQUIRED HYPOTHYROIDISM: Primary | ICD-10-CM

## 2020-11-09 DIAGNOSIS — E78.01 FAMILIAL HYPERCHOLESTEROLEMIA: ICD-10-CM

## 2020-11-09 DIAGNOSIS — E66.3 OVERWEIGHT: ICD-10-CM

## 2020-11-09 DIAGNOSIS — G43.709 CHRONIC MIGRAINE WITHOUT AURA WITHOUT STATUS MIGRAINOSUS, NOT INTRACTABLE: ICD-10-CM

## 2020-11-09 DIAGNOSIS — J40 BRONCHITIS: ICD-10-CM

## 2020-11-09 DIAGNOSIS — Z23 ENCOUNTER FOR IMMUNIZATION: ICD-10-CM

## 2020-11-09 DIAGNOSIS — Z00.00 HEALTHCARE MAINTENANCE: ICD-10-CM

## 2020-11-09 DIAGNOSIS — F17.200 SMOKING: ICD-10-CM

## 2020-11-09 DIAGNOSIS — F32.89 OTHER DEPRESSION: ICD-10-CM

## 2020-11-09 PROCEDURE — G0438 PPPS, INITIAL VISIT: HCPCS | Performed by: INTERNAL MEDICINE

## 2020-11-09 PROCEDURE — 99214 OFFICE O/P EST MOD 30 MIN: CPT | Performed by: INTERNAL MEDICINE

## 2020-11-09 PROCEDURE — G0009 ADMIN PNEUMOCOCCAL VACCINE: HCPCS

## 2020-11-09 PROCEDURE — 90732 PPSV23 VACC 2 YRS+ SUBQ/IM: CPT

## 2020-11-09 RX ORDER — ALBUTEROL SULFATE 90 UG/1
2 AEROSOL, METERED RESPIRATORY (INHALATION) EVERY 6 HOURS PRN
Qty: 1 INHALER | Refills: 0 | Status: SHIPPED | OUTPATIENT
Start: 2020-11-09 | End: 2021-11-15 | Stop reason: SDUPTHER

## 2020-11-09 RX ORDER — BENZONATATE 100 MG/1
100 CAPSULE ORAL 3 TIMES DAILY PRN
Qty: 60 CAPSULE | Refills: 3 | Status: SHIPPED | OUTPATIENT
Start: 2020-11-09 | End: 2020-11-19

## 2020-11-12 ENCOUNTER — APPOINTMENT (OUTPATIENT)
Dept: LAB | Age: 70
End: 2020-11-12
Payer: MEDICARE

## 2020-11-12 DIAGNOSIS — R82.81 PYURIA: ICD-10-CM

## 2020-11-12 PROCEDURE — 87086 URINE CULTURE/COLONY COUNT: CPT

## 2020-11-13 DIAGNOSIS — G43.709 CHRONIC MIGRAINE WITHOUT AURA WITHOUT STATUS MIGRAINOSUS, NOT INTRACTABLE: ICD-10-CM

## 2020-11-13 LAB — BACTERIA UR CULT: NORMAL

## 2020-11-13 RX ORDER — SUMATRIPTAN 100 MG/1
TABLET, FILM COATED ORAL
Qty: 12 TABLET | Refills: 0 | Status: SHIPPED | OUTPATIENT
Start: 2020-11-13 | End: 2020-12-16 | Stop reason: SDUPTHER

## 2020-11-18 ENCOUNTER — TELEPHONE (OUTPATIENT)
Dept: INTERNAL MEDICINE CLINIC | Facility: CLINIC | Age: 70
End: 2020-11-18

## 2020-12-07 PROBLEM — K43.9 VENTRAL HERNIA WITHOUT OBSTRUCTION OR GANGRENE: Status: ACTIVE | Noted: 2020-12-07

## 2020-12-16 DIAGNOSIS — G43.009 MIGRAINE WITHOUT AURA AND WITHOUT STATUS MIGRAINOSUS, NOT INTRACTABLE: ICD-10-CM

## 2020-12-16 DIAGNOSIS — G43.709 CHRONIC MIGRAINE WITHOUT AURA WITHOUT STATUS MIGRAINOSUS, NOT INTRACTABLE: ICD-10-CM

## 2020-12-16 RX ORDER — NAPROXEN 500 MG/1
500 TABLET ORAL AS NEEDED
Qty: 10 TABLET | Refills: 0 | Status: SHIPPED | OUTPATIENT
Start: 2020-12-16 | End: 2021-01-18 | Stop reason: SDUPTHER

## 2020-12-16 RX ORDER — SUMATRIPTAN 100 MG/1
TABLET, FILM COATED ORAL
Qty: 12 TABLET | Refills: 0 | Status: SHIPPED | OUTPATIENT
Start: 2020-12-16 | End: 2021-01-18 | Stop reason: SDUPTHER

## 2020-12-21 ENCOUNTER — HOSPITAL ENCOUNTER (OUTPATIENT)
Dept: RADIOLOGY | Age: 70
Discharge: HOME | End: 2020-12-21
Payer: MEDICARE

## 2020-12-21 VITALS — HEIGHT: 66 IN | WEIGHT: 155 LBS | BODY MASS INDEX: 24.91 KG/M2

## 2020-12-21 DIAGNOSIS — Z12.31 ENCOUNTER FOR SCREENING MAMMOGRAM FOR MALIGNANT NEOPLASM OF BREAST: ICD-10-CM

## 2020-12-21 PROCEDURE — 77067 SCR MAMMO BI INCL CAD: CPT

## 2020-12-21 PROCEDURE — 77063 BREAST TOMOSYNTHESIS BI: CPT

## 2020-12-22 ENCOUNTER — IMMUNIZATIONS (OUTPATIENT)
Dept: FAMILY MEDICINE CLINIC | Facility: HOSPITAL | Age: 70
End: 2020-12-22
Payer: MEDICARE

## 2020-12-22 DIAGNOSIS — Z23 ENCOUNTER FOR IMMUNIZATION: ICD-10-CM

## 2020-12-22 PROCEDURE — 0001A SARS-COV-2 / COVID-19 MRNA VACCINE (PFIZER-BIONTECH) 30 MCG: CPT

## 2020-12-22 PROCEDURE — 91300 SARS-COV-2 / COVID-19 MRNA VACCINE (PFIZER-BIONTECH) 30 MCG: CPT

## 2021-01-11 ENCOUNTER — LAB (OUTPATIENT)
Dept: LAB | Age: 71
End: 2021-01-11
Payer: MEDICARE

## 2021-01-11 ENCOUNTER — OFFICE VISIT (OUTPATIENT)
Dept: LAB | Age: 71
End: 2021-01-11
Payer: MEDICARE

## 2021-01-11 DIAGNOSIS — F41.9 ANXIETY: ICD-10-CM

## 2021-01-11 DIAGNOSIS — K43.9 VENTRAL HERNIA WITHOUT OBSTRUCTION OR GANGRENE: ICD-10-CM

## 2021-01-11 LAB
ANION GAP SERPL CALCULATED.3IONS-SCNC: 1 MMOL/L (ref 4–13)
ATRIAL RATE: 68 BPM
BASOPHILS # BLD AUTO: 0.02 THOUSANDS/ΜL (ref 0–0.1)
BASOPHILS NFR BLD AUTO: 0 % (ref 0–1)
BUN SERPL-MCNC: 16 MG/DL (ref 5–25)
CALCIUM SERPL-MCNC: 9.3 MG/DL (ref 8.3–10.1)
CHLORIDE SERPL-SCNC: 103 MMOL/L (ref 100–108)
CO2 SERPL-SCNC: 31 MMOL/L (ref 21–32)
CREAT SERPL-MCNC: 0.82 MG/DL (ref 0.6–1.3)
EOSINOPHIL # BLD AUTO: 0.12 THOUSAND/ΜL (ref 0–0.61)
EOSINOPHIL NFR BLD AUTO: 2 % (ref 0–6)
ERYTHROCYTE [DISTWIDTH] IN BLOOD BY AUTOMATED COUNT: 13.2 % (ref 11.6–15.1)
GFR SERPL CREATININE-BSD FRML MDRD: 72 ML/MIN/1.73SQ M
GLUCOSE P FAST SERPL-MCNC: 93 MG/DL (ref 65–99)
HCT VFR BLD AUTO: 42.6 % (ref 34.8–46.1)
HGB BLD-MCNC: 13.4 G/DL (ref 11.5–15.4)
IMM GRANULOCYTES # BLD AUTO: 0.01 THOUSAND/UL (ref 0–0.2)
IMM GRANULOCYTES NFR BLD AUTO: 0 % (ref 0–2)
LYMPHOCYTES # BLD AUTO: 2 THOUSANDS/ΜL (ref 0.6–4.47)
LYMPHOCYTES NFR BLD AUTO: 34 % (ref 14–44)
MCH RBC QN AUTO: 30.5 PG (ref 26.8–34.3)
MCHC RBC AUTO-ENTMCNC: 31.5 G/DL (ref 31.4–37.4)
MCV RBC AUTO: 97 FL (ref 82–98)
MONOCYTES # BLD AUTO: 0.62 THOUSAND/ΜL (ref 0.17–1.22)
MONOCYTES NFR BLD AUTO: 10 % (ref 4–12)
NEUTROPHILS # BLD AUTO: 3.19 THOUSANDS/ΜL (ref 1.85–7.62)
NEUTS SEG NFR BLD AUTO: 54 % (ref 43–75)
NRBC BLD AUTO-RTO: 0 /100 WBCS
P AXIS: 77 DEGREES
PLATELET # BLD AUTO: 270 THOUSANDS/UL (ref 149–390)
PMV BLD AUTO: 10.1 FL (ref 8.9–12.7)
POTASSIUM SERPL-SCNC: 4.6 MMOL/L (ref 3.5–5.3)
PR INTERVAL: 172 MS
QRS AXIS: -12 DEGREES
QRSD INTERVAL: 80 MS
QT INTERVAL: 412 MS
QTC INTERVAL: 438 MS
RBC # BLD AUTO: 4.4 MILLION/UL (ref 3.81–5.12)
SODIUM SERPL-SCNC: 135 MMOL/L (ref 136–145)
T WAVE AXIS: 56 DEGREES
VENTRICULAR RATE: 68 BPM
WBC # BLD AUTO: 5.96 THOUSAND/UL (ref 4.31–10.16)

## 2021-01-11 PROCEDURE — 80048 BASIC METABOLIC PNL TOTAL CA: CPT

## 2021-01-11 PROCEDURE — 93005 ELECTROCARDIOGRAM TRACING: CPT

## 2021-01-11 PROCEDURE — 36415 COLL VENOUS BLD VENIPUNCTURE: CPT

## 2021-01-11 PROCEDURE — 85025 COMPLETE CBC W/AUTO DIFF WBC: CPT

## 2021-01-11 PROCEDURE — 93010 ELECTROCARDIOGRAM REPORT: CPT | Performed by: INTERNAL MEDICINE

## 2021-01-11 RX ORDER — CITALOPRAM 20 MG/1
20 TABLET ORAL DAILY
Qty: 90 TABLET | Refills: 3 | Status: SHIPPED | OUTPATIENT
Start: 2021-01-11 | End: 2021-07-08 | Stop reason: SDUPTHER

## 2021-01-12 ENCOUNTER — IMMUNIZATIONS (OUTPATIENT)
Dept: FAMILY MEDICINE CLINIC | Facility: HOSPITAL | Age: 71
End: 2021-01-12

## 2021-01-12 DIAGNOSIS — Z23 ENCOUNTER FOR IMMUNIZATION: ICD-10-CM

## 2021-01-12 PROCEDURE — 0002A SARS-COV-2 / COVID-19 MRNA VACCINE (PFIZER-BIONTECH) 30 MCG: CPT

## 2021-01-12 PROCEDURE — 91300 SARS-COV-2 / COVID-19 MRNA VACCINE (PFIZER-BIONTECH) 30 MCG: CPT

## 2021-01-18 DIAGNOSIS — G43.709 CHRONIC MIGRAINE WITHOUT AURA WITHOUT STATUS MIGRAINOSUS, NOT INTRACTABLE: ICD-10-CM

## 2021-01-18 DIAGNOSIS — G43.009 MIGRAINE WITHOUT AURA AND WITHOUT STATUS MIGRAINOSUS, NOT INTRACTABLE: ICD-10-CM

## 2021-01-18 NOTE — TELEPHONE ENCOUNTER
Patient calling in requesting refill of sumatriptan and naproxen  Orders pended below, please sign if agreeable

## 2021-01-19 DIAGNOSIS — K43.9 VENTRAL HERNIA WITHOUT OBSTRUCTION OR GANGRENE: ICD-10-CM

## 2021-01-19 PROCEDURE — U0003 INFECTIOUS AGENT DETECTION BY NUCLEIC ACID (DNA OR RNA); SEVERE ACUTE RESPIRATORY SYNDROME CORONAVIRUS 2 (SARS-COV-2) (CORONAVIRUS DISEASE [COVID-19]), AMPLIFIED PROBE TECHNIQUE, MAKING USE OF HIGH THROUGHPUT TECHNOLOGIES AS DESCRIBED BY CMS-2020-01-R: HCPCS

## 2021-01-19 PROCEDURE — U0005 INFEC AGEN DETEC AMPLI PROBE: HCPCS

## 2021-01-19 RX ORDER — SUMATRIPTAN 100 MG/1
TABLET, FILM COATED ORAL
Qty: 12 TABLET | Refills: 0 | Status: SHIPPED | OUTPATIENT
Start: 2021-01-19 | End: 2021-03-03 | Stop reason: SDUPTHER

## 2021-01-19 RX ORDER — NAPROXEN 500 MG/1
500 TABLET ORAL AS NEEDED
Qty: 10 TABLET | Refills: 0 | Status: SHIPPED | OUTPATIENT
Start: 2021-01-19 | End: 2021-03-03 | Stop reason: SDUPTHER

## 2021-01-20 NOTE — PRE-PROCEDURE INSTRUCTIONS
Pre-Surgery Instructions:   Medication Instructions    albuterol (Ventolin HFA) 90 mcg/act inhaler pt can use day of surgery prn     aspirin (ECOTRIN) 325 mg EC tablet pt instructed to stop 7 days prior to surgery    Cholecalciferol (VITAMIN D3 PO) pt instructed to stop 7 days prior to surgery    citalopram (CeleXA) 20 mg tablet pt can take day of surgery with 1-2 sips of water    cyanocobalamin ([employer]S VITAMIN B-12) 1000 MCG tablet pt instructed to stop 7 days prior to surgery    divalproex sodium (DEPAKOTE ER) 250 mg 24 hr tablet pt instructed to take on day of surgery with 1-2 sips of water    Magnesium 500 MG CAPS pt instructed to stop 7 days prior to surgery    naproxen (NAPROSYN) 500 mg tablet pt instructed to stop 7 days prior to surgery    OLANZapine (ZyPREXA) 5 mg tablet pt takes at hs    pyridoxine (B-6) 100 MG tablet pt instructed to stop 7 days prior to surgery    Riboflavin (B2) 100 MG TABS pt instructed to stop 7 days prior to surgery     simvastatin (ZOCOR) 40 mg tablet pt takes in the evening     SUMAtriptan (IMITREX) 100 mg tablet pt instructed to take day of surgery prn     zolpidem (AMBIEN) 5 mg tablet pt takes at hs     Pt instructed to stop nsaids and supplements one week prior to surgery  Pt received antibiotic and bowel cleansing instructions from the surgeon  Pt denies fever, sob, sore throat and cough  Pt verbalized understanding of COVID visitor and shower instructions

## 2021-01-21 LAB — SARS-COV-2 RNA RESP QL NAA+PROBE: NEGATIVE

## 2021-01-25 ENCOUNTER — ANESTHESIA EVENT (OUTPATIENT)
Dept: PERIOP | Facility: HOSPITAL | Age: 71
End: 2021-01-25
Payer: MEDICARE

## 2021-01-25 NOTE — ANESTHESIA PREPROCEDURE EVALUATION
Procedure:  REPAIR HERNIA VENTRAL LAPAROSCOPIC (N/A Abdomen)    Relevant Problems   CARDIO   (+) Bilateral carotid artery stenosis   (+) Chronic migraine without aura      ENDO   (+) Acquired hypothyroidism      MUSCULOSKELETAL   (+) Acute left-sided low back pain with sciatica      NEURO/PSYCH   (+) Chronic migraine without aura   (+) Depression      PULMONARY   (+) Simple chronic bronchitis (HCC)   (+) Smoking      Other   (+) Bruit of right carotid artery        Physical Exam    Airway    Mallampati score: II  TM Distance: >3 FB  Neck ROM: full     Dental   No notable dental hx     Cardiovascular      Pulmonary      Other Findings        Anesthesia Plan  ASA Score- 3     Anesthesia Type- general with ASA Monitors  Additional Monitors:   Airway Plan: ETT  Plan Factors-Exercise tolerance (METS): >4 METS  Chart reviewed  EKG reviewed  Patient is a current smoker  Patient not instructed to abstain from smoking on day of procedure  Patient smoked on day of surgery (2 this am)  Induction- intravenous  Postoperative Plan-     Informed Consent- Anesthetic plan and risks discussed with patient and spouse  I personally reviewed this patient with the CRNA  Discussed and agreed on the Anesthesia Plan with the CRNA             Lab Results   Component Value Date    GLUC 104 03/11/2020    GLUF 93 01/11/2021    ALT 13 10/26/2020    AST 12 10/26/2020    BUN 16 01/11/2021    CALCIUM 9 3 01/11/2021     01/11/2021    CO2 31 01/11/2021    CREATININE 0 82 01/11/2021    HDL 62 10/26/2020    HCT 42 6 01/11/2021    HGB 13 4 01/11/2021    PROT 7 0 12/08/2014    MG 2 0 03/11/2020     01/11/2021    K 4 6 01/11/2021     12/08/2014    TRIG 133 10/26/2020    WBC 5 96 01/11/2021

## 2021-01-26 ENCOUNTER — HOSPITAL ENCOUNTER (OUTPATIENT)
Facility: HOSPITAL | Age: 71
Setting detail: OUTPATIENT SURGERY
Discharge: HOME | End: 2021-01-26
Attending: COLON & RECTAL SURGERY | Admitting: COLON & RECTAL SURGERY
Payer: MEDICARE

## 2021-01-26 ENCOUNTER — ANESTHESIA (OUTPATIENT)
Dept: PERIOP | Facility: HOSPITAL | Age: 71
End: 2021-01-26
Payer: MEDICARE

## 2021-01-26 VITALS
TEMPERATURE: 97.6 F | RESPIRATION RATE: 18 BRPM | BODY MASS INDEX: 24.59 KG/M2 | DIASTOLIC BLOOD PRESSURE: 64 MMHG | OXYGEN SATURATION: 95 % | SYSTOLIC BLOOD PRESSURE: 136 MMHG | HEART RATE: 72 BPM | WEIGHT: 153 LBS | HEIGHT: 66 IN

## 2021-01-26 VITALS — HEART RATE: 86 BPM

## 2021-01-26 DIAGNOSIS — K43.9 VENTRAL HERNIA WITHOUT OBSTRUCTION OR GANGRENE: Primary | ICD-10-CM

## 2021-01-26 PROCEDURE — C1781 MESH (IMPLANTABLE): HCPCS | Performed by: COLON & RECTAL SURGERY

## 2021-01-26 PROCEDURE — 49654 PR LAP, INCISIONAL HERNIA REPAIR,REDUCIBLE: CPT | Performed by: COLON & RECTAL SURGERY

## 2021-01-26 DEVICE — VENTRALIGHT ST MESH WITH ECHO PS POSITIONING SYSTEM
Type: IMPLANTABLE DEVICE | Site: ABDOMEN | Status: FUNCTIONAL
Brand: VENTRALIGHT ST MESH WITH ECHO PS POSITIONING SYSTEM

## 2021-01-26 DEVICE — SORBAFIX ABSORBABLE FIXATION SYSTEM 30 ABSORBABLE FASTENERS
Type: IMPLANTABLE DEVICE | Site: ABDOMEN | Status: FUNCTIONAL
Brand: SORBAFIX ABSORBABLE FIXATION SYSTEM

## 2021-01-26 RX ORDER — ONDANSETRON 2 MG/ML
4 INJECTION INTRAMUSCULAR; INTRAVENOUS ONCE AS NEEDED
Status: DISCONTINUED | OUTPATIENT
Start: 2021-01-26 | End: 2021-01-26 | Stop reason: HOSPADM

## 2021-01-26 RX ORDER — OXYCODONE HYDROCHLORIDE AND ACETAMINOPHEN 5; 325 MG/1; MG/1
1 TABLET ORAL EVERY 4 HOURS PRN
Qty: 20 TABLET | Refills: 0 | Status: SHIPPED | OUTPATIENT
Start: 2021-01-26 | End: 2021-01-31

## 2021-01-26 RX ORDER — OXYCODONE HYDROCHLORIDE 5 MG/1
5 TABLET ORAL EVERY 4 HOURS PRN
Status: DISCONTINUED | OUTPATIENT
Start: 2021-01-26 | End: 2021-01-26 | Stop reason: HOSPADM

## 2021-01-26 RX ORDER — NEOMYCIN SULFATE 500 MG/1
1000 TABLET ORAL 3 TIMES DAILY
Status: DISCONTINUED | OUTPATIENT
Start: 2021-01-26 | End: 2021-01-26

## 2021-01-26 RX ORDER — DEXAMETHASONE SODIUM PHOSPHATE 10 MG/ML
INJECTION, SOLUTION INTRAMUSCULAR; INTRAVENOUS AS NEEDED
Status: DISCONTINUED | OUTPATIENT
Start: 2021-01-26 | End: 2021-01-26

## 2021-01-26 RX ORDER — SODIUM CHLORIDE, SODIUM LACTATE, POTASSIUM CHLORIDE, CALCIUM CHLORIDE 600; 310; 30; 20 MG/100ML; MG/100ML; MG/100ML; MG/100ML
125 INJECTION, SOLUTION INTRAVENOUS CONTINUOUS
Status: DISCONTINUED | OUTPATIENT
Start: 2021-01-26 | End: 2021-01-26 | Stop reason: HOSPADM

## 2021-01-26 RX ORDER — ROCURONIUM BROMIDE 10 MG/ML
INJECTION, SOLUTION INTRAVENOUS AS NEEDED
Status: DISCONTINUED | OUTPATIENT
Start: 2021-01-26 | End: 2021-01-26

## 2021-01-26 RX ORDER — OXYCODONE HYDROCHLORIDE 5 MG/1
10 TABLET ORAL EVERY 4 HOURS PRN
Status: DISCONTINUED | OUTPATIENT
Start: 2021-01-26 | End: 2021-01-26 | Stop reason: HOSPADM

## 2021-01-26 RX ORDER — LIDOCAINE HYDROCHLORIDE 10 MG/ML
0.5 INJECTION, SOLUTION EPIDURAL; INFILTRATION; INTRACAUDAL; PERINEURAL ONCE AS NEEDED
Status: DISCONTINUED | OUTPATIENT
Start: 2021-01-26 | End: 2021-01-26 | Stop reason: HOSPADM

## 2021-01-26 RX ORDER — MAGNESIUM HYDROXIDE 1200 MG/15ML
LIQUID ORAL AS NEEDED
Status: DISCONTINUED | OUTPATIENT
Start: 2021-01-26 | End: 2021-01-26 | Stop reason: HOSPADM

## 2021-01-26 RX ORDER — HEPARIN SODIUM 5000 [USP'U]/ML
INJECTION, SOLUTION INTRAVENOUS; SUBCUTANEOUS AS NEEDED
Status: DISCONTINUED | OUTPATIENT
Start: 2021-01-26 | End: 2021-01-26

## 2021-01-26 RX ORDER — LIDOCAINE HYDROCHLORIDE 10 MG/ML
INJECTION, SOLUTION EPIDURAL; INFILTRATION; INTRACAUDAL; PERINEURAL AS NEEDED
Status: DISCONTINUED | OUTPATIENT
Start: 2021-01-26 | End: 2021-01-26

## 2021-01-26 RX ORDER — SODIUM CHLORIDE 9 MG/ML
INJECTION, SOLUTION INTRAVENOUS CONTINUOUS PRN
Status: DISCONTINUED | OUTPATIENT
Start: 2021-01-26 | End: 2021-01-26

## 2021-01-26 RX ORDER — METRONIDAZOLE 500 MG/1
1000 TABLET ORAL 3 TIMES DAILY
Status: DISCONTINUED | OUTPATIENT
Start: 2021-01-26 | End: 2021-01-26

## 2021-01-26 RX ORDER — ACETAMINOPHEN 325 MG/1
650 TABLET ORAL EVERY 6 HOURS PRN
Status: DISCONTINUED | OUTPATIENT
Start: 2021-01-26 | End: 2021-01-26 | Stop reason: HOSPADM

## 2021-01-26 RX ORDER — HYDROMORPHONE HCL/PF 1 MG/ML
SYRINGE (ML) INJECTION AS NEEDED
Status: DISCONTINUED | OUTPATIENT
Start: 2021-01-26 | End: 2021-01-26

## 2021-01-26 RX ORDER — CEFAZOLIN SODIUM 1 G/50ML
1000 SOLUTION INTRAVENOUS ONCE
Status: COMPLETED | OUTPATIENT
Start: 2021-01-26 | End: 2021-01-26

## 2021-01-26 RX ORDER — ALBUTEROL SULFATE 2.5 MG/3ML
SOLUTION RESPIRATORY (INHALATION) AS NEEDED
Status: DISCONTINUED | OUTPATIENT
Start: 2021-01-26 | End: 2021-01-26

## 2021-01-26 RX ORDER — FENTANYL CITRATE/PF 50 MCG/ML
25 SYRINGE (ML) INJECTION
Status: DISCONTINUED | OUTPATIENT
Start: 2021-01-26 | End: 2021-01-26 | Stop reason: HOSPADM

## 2021-01-26 RX ORDER — SODIUM CHLORIDE, SODIUM LACTATE, POTASSIUM CHLORIDE, CALCIUM CHLORIDE 600; 310; 30; 20 MG/100ML; MG/100ML; MG/100ML; MG/100ML
75 INJECTION, SOLUTION INTRAVENOUS CONTINUOUS
Status: DISCONTINUED | OUTPATIENT
Start: 2021-01-26 | End: 2021-01-26 | Stop reason: HOSPADM

## 2021-01-26 RX ORDER — METOCLOPRAMIDE HYDROCHLORIDE 5 MG/ML
5 INJECTION INTRAMUSCULAR; INTRAVENOUS ONCE AS NEEDED
Status: DISCONTINUED | OUTPATIENT
Start: 2021-01-26 | End: 2021-01-26 | Stop reason: HOSPADM

## 2021-01-26 RX ORDER — HYDROMORPHONE HCL/PF 1 MG/ML
0.2 SYRINGE (ML) INJECTION
Status: DISCONTINUED | OUTPATIENT
Start: 2021-01-26 | End: 2021-01-26

## 2021-01-26 RX ORDER — FENTANYL CITRATE 50 UG/ML
INJECTION, SOLUTION INTRAMUSCULAR; INTRAVENOUS AS NEEDED
Status: DISCONTINUED | OUTPATIENT
Start: 2021-01-26 | End: 2021-01-26

## 2021-01-26 RX ORDER — PROPOFOL 10 MG/ML
INJECTION, EMULSION INTRAVENOUS AS NEEDED
Status: DISCONTINUED | OUTPATIENT
Start: 2021-01-26 | End: 2021-01-26

## 2021-01-26 RX ORDER — HYDROMORPHONE HCL/PF 1 MG/ML
0.2 SYRINGE (ML) INJECTION
Status: DISCONTINUED | OUTPATIENT
Start: 2021-01-26 | End: 2021-01-26 | Stop reason: HOSPADM

## 2021-01-26 RX ORDER — ALBUMIN, HUMAN INJ 5% 5 %
SOLUTION INTRAVENOUS CONTINUOUS PRN
Status: DISCONTINUED | OUTPATIENT
Start: 2021-01-26 | End: 2021-01-26

## 2021-01-26 RX ORDER — ONDANSETRON 2 MG/ML
INJECTION INTRAMUSCULAR; INTRAVENOUS AS NEEDED
Status: DISCONTINUED | OUTPATIENT
Start: 2021-01-26 | End: 2021-01-26

## 2021-01-26 RX ADMIN — ONDANSETRON 4 MG: 2 INJECTION INTRAMUSCULAR; INTRAVENOUS at 14:02

## 2021-01-26 RX ADMIN — PHENYLEPHRINE HYDROCHLORIDE 200 MCG: 10 INJECTION INTRAVENOUS at 14:29

## 2021-01-26 RX ADMIN — PROPOFOL 200 MG: 10 INJECTION, EMULSION INTRAVENOUS at 14:01

## 2021-01-26 RX ADMIN — ALBUMIN (HUMAN): 12.5 INJECTION, SOLUTION INTRAVENOUS at 14:30

## 2021-01-26 RX ADMIN — CEFAZOLIN SODIUM 1000 MG: 1 SOLUTION INTRAVENOUS at 14:06

## 2021-01-26 RX ADMIN — Medication 25 MCG: at 17:05

## 2021-01-26 RX ADMIN — HYDROMORPHONE HYDROCHLORIDE 1 MG: 1 INJECTION, SOLUTION INTRAMUSCULAR; INTRAVENOUS; SUBCUTANEOUS at 15:51

## 2021-01-26 RX ADMIN — DEXAMETHASONE SODIUM PHOSPHATE 10 MG: 10 INJECTION, SOLUTION INTRAMUSCULAR; INTRAVENOUS at 14:05

## 2021-01-26 RX ADMIN — PHENYLEPHRINE HYDROCHLORIDE 200 MCG: 10 INJECTION INTRAVENOUS at 14:19

## 2021-01-26 RX ADMIN — HEPARIN SODIUM 5000 UNITS: 5000 INJECTION INTRAVENOUS; SUBCUTANEOUS at 14:34

## 2021-01-26 RX ADMIN — ROCURONIUM BROMIDE 50 MG: 50 INJECTION, SOLUTION INTRAVENOUS at 14:02

## 2021-01-26 RX ADMIN — Medication 25 MCG: at 16:30

## 2021-01-26 RX ADMIN — SODIUM CHLORIDE, SODIUM LACTATE, POTASSIUM CHLORIDE, AND CALCIUM CHLORIDE: .6; .31; .03; .02 INJECTION, SOLUTION INTRAVENOUS at 15:59

## 2021-01-26 RX ADMIN — LIDOCAINE HYDROCHLORIDE 50 MG: 10 INJECTION, SOLUTION EPIDURAL; INFILTRATION; INTRACAUDAL; PERINEURAL at 14:01

## 2021-01-26 RX ADMIN — ALBUTEROL SULFATE 2.5 MG: 2.5 SOLUTION RESPIRATORY (INHALATION) at 13:53

## 2021-01-26 RX ADMIN — SODIUM CHLORIDE, SODIUM LACTATE, POTASSIUM CHLORIDE, AND CALCIUM CHLORIDE 125 ML/HR: .6; .31; .03; .02 INJECTION, SOLUTION INTRAVENOUS at 13:23

## 2021-01-26 RX ADMIN — FENTANYL CITRATE 50 MCG: 50 INJECTION INTRAMUSCULAR; INTRAVENOUS at 14:17

## 2021-01-26 RX ADMIN — SODIUM CHLORIDE: 0.9 INJECTION, SOLUTION INTRAVENOUS at 14:06

## 2021-01-26 RX ADMIN — Medication 25 MCG: at 16:48

## 2021-01-26 RX ADMIN — FENTANYL CITRATE 50 MCG: 50 INJECTION INTRAMUSCULAR; INTRAVENOUS at 14:01

## 2021-01-26 RX ADMIN — PHENYLEPHRINE HYDROCHLORIDE 200 MCG: 10 INJECTION INTRAVENOUS at 15:54

## 2021-01-26 RX ADMIN — PHENYLEPHRINE HYDROCHLORIDE 200 MCG: 10 INJECTION INTRAVENOUS at 14:05

## 2021-01-26 RX ADMIN — METRONIDAZOLE 500 MG: 500 INJECTION, SOLUTION INTRAVENOUS at 13:48

## 2021-01-26 RX ADMIN — SUGAMMADEX 200 MG: 100 INJECTION, SOLUTION INTRAVENOUS at 15:52

## 2021-01-26 RX ADMIN — Medication 25 MCG: at 16:58

## 2021-01-26 NOTE — ANESTHESIA POSTPROCEDURE EVALUATION
Post-Op Assessment Note    CV Status:  Stable  Pain Score: 0    Pain management: adequate     Mental Status:  Alert   Hydration Status:  Stable   PONV Controlled:  None   Airway Patency:  Patent      Post Op Vitals Reviewed: Yes      Staff: Anesthesiologist         No complications documented      /57 (01/26/21 1700)    Temp      Pulse 74 (01/26/21 1700)   Resp 18 (01/26/21 1700)    SpO2 99 % (01/26/21 1700)

## 2021-01-26 NOTE — H&P
History and Physical   Colon and Rectal Surgery   Randy Blizzard 65 y o  female MRN: [id number]  Unit/Bed#:  Encounter: [id number]  01/26/21   12:00 PM      CC:  Ventral hernia    History of Present Illness   HPI:  Randy Blizzard is a 65 y o  female for herniorrhaphy  Historical Information   Past Medical History:   Diagnosis Date    Hyperlipidemia     Migraine      Past Surgical History:   Procedure Laterality Date    BREAST EXCISIONAL BIOPSY Right 1986    HARTMANS PROCEDURE N/A 3/8/2020    Procedure: Laparoscopic drainage of intra peritoneal abscess, sigmoid rescetion,;  Surgeon: Yash Judge MD;  Location: [facility];  Service: Colorectal    HYSTERECTOMY  1978    age 25   Shana Seller NASAL SEPTUM SURGERY      deviation repair    WI LAP,DIAGNOSTIC ABDOMEN N/A 3/8/2020    Procedure: LAPAROSCOPY DIAGNOSTIC;  Surgeon: Yash Judge MD;  Location: [facility];  Service: Colorectal    WI SIGMOIDOSCOPY FLX DX W/COLLJ SPEC BR/WA IF PFRMD N/A 3/8/2020    Procedure: Erma Minium;  Surgeon: Yash Judge MD;  Location: [facility];  Service: Colorectal    SHOULDER SURGERY         Meds/Allergies     No medications prior to admission  No current facility-administered medications for this encounter       Current Outpatient Medications:     albuterol (Ventolin HFA) 90 mcg/act inhaler, Inhale 2 puffs every 6 (six) hours as needed for wheezing, Disp: 1 Inhaler, Rfl: 0    aspirin (ECOTRIN) 325 mg EC tablet, Take 1 tablet (325 mg total) by mouth daily, Disp: 30 tablet, Rfl: 0    Cholecalciferol (VITAMIN D3 PO), Take by mouth daily , Disp: , Rfl:     citalopram (CeleXA) 20 mg tablet, Take 1 tablet (20 mg total) by mouth daily, Disp: 90 tablet, Rfl: 3    cyanocobalamin ([employer]S VITAMIN B-12) 1000 MCG tablet, Take 1,000 mcg by mouth daily , Disp: , Rfl:     divalproex sodium (DEPAKOTE ER) 250 mg 24 hr tablet, Take 1 tablet (250 mg total) by mouth 2 (two) times a day, Disp: 180 tablet, Rfl: 5    Magnesium 500 MG CAPS, Take by mouth daily , Disp: , Rfl:     naproxen (NAPROSYN) 500 mg tablet, Take 1 tablet (500 mg total) by mouth as needed for headaches, Disp: 10 tablet, Rfl: 0    OLANZapine (ZyPREXA) 5 mg tablet, At bedtime only as needed, Disp: 10 tablet, Rfl: 3    pyridoxine (B-6) 100 MG tablet, Take 100 mg by mouth daily , Disp: , Rfl:     Riboflavin (B2) 100 MG TABS, Take by mouth daily , Disp: , Rfl:     simvastatin (ZOCOR) 40 mg tablet, Take 1 tablet (40 mg total) by mouth daily, Disp: 90 tablet, Rfl: 3    SUMAtriptan (IMITREX) 100 mg tablet, Take 1 tablet at onset of migraine headache   May repeat in 2 hours if needed, no more than 2 in 24 hours and no more than 3 in a week, Disp: 12 tablet, Rfl: 0    zolpidem (AMBIEN) 5 mg tablet, One pill at bedtime as needed to help with sleep, Disp: 30 tablet, Rfl: 3    Allergies   Allergen Reactions    Penicillins Anaphylaxis    Azithromycin Hives    Nisoldipine      Reaction Date: 14Apr2011;     Sulfa Antibiotics Hives         Social History   Social History     Substance and Sexual Activity   Alcohol Use Yes    Frequency: Monthly or less    Drinks per session: 1 or 2    Binge frequency: Never     Social History     Substance and Sexual Activity   Drug Use No     Social History     Tobacco Use   Smoking Status Current Every Day Smoker    Packs/day: 0 50    Years: 53 00    Pack years: 26 50    Start date: 1965   Smokeless Tobacco Never Used         Family History:   Family History   Problem Relation Age of Onset    Breast cancer Mother 48    Heart disease Father     Diabetes Sister     Leukemia Sister     No Known Problems Maternal Grandmother     No Known Problems Maternal Grandfather     No Known Problems Paternal Grandmother     No Known Problems Paternal Grandfather     No Known Problems Sister     Breast cancer Maternal Aunt 48    No Known Problems Maternal Aunt     No Known Problems Paternal Aunt     Colon cancer Maternal Uncle     No Known Problems Son     No Known Problems Son     Lung cancer Other 47         Objective     Current Vitals:   Height: 5' 6" (167 6 cm) (01/20/21 0905)  Weight - Scale: 69 4 kg (153 lb) (01/20/21 0905)  No intake or output data in the 24 hours ending 01/26/21 1200    Physical Exam:  General:  Well nourished, no distress  Neuro: Alert and oriented  Eyes:Sclera anicteric, conjunctiva pink  Pulm: Clear to auscultation bilaterally  No respiratory Distress  CV:  Regular rate and rhythm  No murmurs  Abdomen:  Soft, flat, non-tender, without masses or hepatosplenomegaly  Lab Results:       ASSESSMENT:  Zoie Zee is a 65 y o  female for herniorrhaphy  PLAN:  Risks of surgery, including but not limited to bleeding, pain, infection, hernia persistence or recurrence, injury to the ureter or other internal organs requiring surgery specific to these injuries, anastomotic leak, deep vein thrombosis, pulmonary embolism, myocardial infarction or heart failure, stroke, death, need for and enterostomy, or other unspecified complications were discussed  Benefits and alternatives were discussed  Questions were answered   Shyam Hartman MD

## 2021-01-26 NOTE — OP NOTE
OPERATIVE REPORT  PATIENT NAME: Justina Alvarenga    :  1950  MRN: [id number]  Pt Location: BE OR ROOM 10    SURGERY DATE: 2021    Surgeon(s) and Role:  Panel 1:     * Shahid Fleming MD - Primary     * Nika Medina MD - Assisting    Preop Diagnosis:  Ventral hernia without obstruction or gangrene [K43 9]    Post-Op Diagnosis Codes: * Ventral hernia without obstruction or gangrene [K43 9]  Mild intestinal adhesions    Procedure(s) (LRB):  REPAIR HERNIA VENTRAL LAPAROSCOPIC (N/A), with mesh      Specimen(s):  * No specimens in log *    Estimated Blood Loss:   Minimal    Drains:  * No LDAs found *    Anesthesia Type:   General    Operative Indications:  Ventral hernia without obstruction or gangrene [K43 9]    Operative Findings:  Ventral hernia without obstruction or gangrene  Mild intestinal adhesions    Complications:   None    Procedure and Technique:  The patient was placed in a supine position with arms out a T position on arm boards  The legs were adhered into position using a seatbelt  The abdomen was prepped widely using ChloraPrep and allowed to dry completely  The area was draped in a sterile manner  A time-out was done  The hernia was examined  There was a 7 x 5 mm hernia in the lower midline wound that was the hand assist port wound  It appeared to be easily reducible  We placed 5 mm trocars in the right upper quadrant, left upper quadrant, left abdomen, and left lower quadrant  A 12 mm trocar was placed in the right lower quadrant  The trocars were placed as far laterally as we could in order to avoid impingement upon the planned placement of the mesh for the herniorrhaphy  Laparoscopic visualization revealed the lower midline hernia without any other hernias present  There were some adhesions inside the hernia and at the hernia edge  These were lysed using blunt and sharp scissors technique    There was some bowel included in the hernia adhesions but this was easily  from surrounding tissues and dropped onto the abdomen away from the hernia  This adhesiolysis was relatively straightforward and was done under direct visualization without difficulty  The hernia was then prepared for repair  We chose a 15 x 20 cm mesh to have good overlapping on all sides  Was then sutured at 4 corners using 0 Prolene suture which was then fashioned to allow for positioning of the mesh hernia repair  The mesh was then rolled and inserted through the 12 mm trocar  It was opened with the green balloon on the abdominal side of the mesh  A stab incision was made through the hernia and the center hole suture was tugged through the opening, cut, and used to inflate the mesh stent to allow for orientation of the mesh  This was done without any difficulty  The 4 corner sutures were withdrawn through stab incisions and tied into position  We then began to tack the surrounding edges of the mesh with wide overlapping of hernia  In the pelvis, this overlap was somewhat difficult because of the presence of the bladder  Once the tacking was initiated, we removed the stent balloon and the 4 corner adherence sutures that were associated with it  This was withdrawn through the 12 mm trocar  We then pulled the blue insufflation catheter out through the hernia stab incision that had been made earlier  We then reinforced all the surrounding tacks but found that the device was not functioning well and some of the tacks were pulling from the wall especially on the patient's left side  Three different devices were used to tack the walls until we were comfortable that there was good adherence of the mesh to the complete surrounding circumference of the abdominal wall  Only then did we desufflated the abdomen    It should be mentioned that the tacking was done with compression from external pressure on the abdominal wall and good pressure had been applied at all times but these tacks seem to be tearing out of the abdominal wall until a final device seemed to work better  This time, the herniorrhaphy was concluded  The right lower quadrant wound was then closed at the fascial level using 0 Vicryl suture  The wounds were cleansed and dried and closed using interrupted 4-0 Monocryl suture placed in the subcuticular level  Exofin was applied  Sponge needle and instrument counts were correct  The Wand revealed no retained gauze      I was present for the entire procedure    Patient Disposition:  PACU     SIGNATURE: Claudell Hoa, MD  DATE: January 26, 2021  TIME: 3:51 PM

## 2021-01-29 ENCOUNTER — TELEPHONE (OUTPATIENT)
Dept: NEUROLOGY | Facility: CLINIC | Age: 71
End: 2021-01-29

## 2021-01-29 NOTE — TELEPHONE ENCOUNTER
pt called and states that she received a letter from [facility] about sumatriptan   she states that she reiceved this letter in the past   see encounter from 3/30     she states that she is ok with 9 tabs per 30 days  our script was sent for qty 12       [phone] to leave a detailed message    Called pharm, she states that [facility] paid for 12 tabs on 1/19     [facility] [phone]  She was unable to locate pt, even with phone number and zip  Also tried [employer], still unable to locate pt  States that they need id  We do not have id on file    Isel[place], [id number]    Whit Arango again, spoke to [place]  She was able to locate pt using ID  She is listed as Morris Lofton  They did give pt a transition fill and that is why she was able to fill 12 tabs this month  She states that Qty limit is 9  Pt made aware  She will remind us to send only qty of 9 when she requests next refill

## 2021-02-19 DIAGNOSIS — F51.01 PRIMARY INSOMNIA: ICD-10-CM

## 2021-02-19 RX ORDER — ZOLPIDEM TARTRATE 5 MG/1
TABLET ORAL
Qty: 30 TABLET | Refills: 3 | Status: SHIPPED | OUTPATIENT
Start: 2021-02-19 | End: 2021-06-07 | Stop reason: SDUPTHER

## 2021-03-03 ENCOUNTER — OFFICE VISIT (OUTPATIENT)
Dept: NEUROLOGY | Facility: CLINIC | Age: 71
End: 2021-03-03
Payer: MEDICARE

## 2021-03-03 VITALS
BODY MASS INDEX: 24.94 KG/M2 | DIASTOLIC BLOOD PRESSURE: 72 MMHG | SYSTOLIC BLOOD PRESSURE: 118 MMHG | WEIGHT: 155.2 LBS | HEART RATE: 66 BPM | HEIGHT: 66 IN

## 2021-03-03 DIAGNOSIS — G43.709 CHRONIC MIGRAINE WITHOUT AURA WITHOUT STATUS MIGRAINOSUS, NOT INTRACTABLE: Primary | ICD-10-CM

## 2021-03-03 DIAGNOSIS — I77.3 FIBROMUSCULAR DYSPLASIA OF CERVICOCRANIAL ARTERY (HCC): ICD-10-CM

## 2021-03-03 DIAGNOSIS — M54.2 CERVICALGIA: ICD-10-CM

## 2021-03-03 DIAGNOSIS — G43.009 MIGRAINE WITHOUT AURA AND WITHOUT STATUS MIGRAINOSUS, NOT INTRACTABLE: ICD-10-CM

## 2021-03-03 PROCEDURE — 99214 OFFICE O/P EST MOD 30 MIN: CPT | Performed by: PHYSICIAN ASSISTANT

## 2021-03-03 RX ORDER — RIMEGEPANT SULFATE 75 MG/75MG
TABLET, ORALLY DISINTEGRATING ORAL
Qty: 9 TABLET | Refills: 0 | Status: SHIPPED | OUTPATIENT
Start: 2021-03-03 | End: 2022-03-08 | Stop reason: CLARIF

## 2021-03-03 RX ORDER — TIZANIDINE 2 MG/1
2 TABLET ORAL
Qty: 30 TABLET | Refills: 6 | Status: SHIPPED | OUTPATIENT
Start: 2021-03-03 | End: 2021-07-09 | Stop reason: SDUPTHER

## 2021-03-03 RX ORDER — SUMATRIPTAN 100 MG/1
TABLET, FILM COATED ORAL
Qty: 9 TABLET | Refills: 0 | Status: SHIPPED | OUTPATIENT
Start: 2021-03-03 | End: 2021-04-08 | Stop reason: SDUPTHER

## 2021-03-03 RX ORDER — NAPROXEN 500 MG/1
500 TABLET ORAL AS NEEDED
Qty: 10 TABLET | Refills: 0 | Status: SHIPPED | OUTPATIENT
Start: 2021-03-03 | End: 2021-04-08 | Stop reason: SDUPTHER

## 2021-03-03 RX ORDER — DIVALPROEX SODIUM 250 MG/1
500 TABLET, EXTENDED RELEASE ORAL
Qty: 180 TABLET | Refills: 5
Start: 2021-03-03 | End: 2021-06-21

## 2021-03-03 NOTE — PATIENT INSTRUCTIONS
Chronic migraine without aura  · Pt will continue Depakote for prevention of migraines  · She will continue sumatriptan and Naproxen for acute migraines  · Nurtec was discussed as an option for abortive treatment  It will be sent to her pharmacy and if it is affordable she will try the medication  I have spent 25 minutes with Patient  today in which greater than 50% of this time was spent in counseling/coordination of care regarding Diagnostic results, Prognosis, Risks and benefits of tx options, Intructions for management, Patient and family education, Importance of tx compliance, Risk factor reductions and Impressions  She will follow-up in 1 year  Fibromuscular dysplasia of cervicocranial artery (HCC)  · Pt is overdue for a carotid ultrasound  It was ordered today  · She will continue Aspirin  Cervicalgia  · Pt does report Lt sided neck pain which is contributing to migraine headaches  · Tizanidine 2mg at bedtime will be started    · Discussed PT and PMR consultation but she will try the medication first

## 2021-03-03 NOTE — PROGRESS NOTES
Patient ID: Dustin Blackburn is a 65 y o  female  Assessment/Plan:    Chronic migraine without aura  · Pt will continue Depakote for prevention of migraines  · She will continue sumatriptan and Naproxen for acute migraines  · Nurtec was discussed as an option for abortive treatment  It will be sent to her pharmacy and if it is affordable she will try the medication  I have spent 25 minutes with Patient  today in which greater than 50% of this time was spent in counseling/coordination of care regarding Diagnostic results, Prognosis, Risks and benefits of tx options, Intructions for management, Patient and family education, Importance of tx compliance, Risk factor reductions and Impressions  She will follow-up in 1 year  Fibromuscular dysplasia of cervicocranial artery (HCC)  · Pt is overdue for a carotid ultrasound  It was ordered today  · She will continue Aspirin  Cervicalgia  · Pt does report Lt sided neck pain which is contributing to migraine headaches  · Tizanidine 2mg at bedtime will be started  · Discussed PT and PMR consultation but she will try the medication first          Problem List Items Addressed This Visit        Cardiovascular and Mediastinum    Migraine without aura and without status migrainosus, not intractable    Relevant Medications    divalproex sodium (DEPAKOTE ER) 250 mg 24 hr tablet    SUMAtriptan (IMITREX) 100 mg tablet    naproxen (NAPROSYN) 500 mg tablet    Rimegepant Sulfate (Nurtec) 75 MG TBDP    Chronic migraine without aura - Primary     · Pt will continue Depakote for prevention of migraines  · She will continue sumatriptan and Naproxen for acute migraines  · Nurtec was discussed as an option for abortive treatment  It will be sent to her pharmacy and if it is affordable she will try the medication    I have spent 25 minutes with Patient  today in which greater than 50% of this time was spent in counseling/coordination of care regarding Diagnostic results, Prognosis, Risks and benefits of tx options, Intructions for management, Patient and family education, Importance of tx compliance, Risk factor reductions and Impressions  She will follow-up in 1 year  Relevant Medications    divalproex sodium (DEPAKOTE ER) 250 mg 24 hr tablet    SUMAtriptan (IMITREX) 100 mg tablet    naproxen (NAPROSYN) 500 mg tablet    Rimegepant Sulfate (Nurtec) 75 MG TBDP    Fibromuscular dysplasia of cervicocranial artery (HCC)     · Pt is overdue for a carotid ultrasound  It was ordered today  · She will continue Aspirin  Relevant Orders    VAS carotid complete study       Other    Cervicalgia     · Pt does report Lt sided neck pain which is contributing to migraine headaches  · Tizanidine 2mg at bedtime will be started  · Discussed PT and PMR consultation but she will try the medication first                   Subjective:    HPI    We had the pleasure of evaluating Paul Whiting in neurological follow up today  As you know she is a 65year old right handed female  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 [employer]  She remains on Depakote for migraine prevention  She will take sumatriptan for treatment of acute migraines  A migraine will typically recur the day after she takes sumatriptan  If she takes another dose of sumatriptan then the migraine will resolve  She does report Lt sided neck pain which she believes can worsen or cause some of the migraine headaches  Migraine headaches without aura:   PREVENTIVE: Citalopram, Topamax, depakote  ABORTIVE: Naratriptan, Sumavel, Dexamethasone, maxalt, imtrex   Headache trigger: Stress/Tension, Weather change     How often do the headaches occur - 6-8 per month  What time of the day do the headaches start - morning   How long do the headaches last - Improves with sumatriptan and naproxen but returns the next 2 days  Sumatriptan and naproxen do work the following 2 days as well     Location of headache: right temporal/frontal  What is the intensity of pain - average pain level 9/10  Describe your usual headache - Throbbing, Pounding, sharp  Associated symptoms: photophobia, phonophobia, nausea, vomiting    She typically does not miss work due to migraine headaches  The following portions of the patient's history were reviewed and updated as appropriate: allergies, current medications, past family history, past medical history, past social history, past surgical history and problem list          Objective:    Blood pressure 118/72, pulse 66, height 5' 6" (1 676 m), weight 70 4 kg (155 lb 3 2 oz), not currently breastfeeding  Physical Exam  Vitals signs reviewed  Eyes:      General: Lids are normal       Extraocular Movements: Extraocular movements intact  Pupils: Pupils are equal, round, and reactive to light  Neurological:      Coordination: Coordination is intact  Deep Tendon Reflexes: Strength normal and reflexes are normal and symmetric  Psychiatric:         Speech: Speech normal          Neurological Exam  Mental Status   Oriented to person, place, time and situation  Speech is normal  Language is fluent with no aphasia  Attention and concentration are normal  Fund of knowledge is appropriate for level of education  Apraxia absent  Cranial Nerves  CN II: Visual acuity is normal  Visual fields full to confrontation  CN III, IV, VI: Extraocular movements intact bilaterally  Normal lids and orbits bilaterally  Pupils equal round and reactive to light bilaterally  CN V: Facial sensation is normal   CN VII: Full and symmetric facial movement  CN VIII: Hearing is normal   CN IX, X: Palate elevates symmetrically  Normal gag reflex  CN XI: Shoulder shrug strength is normal   CN XII: Tongue midline without atrophy or fasciculations  Motor   Strength is 5/5 throughout all four extremities  Sensory  Sensation is intact to light touch, pinprick, vibration and proprioception in all four extremities      Reflexes  Deep tendon reflexes are 2+ and symmetric in all four extremities with downgoing toes bilaterally  Coordination  Finger-to-nose, rapid alternating movements and heel-to-shin normal bilaterally without dysmetria  Gait  Normal casual, toe, heel and tandem gait  ROS:    Review of Systems   Constitutional: Negative  Negative for appetite change and fever  HENT: Negative  Negative for hearing loss, tinnitus, trouble swallowing and voice change  Eyes: Negative  Negative for photophobia and pain  Respiratory: Negative  Negative for shortness of breath  Cardiovascular: Negative  Negative for palpitations  Gastrointestinal: Negative  Negative for nausea and vomiting  Endocrine: Negative  Negative for cold intolerance  Genitourinary: Negative  Negative for dysuria, frequency and urgency  Musculoskeletal: Negative  Negative for myalgias and neck pain  Skin: Negative  Negative for rash  Allergic/Immunologic: Negative  Neurological: Negative  Negative for dizziness, tremors, seizures, syncope, facial asymmetry, speech difficulty, weakness, light-headedness, numbness and headaches  Mirgraines   Hematological: Negative  Does not bruise/bleed easily  Psychiatric/Behavioral: Negative  Negative for confusion, hallucinations and sleep disturbance  All other systems reviewed and are negative  Review of systems personally reviewed

## 2021-03-03 NOTE — ASSESSMENT & PLAN NOTE
· Pt does report Lt sided neck pain which is contributing to migraine headaches  · Tizanidine 2mg at bedtime will be started    · Discussed PT and PMR consultation but she will try the medication first

## 2021-03-03 NOTE — ASSESSMENT & PLAN NOTE
· Pt will continue Depakote for prevention of migraines  · She will continue sumatriptan and Naproxen for acute migraines  · Nurtec was discussed as an option for abortive treatment  It will be sent to her pharmacy and if it is affordable she will try the medication  I have spent 25 minutes with Patient  today in which greater than 50% of this time was spent in counseling/coordination of care regarding Diagnostic results, Prognosis, Risks and benefits of tx options, Intructions for management, Patient and family education, Importance of tx compliance, Risk factor reductions and Impressions  She will follow-up in 1 year

## 2021-03-16 ENCOUNTER — TELEPHONE (OUTPATIENT)
Dept: NEUROLOGY | Facility: CLINIC | Age: 71
End: 2021-03-16

## 2021-03-16 NOTE — TELEPHONE ENCOUNTER
Continued Stay Note  [facility]     Patient Name: Bryanna Schwartz  MRN: [id number]  Today's Date: 11/2/2018    Admit Date: 10/14/2018          Discharge Plan     Row Name 11/02/18 1405       Plan    Plan update    Plan Comments CM contacted Darya jaramillo [facility] acute [facility] and left a message - she is out of office today. CM spoke ella Barker at [facility] and faxed updated notes to her. She is going to review the updated notes and check w the physician at her facility. There will need to be an ins precert that, if accepted, may start tonight - otherwise it would be Monday. Pt is medically ready for d/c according to report and notes. CM will need to f/u w this on Monday (w/ placements) in order to start precert if availability. No other needs at this time.              Discharge Codes    No documentation.           Kristen Pollock, RN     [email or web address]    Received fax from [facility] requires PA, key# [id number]  Attempted to initiate PA  Message from plan: Eligibility could not be verified for this pt  Time Connor, spoke w/ Johan Fenton  PA is required  rxbin [id number]  n [id number]  Group Daniele Ramos  Id [id number]    Called PA dept at [phone], spoke w/Anthony LEMUS initiated via phone  All questions answered  Provided info from office notes dated 3/3/21  IBY#[id number]  States that PA has been approved through 12/31/2021  [place] [employer] approval# [id number]  She will fax a copy of the approval to [phone]    [facility] drug pharmacy made aware of the approval    Claim was processed but copay is $[phone] 72  Pt made aware and states that she met her deductible  Refused to pay this amount

## 2021-03-17 NOTE — TELEPHONE ENCOUNTER
Patient calling in to return our call, read Alison's message  Patient verbalizes understanding of information  Reports they do not have any further questions or concerns at this time

## 2021-03-17 NOTE — TELEPHONE ENCOUNTER
Thanks  She can just continue the sumatriptan and naproxen she has been taking  We were just hoping it would be more affordable

## 2021-03-22 ENCOUNTER — HOSPITAL ENCOUNTER (OUTPATIENT)
Dept: NON INVASIVE DIAGNOSTICS | Facility: CLINIC | Age: 71
Discharge: HOME | End: 2021-03-22
Payer: MEDICARE

## 2021-03-22 DIAGNOSIS — I77.3 FIBROMUSCULAR DYSPLASIA OF CERVICOCRANIAL ARTERY (HCC): ICD-10-CM

## 2021-03-22 PROCEDURE — 93880 EXTRACRANIAL BILAT STUDY: CPT

## 2021-03-23 PROCEDURE — 93880 EXTRACRANIAL BILAT STUDY: CPT | Performed by: SURGERY

## 2021-04-08 DIAGNOSIS — G43.709 CHRONIC MIGRAINE WITHOUT AURA WITHOUT STATUS MIGRAINOSUS, NOT INTRACTABLE: ICD-10-CM

## 2021-04-08 DIAGNOSIS — G43.009 MIGRAINE WITHOUT AURA AND WITHOUT STATUS MIGRAINOSUS, NOT INTRACTABLE: ICD-10-CM

## 2021-04-08 RX ORDER — NAPROXEN 500 MG/1
500 TABLET ORAL AS NEEDED
Qty: 10 TABLET | Refills: 0 | Status: SHIPPED | OUTPATIENT
Start: 2021-04-08 | End: 2021-05-11 | Stop reason: SDUPTHER

## 2021-04-08 RX ORDER — SUMATRIPTAN 100 MG/1
TABLET, FILM COATED ORAL
Qty: 9 TABLET | Refills: 0 | Status: SHIPPED | OUTPATIENT
Start: 2021-04-08 | End: 2021-05-11 | Stop reason: SDUPTHER

## 2021-04-08 NOTE — TELEPHONE ENCOUNTER
Patient calling in for refills of naproxen and sumatriptan  Verified doses  She would like this filled at [facility]  Please review and sign if agreeable, thanks!

## 2021-04-20 ENCOUNTER — APPOINTMENT (OUTPATIENT)
Dept: LAB | Age: 71
End: 2021-04-20
Payer: MEDICARE

## 2021-04-20 DIAGNOSIS — E03.9 ACQUIRED HYPOTHYROIDISM: ICD-10-CM

## 2021-04-20 LAB — TSH SERPL DL<=0.05 MIU/L-ACNC: 2.15 UIU/ML (ref 0.36–3.74)

## 2021-04-20 PROCEDURE — 84443 ASSAY THYROID STIM HORMONE: CPT

## 2021-04-20 PROCEDURE — 36415 COLL VENOUS BLD VENIPUNCTURE: CPT

## 2021-05-10 ENCOUNTER — OFFICE VISIT (OUTPATIENT)
Dept: INTERNAL MEDICINE CLINIC | Facility: CLINIC | Age: 71
End: 2021-05-10
Payer: MEDICARE

## 2021-05-10 VITALS
DIASTOLIC BLOOD PRESSURE: 66 MMHG | TEMPERATURE: 97.4 F | HEIGHT: 66 IN | HEART RATE: 79 BPM | BODY MASS INDEX: 24.59 KG/M2 | SYSTOLIC BLOOD PRESSURE: 128 MMHG | OXYGEN SATURATION: 98 % | WEIGHT: 153 LBS

## 2021-05-10 DIAGNOSIS — F17.200 SMOKING: ICD-10-CM

## 2021-05-10 DIAGNOSIS — E03.9 ACQUIRED HYPOTHYROIDISM: ICD-10-CM

## 2021-05-10 DIAGNOSIS — E78.01 FAMILIAL HYPERCHOLESTEROLEMIA: ICD-10-CM

## 2021-05-10 DIAGNOSIS — F32.89 OTHER DEPRESSION: ICD-10-CM

## 2021-05-10 DIAGNOSIS — R11.0 NAUSEA: Primary | ICD-10-CM

## 2021-05-10 DIAGNOSIS — R53.82 CHRONIC FATIGUE: ICD-10-CM

## 2021-05-10 DIAGNOSIS — Z00.00 HEALTHCARE MAINTENANCE: ICD-10-CM

## 2021-05-10 DIAGNOSIS — I65.23 BILATERAL CAROTID ARTERY STENOSIS: ICD-10-CM

## 2021-05-10 PROCEDURE — 99214 OFFICE O/P EST MOD 30 MIN: CPT | Performed by: INTERNAL MEDICINE

## 2021-05-10 RX ORDER — ONDANSETRON 4 MG/1
4 TABLET, FILM COATED ORAL EVERY 8 HOURS PRN
Qty: 20 TABLET | Refills: 4 | Status: SHIPPED | OUTPATIENT
Start: 2021-05-10 | End: 2021-07-12 | Stop reason: SDUPTHER

## 2021-05-10 NOTE — ASSESSMENT & PLAN NOTE
Patient will be due for routine physical when she returns to the office in 6 months  She was given a slip for complete labs to be performed prior to the visit  Patient was told if any new medical problems or concerns in between to please call for evaluation

## 2021-05-10 NOTE — ASSESSMENT & PLAN NOTE
Patient states occasionally she does have episodes of nausea which had been addressed in the past   She states that she usually takes Zofran as needed in new prescription was given to the patient period she was told if she has further progression of symptoms to please call

## 2021-05-10 NOTE — ASSESSMENT & PLAN NOTE
Patient does have a history of bilateral carotid artery stenosis  She has had no symptoms from a neurologic standpoint  Again as previously we did tell the patient that the 1 a notable risk factors that she can work on is that she still smoking period approximately 1/2 of cigarettes per day period we do have concerns of her progression of disease and will continue to monitor this

## 2021-05-10 NOTE — ASSESSMENT & PLAN NOTE
Patient is complaining of some chronic fatigue  She states this is not new and she has learned to live with it  She has had full metabolic workup evaluation in the past finding no significant abnormalities that need to be addressed  We will be checking a comprehensive metabolic profile CBC and urinalysis now  Repeat this again when she returns in 6 months  Call the patient if there is any abnormalities that need to be addressed  We did tell the patient the importance of quitting smoking  We did discuss with the patient her sleep habits and sending her for sleep study but she is refusing

## 2021-05-10 NOTE — ASSESSMENT & PLAN NOTE
Patient continues to take medications specifically citalopram 20 mg a day for chronic fatigue  She states this point time that she is doing well from a mental standpoint  No suicidal thoughts or ideation  States that most of the time she feels relatively cheerful but occasionally fatigue  She was told if any changes with her emotional state to please call

## 2021-05-10 NOTE — ASSESSMENT & PLAN NOTE
Patient continues to smoke she estimates approximately a half a pack of cigarettes per day  Again we discussed with the patient in the past the importance of quitting smoking for her health and welfare  We have discussed using different modalities in order to help her quit in the past but she states that she is not interested at this time

## 2021-05-10 NOTE — PROGRESS NOTES
Assessment/Plan:    Acquired hypothyroidism   Patient did have a repeat studies performed prior to the visit today were happy to tell the patient that her thyroid function is intact with no abnormality seen  Patient will continue present surveillance  The this was checked because the patient is complaining of fatigue     Bilateral carotid artery stenosis   Patient does have a history of bilateral carotid artery stenosis  She has had no symptoms from a neurologic standpoint  Again as previously we did tell the patient that the 1 a notable risk factors that she can work on is that she still smoking period approximately 1/2 of cigarettes per day period we do have concerns of her progression of disease and will continue to monitor this  Depression    Patient continues to take medications specifically citalopram 20 mg a day for chronic fatigue  She states this point time that she is doing well from a mental standpoint  No suicidal thoughts or ideation  States that most of the time she feels relatively cheerful but occasionally fatigue  She was told if any changes with her emotional state to please call  Healthcare maintenance    Patient will be due for routine physical when she returns to the office in 6 months  She was given a slip for complete labs to be performed prior to the visit  Patient was told if any new medical problems or concerns in between to please call for evaluation  Hyperlipidemia    Patient does have a history of hyperlipidemia  She states that she is not watching her diet as far as fats and cholesterol are concerned  She continues to smoke and she was told the combination of smoking and elevated cholesterol levels are increasing risk factors for heart disease, stroke,  Progression of her carotid artery disease  Patient was told she will muscle closely at her intake of fats and cholesterol with her diet, stop smoking    Patient will continue with his simvastatin as previously and we will check a lipid profile when she returns the office in 6 months    Nausea   Patient states occasionally she does have episodes of nausea which had been addressed in the past   She states that she usually takes Zofran as needed in new prescription was given to the patient period she was told if she has further progression of symptoms to please call  Smoking    Patient continues to smoke she estimates approximately a half a pack of cigarettes per day  Again we discussed with the patient in the past the importance of quitting smoking for her health and welfare  We have discussed using different modalities in order to help her quit in the past but she states that she is not interested at this time  Chronic fatigue    Patient is complaining of some chronic fatigue  She states this is not new and she has learned to live with it  She has had full metabolic workup evaluation in the past finding no significant abnormalities that need to be addressed  We will be checking a comprehensive metabolic profile CBC and urinalysis now  Repeat this again when she returns in 6 months  Call the patient if there is any abnormalities that need to be addressed  We did tell the patient the importance of quitting smoking  We did discuss with the patient her sleep habits and sending her for sleep study but she is refusing  Diagnoses and all orders for this visit:    Nausea  -     ondansetron (ZOFRAN) 4 mg tablet; Take 1 tablet (4 mg total) by mouth every 8 (eight) hours as needed for nausea or vomiting    Chronic fatigue  -     Comprehensive metabolic panel; Future  -     CBC and differential; Future  -     UA (URINE) with reflex to Scope; Future  -     Comprehensive metabolic panel; Future  -     CBC and differential; Future  -     UA (URINE) with reflex to Scope; Future  -     TSH, 3rd generation with Free T4 reflex; Future    Acquired hypothyroidism  -     TSH, 3rd generation with Free T4 reflex;  Future    Familial hypercholesterolemia  -     TSH, 3rd generation with Free T4 reflex; Future    Smoking    Healthcare maintenance  -     Comprehensive metabolic panel; Future  -     CBC and differential; Future  -     UA (URINE) with reflex to Scope; Future  -     TSH, 3rd generation with Free T4 reflex; Future  -     Occult Blood, Fecal Immunochemical; Future    Bilateral carotid artery stenosis    Other depression          Subjective:      Patient ID: Zane Neely is a 66 y o  female  Patient is a 77-year-old female history of medical problems as outlined previously  Patient is here today for routine follow-up after 6 month period of time  Patient states in general she is doing about the same and offers no new complaints or concerns  She states that as previously she is having problems with fatigability and this has not changed  She did have labs performed prior to the visit today specifically thyroid function testing and this was normal       The following portions of the patient's history were reviewed and updated as appropriate:   She  has a past medical history of Hyperlipidemia and Migraine    She   Patient Active Problem List    Diagnosis Date Noted    Chronic fatigue 05/10/2021    Nausea 05/10/2021    Cervicalgia 03/03/2021    Ventral hernia without obstruction or gangrene 12/07/2020    Pyuria 11/09/2020    Acquired hypothyroidism 11/09/2020    Overweight 11/09/2020    Acute left-sided low back pain with sciatica 07/20/2020    Perforated diverticulum 03/08/2020    LLQ abdominal pain 02/21/2020    Macular degeneration of right eye 11/05/2019    Herpes simplex 10/14/2019    Fibromuscular dysplasia of cervicocranial artery (HCC) 08/27/2019    Simple chronic bronchitis ([address] ) 07/22/2019    Bilateral carotid artery stenosis 07/22/2019    Occult blood in stools 06/27/2019    Primary insomnia 06/27/2019    Bruit of right carotid artery 06/27/2019    Bronchitis 12/21/2018    Healthcare maintenance 05/17/2018    Migraine without aura and without status migrainosus, not intractable 03/15/2018    Smoking 07/16/2015    Chronic migraine without aura 12/04/2014    Acute upper respiratory infection 10/16/2013    Hyperlipidemia 09/07/2012    Depression 09/07/2012     She  has a past surgical history that includes Nasal septum surgery; Shoulder surgery; Hysterectomy (1978); Breast excisional biopsy (Right, 1986); pr lap,diagnostic abdomen (N/A, 3/8/2020); pr sigmoidoscopy flx dx w/collj spec br/wa if pfrmd (N/A, 3/8/2020); HARTMANS PROCEDURE (N/A, 3/8/2020); and pr lap, ventral hernia repair,reducible (N/A, 1/26/2021)  Her family history includes Breast cancer (age of onset: 48) in her maternal aunt and mother; Colon cancer in her maternal uncle; Diabetes in her sister; Heart disease in her father; Leukemia in her sister; Lung cancer (age of onset: 52) in her other; No Known Problems in her maternal aunt, maternal grandfather, maternal grandmother, paternal aunt, paternal grandfather, paternal grandmother, sister, son, and son  She  reports that she has been smoking  She started smoking about 56 years ago  She has a 26 50 pack-year smoking history  She has never used smokeless tobacco  She reports current alcohol use  She reports that she does not use drugs    Current Outpatient Medications   Medication Sig Dispense Refill    albuterol (Ventolin HFA) 90 mcg/act inhaler Inhale 2 puffs every 6 (six) hours as needed for wheezing 1 Inhaler 0    aspirin (ECOTRIN) 325 mg EC tablet Take 1 tablet (325 mg total) by mouth daily 30 tablet 0    Cholecalciferol (VITAMIN D3 PO) Take by mouth daily       citalopram (CeleXA) 20 mg tablet Take 1 tablet (20 mg total) by mouth daily 90 tablet 3    cyanocobalamin ([employer] VITAMIN B-12) 1000 MCG tablet Take 1,000 mcg by mouth daily       divalproex sodium (DEPAKOTE ER) 250 mg 24 hr tablet Take 2 tablets (500 mg total) by mouth daily at bedtime 180 tablet 5    Magnesium 500 MG CAPS Take by mouth daily       naproxen (NAPROSYN) 500 mg tablet Take 1 tablet (500 mg total) by mouth as needed for headaches 10 tablet 0    OLANZapine (ZyPREXA) 5 mg tablet At bedtime only as needed 10 tablet 3    pyridoxine (B-6) 100 MG tablet Take 100 mg by mouth daily       Riboflavin (B2) 100 MG TABS Take by mouth daily       Rimegepant Sulfate (Nurtec) 75 MG TBDP Take one tab at the onset of migraine  9 tablet 0    simvastatin (ZOCOR) 40 mg tablet Take 1 tablet (40 mg total) by mouth daily 90 tablet 3    SUMAtriptan (IMITREX) 100 mg tablet Take 1 tablet at onset of migraine headache  May repeat in 2 hours if needed, no more than 2 in 24 hours and no more than 3 in a week 9 tablet 0    tiZANidine (ZANAFLEX) 2 mg tablet Take 1 tablet (2 mg total) by mouth daily at bedtime 30 tablet 6    zolpidem (AMBIEN) 5 mg tablet One pill at bedtime as needed to help with sleep 30 tablet 3    ondansetron (ZOFRAN) 4 mg tablet Take 1 tablet (4 mg total) by mouth every 8 (eight) hours as needed for nausea or vomiting 20 tablet 4     No current facility-administered medications for this visit        Current Outpatient Medications on File Prior to Visit   Medication Sig    albuterol (Ventolin HFA) 90 mcg/act inhaler Inhale 2 puffs every 6 (six) hours as needed for wheezing    aspirin (ECOTRIN) 325 mg EC tablet Take 1 tablet (325 mg total) by mouth daily    Cholecalciferol (VITAMIN D3 PO) Take by mouth daily     citalopram (CeleXA) 20 mg tablet Take 1 tablet (20 mg total) by mouth daily    cyanocobalamin ([employer] VITAMIN B-12) 1000 MCG tablet Take 1,000 mcg by mouth daily     divalproex sodium (DEPAKOTE ER) 250 mg 24 hr tablet Take 2 tablets (500 mg total) by mouth daily at bedtime    Magnesium 500 MG CAPS Take by mouth daily     naproxen (NAPROSYN) 500 mg tablet Take 1 tablet (500 mg total) by mouth as needed for headaches    OLANZapine (ZyPREXA) 5 mg tablet At bedtime only as needed    pyridoxine (B-6) 100 MG tablet Take 100 mg by mouth daily     Riboflavin (B2) 100 MG TABS Take by mouth daily     Rimegepant Sulfate (Nurtec) 75 MG TBDP Take one tab at the onset of migraine   simvastatin (ZOCOR) 40 mg tablet Take 1 tablet (40 mg total) by mouth daily    SUMAtriptan (IMITREX) 100 mg tablet Take 1 tablet at onset of migraine headache  May repeat in 2 hours if needed, no more than 2 in 24 hours and no more than 3 in a week    tiZANidine (ZANAFLEX) 2 mg tablet Take 1 tablet (2 mg total) by mouth daily at bedtime    zolpidem (AMBIEN) 5 mg tablet One pill at bedtime as needed to help with sleep     No current facility-administered medications on file prior to visit  She is allergic to penicillins; azithromycin; nisoldipine; and sulfa antibiotics       Review of Systems   Constitutional: Positive for fatigue  Negative for activity change, appetite change, chills, diaphoresis, fever and unexpected weight change  HENT: Negative  Eyes: Negative  Respiratory: Negative  Cardiovascular: Negative  Gastrointestinal: Negative  Endocrine: Negative  Genitourinary: Negative  Musculoskeletal: Negative  Skin: Negative  Allergic/Immunologic: Negative  Neurological: Negative  Hematological: Negative  Psychiatric/Behavioral: Negative  Objective:      /66   Pulse 79   Temp (!) 97 4 °F (36 3 °C) (Skin)   Ht 5' 6" (1 676 m)   Wt 69 4 kg (153 lb)   SpO2 98%   BMI 24 69 kg/m²          Physical Exam  Vitals signs and nursing note reviewed  Constitutional:       General: She is not in acute distress  Appearance: Normal appearance  She is normal weight  She is not ill-appearing, toxic-appearing or diaphoretic  Comments: Pleasant 53-year-old female who is awake alert no acute distress and oriented x3   HENT:      Head: Normocephalic and atraumatic  Right Ear: Tympanic membrane, ear canal and external ear normal  There is no impacted cerumen        Left Ear: Tympanic membrane, ear canal and external ear normal  There is no impacted cerumen  Nose: Nose normal  No congestion or rhinorrhea  Mouth/Throat:      Mouth: Mucous membranes are moist       Pharynx: Oropharynx is clear  No oropharyngeal exudate or posterior oropharyngeal erythema  Eyes:      General: No scleral icterus  Right eye: No discharge  Left eye: No discharge  Extraocular Movements: Extraocular movements intact  Conjunctiva/sclera: Conjunctivae normal       Pupils: Pupils are equal, round, and reactive to light  Neck:      Musculoskeletal: Normal range of motion and neck supple  No neck rigidity or muscular tenderness  Vascular: No carotid bruit  Cardiovascular:      Rate and Rhythm: Normal rate and regular rhythm  Pulses: Normal pulses  Heart sounds: Normal heart sounds  No murmur  No friction rub  No gallop  Pulmonary:      Effort: Pulmonary effort is normal  No respiratory distress  Breath sounds: No stridor  No wheezing, rhonchi or rales  Comments: Patient does have some decreased breath sounds anteriorly and posteriorly but no rales rhonchi or wheezes could be appreciated on exam today  Chest:      Chest wall: No tenderness  Abdominal:      General: Abdomen is flat  Bowel sounds are normal  There is no distension  Palpations: Abdomen is soft  There is no mass  Tenderness: There is no abdominal tenderness  There is no right CVA tenderness, left CVA tenderness, guarding or rebound  Hernia: No hernia is present  Musculoskeletal: Normal range of motion  General: No swelling, tenderness, deformity or signs of injury  Right lower leg: No edema  Left lower leg: No edema  Lymphadenopathy:      Cervical: No cervical adenopathy  Skin:     General: Skin is warm and dry  Capillary Refill: Capillary refill takes less than 2 seconds  Coloration: Skin is not jaundiced or pale        Findings: No bruising, erythema, lesion or rash  Neurological:      General: No focal deficit present  Mental Status: She is alert and oriented to person, place, and time  Mental status is at baseline  Cranial Nerves: No cranial nerve deficit  Sensory: No sensory deficit  Motor: No weakness  Coordination: Coordination normal       Gait: Gait normal       Deep Tendon Reflexes: Reflexes normal    Psychiatric:         Mood and Affect: Mood normal          Behavior: Behavior normal          Thought Content:  Thought content normal          Judgment: Judgment normal

## 2021-05-10 NOTE — ASSESSMENT & PLAN NOTE
Patient did have a repeat studies performed prior to the visit today were happy to tell the patient that her thyroid function is intact with no abnormality seen  Patient will continue present surveillance    The this was checked because the patient is complaining of fatigue

## 2021-05-10 NOTE — ASSESSMENT & PLAN NOTE
Patient does have a history of hyperlipidemia  She states that she is not watching her diet as far as fats and cholesterol are concerned  She continues to smoke and she was told the combination of smoking and elevated cholesterol levels are increasing risk factors for heart disease, stroke,  Progression of her carotid artery disease  Patient was told she will muscle closely at her intake of fats and cholesterol with her diet, stop smoking    Patient will continue with his simvastatin as previously and we will check a lipid profile when she returns the office in 6 months

## 2021-05-11 DIAGNOSIS — G43.009 MIGRAINE WITHOUT AURA AND WITHOUT STATUS MIGRAINOSUS, NOT INTRACTABLE: ICD-10-CM

## 2021-05-11 DIAGNOSIS — G43.709 CHRONIC MIGRAINE WITHOUT AURA WITHOUT STATUS MIGRAINOSUS, NOT INTRACTABLE: ICD-10-CM

## 2021-05-11 RX ORDER — SUMATRIPTAN 100 MG/1
TABLET, FILM COATED ORAL
Qty: 9 TABLET | Refills: 0 | Status: SHIPPED | OUTPATIENT
Start: 2021-05-11 | End: 2021-07-01 | Stop reason: SDUPTHER

## 2021-05-11 RX ORDER — NAPROXEN 500 MG/1
500 TABLET ORAL AS NEEDED
Qty: 10 TABLET | Refills: 0 | Status: SHIPPED | OUTPATIENT
Start: 2021-05-11 | End: 2021-07-01 | Stop reason: SDUPTHER

## 2021-05-18 ENCOUNTER — APPOINTMENT (OUTPATIENT)
Dept: LAB | Age: 71
End: 2021-05-18
Payer: MEDICARE

## 2021-05-18 DIAGNOSIS — R53.82 CHRONIC FATIGUE: ICD-10-CM

## 2021-05-18 DIAGNOSIS — Z00.00 HEALTHCARE MAINTENANCE: ICD-10-CM

## 2021-05-18 LAB
ALBUMIN SERPL BCP-MCNC: 3.5 G/DL (ref 3.5–5)
ALP SERPL-CCNC: 45 U/L (ref 46–116)
ALT SERPL W P-5'-P-CCNC: 13 U/L (ref 12–78)
ANION GAP SERPL CALCULATED.3IONS-SCNC: 6 MMOL/L (ref 4–13)
AST SERPL W P-5'-P-CCNC: 9 U/L (ref 5–45)
BACTERIA UR QL AUTO: NORMAL /HPF
BASOPHILS # BLD AUTO: 0.03 THOUSANDS/ΜL (ref 0–0.1)
BASOPHILS NFR BLD AUTO: 1 % (ref 0–1)
BILIRUB SERPL-MCNC: 0.25 MG/DL (ref 0.2–1)
BILIRUB UR QL STRIP: NEGATIVE
BUN SERPL-MCNC: 15 MG/DL (ref 5–25)
CALCIUM SERPL-MCNC: 9 MG/DL (ref 8.3–10.1)
CHLORIDE SERPL-SCNC: 103 MMOL/L (ref 100–108)
CLARITY UR: CLEAR
CO2 SERPL-SCNC: 26 MMOL/L (ref 21–32)
COLOR UR: YELLOW
CREAT SERPL-MCNC: 0.73 MG/DL (ref 0.6–1.3)
EOSINOPHIL # BLD AUTO: 0.16 THOUSAND/ΜL (ref 0–0.61)
EOSINOPHIL NFR BLD AUTO: 3 % (ref 0–6)
ERYTHROCYTE [DISTWIDTH] IN BLOOD BY AUTOMATED COUNT: 13.9 % (ref 11.6–15.1)
GFR SERPL CREATININE-BSD FRML MDRD: 82 ML/MIN/1.73SQ M
GLUCOSE P FAST SERPL-MCNC: 90 MG/DL (ref 65–99)
GLUCOSE UR STRIP-MCNC: NEGATIVE MG/DL
HCT VFR BLD AUTO: 38.8 % (ref 34.8–46.1)
HGB BLD-MCNC: 12.7 G/DL (ref 11.5–15.4)
HGB UR QL STRIP.AUTO: ABNORMAL
HYALINE CASTS #/AREA URNS LPF: NORMAL /LPF
IMM GRANULOCYTES # BLD AUTO: 0.01 THOUSAND/UL (ref 0–0.2)
IMM GRANULOCYTES NFR BLD AUTO: 0 % (ref 0–2)
KETONES UR STRIP-MCNC: NEGATIVE MG/DL
LEUKOCYTE ESTERASE UR QL STRIP: ABNORMAL
LYMPHOCYTES # BLD AUTO: 1.94 THOUSANDS/ΜL (ref 0.6–4.47)
LYMPHOCYTES NFR BLD AUTO: 32 % (ref 14–44)
MCH RBC QN AUTO: 31.3 PG (ref 26.8–34.3)
MCHC RBC AUTO-ENTMCNC: 32.7 G/DL (ref 31.4–37.4)
MCV RBC AUTO: 96 FL (ref 82–98)
MONOCYTES # BLD AUTO: 0.64 THOUSAND/ΜL (ref 0.17–1.22)
MONOCYTES NFR BLD AUTO: 11 % (ref 4–12)
NEUTROPHILS # BLD AUTO: 3.2 THOUSANDS/ΜL (ref 1.85–7.62)
NEUTS SEG NFR BLD AUTO: 53 % (ref 43–75)
NITRITE UR QL STRIP: NEGATIVE
NON-SQ EPI CELLS URNS QL MICRO: NORMAL /HPF
NRBC BLD AUTO-RTO: 0 /100 WBCS
PH UR STRIP.AUTO: 6.5 [PH]
PLATELET # BLD AUTO: 237 THOUSANDS/UL (ref 149–390)
PMV BLD AUTO: 10.6 FL (ref 8.9–12.7)
POTASSIUM SERPL-SCNC: 4.3 MMOL/L (ref 3.5–5.3)
PROT SERPL-MCNC: 6.6 G/DL (ref 6.4–8.2)
PROT UR STRIP-MCNC: NEGATIVE MG/DL
RBC # BLD AUTO: 4.06 MILLION/UL (ref 3.81–5.12)
RBC #/AREA URNS AUTO: NORMAL /HPF
SODIUM SERPL-SCNC: 135 MMOL/L (ref 136–145)
SP GR UR STRIP.AUTO: 1.01 (ref 1–1.03)
UROBILINOGEN UR QL STRIP.AUTO: 0.2 E.U./DL
WBC # BLD AUTO: 5.98 THOUSAND/UL (ref 4.31–10.16)
WBC #/AREA URNS AUTO: NORMAL /HPF

## 2021-05-18 PROCEDURE — 85025 COMPLETE CBC W/AUTO DIFF WBC: CPT

## 2021-05-18 PROCEDURE — 36415 COLL VENOUS BLD VENIPUNCTURE: CPT

## 2021-05-18 PROCEDURE — 80053 COMPREHEN METABOLIC PANEL: CPT

## 2021-05-18 PROCEDURE — 81001 URINALYSIS AUTO W/SCOPE: CPT

## 2021-06-07 ENCOUNTER — TELEPHONE (OUTPATIENT)
Dept: INTERNAL MEDICINE CLINIC | Facility: CLINIC | Age: 71
End: 2021-06-07

## 2021-06-07 DIAGNOSIS — F51.01 PRIMARY INSOMNIA: ICD-10-CM

## 2021-06-07 RX ORDER — ZOLPIDEM TARTRATE 5 MG/1
TABLET ORAL
Qty: 30 TABLET | Refills: 3 | Status: SHIPPED | OUTPATIENT
Start: 2021-06-07 | End: 2021-12-13 | Stop reason: SDUPTHER

## 2021-06-08 DIAGNOSIS — I65.21 INTERNAL CAROTID ARTERY STENOSIS, RIGHT: Primary | ICD-10-CM

## 2021-06-08 NOTE — PROGRESS NOTES
cta ordered and [facility] surgery referral placed for worsening [place] stenosis noted on CUS  Pt to take ASA 81 mg daily

## 2021-06-21 ENCOUNTER — TELEPHONE (OUTPATIENT)
Dept: NEUROLOGY | Facility: CLINIC | Age: 71
End: 2021-06-21

## 2021-06-21 DIAGNOSIS — G43.709 CHRONIC MIGRAINE WITHOUT AURA WITHOUT STATUS MIGRAINOSUS, NOT INTRACTABLE: ICD-10-CM

## 2021-06-21 RX ORDER — DIVALPROEX SODIUM 250 MG/1
TABLET, EXTENDED RELEASE ORAL
Qty: 180 TABLET | Refills: 4 | Status: SHIPPED | OUTPATIENT
Start: 2021-06-21 | End: 2021-07-09 | Stop reason: SDUPTHER

## 2021-06-21 NOTE — TELEPHONE ENCOUNTER
fyi    Pt called requesting depakote refill be sent to [facility] drug pharmacy  Pt states that script that was sent on 3/3/21 had   Chart reviewed: Depakote script on 3/3/21 was entered as NO PRINT    [employer], spoke w/ pharmacist Olivai and confirmed that they did not receive the script that was sent on 3/3/21  Last script on their file dated   3/24/20  Rx divalproex sodium (DEPAKOTE ER) 250 mg 24 hr tablet  Sig: Take 2 tablets (500 mg total) by mouth daily at bedtime  Quantity 180 w/ 5 refills called in as prescribed  Olivia verbalized understanding

## 2021-06-22 ENCOUNTER — TELEPHONE (OUTPATIENT)
Dept: NEUROLOGY | Facility: CLINIC | Age: 71
End: 2021-06-22

## 2021-06-22 NOTE — TELEPHONE ENCOUNTER
----- Message from [address] [employer], DO sent at 6/8/2021  5:43 PM EDT -----  Regarding: part two of message  Please make sure patient is taking aspirin 81 mg daily thanks

## 2021-06-22 NOTE — TELEPHONE ENCOUNTER
Lmom for patient to call me back to make sure that she is taking her 81 MG of Asprin daily, Dr Poonam Pete wants to know

## 2021-06-28 ENCOUNTER — HOSPITAL ENCOUNTER (OUTPATIENT)
Dept: RADIOLOGY | Age: 71
Discharge: HOME | End: 2021-06-28
Payer: MEDICARE

## 2021-06-28 DIAGNOSIS — I65.21 INTERNAL CAROTID ARTERY STENOSIS, RIGHT: ICD-10-CM

## 2021-06-28 PROCEDURE — G1004 CDSM NDSC: HCPCS

## 2021-06-28 PROCEDURE — 70498 CT ANGIOGRAPHY NECK: CPT

## 2021-06-28 PROCEDURE — 70496 CT ANGIOGRAPHY HEAD: CPT

## 2021-06-28 RX ADMIN — IOHEXOL 85 ML: 350 INJECTION, SOLUTION INTRAVENOUS at 13:03

## 2021-07-01 DIAGNOSIS — G43.709 CHRONIC MIGRAINE WITHOUT AURA WITHOUT STATUS MIGRAINOSUS, NOT INTRACTABLE: ICD-10-CM

## 2021-07-01 DIAGNOSIS — G43.009 MIGRAINE WITHOUT AURA AND WITHOUT STATUS MIGRAINOSUS, NOT INTRACTABLE: ICD-10-CM

## 2021-07-01 RX ORDER — NAPROXEN 500 MG/1
500 TABLET ORAL AS NEEDED
Qty: 10 TABLET | Refills: 0 | Status: SHIPPED | OUTPATIENT
Start: 2021-07-01 | End: 2021-07-09 | Stop reason: SDUPTHER

## 2021-07-01 RX ORDER — SUMATRIPTAN 100 MG/1
TABLET, FILM COATED ORAL
Qty: 9 TABLET | Refills: 0 | Status: SHIPPED | OUTPATIENT
Start: 2021-07-01 | End: 2021-07-09 | Stop reason: SDUPTHER

## 2021-07-01 NOTE — TELEPHONE ENCOUNTER
Patient calling in for refills of naproxen 500 mg and sumatriptan 100 mg  Preferred pharm is [facility]  Script pended  Please review and sign, thanks!

## 2021-07-06 ENCOUNTER — TELEPHONE (OUTPATIENT)
Dept: INTERNAL MEDICINE CLINIC | Facility: CLINIC | Age: 71
End: 2021-07-06

## 2021-07-06 NOTE — TELEPHONE ENCOUNTER
Patient was in office with  and asked if she can get a referral to pain managment  She states she was in physical therapy before and it gives her migraines

## 2021-07-08 DIAGNOSIS — F41.9 ANXIETY: ICD-10-CM

## 2021-07-08 DIAGNOSIS — E78.00 PURE HYPERCHOLESTEROLEMIA: ICD-10-CM

## 2021-07-08 RX ORDER — SIMVASTATIN 40 MG
40 TABLET ORAL DAILY
Qty: 90 TABLET | Refills: 3 | Status: SHIPPED | OUTPATIENT
Start: 2021-07-08 | End: 2021-07-12 | Stop reason: SDUPTHER

## 2021-07-08 RX ORDER — CITALOPRAM 20 MG/1
20 TABLET ORAL DAILY
Qty: 90 TABLET | Refills: 3 | Status: SHIPPED | OUTPATIENT
Start: 2021-07-08 | End: 2021-07-12 | Stop reason: SDUPTHER

## 2021-07-09 ENCOUNTER — TELEPHONE (OUTPATIENT)
Dept: NEUROLOGY | Facility: CLINIC | Age: 71
End: 2021-07-09

## 2021-07-09 DIAGNOSIS — M54.2 CERVICALGIA: ICD-10-CM

## 2021-07-09 DIAGNOSIS — G43.009 MIGRAINE WITHOUT AURA AND WITHOUT STATUS MIGRAINOSUS, NOT INTRACTABLE: ICD-10-CM

## 2021-07-09 DIAGNOSIS — G43.709 CHRONIC MIGRAINE WITHOUT AURA WITHOUT STATUS MIGRAINOSUS, NOT INTRACTABLE: ICD-10-CM

## 2021-07-09 RX ORDER — TIZANIDINE 2 MG/1
2 TABLET ORAL
Qty: 90 TABLET | Refills: 6 | Status: SHIPPED | OUTPATIENT
Start: 2021-07-09 | End: 2022-05-10 | Stop reason: CLARIF

## 2021-07-09 RX ORDER — NAPROXEN 500 MG/1
500 TABLET ORAL AS NEEDED
Qty: 30 TABLET | Refills: 0 | Status: SHIPPED | OUTPATIENT
Start: 2021-07-09 | End: 2021-07-12 | Stop reason: SDUPTHER

## 2021-07-09 RX ORDER — DIVALPROEX SODIUM 250 MG/1
250 TABLET, EXTENDED RELEASE ORAL 2 TIMES DAILY
Qty: 180 TABLET | Refills: 6 | Status: SHIPPED | OUTPATIENT
Start: 2021-07-09 | End: 2022-06-15 | Stop reason: SDUPTHER

## 2021-07-09 RX ORDER — SUMATRIPTAN 100 MG/1
TABLET, FILM COATED ORAL
Qty: 27 TABLET | Refills: 0 | Status: SHIPPED | OUTPATIENT
Start: 2021-07-09 | End: 2021-09-07

## 2021-07-09 NOTE — TELEPHONE ENCOUNTER
Received fax from [facility]  This pt is new to [facility] and has requested tizanidine, depakote, naproxen and sumatriptan scripts be filled w/ them  Rxs entered   Pls review and sign off      thanks

## 2021-07-12 DIAGNOSIS — F41.9 ANXIETY: ICD-10-CM

## 2021-07-12 DIAGNOSIS — R11.0 NAUSEA: ICD-10-CM

## 2021-07-12 DIAGNOSIS — E78.00 PURE HYPERCHOLESTEROLEMIA: ICD-10-CM

## 2021-07-12 RX ORDER — CITALOPRAM 20 MG/1
20 TABLET ORAL DAILY
Qty: 90 TABLET | Refills: 3 | Status: SHIPPED | OUTPATIENT
Start: 2021-07-12 | End: 2022-06-20

## 2021-07-12 RX ORDER — ONDANSETRON 4 MG/1
4 TABLET, FILM COATED ORAL EVERY 8 HOURS PRN
Qty: 20 TABLET | Refills: 4 | Status: SHIPPED | OUTPATIENT
Start: 2021-07-12 | End: 2021-08-19

## 2021-07-12 RX ORDER — SIMVASTATIN 40 MG
40 TABLET ORAL DAILY
Qty: 90 TABLET | Refills: 3 | Status: SHIPPED | OUTPATIENT
Start: 2021-07-12 | End: 2022-04-12

## 2021-07-12 NOTE — TELEPHONE ENCOUNTER
Received fax from [facility]  Requesting a quantity / clarification on naproxen  Current sig: Take 1 tablet (500 mg total) by mouth as needed for headaches    Clarify how often there patient is able to take this med / or a quantity limit per day/week  Thanks!

## 2021-07-19 ENCOUNTER — TELEPHONE (OUTPATIENT)
Dept: NEUROLOGY | Facility: CLINIC | Age: 71
End: 2021-07-19

## 2021-07-19 NOTE — RESULT ENCOUNTER NOTE
Please let patient know CTA H/N looking at blood vessels going to brain have no significant abnormalities thanks

## 2021-07-19 NOTE — TELEPHONE ENCOUNTER
----- Message from [facility], DO sent at 7/19/2021  1:04 PM EDT -----  Please let patient know CTA H/N looking at blood vessels going to brain have no significant abnormalities thanks

## 2021-07-19 NOTE — TELEPHONE ENCOUNTER
Spoke to patient, informed of previous  Verbalized understanding with results  Nothing further at this time

## 2021-07-26 ENCOUNTER — CONSULT (OUTPATIENT)
Dept: PAIN MEDICINE | Facility: CLINIC | Age: 71
End: 2021-07-26
Payer: MEDICARE

## 2021-07-26 VITALS
HEIGHT: 66 IN | WEIGHT: 157 LBS | BODY MASS INDEX: 25.23 KG/M2 | DIASTOLIC BLOOD PRESSURE: 79 MMHG | HEART RATE: 79 BPM | SYSTOLIC BLOOD PRESSURE: 145 MMHG

## 2021-07-26 DIAGNOSIS — M54.2 CERVICALGIA: ICD-10-CM

## 2021-07-26 DIAGNOSIS — M47.812 CERVICAL SPONDYLOSIS: Primary | ICD-10-CM

## 2021-07-26 PROCEDURE — 99204 OFFICE O/P NEW MOD 45 MIN: CPT | Performed by: ANESTHESIOLOGY

## 2021-07-26 PROCEDURE — 1124F ACP DISCUSS-NO DSCNMKR DOCD: CPT | Performed by: ANESTHESIOLOGY

## 2021-07-26 RX ORDER — LIDOCAINE HYDROCHLORIDE 20 MG/ML
SOLUTION OROPHARYNGEAL
COMMUNITY
Start: 2021-06-24

## 2021-07-26 NOTE — PROGRESS NOTES
Assessment  1  Cervical spondylosis    2  Cervicalgia        Plan  49-year-old female with a history of fibromuscular dysplasia of cervical artery, carotid artery stenosis, and chronic migraines, referred by Dr López Colon, presenting for initial consultation regarding left-sided neck pain that radiates into the left trapezius region and occasionally into the scapular region  She denies any radicular symptoms into the upper extremities  She denies any trauma or inciting event and has been dealing with the symptoms for the past few years which has worsened over the past 3 months without any specific aggravating event  CTA of the head and neck shows cervical spondylosis  Mild beaded appearance of both mid to distal cervical internal carotid arteries  The patient has done physical therapy which worsened her symptoms  She has tried Tylenol, NSAIDs, and tizanidine without any relief  Patient's neck pain seems to be mostly myofascial and facet mediated nature  No evidence of radiculopathy or myelopathy on physical exam   Discogenic causes of axial neck pain also on the differential     1  I will schedule the patient for left C2, C3, C4, C5 medial branch blocks to address the facet mediated component of her neck pain  If the patient has a favorable result x2 we will proceed with RFA for longer lasting relief  2  Patient may continue with tizanidine as prescribed  3  Patient will continue with home exercise program  4  I will follow up the patient pending results of medial branch blocks      Complete risks and benefits including bleeding, infection, tissue reaction, nerve injury and allergic reaction were discussed  The approach was demonstrated using models and literature was provided  Verbal and written consent was obtained  My impressions and treatment recommendations were discussed in detail with the patient who verbalized understanding and had no further questions  Discharge instructions were provided   I personally saw and examined the patient and I agree with the above discussed plan of care  No orders of the defined types were placed in this encounter  No orders of the defined types were placed in this encounter  History of Present Illness    Rush De La Cruz is a 66 y o  female with a history of fibromuscular dysplasia of cervical artery, carotid artery stenosis, and chronic migraines, referred by Dr Jessica Acevedo, presenting for initial consultation regarding left-sided neck pain that radiates into the left trapezius region and occasionally into the scapular region  She denies any radicular symptoms into the upper extremities  She denies any trauma or inciting event and has been dealing with the symptoms for the past few years which has worsened over the past 3 months without any specific aggravating event  She denies any bladder or bowel incontinence or balance issues  CTA of the head and neck shows cervical spondylosis  Mild beaded appearance of both mid to distal cervical internal carotid arteries  The patient has done physical therapy which worsened her symptoms  She has tried Tylenol, NSAIDs, and tizanidine without any relief  The patient rates her pain moderate to severe and the pain is nearly constant  The pain is not follow any particular pattern throughout the day  The pain is described as throbbing, dull, and aching  The pain is worse with bending and twisting the neck  The pain is alleviated with relaxation  Other than as stated above, the patient denies any interval changes in medications, medical condition, mental condition, symptoms, or allergies since the last office visit  I have personally reviewed and/or updated the patient's past medical history, past surgical history, family history, social history, current medications, allergies, and vital signs today  Review of Systems   Constitutional: Negative for fever and unexpected weight change     HENT: Negative for trouble swallowing  Eyes: Negative for visual disturbance  Respiratory: Negative for shortness of breath and wheezing  Cardiovascular: Negative for chest pain and palpitations  Gastrointestinal: Negative for constipation, diarrhea, nausea and vomiting  Endocrine: Negative for cold intolerance, heat intolerance and polydipsia  Genitourinary: Negative for difficulty urinating and frequency  Musculoskeletal: Negative for arthralgias, gait problem, joint swelling and myalgias  Skin: Negative for rash  Neurological: Negative for dizziness, seizures, syncope, weakness and headaches  Hematological: Does not bruise/bleed easily  Psychiatric/Behavioral: Negative for dysphoric mood  All other systems reviewed and are negative        Patient Active Problem List   Diagnosis    Migraine without aura and without status migrainosus, not intractable    Acute upper respiratory infection    Chronic migraine without aura    Hyperlipidemia    Smoking    Healthcare maintenance    Bronchitis    Occult blood in stools    Primary insomnia    Bruit of right carotid artery    Simple chronic bronchitis (HCC)    Bilateral carotid artery stenosis    Fibromuscular dysplasia of cervicocranial artery (HCC)    Depression    Herpes simplex    Macular degeneration of right eye    LLQ abdominal pain    Perforated diverticulum    Acute left-sided low back pain with sciatica    Pyuria    Acquired hypothyroidism    Overweight    Ventral hernia without obstruction or gangrene    Cervicalgia    Chronic fatigue    Nausea       Past Medical History:   Diagnosis Date    Hyperlipidemia     Migraine        Past Surgical History:   Procedure Laterality Date    BREAST EXCISIONAL BIOPSY Right 1986    HARTMANS PROCEDURE N/A 3/8/2020    Procedure: Laparoscopic drainage of intra peritoneal abscess, sigmoid rescetion,;  Surgeon: Kay Scruggs MD;  [facility]: [facility];  Service: Colorectal    HYSTERECTOMY 1    age 25   [place] NASAL SEPTUM SURGERY      deviation repair    GA LAP, VENTRAL HERNIA REPAIR,REDUCIBLE N/A 1/26/2021    Procedure: REPAIR HERNIA VENTRAL LAPAROSCOPIC;  Surgeon: Lennie Butler MD;  [facility];  Service: Colorectal    GA LAP,DIAGNOSTIC ABDOMEN N/A 3/8/2020    Procedure: LAPAROSCOPY DIAGNOSTIC;  Surgeon: Lennie Butler MD;  [facility];  Service: Colorectal    GA SIGMOIDOSCOPY FLX DX W/COLLJ SPEC BR/WA IF PFRMD N/A 3/8/2020    Procedure: Lima Hebert;  Surgeon: Lennie Butler MD;  [facility];  Service: Colorectal    SHOULDER SURGERY         Family History   Problem Relation Age of Onset    Breast cancer Mother 48    Heart disease Father     Diabetes Sister     Leukemia Sister     No Known Problems Maternal Grandmother     No Known Problems Maternal Grandfather     No Known Problems Paternal Grandmother     No Known Problems Paternal Grandfather     No Known Problems Sister     Breast cancer Maternal Aunt 48    No Known Problems Maternal Aunt     No Known Problems Paternal Aunt     Colon cancer Maternal Uncle     No Known Problems Son     No Known Problems Son     Lung cancer Other 52       Social History     Occupational History    Not on file   Tobacco Use    Smoking status: Current Every Day Smoker     Packs/day: 0 50     Years: 53 00     Pack years: 26 50     Start date: 1965    Smokeless tobacco: Never Used   Vaping Use    Vaping Use: Never used   Substance and Sexual Activity    Alcohol use:  Yes    Drug use: No    Sexual activity: Not on file       Current Outpatient Medications on File Prior to Visit   Medication Sig    albuterol (Ventolin HFA) 90 mcg/act inhaler Inhale 2 puffs every 6 (six) hours as needed for wheezing    aspirin (ECOTRIN) 325 mg EC tablet Take 1 tablet (325 mg total) by mouth daily    Cholecalciferol (VITAMIN D3 PO) Take by mouth daily     citalopram (CeleXA) 20 mg tablet Take 1 tablet (20 mg total) by mouth daily    cyanocobalamin ([employer] VITAMIN B-12) 1000 MCG tablet Take 1,000 mcg by mouth daily     divalproex sodium (DEPAKOTE ER) 250 mg 24 hr tablet Take 1 tablet (250 mg total) by mouth 2 (two) times a day    Magnesium 500 MG CAPS Take by mouth daily     naproxen (NAPROSYN) 500 mg tablet Take 1 tablet (500 mg total) by mouth 2 (two) times a day as needed for headaches    OLANZapine (ZyPREXA) 5 mg tablet At bedtime only as needed    ondansetron (ZOFRAN) 4 mg tablet Take 1 tablet (4 mg total) by mouth every 8 (eight) hours as needed for nausea or vomiting    pyridoxine (B-6) 100 MG tablet Take 100 mg by mouth daily     Riboflavin (B2) 100 MG TABS Take by mouth daily     Rimegepant Sulfate (Nurtec) 75 MG TBDP Take one tab at the onset of migraine   simvastatin (ZOCOR) 40 mg tablet Take 1 tablet (40 mg total) by mouth daily    SUMAtriptan (IMITREX) 100 mg tablet Take 1 tablet at onset of migraine headache  May repeat in 2 hours if needed, no more than 2 in 24 hours and no more than 3 in a week    tiZANidine (ZANAFLEX) 2 mg tablet Take 1 tablet (2 mg total) by mouth daily at bedtime    zolpidem (AMBIEN) 5 mg tablet One pill at bedtime as needed to help with sleep     No current facility-administered medications on file prior to visit  Allergies   Allergen Reactions    Penicillins Anaphylaxis    Azithromycin Hives    Nisoldipine      Reaction Date: 14Apr2011;     Sulfa Antibiotics Hives       Physical Exam    There were no vitals taken for this visit  Constitutional: normal, well developed, well nourished, alert, in no distress and non-toxic and no overt pain behavior    Eyes: anicteric  HEENT: grossly intact  Neck: supple, symmetric, trachea midline and no masses   Pulmonary:even and unlabored  Cardiovascular:No edema or pitting edema present  Skin:Normal without rashes or lesions and well hydrated  Psychiatric:Mood and affect appropriate  Neurologic:Cranial Nerves II-XII grossly intact  Musculoskeletal:normalGait  Left cervical paraspinals tender to palpation and ropy in texture  Limited range of motion with extension and bilateral side bending of cervical spine  Bilateral biceps, triceps, and brachioradialis reflexes were 2/4 and symmetrical   Negative Xiao's reflex bilaterally  Bilateral upper extremity strength 5/5 in all muscle groups  Sensation intact to light touch in C5 through T1 dermatomes bilaterally  Negative Spurling's bilaterally  Imaging      Study Result    Narrative & Impression   CTA NECK AND BRAIN WITH AND WITHOUT CONTRAST     INDICATION: I65 21: Occlusion and stenosis of right carotid artery headaches      COMPARISON:   8/21/2019     TECHNIQUE:  Routine CT imaging of the Brain without contrast   Post contrast imaging was performed after administration of iodinated contrast through the neck and brain  Post contrast axial 0 625 mm images timed to opacify the arterial system        3D rendering was performed on an independent workstation  MIP reconstructions performed  Coronal reconstructions were performed of the noncontrast portion of the brain        Radiation dose length product (DLP) for this visit:  6001 mGy-cm   This examination, like all CT scans performed in the [facility], was performed utilizing techniques to minimize radiation dose exposure, including the use of iterative   reconstruction and automated exposure control         IV Contrast:  85 mL of iohexol (OMNIPAQUE)     IMAGE QUALITY:   Diagnostic     FINDINGS:  NONCONTRAST BRAIN  PARENCHYMA: Decreased attenuation is noted in periventricular and subcortical white matter demonstrating an appearance that is statistically most likely to represent mild microangiopathic change; this appearance is similar when compared to most recent   prior examination      No CT signs of acute infarction  No intracranial mass, mass effect or midline shift    No acute parenchymal hemorrhage      VENTRICLES AND EXTRA-AXIAL SPACES:  Normal for the patient's age      VISUALIZED ORBITS AND PARANASAL SINUSES:  Unremarkable      CERVICAL VASCULATURE  AORTIC ARCH AND GREAT VESSELS:  There is a four-vessel aortic arch, the left vertebral artery having its own origin off the aortic arch, a developmental variant  Trace atherosclerotic vascular calcifications in the aortic arch without hemodynamically   significant stenosis  Mild atherosclerotic narrowing of the proximal left subclavian artery immediately distal to the origin, exemplified on image 217, series 5, similar to the previous study  Normal luminal enhancement in the visualized distal left   subclavian artery        RIGHT VERTEBRAL ARTERY CERVICAL SEGMENT:  Normal origin  The vessel is normal in caliber throughout the neck  Right vertebral artery is congenitally dominant      LEFT VERTEBRAL ARTERY CERVICAL SEGMENT:  The left vertebral artery origin is directly off the aortic arch, a developmental variant  The vessel is normal in caliber throughout the neck      RIGHT EXTRACRANIAL CAROTID SEGMENT:  Right common carotid internal carotid     Carotid arteries are patent  Minimal narrowing of the origin the right internal carotid artery is less than 20% and not hemodynamically significant  There is tortuosity of the mid to distal cervical ICA on the right similar to the prior study  A few   small rounded mural protrusions associated with the right internal carotid arteries identified at image 135, series 5 are stable, no discrete intimal flap detected  The vessel is patent through the skull base      LEFT EXTRACRANIAL CAROTID SEGMENT:  Mild atherosclerotic disease of the distal common carotid artery and proximal cervical internal carotid artery without significant stenosis compared to the more distal ICA    Tortuosity of the mid to distal cervical ICA   on the left noted as well with a prominent hairpin loop immediately proximal to the carotid canal   Mild beaded appearance of the proximal to mid left internal carotid artery, exemplified on image 23, series 602, stable from the prior study  No   discrete aneurysm or focal occlusion      NASCET criteria was used to determine the degree of internal carotid artery diameter stenosis        INTRACRANIAL VASCULATURE   INTERNAL CAROTID ARTERIES:  Normal enhancement of the intracranial portions of the internal carotid arteries  Normal ophthalmic artery origins  Normal ICA terminus       ANTERIOR CIRCULATION:  Symmetric A1 segments and anterior cerebral arteries with normal enhancement  Normal anterior communicating artery      MIDDLE CEREBRAL ARTERY CIRCULATION:  M1 segment and middle cerebral artery branches demonstrate normal enhancement bilaterally      DISTAL VERTEBRAL ARTERIES:  Normal distal vertebral arteries  Posterior inferior cerebellar artery origins are normal  Normal vertebral basilar junction      BASILAR ARTERY:  Basilar artery is normal in caliber  Normal superior cerebellar arteries      POSTERIOR CEREBRAL ARTERIES: Both posterior cerebral arteries arises from the basilar tip  Both arteries demonstrate normal enhancement  Normal posterior communicating arteries      DURAL VENOUS SINUSES:  Normal         NON VASCULAR ANATOMY  BONY STRUCTURES:  Mild spondylotic changes noted      SOFT TISSUES OF THE NECK:  Normal      THORACIC INLET:  Apical emphysematous changes noted         IMPRESSION:        1  No hemodynamically significant stenosis in the major arteries of the neck  2   Mild beaded appearance of both mid to distal cervical internal carotid arteries, similar to the prior study, suggestive of  Fibromuscular dysplasia  3   No intracranial aneurysm or major intracranial arterial stenosis  4   No acute intracranial hemorrhage, mass effect or edema  Mild, chronic microangiopathy    5   Emphysema               Workstation performed: [id number]

## 2021-07-26 NOTE — PATIENT INSTRUCTIONS
Neck Pain   WHAT YOU NEED TO KNOW:   What do I need to know about neck pain? You may have sudden neck pain that increases quickly  You may instead feel pain build slowly over time  Neck pain may go away in a few days or weeks, or it may continue for months  The pain may come and go, or be worse with certain movements  The pain may be only in your neck, or it may move to your arms, back, or shoulders  You may also have pain that starts in another body area and moves to your neck  Some types of neck pain are permanent  What causes or increases my risk for neck pain? · Stenosis (narrowing) of your spinal column, or degeneration (breakdown) or inflammation of the discs in your neck    · Inflammation from a condition such as rheumatoid arthritis, polymyalgia rheumatica, or rotator cuff tendinitis    · A condition that affects neck to arm nerves, such as thoracic outlet syndrome or brachial neuritis     · Trauma or injury to your neck, such as being hit from behind in a car (whiplash) or sleeping in a bad position    · A fracture of a neck bone that causes nerve damage    · A medical condition, such as shingles, meningitis, a tumor, or coronary artery disease (CAD)    How is the cause of neck pain diagnosed? Your healthcare provider will ask about your symptoms and when they began  Tell him if you were recently in an accident or had another injury to your neck  He will examine your neck and shoulders  He may also have you move your head and arms in certain ways to see if any position causes or relieves the pain  · Blood tests  may be used to measure the amount of inflammation or to check for signs of an infection  · X-ray or MRI  pictures may show a neck injury or medical condition  Do not enter the MRI room with anything metal  Metal can cause serious damage  Tell the healthcare provider if you have any metal in or on your body  How is neck pain treated?   Treatment will depend on what is causing your pain   · Medicines  may be prescribed or recommended by your healthcare provider for pain  You may need medicine to treat nerve pain or to stop muscle spasms  Medicines may also be given to reduce inflammation  Your healthcare provider may inject medicine into a nerve to block pain  Over-the-counter NSAID medicine or acetaminophen may be recommended to help treat minor pain or inflammation  · Traction  is used to relieve pressure from nerves  Your head is gently pulled up and away from your neck  This stretches muscles and ligaments and makes more room for the spine  Your healthcare provider will tell you the kind of traction that will help your neck pain  Do not use traction devices at home unless directed by your healthcare provider  · Surgery  may be needed if the pain is severe or other treatments do not work  Surgery will not help every kind of neck pain  You may need surgery to stabilize a fractured bone or to remove a tumor  Surgery may also be used to widen a narrowed spinal column or to remove a disc from between neck bones  What can I do to manage or prevent neck pain? · Rest your neck as directed  Do not make sudden movements, such as turning your head quickly  Your healthcare provider may recommend you wear a cervical collar for a short time  The collar will prevent you from moving your head  This will give your neck time to heal if an injury is causing your neck pain  Ask your healthcare provider when you can return to sports or other normal daily activities  · Apply heat as directed  Heat helps relieve pain and swelling  Use a heat wrap, or soak a small towel in warm water  Wring out the extra water  Apply the heat wrap or towel for 20 minutes every hour, or as directed  · Apply ice as directed  Ice helps relieve pain and swelling, and can help prevent tissue damage  Use an ice pack, or put ice in a bag  Cover the ice pack or back with a towel before you apply it to your neck   Apply the ice pack or ice for 15 minutes every hour, or as directed  Your healthcare provider can tell you how often to apply ice  · Do neck exercises as directed  Neck exercises help strengthen the muscles and increase range of motion  Your healthcare provider will tell you which exercises are right for you  He may give you instructions, or he may recommend that you work with a physical therapist  Your healthcare provider or therapist can make sure you are doing the exercises correctly  · Maintain good posture  Try to keep your head and shoulders lifted when you sit  If you work in front of a computer, make sure the monitor is at the right level  You should not need to look up down to see the screen  You should also not have to lean forward to be able to read what is on the screen  Make sure your keyboard, mouse, and other computer items are placed where you do not have to extend your shoulder to reach them  Get up often if you work in front of a computer or sit for long periods of time  Stretch or walk around to keep your neck muscles loose  When should I seek immediate care? · You have an injury that causes neck pain and shooting pain down your arms or legs  · Your neck pain suddenly becomes severe  · You have neck pain along with numbness, tingling, or weakness in your arms or legs  · You have a stiff neck, a headache, and a fever  When should I contact my healthcare provider? · You have new or worsening symptoms  · Your symptoms continue even after treatment  · You have questions or concerns about your condition or care  CARE AGREEMENT:   You have the right to help plan your care  Learn about your health condition and how it may be treated  Discuss treatment options with your healthcare providers to decide what care you want to receive  You always have the right to refuse treatment  The above information is an  only   It is not intended as medical advice for individual conditions or treatments  Talk to your doctor, nurse or pharmacist before following any medical regimen to see if it is safe and effective for you  © Copyright [employer] 2021 Information is for End User's use only and may not be sold, redistributed or otherwise used for commercial purposes   All illustrations and images included in CareNotes® are the copyrighted property of [employer]  or [address] [place] [address]

## 2021-07-28 ENCOUNTER — TELEPHONE (OUTPATIENT)
Dept: RADIOLOGY | Facility: CLINIC | Age: 71
End: 2021-07-28

## 2021-07-28 ENCOUNTER — OFFICE VISIT (OUTPATIENT)
Dept: VASCULAR SURGERY | Facility: CLINIC | Age: 71
End: 2021-07-28
Payer: MEDICARE

## 2021-07-28 VITALS
BODY MASS INDEX: 25.23 KG/M2 | WEIGHT: 157 LBS | HEART RATE: 83 BPM | HEIGHT: 66 IN | DIASTOLIC BLOOD PRESSURE: 72 MMHG | RESPIRATION RATE: 20 BRPM | SYSTOLIC BLOOD PRESSURE: 116 MMHG

## 2021-07-28 DIAGNOSIS — I65.21 INTERNAL CAROTID ARTERY STENOSIS, RIGHT: ICD-10-CM

## 2021-07-28 DIAGNOSIS — I77.3 FIBROMUSCULAR DYSPLASIA OF CERVICOCRANIAL ARTERY (HCC): Primary | ICD-10-CM

## 2021-07-28 PROCEDURE — 99213 OFFICE O/P EST LOW 20 MIN: CPT | Performed by: SURGERY

## 2021-07-28 NOTE — PROGRESS NOTES
Assessment/Plan:    No problem-specific Assessment & Plan notes found for this encounter  Diagnoses and all orders for this visit:    Internal carotid artery stenosis, right  -     Ambulatory referral to Vascular Surgery          Subjective:      Patient ID: Humera Shoemaker is a 66 y o  female  Patient had a CTA of the head and neck on 6/28/21  There has been no progression of disease  Carotid duplex 3/21 shows elevated velocities in the right internal carotid artery consistent with greater than 70% stenosis  However upon reviewing the CTA this is secondary to vessel tortuosity  On the left velocities are also elevated consistent with 50-69% stenosis, however again on CTA there is significant tortuosity  Pt denies TIA or CVA symptoms  Pt is on Simvastatin and  mg  Pt smokes 1/2 ppd  [place] from the [place]  Registry for FMD shows signifcant prevalence of synchronous associated arterial pathology, namely renal artery aneurysms  Thus, patients with FMD found to have aneurysmal or dissection are recommended  to undergo complete cross-sectional CTA from head to pelvis  Thus, we  obtained a CTA of the abdomen and pelvis, showed no evidence of fibromuscular dysplasia  As for the cervical carotid pathology, the pseudoaneurysms are quite small, thus I would not recommend intervention at this time  Previous visit:    52-year-old female underwent carotid duplex performed on 07/15/2019 for right cervical bruit, appreciated on clinical examination  Elevated velocities were noted with abnormal flow patterns consistent with fibromuscular dysplasia  The velocities within the right internal carotid artery were noted to be 220 cm/s with an end-diastolic velocity of 51 centimeters/second  On the left the peak systolic velocity was 954 centimeters/second with an end-diastolic velocity of 89 centimeters/second    Patient denies history of amaurosis, unilateral weakness or numbness or other signs or symptoms of TIA/CVA  Patient denies history of hypertension  CTA reveals: No atherosclerotic disease or occlusion noted within the cervical vasculature, however, there are subtle irregularities involving the walls of the mid cervical internal carotid arteries with tortuosity  Small pseudoaneurysms are identified bilaterally  Pattern is suggestive of fibromuscular dysplasia  No evidence of dissection  No intracranial aneurysms are noted  CT of the brain without contrast demonstrates a small area of low density within the superior aspect of the right lentiform nucleus which is new compared to the prior study from 9 years ago and may represent a chronic or subacute lacunar infarct       Data from the [facility]  Registry for FMD shows signifcant prevalence of other associated pathology, namely renal artery aneurysms, it is recommended for patients to undergo complete cross-sectional CTA from head to pelvis  Thus, we will obtain a CTA of the abdomen and pelvis  As for the cervical carotid pathology, the pseudoaneurysms are quite small, thus I would not recommend intervention at this time  She will undergo a repeat duplex in 6 months time period  If the pseudoaneurysms are not visualized on the repeat duplex will consider repeat cervical CTA at 1 year intervals  The following portions of the patient's history were reviewed and updated as appropriate: allergies, current medications, past family history, past medical history, past social history, past surgical history and problem list     Review of Systems   Constitutional: Negative  HENT: Negative  Eyes: Negative for visual disturbance  Respiratory: Negative  Cardiovascular: Negative  Gastrointestinal: Negative  Endocrine: Negative  Genitourinary: Negative  Musculoskeletal: Positive for arthralgias  Skin: Negative  Allergic/Immunologic: Negative  Neurological: Negative for dizziness, facial asymmetry, speech difficulty, weakness and numbness  Hematological: Negative  Psychiatric/Behavioral: Negative  Objective: There were no vitals taken for this visit  Physical Exam  Vitals and nursing note reviewed  Constitutional:       Appearance: She is well-developed  HENT:      Head: Normocephalic and atraumatic  Eyes:      Conjunctiva/sclera: Conjunctivae normal       Pupils: Pupils are equal, round, and reactive to light  Neck:      Vascular: No JVD  Cardiovascular:      Rate and Rhythm: Normal rate and regular rhythm  Heart sounds: Normal heart sounds  Pulmonary:      Effort: Pulmonary effort is normal  No respiratory distress  Breath sounds: Normal breath sounds  No stridor  No wheezing or rales  Chest:      Chest wall: No tenderness  Abdominal:      General: There is no distension  Palpations: Abdomen is soft  There is no mass  Tenderness: There is no abdominal tenderness  There is no guarding or rebound  Musculoskeletal:         General: No tenderness or deformity  Normal range of motion  Cervical back: Normal range of motion and neck supple  Skin:     General: Skin is warm and dry  Findings: No erythema or rash  Neurological:      Mental Status: She is alert and oriented to person, place, and time  Psychiatric:         Behavior: Behavior normal          Thought Content:  Thought content normal

## 2021-07-28 NOTE — ASSESSMENT & PLAN NOTE
No change on CTA  Carotid duplex shows elevated velocities were which is secondary to vessel tortuosity  Will obtain repeat carotid duplex in 1 year  The fibromuscular dysplasia cannot be well delineated on carotid duplex therefore will repeat a CTA of the cervical carotid arteries in approximately 3 years, earlier if there are changes on carotid duplex

## 2021-07-29 NOTE — TELEPHONE ENCOUNTER
Patient contacted and scheduled for Left C2, C3, C4, C5 MBB#1  All pre procedure instructions were given to patient   Nothing to eat or drink for 1 hour prior  Loose fitting clothing   NSAIDS, ANTICOAG'S OKAY   Denies Antibx   NO PRN PAIN MEDS 6 HOURS PRIOR   Needs    Patient directed to call the office if becomes sick, as we will most likely reschedule       Insurance auth received but is not a guarantee of payment per your insurance company's authorization disclaimer and it is your responsibility to verify your benefits     COVID -19 screening complete

## 2021-08-18 DIAGNOSIS — R11.0 NAUSEA: ICD-10-CM

## 2021-08-19 RX ORDER — ONDANSETRON 4 MG/1
4 TABLET, FILM COATED ORAL EVERY 8 HOURS PRN
Qty: 20 TABLET | Refills: 4 | Status: SHIPPED | OUTPATIENT
Start: 2021-08-19 | End: 2021-12-29 | Stop reason: SDUPTHER

## 2021-08-30 ENCOUNTER — HOSPITAL ENCOUNTER (OUTPATIENT)
Dept: RADIOLOGY | Facility: CLINIC | Age: 71
Discharge: HOME | End: 2021-08-30
Attending: ANESTHESIOLOGY
Payer: MEDICARE

## 2021-08-30 VITALS
HEART RATE: 74 BPM | DIASTOLIC BLOOD PRESSURE: 65 MMHG | RESPIRATION RATE: 16 BRPM | TEMPERATURE: 97.8 F | SYSTOLIC BLOOD PRESSURE: 153 MMHG | OXYGEN SATURATION: 97 %

## 2021-08-30 DIAGNOSIS — M47.812 CERVICAL SPONDYLOSIS: ICD-10-CM

## 2021-08-30 PROCEDURE — 64490 INJ PARAVERT F JNT C/T 1 LEV: CPT | Performed by: ANESTHESIOLOGY

## 2021-08-30 PROCEDURE — 64491 INJ PARAVERT F JNT C/T 2 LEV: CPT | Performed by: ANESTHESIOLOGY

## 2021-08-30 PROCEDURE — 64492 INJ PARAVERT F JNT C/T 3 LEV: CPT | Performed by: ANESTHESIOLOGY

## 2021-08-30 RX ORDER — LIDOCAINE HYDROCHLORIDE 10 MG/ML
5 INJECTION, SOLUTION EPIDURAL; INFILTRATION; INTRACAUDAL; PERINEURAL ONCE
Status: COMPLETED | OUTPATIENT
Start: 2021-08-30 | End: 2021-08-30

## 2021-08-30 RX ADMIN — LIDOCAINE HYDROCHLORIDE 2 ML: 20 INJECTION, SOLUTION EPIDURAL; INFILTRATION; INTRACAUDAL; PERINEURAL at 13:58

## 2021-08-30 RX ADMIN — LIDOCAINE HYDROCHLORIDE 3 ML: 10 INJECTION, SOLUTION EPIDURAL; INFILTRATION; INTRACAUDAL; PERINEURAL at 13:56

## 2021-08-30 NOTE — H&P
History of Present Illness: The patient is a 66 y o  female who presents with complaints of  Neck pain      Patient Active Problem List   Diagnosis    Migraine without aura and without status migrainosus, not intractable    Acute upper respiratory infection    Chronic migraine without aura    Hyperlipidemia    Smoking    Healthcare maintenance    Bronchitis    Occult blood in stools    Primary insomnia    Bruit of right carotid artery    Simple chronic bronchitis (HCC)    Bilateral carotid artery stenosis    Fibromuscular dysplasia of cervicocranial artery (HCC)    Depression    Herpes simplex    Macular degeneration of right eye    LLQ abdominal pain    Perforated diverticulum    Acute left-sided low back pain with sciatica    Pyuria    Acquired hypothyroidism    Overweight    Ventral hernia without obstruction or gangrene    Cervicalgia    Chronic fatigue    Nausea    Cervical spondylosis       Past Medical History:   Diagnosis Date    Hyperlipidemia     Migraine        Past Surgical History:   Procedure Laterality Date    BREAST EXCISIONAL BIOPSY Right 1986    HARTMANS PROCEDURE N/A 3/8/2020    Procedure: Laparoscopic drainage of intra peritoneal abscess, sigmoid rescetion,;  Surgeon: Charli Salas MD;  [facility]: [facility];  Service: Colorectal    HYSTERECTOMY  1978    age 25   [facility] NASAL SEPTUM SURGERY      deviation repair    HI LAP, VENTRAL HERNIA REPAIR,REDUCIBLE N/A 1/26/2021    Procedure: REPAIR HERNIA VENTRAL LAPAROSCOPIC;  Surgeon: Charli Salas MD;  [facility];  Service: Colorectal    HI LAP,DIAGNOSTIC ABDOMEN N/A 3/8/2020    Procedure: LAPAROSCOPY DIAGNOSTIC;  Surgeon: Charli Salas MD;  [facility];  Service: Colorectal    HI SIGMOIDOSCOPY FLX DX W/COLLJ SPEC BR/WA IF PFRMD N/A 3/8/2020    Procedure: [place];  Surgeon: Charli Salas MD;  [facility];  Service: Colorectal    SHOULDER SURGERY           Current Outpatient Medications:     albuterol (Ventolin HFA) 90 mcg/act inhaler, Inhale 2 puffs every 6 (six) hours as needed for wheezing, Disp: 1 Inhaler, Rfl: 0    aspirin (ECOTRIN) 325 mg EC tablet, Take 1 tablet (325 mg total) by mouth daily, Disp: 30 tablet, Rfl: 0    Cholecalciferol (VITAMIN D3 PO), Take by mouth daily , Disp: , Rfl:     citalopram (CeleXA) 20 mg tablet, Take 1 tablet (20 mg total) by mouth daily, Disp: 90 tablet, Rfl: 3    cyanocobalamin ([employer] VITAMIN B-12) 1000 MCG tablet, Take 1,000 mcg by mouth daily , Disp: , Rfl:     divalproex sodium (DEPAKOTE ER) 250 mg 24 hr tablet, Take 1 tablet (250 mg total) by mouth 2 (two) times a day, Disp: 180 tablet, Rfl: 6    Lidocaine Viscous HCl (XYLOCAINE) 2 % mucosal solution, , Disp: , Rfl:     Magnesium 500 MG CAPS, Take by mouth daily , Disp: , Rfl:     naproxen (NAPROSYN) 500 mg tablet, Take 1 tablet (500 mg total) by mouth 2 (two) times a day as needed for headaches, Disp: 30 tablet, Rfl: 0    OLANZapine (ZyPREXA) 5 mg tablet, At bedtime only as needed, Disp: 10 tablet, Rfl: 3    ondansetron (ZOFRAN) 4 mg tablet, TAKE 1 TABLET (4 MG TOTAL) BY MOUTH EVERY 8 (EIGHT) HOURS AS NEEDED FOR NAUSEA OR VOMITING, Disp: 20 tablet, Rfl: 4    pyridoxine (B-6) 100 MG tablet, Take 100 mg by mouth daily , Disp: , Rfl:     Riboflavin (B2) 100 MG TABS, Take by mouth daily , Disp: , Rfl:     Rimegepant Sulfate (Nurtec) 75 MG TBDP, Take one tab at the onset of migraine  , Disp: 9 tablet, Rfl: 0    simvastatin (ZOCOR) 40 mg tablet, Take 1 tablet (40 mg total) by mouth daily, Disp: 90 tablet, Rfl: 3    SUMAtriptan (IMITREX) 100 mg tablet, Take 1 tablet at onset of migraine headache   May repeat in 2 hours if needed, no more than 2 in 24 hours and no more than 3 in a week, Disp: 27 tablet, Rfl: 0    tiZANidine (ZANAFLEX) 2 mg tablet, Take 1 tablet (2 mg total) by mouth daily at bedtime, Disp: 90 tablet, Rfl: 6    zolpidem (AMBIEN) 5 mg tablet, One pill at bedtime as needed to help with sleep, Disp: 30 tablet, Rfl: 3    Current Facility-Administered Medications:     lidocaine (PF) (XYLOCAINE-MPF) 1 % injection 5 mL, 5 mL, Other, Once, Oneyda Olivares, DO    lidocaine (PF) (XYLOCAINE-MPF) 2 % injection 2 mL, 2 mL, Other, Once, Nicholas Olivares, DO    Allergies   Allergen Reactions    Penicillins Anaphylaxis    Azithromycin Hives    Nisoldipine      Reaction Date: 14Apr2011;     Sulfa Antibiotics Hives       Physical Exam:   Vitals:    08/30/21 1343   BP: 141/63   Pulse: 73   Resp: 18   Temp: 97 8 °F (36 6 °C)   SpO2: 97%     General: Awake, Alert, Oriented x 3, Mood and affect appropriate  Respiratory: Respirations even and unlabored  Cardiovascular: Peripheral pulses intact; no edema  Musculoskeletal Exam:   Left cervical paraspinals tender to palpation  ASA Score: 2         Assessment:   1   Cervical spondylosis        Plan: Left C2, C3, C4, C5 MBB#1

## 2021-08-30 NOTE — DISCHARGE INSTRUCTIONS
ACTIVITY  · Please do activities that will bring on the normal pain that we are rating  For example, if vacuuming or walking increases the pain, do that  This will give the most accurate response to the diary  · You may shower, but no tub baths today, or applied heat  CARE OF THE INJECTION SITE  · This area may be numb for several hours after the injection  · Notify [facility] if you have any of the following:  redness, drainage, swelling, or fever above 100°F     SPECIAL INSTRUCTIONS  · Please return the MBB diary to our office by mail, fax, or drop it off  MEDICATIONS  · Please do not take any break through or short acting pain medications for 8 hours after the block  · Continue to take all routine medications  · Our office may have instructed you to hold some medications  As no general anesthesia was used in today's procedure, you should not experience any side effects related to anesthesia  If you have a problem specifically related to your procedure, please call our office at [phone]  Problems not related to your procedure should be directed to your primary care physician

## 2021-09-06 DIAGNOSIS — G43.709 CHRONIC MIGRAINE WITHOUT AURA WITHOUT STATUS MIGRAINOSUS, NOT INTRACTABLE: ICD-10-CM

## 2021-09-07 ENCOUNTER — APPOINTMENT (OUTPATIENT)
Dept: LAB | Age: 71
End: 2021-09-07
Payer: MEDICARE

## 2021-09-07 ENCOUNTER — TELEPHONE (OUTPATIENT)
Dept: PAIN MEDICINE | Facility: CLINIC | Age: 71
End: 2021-09-07

## 2021-09-07 DIAGNOSIS — Z00.8 HEALTH EXAMINATION IN POPULATION SURVEY: ICD-10-CM

## 2021-09-07 LAB
CHOLEST SERPL-MCNC: 166 MG/DL (ref 50–200)
EST. AVERAGE GLUCOSE BLD GHB EST-MCNC: 117 MG/DL
HBA1C MFR BLD: 5.7 %
HDLC SERPL-MCNC: 51 MG/DL
LDLC SERPL CALC-MCNC: 85 MG/DL (ref 0–100)
NONHDLC SERPL-MCNC: 115 MG/DL
TRIGL SERPL-MCNC: 148 MG/DL

## 2021-09-07 PROCEDURE — 36415 COLL VENOUS BLD VENIPUNCTURE: CPT

## 2021-09-07 PROCEDURE — 80061 LIPID PANEL: CPT

## 2021-09-07 PROCEDURE — 83036 HEMOGLOBIN GLYCOSYLATED A1C: CPT

## 2021-09-07 RX ORDER — SUMATRIPTAN 100 MG/1
TABLET, FILM COATED ORAL
Qty: 27 TABLET | Refills: 0 | Status: SHIPPED | OUTPATIENT
Start: 2021-09-07 | End: 2021-12-10 | Stop reason: SDUPTHER

## 2021-09-07 NOTE — TELEPHONE ENCOUNTER
The patient did not have favorable results to cervical medial branch blocks    Please call the patient to schedule follow-up office visit for re-evaluation

## 2021-09-13 ENCOUNTER — OFFICE VISIT (OUTPATIENT)
Dept: PAIN MEDICINE | Facility: CLINIC | Age: 71
End: 2021-09-13
Payer: MEDICARE

## 2021-09-13 VITALS
SYSTOLIC BLOOD PRESSURE: 136 MMHG | DIASTOLIC BLOOD PRESSURE: 72 MMHG | HEART RATE: 79 BPM | HEIGHT: 66 IN | BODY MASS INDEX: 25.07 KG/M2 | WEIGHT: 156 LBS

## 2021-09-13 DIAGNOSIS — M54.2 NECK PAIN: Primary | ICD-10-CM

## 2021-09-13 DIAGNOSIS — M54.2 CERVICALGIA: ICD-10-CM

## 2021-09-13 DIAGNOSIS — M47.812 CERVICAL SPONDYLOSIS: ICD-10-CM

## 2021-09-13 PROCEDURE — 99214 OFFICE O/P EST MOD 30 MIN: CPT | Performed by: NURSE PRACTITIONER

## 2021-09-13 NOTE — PROGRESS NOTES
Assessment:  1  Neck pain    2  Cervical spondylosis    3  Cervicalgia        Plan:  1  I will order an MRI of the cervical spine   2  Patient may continue tizanidine as prescribed  3  Patient may continue ibuprofen p r n  should not exceed more than 2400 mg in 24 hours   4  Patient will continue with her home exercise program   5  Patient will follow-up after MRI is completed  M*Modal software was used to dictate this note  It may contain errors with dictating incorrect words or incorrect spelling  Please contact the provider directly with any questions  History of Present Illness: The patient is a 66 y o  female  With a history of fibromuscular dysplasia of the cervical artery, carotid artery stenosis and chronic migraine last seen on 7/26/21 who presents for a follow up office visit in regards to chronic left-sided neck pain that will radiate toward the left trapezius region and occasionally into the scapula  The patient denies right-sided symptoms, bowel or bladder incontinence or balance issues  She denies any radicular symptoms into the upper extremities  She is status post left C2, C3, C4 and C5 medial branch blocks without a favorable response to this procedure  She takes tizanidine on a p r n  basis and ibuprofen p r n  a 60% improvement of her pain without side effects  She rates her pain as 3/10 on the numeric pain rating scale  She states she intermittently has pain in the evening which is described as pressure-like  I have personally reviewed and/or updated the patient's past medical history, past surgical history, family history, social history, current medications, allergies, and vital signs today  Review of Systems:    Review of Systems   Respiratory: Negative for shortness of breath  Cardiovascular: Negative for chest pain  Gastrointestinal: Negative for constipation, diarrhea, nausea and vomiting  Musculoskeletal: Positive for gait problem   Negative for arthralgias, joint swelling and myalgias  Skin: Negative for rash  Neurological: Negative for dizziness, seizures and weakness  All other systems reviewed and are negative          Past Medical History:   Diagnosis Date    Hyperlipidemia     Migraine        Past Surgical History:   Procedure Laterality Date    BREAST EXCISIONAL BIOPSY Right 1986    HARTMANS PROCEDURE N/A 3/8/2020    Procedure: Laparoscopic drainage of intra peritoneal abscess, sigmoid rescetion,;  Surgeon: Jessica Lainez MD;  [facility]: [facility];  Service: Colorectal    HYSTERECTOMY  1978    age 25   [facility] NASAL SEPTUM SURGERY      deviation repair    DE LAP, VENTRAL HERNIA REPAIR,REDUCIBLE N/A 1/26/2021    Procedure: REPAIR HERNIA VENTRAL LAPAROSCOPIC;  Surgeon: Jessica Lainez MD;  [facility];  Service: Colorectal    DE LAP,DIAGNOSTIC ABDOMEN N/A 3/8/2020    Procedure: LAPAROSCOPY DIAGNOSTIC;  Surgeon: Jessica Lainez MD;  [facility];  Service: Colorectal    DE SIGMOIDOSCOPY FLX DX W/COLLJ SPEC BR/WA IF PFRMD N/A 3/8/2020    Procedure: Riki Anguiano;  Surgeon: Jessica Lainez MD;  [facility];  Service: Colorectal    SHOULDER SURGERY         Family History   Problem Relation Age of Onset    Breast cancer Mother 48    Heart disease Father     Diabetes Sister     Leukemia Sister     No Known Problems Maternal Grandmother     No Known Problems Maternal Grandfather     No Known Problems Paternal Grandmother     No Known Problems Paternal Grandfather     No Known Problems Sister     Breast cancer Maternal Aunt 48    No Known Problems Maternal Aunt     No Known Problems Paternal Aunt     Colon cancer Maternal Uncle     No Known Problems Son     No Known Problems Son     Lung cancer Other 52       Social History     Occupational History    Not on file   Tobacco Use    Smoking status: Current Every Day Smoker     Packs/day: 0 50     Years: 53 00     Pack years: 26 50     Start date: 1965    Smokeless tobacco: Never Used   Vaping Use    Vaping Use: Never used   Substance and Sexual Activity    Alcohol use: Yes    Drug use: No    Sexual activity: Not on file         Current Outpatient Medications:     albuterol (Ventolin HFA) 90 mcg/act inhaler, Inhale 2 puffs every 6 (six) hours as needed for wheezing, Disp: 1 Inhaler, Rfl: 0    aspirin (ECOTRIN) 325 mg EC tablet, Take 1 tablet (325 mg total) by mouth daily, Disp: 30 tablet, Rfl: 0    Cholecalciferol (VITAMIN D3 PO), Take by mouth daily , Disp: , Rfl:     citalopram (CeleXA) 20 mg tablet, Take 1 tablet (20 mg total) by mouth daily, Disp: 90 tablet, Rfl: 3    cyanocobalamin ([employer] VITAMIN B-12) 1000 MCG tablet, Take 1,000 mcg by mouth daily , Disp: , Rfl:     divalproex sodium (DEPAKOTE ER) 250 mg 24 hr tablet, Take 1 tablet (250 mg total) by mouth 2 (two) times a day, Disp: 180 tablet, Rfl: 6    Lidocaine Viscous HCl (XYLOCAINE) 2 % mucosal solution, , Disp: , Rfl:     Magnesium 500 MG CAPS, Take by mouth daily , Disp: , Rfl:     naproxen (NAPROSYN) 500 mg tablet, Take 1 tablet (500 mg total) by mouth 2 (two) times a day as needed for headaches, Disp: 30 tablet, Rfl: 0    OLANZapine (ZyPREXA) 5 mg tablet, At bedtime only as needed, Disp: 10 tablet, Rfl: 3    ondansetron (ZOFRAN) 4 mg tablet, TAKE 1 TABLET (4 MG TOTAL) BY MOUTH EVERY 8 (EIGHT) HOURS AS NEEDED FOR NAUSEA OR VOMITING, Disp: 20 tablet, Rfl: 4    pyridoxine (B-6) 100 MG tablet, Take 100 mg by mouth daily , Disp: , Rfl:     Riboflavin (B2) 100 MG TABS, Take by mouth daily , Disp: , Rfl:     Rimegepant Sulfate (Nurtec) 75 MG TBDP, Take one tab at the onset of migraine  , Disp: 9 tablet, Rfl: 0    simvastatin (ZOCOR) 40 mg tablet, Take 1 tablet (40 mg total) by mouth daily, Disp: 90 tablet, Rfl: 3    SUMAtriptan (IMITREX) 100 mg tablet, TAKE 1 TABLET AT ONSET OF MIGRAINE HEADACHE   MAY REPEAT IN 2 HOURS IF NEEDED, NO MORE THAN 2 IN 24 HOURS AND NO MORE THAN 3 IN A WEEK, Disp: 27 tablet, Rfl: 0    tiZANidine (ZANAFLEX) 2 mg tablet, Take 1 tablet (2 mg total) by mouth daily at bedtime, Disp: 90 tablet, Rfl: 6    zolpidem (AMBIEN) 5 mg tablet, One pill at bedtime as needed to help with sleep, Disp: 30 tablet, Rfl: 3    Allergies   Allergen Reactions    Penicillins Anaphylaxis    Azithromycin Hives    Nisoldipine      Reaction Date: 14Apr2011;     Sulfa Antibiotics Hives       Physical Exam:    /72   Pulse 79   Ht 5' 6" (1 676 m)   Wt 70 8 kg (156 lb)   BMI 25 18 kg/m²     Constitutional:normal, well developed, well nourished, alert, in no distress and non-toxic and no overt pain behavior  Eyes:anicteric  HEENT:grossly intact  Neck:supple, symmetric, trachea midline and no masses   Pulmonary:even and unlabored  Cardiovascular:No edema or pitting edema present  Skin:Normal without rashes or lesions and well hydrated  Psychiatric:Mood and affect appropriate  Neurologic:Cranial Nerves II-XII grossly intact  Musculoskeletal:normal  Gait  Left cervical paraspinal and trapezii musculature tender to palpation  Negative Spurling's      Imaging  MRI cervical spine wo contrast    (Results Pending)     CTA NECK AND BRAIN WITH AND WITHOUT CONTRAST   INDICATION: I65 21: Occlusion and stenosis of right carotid artery headaches  COMPARISON: 8/21/2019   TECHNIQUE: Routine CT imaging of the Brain without contrast  Post contrast imaging was performed after administration of iodinated contrast through the neck and brain  Post contrast axial 0 625 mm images timed to opacify the arterial system  3D rendering was performed on an independent workstation  MIP reconstructions performed  Coronal reconstructions were performed of the noncontrast portion of the brain  Radiation dose length product (DLP) for this visit: 1199 mGy-cm    This examination, like all CT scans performed in the [facility], was performed utilizing techniques to minimize radiation dose exposure, including the use of iterative   reconstruction and automated exposure control  IV Contrast: 85 mL of iohexol (OMNIPAQUE)   IMAGE QUALITY: Diagnostic   FINDINGS:   NONCONTRAST BRAIN   PARENCHYMA: Decreased attenuation is noted in periventricular and subcortical white matter demonstrating an appearance that is statistically most likely to represent mild microangiopathic change; this appearance is similar when compared to most recent   prior examination  No CT signs of acute infarction  No intracranial mass, mass effect or midline shift  No acute parenchymal hemorrhage  VENTRICLES AND EXTRA-AXIAL SPACES: Normal for the patient's age  VISUALIZED ORBITS AND PARANASAL SINUSES: Unremarkable  CERVICAL VASCULATURE   AORTIC ARCH AND GREAT VESSELS: There is a four-vessel aortic arch, the left vertebral artery having its own origin off the aortic arch, a developmental variant  Trace atherosclerotic vascular calcifications in the aortic arch without hemodynamically   significant stenosis  Mild atherosclerotic narrowing of the proximal left subclavian artery immediately distal to the origin, exemplified on image 217, series 5, similar to the previous study  Normal luminal enhancement in the visualized distal left   subclavian artery  Paty Ramirez RIGHT VERTEBRAL ARTERY CERVICAL SEGMENT: Normal origin  The vessel is normal in caliber throughout the neck  Right vertebral artery is congenitally dominant  LEFT VERTEBRAL ARTERY CERVICAL SEGMENT: The left vertebral artery origin is directly off the aortic arch, a developmental variant  The vessel is normal in caliber throughout the neck  RIGHT EXTRACRANIAL CAROTID SEGMENT: Right common carotid internal carotid   Carotid arteries are patent  Minimal narrowing of the origin the right internal carotid artery is less than 20% and not hemodynamically significant  There is tortuosity of the mid to distal cervical ICA on the right similar to the prior study   A few   small rounded mural protrusions associated with the right internal carotid arteries identified at image 135, series 5 are stable, no discrete intimal flap detected  The vessel is patent through the skull base  LEFT EXTRACRANIAL CAROTID SEGMENT: Mild atherosclerotic disease of the distal common carotid artery and proximal cervical internal carotid artery without significant stenosis compared to the more distal ICA  Tortuosity of the mid to distal cervical ICA   on the left noted as well with a prominent hairpin loop immediately proximal to the carotid canal  Mild beaded appearance of the proximal to mid left internal carotid artery, exemplified on image 23, series 602, stable from the prior study  No   discrete aneurysm or focal occlusion  NASCET criteria was used to determine the degree of internal carotid artery diameter stenosis  INTRACRANIAL VASCULATURE   INTERNAL CAROTID ARTERIES: Normal enhancement of the intracranial portions of the internal carotid arteries  Normal ophthalmic artery origins  Normal ICA terminus  ANTERIOR CIRCULATION: Symmetric A1 segments and anterior cerebral arteries with normal enhancement  Normal anterior communicating artery  MIDDLE CEREBRAL ARTERY CIRCULATION: M1 segment and middle cerebral artery branches demonstrate normal enhancement bilaterally  DISTAL VERTEBRAL ARTERIES: Normal distal vertebral arteries  Posterior inferior cerebellar artery origins are normal  Normal vertebral basilar junction  BASILAR ARTERY: Basilar artery is normal in caliber  Normal superior cerebellar arteries  POSTERIOR CEREBRAL ARTERIES: Both posterior cerebral arteries arises from the basilar tip  Both arteries demonstrate normal enhancement  Normal posterior communicating arteries  DURAL VENOUS SINUSES: Normal    NON VASCULAR ANATOMY   BONY STRUCTURES: Mild spondylotic changes noted  SOFT TISSUES OF THE NECK: Normal    THORACIC INLET: Apical emphysematous changes noted  IMPRESSION:   1   No hemodynamically significant stenosis in the major arteries of the neck  2  Mild beaded appearance of both mid to distal cervical internal carotid arteries, similar to the prior study, suggestive of   Fibromuscular dysplasia  3  No intracranial aneurysm or major intracranial arterial stenosis  4  No acute intracranial hemorrhage, mass effect or edema  Mild, chronic microangiopathy  5  Emphysema        Orders Placed This Encounter   Procedures    MRI cervical spine wo contrast

## 2021-10-04 DIAGNOSIS — G43.009 MIGRAINE WITHOUT AURA AND WITHOUT STATUS MIGRAINOSUS, NOT INTRACTABLE: ICD-10-CM

## 2021-10-04 RX ORDER — NAPROXEN 500 MG/1
500 TABLET ORAL 2 TIMES DAILY PRN
Qty: 30 TABLET | Refills: 4 | Status: SHIPPED | OUTPATIENT
Start: 2021-10-04 | End: 2021-12-10 | Stop reason: SDUPTHER

## 2021-10-11 ENCOUNTER — HOSPITAL ENCOUNTER (OUTPATIENT)
Dept: RADIOLOGY | Age: 71
Discharge: HOME | End: 2021-10-11
Payer: MEDICARE

## 2021-10-11 DIAGNOSIS — M54.2 NECK PAIN: ICD-10-CM

## 2021-10-11 PROCEDURE — G1004 CDSM NDSC: HCPCS

## 2021-10-11 PROCEDURE — 72141 MRI NECK SPINE W/O DYE: CPT

## 2021-10-26 ENCOUNTER — APPOINTMENT (OUTPATIENT)
Dept: LAB | Age: 71
End: 2021-10-26
Payer: MEDICARE

## 2021-10-26 DIAGNOSIS — E03.9 ACQUIRED HYPOTHYROIDISM: ICD-10-CM

## 2021-10-26 DIAGNOSIS — E78.01 FAMILIAL HYPERCHOLESTEROLEMIA: ICD-10-CM

## 2021-10-26 DIAGNOSIS — I65.21 INTERNAL CAROTID ARTERY STENOSIS, RIGHT: ICD-10-CM

## 2021-10-26 DIAGNOSIS — R53.82 CHRONIC FATIGUE: ICD-10-CM

## 2021-10-26 DIAGNOSIS — Z00.00 HEALTHCARE MAINTENANCE: ICD-10-CM

## 2021-10-26 LAB
ALBUMIN SERPL BCP-MCNC: 3.3 G/DL (ref 3.5–5)
ALP SERPL-CCNC: 54 U/L (ref 46–116)
ALT SERPL W P-5'-P-CCNC: 19 U/L (ref 12–78)
ANION GAP SERPL CALCULATED.3IONS-SCNC: 2 MMOL/L (ref 4–13)
AST SERPL W P-5'-P-CCNC: 15 U/L (ref 5–45)
BACTERIA UR QL AUTO: ABNORMAL /HPF
BASOPHILS # BLD AUTO: 0.03 THOUSANDS/ΜL (ref 0–0.1)
BASOPHILS NFR BLD AUTO: 0 % (ref 0–1)
BILIRUB SERPL-MCNC: 0.27 MG/DL (ref 0.2–1)
BILIRUB UR QL STRIP: NEGATIVE
BUN SERPL-MCNC: 19 MG/DL (ref 5–25)
CALCIUM ALBUM COR SERPL-MCNC: 9.9 MG/DL (ref 8.3–10.1)
CALCIUM SERPL-MCNC: 9.3 MG/DL (ref 8.3–10.1)
CHLORIDE SERPL-SCNC: 103 MMOL/L (ref 100–108)
CLARITY UR: CLEAR
CO2 SERPL-SCNC: 30 MMOL/L (ref 21–32)
COLOR UR: YELLOW
CREAT SERPL-MCNC: 0.9 MG/DL (ref 0.6–1.3)
EOSINOPHIL # BLD AUTO: 0.17 THOUSAND/ΜL (ref 0–0.61)
EOSINOPHIL NFR BLD AUTO: 2 % (ref 0–6)
ERYTHROCYTE [DISTWIDTH] IN BLOOD BY AUTOMATED COUNT: 14 % (ref 11.6–15.1)
GFR SERPL CREATININE-BSD FRML MDRD: 64 ML/MIN/1.73SQ M
GLUCOSE P FAST SERPL-MCNC: 96 MG/DL (ref 65–99)
GLUCOSE UR STRIP-MCNC: NEGATIVE MG/DL
HCT VFR BLD AUTO: 42.6 % (ref 34.8–46.1)
HGB BLD-MCNC: 13.7 G/DL (ref 11.5–15.4)
HGB UR QL STRIP.AUTO: ABNORMAL
HYALINE CASTS #/AREA URNS LPF: ABNORMAL /LPF
IMM GRANULOCYTES # BLD AUTO: 0.04 THOUSAND/UL (ref 0–0.2)
IMM GRANULOCYTES NFR BLD AUTO: 1 % (ref 0–2)
KETONES UR STRIP-MCNC: NEGATIVE MG/DL
LEUKOCYTE ESTERASE UR QL STRIP: ABNORMAL
LYMPHOCYTES # BLD AUTO: 1.41 THOUSANDS/ΜL (ref 0.6–4.47)
LYMPHOCYTES NFR BLD AUTO: 16 % (ref 14–44)
MCH RBC QN AUTO: 30.9 PG (ref 26.8–34.3)
MCHC RBC AUTO-ENTMCNC: 32.2 G/DL (ref 31.4–37.4)
MCV RBC AUTO: 96 FL (ref 82–98)
MONOCYTES # BLD AUTO: 1.09 THOUSAND/ΜL (ref 0.17–1.22)
MONOCYTES NFR BLD AUTO: 13 % (ref 4–12)
NEUTROPHILS # BLD AUTO: 5.98 THOUSANDS/ΜL (ref 1.85–7.62)
NEUTS SEG NFR BLD AUTO: 68 % (ref 43–75)
NITRITE UR QL STRIP: NEGATIVE
NON-SQ EPI CELLS URNS QL MICRO: ABNORMAL /HPF
NRBC BLD AUTO-RTO: 0 /100 WBCS
PH UR STRIP.AUTO: 6.5 [PH]
PLATELET # BLD AUTO: 243 THOUSANDS/UL (ref 149–390)
PMV BLD AUTO: 10.3 FL (ref 8.9–12.7)
POTASSIUM SERPL-SCNC: 4.8 MMOL/L (ref 3.5–5.3)
PROT SERPL-MCNC: 7 G/DL (ref 6.4–8.2)
PROT UR STRIP-MCNC: NEGATIVE MG/DL
RBC # BLD AUTO: 4.43 MILLION/UL (ref 3.81–5.12)
RBC #/AREA URNS AUTO: ABNORMAL /HPF
SODIUM SERPL-SCNC: 135 MMOL/L (ref 136–145)
SP GR UR STRIP.AUTO: 1.02 (ref 1–1.03)
TSH SERPL DL<=0.05 MIU/L-ACNC: 1.33 UIU/ML (ref 0.36–3.74)
UROBILINOGEN UR QL STRIP.AUTO: 1 E.U./DL
WBC # BLD AUTO: 8.72 THOUSAND/UL (ref 4.31–10.16)
WBC #/AREA URNS AUTO: ABNORMAL /HPF

## 2021-10-26 PROCEDURE — 80053 COMPREHEN METABOLIC PANEL: CPT

## 2021-10-26 PROCEDURE — 36415 COLL VENOUS BLD VENIPUNCTURE: CPT

## 2021-10-26 PROCEDURE — 81001 URINALYSIS AUTO W/SCOPE: CPT

## 2021-10-26 PROCEDURE — 84443 ASSAY THYROID STIM HORMONE: CPT

## 2021-10-26 PROCEDURE — 85025 COMPLETE CBC W/AUTO DIFF WBC: CPT

## 2021-10-27 ENCOUNTER — APPOINTMENT (OUTPATIENT)
Dept: LAB | Age: 71
End: 2021-10-27
Payer: MEDICARE

## 2021-10-27 DIAGNOSIS — Z00.00 HEALTHCARE MAINTENANCE: ICD-10-CM

## 2021-10-27 LAB — HEMOCCULT STL QL IA: POSITIVE

## 2021-10-27 PROCEDURE — G0328 FECAL BLOOD SCRN IMMUNOASSAY: HCPCS

## 2021-11-03 ENCOUNTER — RA CDI HCC (OUTPATIENT)
Dept: OTHER | Facility: HOSPITAL | Age: 71
End: 2021-11-03

## 2021-11-09 ENCOUNTER — OFFICE VISIT (OUTPATIENT)
Dept: INTERNAL MEDICINE CLINIC | Facility: CLINIC | Age: 71
End: 2021-11-09
Payer: MEDICARE

## 2021-11-09 VITALS
DIASTOLIC BLOOD PRESSURE: 74 MMHG | BODY MASS INDEX: 24.91 KG/M2 | WEIGHT: 155 LBS | HEIGHT: 66 IN | HEART RATE: 69 BPM | TEMPERATURE: 97.1 F | OXYGEN SATURATION: 94 % | SYSTOLIC BLOOD PRESSURE: 130 MMHG

## 2021-11-09 DIAGNOSIS — I65.23 BILATERAL CAROTID ARTERY STENOSIS: ICD-10-CM

## 2021-11-09 DIAGNOSIS — F17.200 SMOKING: ICD-10-CM

## 2021-11-09 DIAGNOSIS — E78.01 FAMILIAL HYPERCHOLESTEROLEMIA: ICD-10-CM

## 2021-11-09 DIAGNOSIS — Z00.00 HEALTHCARE MAINTENANCE: ICD-10-CM

## 2021-11-09 DIAGNOSIS — E66.3 OVERWEIGHT: ICD-10-CM

## 2021-11-09 DIAGNOSIS — E03.9 ACQUIRED HYPOTHYROIDISM: ICD-10-CM

## 2021-11-09 DIAGNOSIS — J41.0 SIMPLE CHRONIC BRONCHITIS (HCC): Primary | ICD-10-CM

## 2021-11-09 DIAGNOSIS — R19.5 FECAL OCCULT BLOOD TEST POSITIVE: ICD-10-CM

## 2021-11-09 DIAGNOSIS — G43.709 CHRONIC MIGRAINE WITHOUT AURA WITHOUT STATUS MIGRAINOSUS, NOT INTRACTABLE: ICD-10-CM

## 2021-11-09 PROCEDURE — 99214 OFFICE O/P EST MOD 30 MIN: CPT | Performed by: INTERNAL MEDICINE

## 2021-11-09 RX ORDER — PREDNISONE 1 MG/1
5 TABLET ORAL DAILY
Qty: 21 TABLET | Refills: 0 | Status: SHIPPED | OUTPATIENT
Start: 2021-11-09 | End: 2022-05-10 | Stop reason: CLARIF

## 2021-11-15 ENCOUNTER — TELEPHONE (OUTPATIENT)
Dept: INTERNAL MEDICINE CLINIC | Facility: CLINIC | Age: 71
End: 2021-11-15

## 2021-11-15 DIAGNOSIS — J40 BRONCHITIS: Primary | ICD-10-CM

## 2021-11-15 RX ORDER — ALBUTEROL SULFATE 90 UG/1
2 AEROSOL, METERED RESPIRATORY (INHALATION) EVERY 6 HOURS PRN
Qty: 18 G | Refills: 3 | Status: SHIPPED | OUTPATIENT
Start: 2021-11-15 | End: 2022-08-05 | Stop reason: SDUPTHER

## 2021-11-15 RX ORDER — DOXYCYCLINE HYCLATE 100 MG/1
100 CAPSULE ORAL EVERY 12 HOURS SCHEDULED
Qty: 20 CAPSULE | Refills: 0 | Status: SHIPPED | OUTPATIENT
Start: 2021-11-15 | End: 2021-11-25

## 2021-11-17 DIAGNOSIS — G43.709 CHRONIC MIGRAINE WITHOUT AURA WITHOUT STATUS MIGRAINOSUS, NOT INTRACTABLE: ICD-10-CM

## 2021-11-29 RX ORDER — SUMATRIPTAN 100 MG/1
TABLET, FILM COATED ORAL
Qty: 27 TABLET | Refills: 0 | OUTPATIENT
Start: 2021-11-29

## 2021-12-06 ENCOUNTER — CONSULT (OUTPATIENT)
Dept: GASTROENTEROLOGY | Facility: CLINIC | Age: 71
End: 2021-12-06
Payer: MEDICARE

## 2021-12-06 VITALS
HEIGHT: 66 IN | TEMPERATURE: 98.1 F | WEIGHT: 152 LBS | HEART RATE: 77 BPM | BODY MASS INDEX: 24.43 KG/M2 | SYSTOLIC BLOOD PRESSURE: 132 MMHG | DIASTOLIC BLOOD PRESSURE: 77 MMHG

## 2021-12-06 DIAGNOSIS — R19.5 FECAL OCCULT BLOOD TEST POSITIVE: ICD-10-CM

## 2021-12-06 DIAGNOSIS — R11.0 NAUSEA IN ADULT: ICD-10-CM

## 2021-12-06 DIAGNOSIS — K59.09 OTHER CONSTIPATION: Primary | ICD-10-CM

## 2021-12-06 PROCEDURE — 99204 OFFICE O/P NEW MOD 45 MIN: CPT | Performed by: PHYSICIAN ASSISTANT

## 2021-12-06 RX ORDER — DOCUSATE SODIUM 100 MG/1
100 CAPSULE, LIQUID FILLED ORAL 2 TIMES DAILY
Qty: 60 CAPSULE | Refills: 5 | Status: SHIPPED | OUTPATIENT
Start: 2021-12-06 | End: 2022-01-11 | Stop reason: SDUPTHER

## 2021-12-06 RX ORDER — FAMOTIDINE 40 MG/1
TABLET, FILM COATED ORAL
Qty: 30 TABLET | Refills: 5 | Status: SHIPPED | OUTPATIENT
Start: 2021-12-06 | End: 2022-01-11 | Stop reason: SDUPTHER

## 2021-12-06 RX ORDER — POLYETHYLENE GLYCOL 3350 17 G/17G
17 POWDER, FOR SOLUTION ORAL DAILY
Qty: 17 G | Refills: 3 | Status: SHIPPED | OUTPATIENT
Start: 2021-12-06 | End: 2022-01-11 | Stop reason: SDUPTHER

## 2021-12-10 DIAGNOSIS — G43.009 MIGRAINE WITHOUT AURA AND WITHOUT STATUS MIGRAINOSUS, NOT INTRACTABLE: ICD-10-CM

## 2021-12-10 DIAGNOSIS — G43.709 CHRONIC MIGRAINE WITHOUT AURA WITHOUT STATUS MIGRAINOSUS, NOT INTRACTABLE: ICD-10-CM

## 2021-12-10 RX ORDER — SUMATRIPTAN 100 MG/1
TABLET, FILM COATED ORAL
Qty: 27 TABLET | Refills: 0 | Status: SHIPPED | OUTPATIENT
Start: 2021-12-10 | End: 2022-03-08 | Stop reason: SDUPTHER

## 2021-12-10 RX ORDER — NAPROXEN 500 MG/1
500 TABLET ORAL 2 TIMES DAILY PRN
Qty: 90 TABLET | Refills: 4 | Status: SHIPPED | OUTPATIENT
Start: 2021-12-10

## 2021-12-13 DIAGNOSIS — F51.01 PRIMARY INSOMNIA: ICD-10-CM

## 2021-12-13 RX ORDER — ZOLPIDEM TARTRATE 5 MG/1
TABLET ORAL
Qty: 30 TABLET | Refills: 3 | Status: SHIPPED | OUTPATIENT
Start: 2021-12-13 | End: 2022-01-10 | Stop reason: SDUPTHER

## 2021-12-27 ENCOUNTER — TELEPHONE (OUTPATIENT)
Dept: INTERNAL MEDICINE CLINIC | Facility: CLINIC | Age: 71
End: 2021-12-27

## 2021-12-27 ENCOUNTER — OFFICE VISIT (OUTPATIENT)
Dept: URGENT CARE | Age: 71
End: 2021-12-27
Payer: MEDICARE

## 2021-12-27 ENCOUNTER — HOSPITAL ENCOUNTER (OUTPATIENT)
Dept: RADIOLOGY | Age: 71
Discharge: HOME | End: 2021-12-27
Payer: MEDICARE

## 2021-12-27 VITALS — BODY MASS INDEX: 24.43 KG/M2 | HEIGHT: 66 IN | WEIGHT: 152 LBS

## 2021-12-27 VITALS
WEIGHT: 152 LBS | SYSTOLIC BLOOD PRESSURE: 146 MMHG | HEART RATE: 86 BPM | DIASTOLIC BLOOD PRESSURE: 74 MMHG | RESPIRATION RATE: 18 BRPM | HEIGHT: 66 IN | BODY MASS INDEX: 24.43 KG/M2 | OXYGEN SATURATION: 96 % | TEMPERATURE: 97.8 F

## 2021-12-27 DIAGNOSIS — N39.0 URINARY TRACT INFECTION WITH HEMATURIA, SITE UNSPECIFIED: Primary | ICD-10-CM

## 2021-12-27 DIAGNOSIS — R31.9 URINARY TRACT INFECTION WITH HEMATURIA, SITE UNSPECIFIED: Primary | ICD-10-CM

## 2021-12-27 DIAGNOSIS — Z12.31 ENCOUNTER FOR SCREENING MAMMOGRAM FOR MALIGNANT NEOPLASM OF BREAST: ICD-10-CM

## 2021-12-27 LAB
SL AMB  POCT GLUCOSE, UA: ABNORMAL
SL AMB LEUKOCYTE ESTERASE,UA: ABNORMAL
SL AMB POCT BILIRUBIN,UA: ABNORMAL
SL AMB POCT BLOOD,UA: ABNORMAL
SL AMB POCT CLARITY,UA: ABNORMAL
SL AMB POCT COLOR,UA: ABNORMAL
SL AMB POCT KETONES,UA: 80
SL AMB POCT NITRITE,UA: ABNORMAL
SL AMB POCT PH,UA: 6
SL AMB POCT SPECIFIC GRAVITY,UA: 1.03
SL AMB POCT URINE PROTEIN: 100
SL AMB POCT UROBILINOGEN: 0.2

## 2021-12-27 PROCEDURE — 99213 OFFICE O/P EST LOW 20 MIN: CPT | Performed by: NURSE PRACTITIONER

## 2021-12-27 PROCEDURE — 77063 BREAST TOMOSYNTHESIS BI: CPT

## 2021-12-27 PROCEDURE — 77067 SCR MAMMO BI INCL CAD: CPT

## 2021-12-27 PROCEDURE — G0463 HOSPITAL OUTPT CLINIC VISIT: HCPCS | Performed by: NURSE PRACTITIONER

## 2021-12-27 PROCEDURE — 81002 URINALYSIS NONAUTO W/O SCOPE: CPT | Performed by: NURSE PRACTITIONER

## 2021-12-27 PROCEDURE — 87086 URINE CULTURE/COLONY COUNT: CPT | Performed by: NURSE PRACTITIONER

## 2021-12-27 RX ORDER — CIPROFLOXACIN 500 MG/1
500 TABLET, FILM COATED ORAL EVERY 12 HOURS SCHEDULED
Qty: 14 TABLET | Refills: 0 | Status: SHIPPED | OUTPATIENT
Start: 2021-12-27 | End: 2022-01-03

## 2021-12-27 NOTE — TELEPHONE ENCOUNTER
Patient called and is having bright red blood in her urine and she is going to [facility]

## 2021-12-28 LAB — BACTERIA UR CULT: NORMAL

## 2021-12-29 DIAGNOSIS — R11.0 NAUSEA: ICD-10-CM

## 2021-12-29 RX ORDER — ONDANSETRON 4 MG/1
4 TABLET, FILM COATED ORAL EVERY 8 HOURS PRN
Qty: 20 TABLET | Refills: 4 | Status: SHIPPED | OUTPATIENT
Start: 2021-12-29 | End: 2022-06-20 | Stop reason: SDUPTHER

## 2022-01-10 ENCOUNTER — TELEPHONE (OUTPATIENT)
Dept: NEUROLOGY | Facility: CLINIC | Age: 72
End: 2022-01-10

## 2022-01-10 DIAGNOSIS — R11.0 NAUSEA IN ADULT: ICD-10-CM

## 2022-01-10 DIAGNOSIS — Z20.822 COVID-19 RULED OUT: Primary | ICD-10-CM

## 2022-01-10 DIAGNOSIS — K59.09 OTHER CONSTIPATION: ICD-10-CM

## 2022-01-10 DIAGNOSIS — F51.01 PRIMARY INSOMNIA: ICD-10-CM

## 2022-01-10 PROCEDURE — U0005 INFEC AGEN DETEC AMPLI PROBE: HCPCS | Performed by: INTERNAL MEDICINE

## 2022-01-10 PROCEDURE — U0003 INFECTIOUS AGENT DETECTION BY NUCLEIC ACID (DNA OR RNA); SEVERE ACUTE RESPIRATORY SYNDROME CORONAVIRUS 2 (SARS-COV-2) (CORONAVIRUS DISEASE [COVID-19]), AMPLIFIED PROBE TECHNIQUE, MAKING USE OF HIGH THROUGHPUT TECHNOLOGIES AS DESCRIBED BY CMS-2020-01-R: HCPCS | Performed by: INTERNAL MEDICINE

## 2022-01-10 RX ORDER — ZOLPIDEM TARTRATE 5 MG/1
TABLET ORAL
Qty: 30 TABLET | Refills: 3 | Status: SHIPPED | OUTPATIENT
Start: 2022-01-10 | End: 2022-02-01 | Stop reason: SDUPTHER

## 2022-01-10 NOTE — TELEPHONE ENCOUNTER
Patient called to advise she has new medication insurance and will be using new mail order pharmacy  Gets sumatriptan and tizanidine through mail order currently; however she is not due for refills yet  I suggested she clarify the name of mail order pharmacy she needs to use and when due for refill, she will advise and we can send for her at that time  She is in agreement with same

## 2022-01-10 NOTE — TELEPHONE ENCOUNTER
Patient called back in - states she will be using [facility]  She does not need refills now  I updated this in the chart  She has no other questions or concerns at this time

## 2022-01-10 NOTE — TELEPHONE ENCOUNTER
Patients GI provider:  ALLAN Howard    Number to return call: [phone]    Reason for call: Pt calling stating she has new insurance which is Malika Zhang and would like her docusate sodium and famotidine requested through this insurance and to fax as well  Please reach out to pt in regards to this matter  Malika Zhang fax is [phone]       Scheduled procedure/appointment date if applicable: Appt: 9/09/3230

## 2022-01-11 RX ORDER — POLYETHYLENE GLYCOL 3350 17 G/17G
17 POWDER, FOR SOLUTION ORAL DAILY
Qty: 850 G | Refills: 3 | Status: SHIPPED | OUTPATIENT
Start: 2022-01-11

## 2022-01-11 RX ORDER — FAMOTIDINE 40 MG/1
TABLET, FILM COATED ORAL
Qty: 90 TABLET | Refills: 2 | Status: SHIPPED | OUTPATIENT
Start: 2022-01-11

## 2022-01-11 RX ORDER — DOCUSATE SODIUM 100 MG/1
100 CAPSULE, LIQUID FILLED ORAL 2 TIMES DAILY
Qty: 180 CAPSULE | Refills: 2 | Status: SHIPPED | OUTPATIENT
Start: 2022-01-11

## 2022-01-11 NOTE — TELEPHONE ENCOUNTER
Called and spoke with patient, she states that she needs her prescriptions sent through [facility] now and is requesting I send a 90 day supply of docusate and pepcid  I asked if she also needed me to send miralax, to which patient responded "what's that"  I advised it was to help her constipation and at last office visit, was recommended she take it twice daily   Patient then expressed she had been struggling with constipation and manually disimpacting herself so I reinforced importance of miralax and advised I would be sending that to mail order pharmacy as well

## 2022-01-12 ENCOUNTER — TELEPHONE (OUTPATIENT)
Dept: INTERNAL MEDICINE CLINIC | Facility: CLINIC | Age: 72
End: 2022-01-12

## 2022-02-01 DIAGNOSIS — F51.01 PRIMARY INSOMNIA: ICD-10-CM

## 2022-02-01 RX ORDER — ZOLPIDEM TARTRATE 5 MG/1
TABLET ORAL
Qty: 90 TABLET | Refills: 1 | Status: SHIPPED | OUTPATIENT
Start: 2022-02-01

## 2022-03-07 ENCOUNTER — TELEPHONE (OUTPATIENT)
Dept: NEUROLOGY | Facility: CLINIC | Age: 72
End: 2022-03-07

## 2022-03-07 NOTE — TELEPHONE ENCOUNTER
Called pt to confirm upcoming appointment with Nathan Heath, No answer left a voicemail to call back to confirm

## 2022-03-08 ENCOUNTER — OFFICE VISIT (OUTPATIENT)
Dept: NEUROLOGY | Facility: CLINIC | Age: 72
End: 2022-03-08
Payer: COMMERCIAL

## 2022-03-08 VITALS
TEMPERATURE: 97.5 F | SYSTOLIC BLOOD PRESSURE: 130 MMHG | BODY MASS INDEX: 24.37 KG/M2 | HEART RATE: 64 BPM | DIASTOLIC BLOOD PRESSURE: 65 MMHG | WEIGHT: 151 LBS

## 2022-03-08 DIAGNOSIS — G43.709 CHRONIC MIGRAINE WITHOUT AURA WITHOUT STATUS MIGRAINOSUS, NOT INTRACTABLE: Primary | ICD-10-CM

## 2022-03-08 DIAGNOSIS — M54.2 CERVICALGIA: ICD-10-CM

## 2022-03-08 DIAGNOSIS — I77.3 FIBROMUSCULAR DYSPLASIA OF CERVICOCRANIAL ARTERY (HCC): ICD-10-CM

## 2022-03-08 PROCEDURE — 99215 OFFICE O/P EST HI 40 MIN: CPT | Performed by: PHYSICIAN ASSISTANT

## 2022-03-08 RX ORDER — SUMATRIPTAN 100 MG/1
TABLET, FILM COATED ORAL
Qty: 27 TABLET | Refills: 0 | Status: SHIPPED | OUTPATIENT
Start: 2022-03-08 | End: 2022-07-13 | Stop reason: SDUPTHER

## 2022-03-08 NOTE — PROGRESS NOTES
Patient ID: Alyssia Pandya is a 66 y o  female  Assessment/Plan:    Chronic migraine without aura without status migrainosus, not intractable  · Pt is having 15 or more migraines per month which last > 4 hours at a time  · She has tried and failed multiple medications  · She is agreeable to Botox injections  Side effects were reviewed  · She will continue Depakote and Celexa for migraine prevention  · She will continue sumatriptan for treatment of acute migraines  She was unable to afford Nurtec  I have spent 45 minutes with Patient and family today in which greater than 50% of this time was spent in counseling/coordination of care regarding Diagnostic results, Prognosis, Risks and benefits of tx options, Intructions for management, Patient and family education, Importance of tx compliance, Risk factor reductions and Impressions  She will follow-up in 5 months  Fibromuscular dysplasia of cervicocranial artery (HCC)  · Following with Vascular surgery  Cervicalgia  · Continue tizanidine  Problem List Items Addressed This Visit        Cardiovascular and Mediastinum    Chronic migraine without aura - Primary    Relevant Medications    SUMAtriptan (IMITREX) 100 mg tablet    Other Relevant Orders    Chemodenervation    Fibromuscular dysplasia of cervicocranial artery (HCC)     · Following with Vascular surgery  Other    Cervicalgia     · Continue tizanidine  Subjective:    HPI    We had the pleasure of evaluating Christiane Asher in neurological follow up today  As you know she is a 66year old right handed female  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 [employer]  She remains on Depakote for migraine prevention  She will take sumatriptan for treatment of acute migraines  A migraine will typically recur the day after she takes sumatriptan  If she takes another dose of sumatriptan then the migraine will resolve   She does report Lt sided neck pain which she believes can worsen or cause some of the migraine headaches  Tizanidine is helpful is keeping the neck pain somewhat controlled  She has recently had an increase in migraine headache frequency  Migraine headaches without aura:   PREVENTIVE: Citalopram, Topamax, depakote, tizanidine  ABORTIVE: Naratriptan, Sumavel, Dexamethasone, maxalt, imtrex, Nurtec  Headache trigger: Stress/Tension, Weather change     How often do the headaches occur - 15 migraines per month  What time of the day do the headaches start - morning   How long do the headaches last - last > 4 hours even with sumatriptan  Location of headache: right temporal/frontal  What is the intensity of pain - average pain level 9/10  Describe your usual headache - Throbbing, Pounding, sharp  Associated symptoms: photophobia, phonophobia, nausea, vomiting     She typically does not miss work due to migraine headaches  The following portions of the patient's history were reviewed and updated as appropriate: allergies, current medications, past family history, past medical history, past social history, past surgical history and problem list          Objective:    Blood pressure 130/65, pulse 64, temperature 97 5 °F (36 4 °C), weight 68 5 kg (151 lb), not currently breastfeeding  Physical Exam  Vitals reviewed  Eyes:      General: Lids are normal       Extraocular Movements: Extraocular movements intact  Pupils: Pupils are equal, round, and reactive to light  Neurological:      Coordination: Coordination is intact  Deep Tendon Reflexes: Strength normal and reflexes are normal and symmetric  Psychiatric:         Speech: Speech normal          Neurological Exam  Mental Status   Oriented to person, place, time and situation  Recent and remote memory are intact  Speech is normal  Language is fluent with no aphasia  Attention and concentration are normal  Fund of knowledge is appropriate for level of education  Apraxia absent      Cranial Nerves  CN II: Visual acuity is normal  Visual fields full to confrontation  CN III, IV, VI: Extraocular movements intact bilaterally  Normal lids and orbits bilaterally  Pupils equal round and reactive to light bilaterally  CN V: Facial sensation is normal   CN VII: Full and symmetric facial movement  CN VIII: Hearing is normal   CN IX, X: Palate elevates symmetrically  Normal gag reflex  CN XI: Shoulder shrug strength is normal   CN XII: Tongue midline without atrophy or fasciculations  Motor   Strength is 5/5 throughout all four extremities  Sensory  Sensation is intact to light touch, pinprick, vibration and proprioception in all four extremities  Reflexes  Deep tendon reflexes are 2+ and symmetric in all four extremities with downgoing toes bilaterally  Coordination  Finger-to-nose, rapid alternating movements and heel-to-shin normal bilaterally without dysmetria  Gait  Normal casual, toe, heel and tandem gait  ROS:    Review of Systems   Constitutional: Negative  Negative for appetite change and fever  HENT: Negative  Negative for hearing loss, tinnitus, trouble swallowing and voice change  Eyes: Negative  Negative for photophobia and pain  Respiratory: Negative  Negative for shortness of breath  Cardiovascular: Negative  Negative for palpitations  Gastrointestinal: Negative  Negative for nausea and vomiting  Endocrine: Negative  Negative for cold intolerance  Genitourinary: Negative  Negative for dysuria, frequency and urgency  Musculoskeletal: Positive for neck pain (Every now and then)  Negative for myalgias  Skin: Negative  Negative for rash  Allergic/Immunologic: Negative  Neurological: Positive for headaches (has stayed the same as long as she keeps taking meds)  Negative for dizziness, tremors, seizures, syncope, facial asymmetry, speech difficulty, weakness, light-headedness and numbness  Hematological: Negative  Does not bruise/bleed easily  Psychiatric/Behavioral: Negative  Negative for confusion, hallucinations and sleep disturbance  All other systems reviewed and are negative  Review of systems personally reviewed

## 2022-03-08 NOTE — PATIENT INSTRUCTIONS
Chronic migraine without aura without status migrainosus, not intractable  · Pt is having 15 or more migraines per month which last > 4 hours at a time  · She has tried and failed multiple medications  · She is agreeable to Botox injections  Side effects were reviewed  · She will continue Depakote and Celexa for migraine prevention  · She will continue sumatriptan for treatment of acute migraines  She was unable to afford Nurtec  I have spent 45 minutes with Patient and family today in which greater than 50% of this time was spent in counseling/coordination of care regarding Diagnostic results, Prognosis, Risks and benefits of tx options, Intructions for management, Patient and family education, Importance of tx compliance, Risk factor reductions and Impressions  She will follow-up in 5 months  Fibromuscular dysplasia of cervicocranial artery (HCC)  · Following with Vascular surgery  Cervicalgia  · Continue tizanidine

## 2022-03-09 ENCOUNTER — TELEPHONE (OUTPATIENT)
Dept: NEUROLOGY | Facility: CLINIC | Age: 72
End: 2022-03-09

## 2022-03-09 NOTE — TELEPHONE ENCOUNTER
Botox:  200 units    Please use our stock    Medicare primary no authorization required  Juan Manuel Huitron, can you please  her? I was unable to find an opening to schedule her      Ray Villeda

## 2022-03-09 NOTE — TELEPHONE ENCOUNTER
----- Message from Sweta Toussaint PA-C sent at 3/8/2022  1:29 PM EST -----  Regarding: New start Botox  Hi,    This pt would be a new start Botox  I am going to ask Dr Alem Black to do the first injection  Thanks      Hugo Casanova

## 2022-03-14 ENCOUNTER — OFFICE VISIT (OUTPATIENT)
Dept: GASTROENTEROLOGY | Facility: CLINIC | Age: 72
End: 2022-03-14
Payer: COMMERCIAL

## 2022-03-14 VITALS
TEMPERATURE: 97.3 F | BODY MASS INDEX: 24.43 KG/M2 | OXYGEN SATURATION: 97 % | WEIGHT: 152 LBS | SYSTOLIC BLOOD PRESSURE: 120 MMHG | DIASTOLIC BLOOD PRESSURE: 60 MMHG | HEIGHT: 66 IN | HEART RATE: 71 BPM

## 2022-03-14 DIAGNOSIS — R10.32 LLQ ABDOMINAL PAIN: Primary | ICD-10-CM

## 2022-03-14 PROCEDURE — 99213 OFFICE O/P EST LOW 20 MIN: CPT | Performed by: INTERNAL MEDICINE

## 2022-03-14 NOTE — PROGRESS NOTES
[facility] - Outpatient Follow-up Note  Rolando Valdovinos 66 y o  female MRN: [id number]  Encounter: [id number]          ASSESSMENT AND PLAN:      1  LLQ abdominal pain  2  Constipation    Patient is feeling better  Not needing to disimpact once she started taking miralax  Has regular bowel movements  Can follow up with Dr Jemima Meadows for colonoscopy  Follow up on as needed basis  ______________________________________________________________________    SUBJECTIVE:  Patient here for follow-up visit  Patient had positive fit test in 2019 in 2021  She had multiple colonoscopies in the past, most recently in August 2020 with recommendation to repeat colonoscopy in 5 years  She also has history of diverticulosis/perforated diverticulum in March of 2020, which was prior to her last colonoscopy  Patient did have significant constipation since bowel surgery and required disimpaction  She was initiated on Colace and MiraLax, with potential plan to initiate Lauri Guile but she is feeling much better now  Of note, patient also had morning nausea and was started on Pepcid to take prior to bed  She say nausea has resolved and only takes it when needed  REVIEW OF SYSTEMS IS OTHERWISE NEGATIVE        Historical Information   Past Medical History:   Diagnosis Date    Hyperlipidemia     Migraine      Past Surgical History:   Procedure Laterality Date    BREAST EXCISIONAL BIOPSY Right 1986    HARTMANS PROCEDURE N/A 3/8/2020    Procedure: Laparoscopic drainage of intra peritoneal abscess, sigmoid rescetion,;  Surgeon: Loc Lo MD;  [facility]: [facility];  Service: Colorectal    HYSTERECTOMY  1978    age 25   [facility] NASAL SEPTUM SURGERY      deviation repair    [place] LAP, VENTRAL HERNIA REPAIR,REDUCIBLE N/A 1/26/2021    Procedure: REPAIR HERNIA VENTRAL LAPAROSCOPIC;  Surgeon: Loc Lo MD;  [facility];  Service: Colorectal    [place] LAP,DIAGNOSTIC ABDOMEN N/A 3/8/2020    Procedure: LAPAROSCOPY DIAGNOSTIC;  Surgeon: Jeovanny Black MD;  [facility];  Service: Colorectal    [place] SIGMOIDOSCOPY FLX DX W/COLLJ SPEC BR/WA IF PFRMD N/A 3/8/2020    Procedure: SIGMOIDOSCOPY FLEXIBLE;  Surgeon: Jeovanny Black MD;  [facility];  Service: Colorectal    SHOULDER SURGERY       Social History   Social History     Substance and Sexual Activity   Alcohol Use Yes     Social History     Substance and Sexual Activity   Drug Use No     Social History     Tobacco Use   Smoking Status Current Every Day Smoker    Packs/day: 0 50    Years: 53 00    Pack years: 26 50    Start date: 1965   Smokeless Tobacco Never Used     Family History   Problem Relation Age of Onset    Breast cancer Mother 48    Heart disease Father     Diabetes Sister     Leukemia Sister     No Known Problems Maternal Grandmother     No Known Problems Maternal Grandfather     No Known Problems Paternal Grandmother     No Known Problems Paternal Grandfather     No Known Problems Sister     Breast cancer Maternal Aunt 48    No Known Problems Maternal Aunt     No Known Problems Paternal Aunt     Colon cancer Maternal Uncle     No Known Problems Son     No Known Problems Son     Lung cancer Other 52       Meds/Allergies       Current Outpatient Medications:     albuterol (Ventolin HFA) 90 mcg/act inhaler    aspirin (ECOTRIN) 325 mg EC tablet    Cholecalciferol (VITAMIN D3 PO)    citalopram (CeleXA) 20 mg tablet    cyanocobalamin ([employer] VITAMIN B-12) 1000 MCG tablet    divalproex sodium (DEPAKOTE ER) 250 mg 24 hr tablet    docusate sodium (COLACE) 100 mg capsule    famotidine (PEPCID) 40 MG tablet    Lidocaine Viscous HCl (XYLOCAINE) 2 % mucosal solution    Magnesium 500 MG CAPS    naproxen (NAPROSYN) 500 mg tablet    OLANZapine (ZyPREXA) 5 mg tablet    ondansetron (ZOFRAN) 4 mg tablet    polyethylene glycol (GLYCOLAX) 17 GM/SCOOP powder    predniSONE 5 mg tablet    pyridoxine (B-6) 100 MG tablet   Riboflavin (B2) 100 MG TABS    simvastatin (ZOCOR) 40 mg tablet    SUMAtriptan (IMITREX) 100 mg tablet    tiZANidine (ZANAFLEX) 2 mg tablet    zolpidem (AMBIEN) 5 mg tablet    Allergies   Allergen Reactions    Penicillins Anaphylaxis    Azithromycin Hives    Nisoldipine      Reaction Date: 14Apr2011;     Sulfa Antibiotics Hives           Objective     Blood pressure 120/60, pulse 71, temperature (!) 97 3 °F (36 3 °C), temperature source Tympanic, height 5' 6" (1 676 m), weight 68 9 kg (152 lb), SpO2 97 %, not currently breastfeeding  Body mass index is 24 53 kg/m²  PHYSICAL EXAM:      General Appearance:   Alert, cooperative, no distress   HEENT:   Normocephalic, atraumatic, anicteric  Lungs:   Equal chest rise and unlabored breathing, normal cough   Heart:   No visualized JVD   Abdomen:   Soft, non-tender, non-distended; no masses, no organomegaly    Genitalia:   Deferred    Rectal:   Deferred    Extremities:  No cyanosis, clubbing or edema    Pulses:  Musculoskeletal:  2+ and symmetric  Normal range of motion visualized    Skin:  Neuro:  No jaundice, rashes, or lesions   Alert and appropriate           Lab Results:   No visits with results within 1 Day(s) from this visit  Latest known visit with results is:   Orders Only on 01/10/2022   Component Date Value    SARS-CoV-2 01/10/2022 Negative          Radiology Results:   No results found

## 2022-03-15 ENCOUNTER — PROCEDURE VISIT (OUTPATIENT)
Dept: NEUROLOGY | Facility: CLINIC | Age: 72
End: 2022-03-15
Payer: COMMERCIAL

## 2022-03-15 VITALS — DIASTOLIC BLOOD PRESSURE: 80 MMHG | SYSTOLIC BLOOD PRESSURE: 140 MMHG | HEART RATE: 70 BPM | TEMPERATURE: 97.2 F

## 2022-03-15 DIAGNOSIS — G43.709 CHRONIC MIGRAINE WITHOUT AURA WITHOUT STATUS MIGRAINOSUS, NOT INTRACTABLE: Primary | ICD-10-CM

## 2022-03-15 PROCEDURE — 64615 CHEMODENERV MUSC MIGRAINE: CPT | Performed by: PSYCHIATRY & NEUROLOGY

## 2022-03-15 NOTE — PROGRESS NOTES
Chemodenervation     Date/Time 3/15/2022 11:04 AM     Performed by  Thong Harrison MD     Authorized by Thong Harrison MD        Pre-procedure details      Prepped With: Alcohol     Anesthesia  (see MAR for exact dosages): Anesthesia method:  None   Botox     Botox Type:  Type A    Brand:  Botox    mL's of Botulinum Toxin:  155    Medication Administration:  100 Units onabotulinumtoxin A 100 units   Procedures     Botox Procedures: chronic headache      Indications: migraines     Injection Location      Head / Face:  L frontalis, R frontalis, R , L , L inferior cervical paraspinal, R inferior cervical paraspinal, L superior cervical paraspinal, R superior cervical paraspinal, procerus, L inferior occipitalis, R inferior occipitalis, L inferior trapezius, R inferior trapezius, L temporalis and R temporalis    L  injection amount:  5 unit(s)    R  injection amount:  5 unit(s)    L lateral frontalis:  5 unit(s)    R lateral frontalis:  5 unit(s)    L medial frontalis:  5 unit(s)    R medial frontalis:  5 unit(s)    L temporalis injection amount:  20 unit(s)    R temporalis injection amount:  20 unit(s)    Procerus injection amount:  5 unit(s)    L inferior occipitalis injection amount:  15 unit(s)    R inferior occipitalis injection amount:  15 unit(s)    L inferior cervical paraspinal injection amount:  5 unit(s)    R inferior cervical paraspinal injection amount:  5 unit(s)    L superior cervical paraspinal injection amount:  5 unit(s)    R superior cervical paraspinal injection amount:  5 unit(s)    L inferior trapezius injection amount:  15 unit(s)    R inferior trapezius injection amount:  15 unit(s)   Total Units     Total units used:  155    Total units discarded:  45   Post-procedure details      Chemodenervation:  Chronic migraine    Facial Nerve Location[de-identified]  Bilateral facial nerve    Patient tolerance of procedure:   Tolerated well, no immediate complications

## 2022-03-28 ENCOUNTER — PREPPED CHART (OUTPATIENT)
Dept: URBAN - METROPOLITAN AREA CLINIC 6 | Facility: CLINIC | Age: 72
End: 2022-03-28

## 2022-04-12 DIAGNOSIS — E78.00 PURE HYPERCHOLESTEROLEMIA: ICD-10-CM

## 2022-04-12 RX ORDER — SIMVASTATIN 40 MG
TABLET ORAL
Qty: 90 TABLET | Refills: 3 | Status: SHIPPED | OUTPATIENT
Start: 2022-04-12

## 2022-04-22 NOTE — ASSESSMENT & PLAN NOTE
· Pt is having 15 or more migraines per month which last > 4 hours at a time  · She has tried and failed multiple medications  · She is agreeable to Botox injections  Side effects were reviewed  · She will continue Depakote and Celexa for migraine prevention  · She will continue sumatriptan for treatment of acute migraines  She was unable to afford Nurtec  I have spent 45 minutes with Patient and family today in which greater than 50% of this time was spent in counseling/coordination of care regarding Diagnostic results, Prognosis, Risks and benefits of tx options, Intructions for management, Patient and family education, Importance of tx compliance, Risk factor reductions and Impressions  She will follow-up in 5 months  Unspecified abdominal pain

## 2022-05-06 ENCOUNTER — TELEPHONE (OUTPATIENT)
Dept: NEUROLOGY | Facility: CLINIC | Age: 72
End: 2022-05-06

## 2022-05-09 ENCOUNTER — APPOINTMENT (OUTPATIENT)
Dept: LAB | Age: 72
End: 2022-05-09
Payer: COMMERCIAL

## 2022-05-09 DIAGNOSIS — E03.9 ACQUIRED HYPOTHYROIDISM: ICD-10-CM

## 2022-05-09 DIAGNOSIS — Z00.00 HEALTHCARE MAINTENANCE: ICD-10-CM

## 2022-05-09 LAB
ALBUMIN SERPL BCP-MCNC: 3.5 G/DL (ref 3.5–5)
ALP SERPL-CCNC: 45 U/L (ref 46–116)
ALT SERPL W P-5'-P-CCNC: 17 U/L (ref 12–78)
AMORPH URATE CRY URNS QL MICRO: ABNORMAL
ANION GAP SERPL CALCULATED.3IONS-SCNC: 2 MMOL/L (ref 4–13)
AST SERPL W P-5'-P-CCNC: 13 U/L (ref 5–45)
BACTERIA UR QL AUTO: ABNORMAL /HPF
BASOPHILS # BLD AUTO: 0.03 THOUSANDS/ΜL (ref 0–0.1)
BASOPHILS NFR BLD AUTO: 1 % (ref 0–1)
BILIRUB SERPL-MCNC: 0.34 MG/DL (ref 0.2–1)
BILIRUB UR QL STRIP: NEGATIVE
BUN SERPL-MCNC: 24 MG/DL (ref 5–25)
CALCIUM SERPL-MCNC: 9.4 MG/DL (ref 8.3–10.1)
CHLORIDE SERPL-SCNC: 105 MMOL/L (ref 100–108)
CHOLEST SERPL-MCNC: 180 MG/DL
CLARITY UR: ABNORMAL
CO2 SERPL-SCNC: 31 MMOL/L (ref 21–32)
COLOR UR: ABNORMAL
CREAT SERPL-MCNC: 1.07 MG/DL (ref 0.6–1.3)
EOSINOPHIL # BLD AUTO: 0.11 THOUSAND/ΜL (ref 0–0.61)
EOSINOPHIL NFR BLD AUTO: 2 % (ref 0–6)
ERYTHROCYTE [DISTWIDTH] IN BLOOD BY AUTOMATED COUNT: 14.2 % (ref 11.6–15.1)
GFR SERPL CREATININE-BSD FRML MDRD: 51 ML/MIN/1.73SQ M
GLUCOSE P FAST SERPL-MCNC: 104 MG/DL (ref 65–99)
GLUCOSE UR STRIP-MCNC: NEGATIVE MG/DL
HCT VFR BLD AUTO: 41.1 % (ref 34.8–46.1)
HDLC SERPL-MCNC: 47 MG/DL
HGB BLD-MCNC: 13.8 G/DL (ref 11.5–15.4)
HGB UR QL STRIP.AUTO: ABNORMAL
IMM GRANULOCYTES # BLD AUTO: 0.02 THOUSAND/UL (ref 0–0.2)
IMM GRANULOCYTES NFR BLD AUTO: 0 % (ref 0–2)
KETONES UR STRIP-MCNC: ABNORMAL MG/DL
LDLC SERPL CALC-MCNC: 92 MG/DL (ref 0–100)
LEUKOCYTE ESTERASE UR QL STRIP: ABNORMAL
LYMPHOCYTES # BLD AUTO: 2.22 THOUSANDS/ΜL (ref 0.6–4.47)
LYMPHOCYTES NFR BLD AUTO: 33 % (ref 14–44)
MCH RBC QN AUTO: 31.7 PG (ref 26.8–34.3)
MCHC RBC AUTO-ENTMCNC: 33.6 G/DL (ref 31.4–37.4)
MCV RBC AUTO: 94 FL (ref 82–98)
MONOCYTES # BLD AUTO: 0.63 THOUSAND/ΜL (ref 0.17–1.22)
MONOCYTES NFR BLD AUTO: 10 % (ref 4–12)
MUCOUS THREADS UR QL AUTO: ABNORMAL
NEUTROPHILS # BLD AUTO: 3.63 THOUSANDS/ΜL (ref 1.85–7.62)
NEUTS SEG NFR BLD AUTO: 54 % (ref 43–75)
NITRITE UR QL STRIP: NEGATIVE
NON-SQ EPI CELLS URNS QL MICRO: ABNORMAL /HPF
NONHDLC SERPL-MCNC: 133 MG/DL
NRBC BLD AUTO-RTO: 0 /100 WBCS
PH UR STRIP.AUTO: 6 [PH]
PLATELET # BLD AUTO: 287 THOUSANDS/UL (ref 149–390)
PMV BLD AUTO: 10.3 FL (ref 8.9–12.7)
POTASSIUM SERPL-SCNC: 4.8 MMOL/L (ref 3.5–5.3)
PROT SERPL-MCNC: 7 G/DL (ref 6.4–8.2)
PROT UR STRIP-MCNC: ABNORMAL MG/DL
RBC # BLD AUTO: 4.36 MILLION/UL (ref 3.81–5.12)
RBC #/AREA URNS AUTO: ABNORMAL /HPF
SODIUM SERPL-SCNC: 138 MMOL/L (ref 136–145)
SP GR UR STRIP.AUTO: 1.03 (ref 1–1.03)
TRIGL SERPL-MCNC: 206 MG/DL
TSH SERPL DL<=0.05 MIU/L-ACNC: 2.57 UIU/ML (ref 0.45–4.5)
UROBILINOGEN UR STRIP-ACNC: 2 MG/DL
WBC # BLD AUTO: 6.64 THOUSAND/UL (ref 4.31–10.16)
WBC #/AREA URNS AUTO: ABNORMAL /HPF

## 2022-05-09 PROCEDURE — 85025 COMPLETE CBC W/AUTO DIFF WBC: CPT

## 2022-05-09 PROCEDURE — 36415 COLL VENOUS BLD VENIPUNCTURE: CPT

## 2022-05-09 PROCEDURE — 80053 COMPREHEN METABOLIC PANEL: CPT

## 2022-05-09 PROCEDURE — 81001 URINALYSIS AUTO W/SCOPE: CPT

## 2022-05-09 PROCEDURE — 80061 LIPID PANEL: CPT

## 2022-05-09 PROCEDURE — 84443 ASSAY THYROID STIM HORMONE: CPT

## 2022-05-10 ENCOUNTER — TELEMEDICINE (OUTPATIENT)
Dept: NEUROLOGY | Facility: CLINIC | Age: 72
End: 2022-05-10
Payer: COMMERCIAL

## 2022-05-10 VITALS — WEIGHT: 156 LBS | HEIGHT: 66 IN | BODY MASS INDEX: 25.07 KG/M2

## 2022-05-10 DIAGNOSIS — G43.709 CHRONIC MIGRAINE WITHOUT AURA: Primary | ICD-10-CM

## 2022-05-10 PROCEDURE — 99442 PR PHYS/QHP TELEPHONE EVALUATION 11-20 MIN: CPT | Performed by: PSYCHIATRY & NEUROLOGY

## 2022-05-10 NOTE — ASSESSMENT & PLAN NOTE
Treatment  Prophylactic tx - continue with botox, depakote    Abortive medications - continue with imitrex PRN and naproxen    Botox - Recommended botox injections every 3 months, discussed side effects from botox injections such as injection site swelling, ptosis and neck stiffness/pain and worsening headaches especially with the first treatment  Patient does not want to pursue at this time  Lifestyle Recommendations:    - Maintain headache diary  We discussed an ROSALIO for a smart phone is "Migraine keyur"  - When patient has a moderate to severe headache, they should seek rest, initiate relaxation and apply cold compresses to the head  Hydration  - Please drink at least 64 ounces of water a day to help remain hydrated  Sleep -  - Maintain regular sleep schedule  Adults need at least 7-8 hours of uninterrupted a night  Maintain good sleep hygiene:Adults need at least 7-8 hours of uninterrupted a night  Diet  - Patient is to have regular frequent meals to prevent headache onset  - Avoid dietary trigger  (aged cheese, peanuts, MSG, aspartame and nitrates)  Medications -   - Limit over the counter medications such as Tylenol, Ibuprofen, Aleve, Excedrin  (No more than 3 times a week)  Exercise  - daily exercise is not only good for your heart but also good for your mental health  Recommend 4-5 times a week for 20 minutes

## 2022-05-10 NOTE — PROGRESS NOTES
Virtual Brief Visit    Patient is located in the following state in which I hold an active license [place]      Assessment/Plan:    Problem List Items Addressed This Visit        Cardiovascular and Mediastinum    Chronic migraine without aura - Primary     Treatment  Prophylactic tx - continue with botox, depakote    Abortive medications - continue with imitrex PRN and naproxen    Botox - Recommended botox injections every 3 months, discussed side effects from botox injections such as injection site swelling, ptosis and neck stiffness/pain and worsening headaches especially with the first treatment  Patient does not want to pursue at this time  Lifestyle Recommendations:    - Maintain headache diary  We discussed an ROSALIO for a smart phone is "Migraine keyur"  - When patient has a moderate to severe headache, they should seek rest, initiate relaxation and apply cold compresses to the head  Hydration  - Please drink at least 64 ounces of water a day to help remain hydrated  Sleep -  - Maintain regular sleep schedule  Adults need at least 7-8 hours of uninterrupted a night  Maintain good sleep hygiene:Adults need at least 7-8 hours of uninterrupted a night  Diet  - Patient is to have regular frequent meals to prevent headache onset  - Avoid dietary trigger  (aged cheese, peanuts, MSG, aspartame and nitrates)  Medications -   - Limit over the counter medications such as Tylenol, Ibuprofen, Aleve, Excedrin  (No more than 3 times a week)  Exercise  - daily exercise is not only good for your heart but also good for your mental health  Recommend 4-5 times a week for 20 minutes  Recent Visits  No visits were found meeting these conditions    Showing recent visits within past 7 days and meeting all other requirements  Today's Visits  Date Type Provider Dept   [facility]   Showing today's visits and meeting all other requirements  Future Appointments  No visits were found meeting these conditions  Showing future appointments within next 150 days and meeting all other requirements     This is a 73 y/o female who received her Botox injection 6 weeks ago this is a follow-up for her Botox injections  Patient is doing well overall except she did notice worsening of her headaches she states and she is having to take Imitrex and naproxen which her a rescue medications more frequently  She does notice that when she takes these medications it does get rid of her headaches  She has not noticed a change in her characteristics of her headache  They are just more frequent and they last long now  On a scale of 1-10 they are about 8/10  And they can last anywhere from a couple hours to a few days  Review of Systems   Constitutional: Negative  Negative for appetite change and fever  HENT: Negative  Negative for hearing loss, tinnitus, trouble swallowing and voice change  Eyes: Negative  Negative for photophobia and pain  Respiratory: Negative  Negative for shortness of breath  Cardiovascular: Negative  Negative for palpitations  Gastrointestinal: Negative  Negative for nausea and vomiting  Endocrine: Negative  Negative for cold intolerance  Genitourinary: Negative  Negative for dysuria, frequency and urgency  Musculoskeletal: Positive for neck pain  Negative for myalgias  Skin: Negative  Negative for rash  Neurological: Positive for headaches  Negative for dizziness, tremors, seizures, syncope, facial asymmetry, speech difficulty, weakness, light-headedness and numbness  Hematological: Negative  Does not bruise/bleed easily  Psychiatric/Behavioral: Negative  Negative for confusion, hallucinations and sleep disturbance           I spent 12 minutes directly with the patient during this visit

## 2022-05-11 ENCOUNTER — RA CDI HCC (OUTPATIENT)
Dept: OTHER | Facility: HOSPITAL | Age: 72
End: 2022-05-11

## 2022-05-11 NOTE — PROGRESS NOTES
[address]  coding opportunities       Chart reviewed, no opportunity found: CHART REVIEWED, NO OPPORTUNITY FOUND        Patients Insurance     Medicare Insurance: Medicare

## 2022-05-16 ENCOUNTER — TELEMEDICINE (OUTPATIENT)
Dept: INTERNAL MEDICINE CLINIC | Facility: CLINIC | Age: 72
End: 2022-05-16
Payer: COMMERCIAL

## 2022-05-16 VITALS — TEMPERATURE: 100.2 F

## 2022-05-16 DIAGNOSIS — J40 BRONCHITIS: Primary | ICD-10-CM

## 2022-05-16 PROCEDURE — 99442 PR PHYS/QHP TELEPHONE EVALUATION 11-20 MIN: CPT | Performed by: INTERNAL MEDICINE

## 2022-05-16 RX ORDER — DOXYCYCLINE HYCLATE 100 MG/1
100 CAPSULE ORAL EVERY 12 HOURS SCHEDULED
Qty: 20 CAPSULE | Refills: 0 | Status: SHIPPED | OUTPATIENT
Start: 2022-05-16 | End: 2022-05-24

## 2022-05-16 RX ORDER — PREDNISONE 10 MG/1
10 TABLET ORAL DAILY
Qty: 21 TABLET | Refills: 0 | Status: SHIPPED | OUTPATIENT
Start: 2022-05-16

## 2022-05-16 NOTE — LETTER
May 16, 2022       No Recipients    Patient: Cathy Yoon   YOB: 1950   Date of Visit: 5/16/2022          Cathy Yoon was evaluated in our office for an acute infection  Because of her illness patient will be unable to return to work till approximately next Wednesday which would be May the 26 of 2022  Hopefully patient will improve with present treatment and will continue to be in contact with our office              Sincerely,        Rosalind Luu DO        CC:   No Recipients  Rosalind Luu DO  5/16/2022 11:28 AM  Incomplete  Virtual Brief Visit    Patient is located in the following state in which I hold an active license [place]      Assessment/Plan:    Problem List Items Addressed This Visit        Respiratory    Bronchitis - Primary     Patient is calling today for evaluation upper tract infection  States that she has been sick since Friday with nasal congestion sore throat chest congestion coughing up a yellow to greenish mucus  She admits to some shortness of breath and she is using her albuterol inhaler which helps  Low-grade temperature to 100 6  No GI symptoms and despite his symptoms she did not check for the COVID virus which we had suggested but she does not have an in-home test and was told to pick 1 up the grocery store and check this at home and call us if it is positive  She has a history of recurrent bronchitis in the past and is still smoking but has cut down dramatically with this acute illness  As previously we will treat aggressively and patient was placed on doxycycline 100 mg twice a day for 10 days, tapering dose of oral prednisone  Told if not getting better were worsening symptoms to please call the office the and especially if worsening symptoms she should go immediately for evaluation to the ER  Again instructed to obtain a kit to do in-home COVID testing and call the positive results     Patient is asking for an excuse from work till next Wednesday and a letter was written and patient will  pick this up at the office                 Recent Visits  No visits were found meeting these conditions  Showing recent visits within past 7 days and meeting all other requirements  Today's Visits  Date Type Provider Dept   05/16/22 Telemedicine Piter Corbin, [address]   Showing today's visits and meeting all other requirements  Future Appointments  No visits were found meeting these conditions    Showing future appointments within next 150 days and meeting all other requirements         I spent 20 minutes with patient today in which greater than 50% of the time was spent in counseling/coordination of care regarding 20

## 2022-05-16 NOTE — ASSESSMENT & PLAN NOTE
Patient is calling today for evaluation upper tract infection  States that she has been sick since Friday with nasal congestion sore throat chest congestion coughing up a yellow to greenish mucus  She admits to some shortness of breath and she is using her albuterol inhaler which helps  Low-grade temperature to 100 6  No GI symptoms and despite his symptoms she did not check for the COVID virus which we had suggested but she does not have an in-home test and was told to pick 1 up the grocery store and check this at home and call us if it is positive  She has a history of recurrent bronchitis in the past and is still smoking but has cut down dramatically with this acute illness  As previously we will treat aggressively and patient was placed on doxycycline 100 mg twice a day for 10 days, tapering dose of oral prednisone  Told if not getting better were worsening symptoms to please call the office the and especially if worsening symptoms she should go immediately for evaluation to the ER  Again instructed to obtain a kit to do in-home COVID testing and call the positive results     Patient is asking for an excuse from work till next Wednesday and a letter was written and patient will  pick this up at the office

## 2022-05-16 NOTE — PROGRESS NOTES
Virtual Brief Visit    Patient is located in the following state in which I hold an active license [place]      Assessment/Plan:    Problem List Items Addressed This Visit        Respiratory    Bronchitis - Primary     Patient is calling today for evaluation upper tract infection  States that she has been sick since Friday with nasal congestion sore throat chest congestion coughing up a yellow to greenish mucus  She admits to some shortness of breath and she is using her albuterol inhaler which helps  Low-grade temperature to 100 6  No GI symptoms and despite his symptoms she did not check for the COVID virus which we had suggested but she does not have an in-home test and was told to pick 1 up the grocery store and check this at home and call us if it is positive  She has a history of recurrent bronchitis in the past and is still smoking but has cut down dramatically with this acute illness  As previously we will treat aggressively and patient was placed on doxycycline 100 mg twice a day for 10 days, tapering dose of oral prednisone  Told if not getting better were worsening symptoms to please call the office the and especially if worsening symptoms she should go immediately for evaluation to the ER  Again instructed to obtain a kit to do in-home COVID testing and call the positive results   Patient is asking for an excuse from work till next Wednesday and a letter was written and patient will  pick this up at the office                 Recent Visits  No visits were found meeting these conditions  Showing recent visits within past 7 days and meeting all other requirements  Today's Visits  Date Type Provider Dept   05/16/22 Telemedicine Leodan Zee, [address]   Showing today's visits and meeting all other requirements  Future Appointments  No visits were found meeting these conditions    Showing future appointments within next 150 days and meeting all other requirements         I spent 20 minutes with patient today in which greater than 50% of the time was spent in counseling/coordination of care regarding 20

## 2022-05-17 ENCOUNTER — TELEPHONE (OUTPATIENT)
Dept: INTERNAL MEDICINE CLINIC | Facility: CLINIC | Age: 72
End: 2022-05-17

## 2022-05-17 NOTE — TELEPHONE ENCOUNTER
Informed patient per Dr Debbie Barba to start vitamin regimen for two weeks and if symptoms worsen to head to the ER

## 2022-05-17 NOTE — TELEPHONE ENCOUNTER
Andrew Renee called to let you know she did a covid test and it is (+)  There are no changes with her symptoms    [phone]

## 2022-05-19 ENCOUNTER — TELEMEDICINE (OUTPATIENT)
Dept: INTERNAL MEDICINE CLINIC | Facility: CLINIC | Age: 72
End: 2022-05-19
Payer: COMMERCIAL

## 2022-05-19 DIAGNOSIS — U07.1 COVID: Primary | ICD-10-CM

## 2022-05-19 PROCEDURE — 99442 PR PHYS/QHP TELEPHONE EVALUATION 11-20 MIN: CPT | Performed by: INTERNAL MEDICINE

## 2022-05-19 NOTE — PROGRESS NOTES
COVID-19 Outpatient Progress Note    Assessment/Plan:    Problem List Items Addressed This Visit        Other    COVID - Primary     Patient had called late last week for evaluation  Was suffering from upper respiratory tract infection bronchitis exacerbation her chronic lung disease  Subsequently she did have a COVID test performed at home which was positive  The patient was treated with oral antibiotics specifically doxycycline a placed on oral steroids  She states from a respiratory standpoint and her COVID infection she is feeling much better  Less shortness of breath less cough less shortness of breath  Appetite and hydration her adequate  Patient is calling because she developed some hives to the buttocks yesterday  She states when she takes Benadryl it does help  The patient has had a rash developing highs from previous antibiotic treatment specifically azithromycin and decision today will be to stop the doxycycline and continue to take the Benadryl 1 every 4 hours as needed  She has already completed dosage of prednisone but it considered an alternative if not improving  Patient was told to keep well hydrated and apparently the patient's  is also come down positive for the COVID with upper respiratory tract symptoms and the fatigue                Disposition:     I recommended continued isolation until at least 24 hours have passed since recovery defined as resolution of fever without the use of fever-reducing medications AND improvement in COVID symptoms AND 10 days have passed since onset of symptoms (or 10 days have passed since date of first positive viral diagnostic test for asymptomatic patients)  I have spent 20 minutes directly with the patient   Greater than 50% of this time was spent in counseling/coordination of care regarding: diagnostic results, prognosis, risks and benefits of treatment options, instructions for management, patient and family education, importance of treatment compliance, risk factor reductions and impressions  Encounter provider Steve Abraham, DO    Provider located at [address] [address]    Recent Visits  Date Type Provider Dept   05/17/22 Telephone Steve Socks, [address] [facility]   05/16/22 Telemedicine Steve Socks, [address] [facility]   Showing recent visits within past 7 days and meeting all other requirements  Today's Visits  Date Type Provider Dept   05/19/22 Telemedicine Steve Socks, [address] [facility]   Showing today's visits and meeting all other requirements  Future Appointments  No visits were found meeting these conditions  Showing future appointments within next 150 days and meeting all other requirements     This virtual check-in was done via telephone and she agrees to proceed  Patient agrees to participate in a virtual check in via telephone or video visit instead of presenting to the office to address urgent/immediate medical needs  Patient is aware this is a billable service  After connecting through Telephone, the patient was identified by name and date of birth  Genna Talha was informed that this was a telemedicine visit and that the exam was being conducted confidentially over secure lines  My office door was closed  No one else was in the room  Genna Palencia acknowledged consent and understanding of privacy and security of the telemedicine visit  I informed the patient that I have reviewed her record in Epic and presented the opportunity for her to ask any questions regarding the visit today  The patient agreed to participate  It was my intent to perform this visit via video technology but the patient was not able to do a video connection so the visit was completed via audio telephone only          Verification of patient location:  Patient is located in the following state in which I hold an active license: [place]    Subjective:   Immanuel Pandey is a 74 y o  female who has been screened for COVID-19  Symptom change since last report: resolving  Patient's symptoms include nasal congestion and cough  Patient denies fever, chills, fatigue, malaise, rhinorrhea, sore throat, anosmia, loss of taste, shortness of breath, chest tightness, abdominal pain, nausea, vomiting, diarrhea, myalgias and headaches  - Date of symptom onset: 5/12/2022      COVID-19 vaccination status: Fully vaccinated (primary series)    Yasmine Hall has been staying home and has isolated themselves in her home  She is taking care to not share personal items and is cleaning all surfaces that are touched often, like counters, tabletops, and doorknobs using household cleaning sprays or wipes       Development rash, hives to the buttocks starting yesterday    Lab Results   Component Value Date    SARSCOV2 Negative 01/10/2022    SARSCOV2 Not Detected 07/24/2020     Past Medical History:   Diagnosis Date    Hyperlipidemia     Migraine      Past Surgical History:   Procedure Laterality Date    BREAST EXCISIONAL BIOPSY Right 1986    HARTMANS PROCEDURE N/A 3/8/2020    Procedure: Laparoscopic drainage of intra peritoneal abscess, sigmoid rescetion,;  Surgeon: Kamila Mathews MD;  [facility];  Service: Colorectal    HYSTERECTOMY  1978    age 25   [place] [facility] NASAL SEPTUM SURGERY      deviation repair    [place] LAP, VENTRAL HERNIA REPAIR,REDUCIBLE N/A 1/26/2021    Procedure: REPAIR HERNIA VENTRAL LAPAROSCOPIC;  Surgeon: Kamila Mathews MD;  [facility];  Service: Colorectal    OH LAP,DIAGNOSTIC ABDOMEN N/A 3/8/2020    Procedure: LAPAROSCOPY DIAGNOSTIC;  Surgeon: Kamila Mathews MD;  [facility];  Service: Colorectal    OH SIGMOIDOSCOPY FLX DX W/COLLJ SPEC BR/WA IF PFRMD N/A 3/8/2020    Procedure: Kendy Mok[place];  Surgeon: Kamila Mathews MD;  [facility];  Service: Colorectal    SHOULDER SURGERY       Current Outpatient Medications   Medication Sig Dispense Refill    albuterol (Ventolin HFA) 90 mcg/act inhaler Inhale 2 puffs every 6 (six) hours as needed for wheezing 18 g 3    aspirin (ECOTRIN) 325 mg EC tablet Take 1 tablet (325 mg total) by mouth daily 30 tablet 0    Cholecalciferol (VITAMIN D3 PO) Take by mouth daily  (Patient not taking: Reported on 3/8/2022 )      citalopram (CeleXA) 20 mg tablet Take 1 tablet (20 mg total) by mouth daily 90 tablet 3    cyanocobalamin ([employer] VITAMIN B-12) 1000 MCG tablet Take 1,000 mcg by mouth daily  (Patient not taking: Reported on 3/8/2022 )      divalproex sodium (DEPAKOTE ER) 250 mg 24 hr tablet Take 1 tablet (250 mg total) by mouth 2 (two) times a day 180 tablet 6    docusate sodium (COLACE) 100 mg capsule Take 1 capsule (100 mg total) by mouth 2 (two) times a day (Patient not taking: Reported on 3/8/2022 ) 180 capsule 2    doxycycline hyclate (VIBRAMYCIN) 100 mg capsule Take 1 capsule (100 mg total) by mouth every 12 (twelve) hours for 10 days 20 capsule 0    famotidine (PEPCID) 40 MG tablet Every night before bed 90 tablet 2    Lidocaine Viscous HCl (XYLOCAINE) 2 % mucosal solution  (Patient not taking: Reported on 3/8/2022 )      Magnesium 500 MG CAPS Take by mouth daily  (Patient not taking: Reported on 3/8/2022 )      naproxen (NAPROSYN) 500 mg tablet Take 1 tablet (500 mg total) by mouth 2 (two) times a day as needed for headaches 90 tablet 4    ondansetron (ZOFRAN) 4 mg tablet Take 1 tablet (4 mg total) by mouth every 8 (eight) hours as needed for nausea or vomiting 20 tablet 4    polyethylene glycol (GLYCOLAX) 17 GM/SCOOP powder Take 17 g by mouth daily , increase to twice daily if needed 850 g 3    predniSONE 10 mg tablet Take 1 tablet (10 mg total) by mouth in the morning  Two pills twice a day with food for 3 days then 1 pill twice a day with food for 3 days then 1 pill daily till finished   21 tablet 0    pyridoxine (B-6) 100 MG tablet Take 100 mg by mouth daily (Patient not taking: Reported on 3/8/2022 )      Riboflavin (B2) 100 MG TABS Take by mouth daily  (Patient not taking: Reported on 3/8/2022 )      simvastatin (ZOCOR) 40 mg tablet TAKE 1 TABLET BY MOUTH DAILY 90 tablet 3    SUMAtriptan (IMITREX) 100 mg tablet TAKE 1 TABLET AT ONSET OF MIGRAINE HEADACHE  MAY REPEAT IN 2 HOURS IF NEEDED, NO MORE THAN 2 IN 24 HOURS AND NO MORE THAN 3 IN A WEEK 27 tablet 0    zolpidem (AMBIEN) 5 mg tablet One pill at bedtime as needed to help with sleep 90 tablet 1     Current Facility-Administered Medications   Medication Dose Route Frequency Provider Last Rate Last Admin    Botulinum Toxin Type A SOLR 200 Units  200 Units Injection Q3 Months Sabi Weinberg MD   200 Units at 03/15/22 1509     Allergies   Allergen Reactions    Penicillins Anaphylaxis    Azithromycin Hives    Nisoldipine      Reaction Date: 14Apr2011;     Sulfa Antibiotics Hives       Review of Systems   Constitutional: Negative for chills, fatigue and fever  HENT: Positive for congestion  Negative for rhinorrhea and sore throat  Respiratory: Positive for cough  Negative for chest tightness and shortness of breath  Gastrointestinal: Negative for abdominal pain, diarrhea, nausea and vomiting  Musculoskeletal: Negative for myalgias  Neurological: Negative for headaches  Objective: There were no vitals filed for this visit  Physical Exam  Vitals and nursing note reviewed  Constitutional:       Comments: Patient is contacted by telephone  Is awake alert sounding less congested no significant shortness of breath with conversation today and no cough was heard  Neurological:      Mental Status: She is alert and oriented to person, place, and time  Psychiatric:         Mood and Affect: Mood normal          Behavior: Behavior normal          Thought Content:  Thought content normal          Judgment: Judgment normal          VIRTUAL VISIT DISCLAIMER    Randy Blizzard verbally agrees to participate in [employer]  Pt is aware that [employer] could be limited without vital signs or the ability to perform a full hands-on physical exam  Rosemary Birch understands she or the provider may request at any time to terminate the video visit and request the patient to seek care or treatment in person

## 2022-05-19 NOTE — ASSESSMENT & PLAN NOTE
Patient had called late last week for evaluation  Was suffering from upper respiratory tract infection bronchitis exacerbation her chronic lung disease  Subsequently she did have a COVID test performed at home which was positive  The patient was treated with oral antibiotics specifically doxycycline a placed on oral steroids  She states from a respiratory standpoint and her COVID infection she is feeling much better  Less shortness of breath less cough less shortness of breath  Appetite and hydration her adequate  Patient is calling because she developed some hives to the buttocks yesterday  She states when she takes Benadryl it does help  The patient has had a rash developing highs from previous antibiotic treatment specifically azithromycin and decision today will be to stop the doxycycline and continue to take the Benadryl 1 every 4 hours as needed  She has already completed dosage of prednisone but it considered an alternative if not improving    Patient was told to keep well hydrated and apparently the patient's  is also come down positive for the COVID with upper respiratory tract symptoms and the fatigue

## 2022-05-24 ENCOUNTER — TELEMEDICINE (OUTPATIENT)
Dept: INTERNAL MEDICINE CLINIC | Facility: CLINIC | Age: 72
End: 2022-05-24
Payer: COMMERCIAL

## 2022-05-24 VITALS — TEMPERATURE: 98.7 F

## 2022-05-24 DIAGNOSIS — U07.1 COVID: Primary | ICD-10-CM

## 2022-05-24 PROCEDURE — 99442 PR PHYS/QHP TELEPHONE EVALUATION 11-20 MIN: CPT | Performed by: INTERNAL MEDICINE

## 2022-05-24 RX ORDER — LEVOFLOXACIN 500 MG/1
500 TABLET, FILM COATED ORAL EVERY 24 HOURS
Qty: 5 TABLET | Refills: 0 | Status: SHIPPED | OUTPATIENT
Start: 2022-05-24 | End: 2022-05-29

## 2022-05-24 NOTE — LETTER
May 24, 2022    Patient: Nichelle Brown  YOB: 1950  Date of Last Encounter: 5/19/2022      To whom it may concern:     Nichelle Brown has tested positive for COVID-19 (Coronavirus)  She may return to work on * May 25, 2022 **, which is 10 days from illness onset (provided symptoms are improving) and 24 hours without fever      Sincerely,         Kalpana Gonsalez, DO

## 2022-05-24 NOTE — PROGRESS NOTES
COVID-19 Outpatient Progress Note    Assessment/Plan:    Problem List Items Addressed This Visit        Other    COVID - Primary     The patient is being contacted today for re-evaluation of her COVID infection  Patient states she still has cough some chest congestion  No fever no chills  Bring up a yellow mucus  Unable to tolerate doxycycline secondary to development of rash  Patient on evaluation has a very husky voice but no respiratory distress noted during exam   The patient is still smoking but she states she has cut down and this is upsetting for both her  and from our perspective  Patient states she wants to go back work tomorrow but I do not feel comfortable with this and she will discuss this with the supervisor at work as to whether not they agree with her returning  Patient is just about finished her prednisone dosage which she states has helped  She inhalers which she states also been helping  Decision will be covered with Levaquin 500 mg daily for 5 days  Will contact the patient on Thursday for follow-up  Relevant Medications    levofloxacin (LEVAQUIN) 500 mg tablet         Disposition:     I recommended continued isolation until at least 24 hours have passed since recovery defined as resolution of fever without the use of fever-reducing medications AND improvement in COVID symptoms AND 10 days have passed since onset of symptoms (or 10 days have passed since date of first positive viral diagnostic test for asymptomatic patients)  I have spent 20 minutes directly with the patient  Greater than 50% of this time was spent in counseling/coordination of care regarding: diagnostic results, prognosis, risks and benefits of treatment options, instructions for management, patient and family education, importance of treatment compliance, risk factor reductions and impressions        Encounter provider Jamey Rivero DO    Provider located at [address] [facility]  [address] [address]    Recent Visits  Date Type Provider Dept   05/19/22 Telemedicine Patricia Latham, [address] [facility] Med   05/17/22 Telephone Patricia Latham [address] [facility]   Showing recent visits within past 7 days and meeting all other requirements  Today's Visits  Date Type Provider Dept   05/24/22 Telemedicine Patricia Latham, [address] [facility]   Showing today's visits and meeting all other requirements  Future Appointments  No visits were found meeting these conditions  Showing future appointments within next 150 days and meeting all other requirements     This virtual check-in was done via telephone and she agrees to proceed  Patient agrees to participate in a virtual check in via telephone or video visit instead of presenting to the office to address urgent/immediate medical needs  Patient is aware this is a billable service  After connecting through Telephone, the patient was identified by name and date of birth  Eloy Fischer was informed that this was a telemedicine visit and that the exam was being conducted confidentially over secure lines  My office door was closed  No one else was in the room  Eloy Fischer acknowledged consent and understanding of privacy and security of the telemedicine visit  I informed the patient that I have reviewed her record in Epic and presented the opportunity for her to ask any questions regarding the visit today  The patient agreed to participate  It was my intent to perform this visit via video technology but the patient was not able to do a video connection so the visit was completed via audio telephone only  Verification of patient location:  Patient is located in the following state in which I hold an active license: [place]    Subjective:   Eloy Fischer is a 74 y o  female who has been screened for COVID-19  Symptom change since last report: resolving   Patient's symptoms include fatigue, cough, shortness of breath and chest tightness  Patient denies fever, chills, malaise, congestion, rhinorrhea, sore throat, anosmia, loss of taste, abdominal pain, nausea, vomiting, diarrhea, myalgias and headaches  COVID-19 vaccination status: Fully vaccinated (primary series)    Reyes Escoto has been staying home and has isolated themselves in her home  She is taking care to not share personal items and is cleaning all surfaces that are touched often, like counters, tabletops, and doorknobs using household cleaning sprays or wipes  She is wearing a mask when she leaves her room       Lab Results   Component Value Date    SARSCOV2 Negative 01/10/2022    SARSCOV2 Not Detected 07/24/2020     Past Medical History:   Diagnosis Date    Hyperlipidemia     Migraine      Past Surgical History:   Procedure Laterality Date    BREAST EXCISIONAL BIOPSY Right 1986    HARTMANS PROCEDURE N/A 3/8/2020    Procedure: Laparoscopic drainage of intra peritoneal abscess, sigmoid rescetion,;  Surgeon: Kory Gonsalez MD;  [facility];  Service: Colorectal    HYSTERECTOMY  1978    age 25   [facility] NASAL SEPTUM SURGERY      deviation repair    PA LAP, VENTRAL HERNIA REPAIR,REDUCIBLE N/A 1/26/2021    Procedure: REPAIR HERNIA VENTRAL LAPAROSCOPIC;  Surgeon: Kory Gonsalez MD;  [facility];  Service: Colorectal    PA LAP,DIAGNOSTIC ABDOMEN N/A 3/8/2020    Procedure: LAPAROSCOPY DIAGNOSTIC;  Surgeon: Kory Gonsalez MD;  [facility];  Service: Colorectal    PA SIGMOIDOSCOPY FLX DX W/COLLJ SPEC BR/WA IF PFRMD N/A 3/8/2020    Procedure: Steven Fernandezals;  Surgeon: Kory Gonsalez MD;  [facility];  Service: Colorectal    SHOULDER SURGERY       Current Outpatient Medications   Medication Sig Dispense Refill    albuterol (Ventolin HFA) 90 mcg/act inhaler Inhale 2 puffs every 6 (six) hours as needed for wheezing 18 g 3    aspirin (ECOTRIN) 325 mg EC tablet Take 1 tablet (325 mg total) by mouth daily 30 tablet 0    citalopram (CeleXA) 20 mg tablet Take 1 tablet (20 mg total) by mouth daily 90 tablet 3    divalproex sodium (DEPAKOTE ER) 250 mg 24 hr tablet Take 1 tablet (250 mg total) by mouth 2 (two) times a day 180 tablet 6    famotidine (PEPCID) 40 MG tablet Every night before bed 90 tablet 2    levofloxacin (LEVAQUIN) 500 mg tablet Take 1 tablet (500 mg total) by mouth every 24 hours for 5 days 5 tablet 0    naproxen (NAPROSYN) 500 mg tablet Take 1 tablet (500 mg total) by mouth 2 (two) times a day as needed for headaches 90 tablet 4    ondansetron (ZOFRAN) 4 mg tablet Take 1 tablet (4 mg total) by mouth every 8 (eight) hours as needed for nausea or vomiting 20 tablet 4    polyethylene glycol (GLYCOLAX) 17 GM/SCOOP powder Take 17 g by mouth daily , increase to twice daily if needed 850 g 3    predniSONE 10 mg tablet Take 1 tablet (10 mg total) by mouth in the morning  Two pills twice a day with food for 3 days then 1 pill twice a day with food for 3 days then 1 pill daily till finished  21 tablet 0    simvastatin (ZOCOR) 40 mg tablet TAKE 1 TABLET BY MOUTH DAILY 90 tablet 3    SUMAtriptan (IMITREX) 100 mg tablet TAKE 1 TABLET AT ONSET OF MIGRAINE HEADACHE   MAY REPEAT IN 2 HOURS IF NEEDED, NO MORE THAN 2 IN 24 HOURS AND NO MORE THAN 3 IN A WEEK 27 tablet 0    zolpidem (AMBIEN) 5 mg tablet One pill at bedtime as needed to help with sleep 90 tablet 1    Cholecalciferol (VITAMIN D3 PO) Take by mouth daily  (Patient not taking: No sig reported)      cyanocobalamin (VITAMIN B-12) 1000 MCG tablet Take 1,000 mcg by mouth daily  (Patient not taking: No sig reported)      docusate sodium (COLACE) 100 mg capsule Take 1 capsule (100 mg total) by mouth 2 (two) times a day (Patient not taking: No sig reported) 180 capsule 2    Lidocaine Viscous HCl (XYLOCAINE) 2 % mucosal solution  (Patient not taking: No sig reported)      Magnesium 500 MG CAPS Take by mouth daily  (Patient not taking: No sig reported)      pyridoxine (B-6) 100 MG tablet Take 100 mg by mouth daily  (Patient not taking: No sig reported)      Riboflavin (B2) 100 MG TABS Take by mouth daily  (Patient not taking: No sig reported)       Current Facility-Administered Medications   Medication Dose Route Frequency Provider Last Rate Last Admin    Botulinum Toxin Type A SOLR 200 Units  200 Units Injection Q3 Months Tilman Canavan, MD   200 Units at 03/15/22 1509     Allergies   Allergen Reactions    Penicillins Anaphylaxis    Azithromycin Hives    Nisoldipine      Reaction Date: 14Apr2011;     Sulfa Antibiotics Hives       Review of Systems   Constitutional: Positive for fatigue  Negative for chills and fever  HENT: Negative for congestion, rhinorrhea and sore throat  Respiratory: Positive for cough, chest tightness and shortness of breath  Gastrointestinal: Negative for abdominal pain, diarrhea, nausea and vomiting  Musculoskeletal: Negative for myalgias  Neurological: Negative for headaches  Objective:    Vitals:    05/24/22 1347   Temp: 98 7 °F (37 1 °C)       Physical Exam  Vitals and nursing note reviewed  Constitutional:       General: She is not in acute distress  Comments: Patient is contacted by telephone  Is awake alert  Sounded congested no respiratory compromise noted during conversation   Pulmonary:      Comments: Episodic cough but no significant shortness of breath or wheezing  Neurological:      Mental Status: She is alert and oriented to person, place, and time  Psychiatric:         Mood and Affect: Mood normal          Behavior: Behavior normal          Thought Content: Thought content normal          Judgment: Judgment normal          VIRTUAL VISIT DISCLAIMER    Immanuel Pandey verbally agrees to participate in [employer]   Pt is aware that [employer] could be limited without vital signs or the ability to perform a full hands-on physical exam  Rosemary Starkey understands she or the provider may request at any time to terminate the video visit and request the patient to seek care or treatment in person

## 2022-05-24 NOTE — ASSESSMENT & PLAN NOTE
The patient is being contacted today for re-evaluation of her COVID infection  Patient states she still has cough some chest congestion  No fever no chills  Bring up a yellow mucus  Unable to tolerate doxycycline secondary to development of rash  Patient on evaluation has a very husky voice but no respiratory distress noted during exam   The patient is still smoking but she states she has cut down and this is upsetting for both her  and from our perspective  Patient states she wants to go back work tomorrow but I do not feel comfortable with this and she will discuss this with the supervisor at work as to whether not they agree with her returning  Patient is just about finished her prednisone dosage which she states has helped  She inhalers which she states also been helping  Decision will be covered with Levaquin 500 mg daily for 5 days  Will contact the patient on Thursday for follow-up

## 2022-05-26 ENCOUNTER — TELEMEDICINE (OUTPATIENT)
Dept: INTERNAL MEDICINE CLINIC | Facility: CLINIC | Age: 72
End: 2022-05-26
Payer: COMMERCIAL

## 2022-05-26 VITALS — TEMPERATURE: 98.7 F

## 2022-05-26 DIAGNOSIS — U07.1 COVID: Primary | ICD-10-CM

## 2022-05-26 DIAGNOSIS — F17.200 SMOKING: ICD-10-CM

## 2022-05-26 PROCEDURE — 99442 PR PHYS/QHP TELEPHONE EVALUATION 11-20 MIN: CPT | Performed by: INTERNAL MEDICINE

## 2022-05-26 NOTE — PROGRESS NOTES
COVID-19 Outpatient Progress Note    Assessment/Plan:    Problem List Items Addressed This Visit        Other    Smoking     Despite her illness she is still smoking  Told that it is extremely important that she quit now a and this will be the perfect opportunity with her upper respiratory symptoms and COVID virus           COVID - Primary     The patient had developed slight case of hives yesterday after taking antibiotic  She states that she did take a Benadryl and the highs went away  She states the cough is much better less shortness of breath  Her biggest problem at this point time is getting adequate nutrition and is not eating enough to sustain her at this time  She was told even though she does not have an appetite it is extremely important that she get nutrition even if she is drinking supplements like Ensure to make sure that she is getting proper calories to help her during her illness  She did have a low-grade temperature last night up to approximately 100 and Tylenol did help bring this down  The she does have some fatigue  Is getting adequate hydration  Patient is slowly improving  She was told that she should take another doses of Levaquin if she can tolerate this and take Benadryl if she develops a hive  Was reassure there is always a doctor on-call discuss things  She feels that she may be well enough to return to work next Wednesday                Disposition:     I recommended continued isolation until at least 24 hours have passed since recovery defined as resolution of fever without the use of fever-reducing medications AND improvement in COVID symptoms AND 10 days have passed since onset of symptoms (or 10 days have passed since date of first positive viral diagnostic test for asymptomatic patients)  I have spent 20 minutes directly with the patient   Greater than 50% of this time was spent in counseling/coordination of care regarding: diagnostic results, prognosis, risks and benefits of treatment options, instructions for management, patient and family education, importance of treatment compliance, risk factor reductions and impressions  Encounter provider Fernandez Casanova DO    Provider located at [address] [address]    Recent Visits  Date Type Provider Dept   05/24/22 Telemedicine Pegmarcus Herronast, [address] [facility]   05/19/22 Telemedicine Peggye Overcast, [address] [facility]   Showing recent visits within past 7 days and meeting all other requirements  Today's Visits  Date Type Provider Dept   05/26/22 Telemedicine Nahedgysofia Overcast, [address] [facility]   Showing today's visits and meeting all other requirements  Future Appointments  No visits were found meeting these conditions  Showing future appointments within next 150 days and meeting all other requirements     This virtual check-in was done via telephone and she agrees to proceed  Patient agrees to participate in a virtual check in via telephone or video visit instead of presenting to the office to address urgent/immediate medical needs  Patient is aware this is a billable service  After connecting through Telephone, the patient was identified by name and date of birth  Omari Grossman was informed that this was a telemedicine visit and that the exam was being conducted confidentially over secure lines  My office door was closed  No one else was in the room  Omari Grossman acknowledged consent and understanding of privacy and security of the telemedicine visit  I informed the patient that I have reviewed her record in Epic and presented the opportunity for her to ask any questions regarding the visit today  The patient agreed to participate  It was my intent to perform this visit via video technology but the patient was not able to do a video connection so the visit was completed via audio telephone only  Verification of patient location:  Patient is located in the following state in which I hold an active license: [place]    Subjective:   Dayo Heart is a 74 y o  female who has been screened for COVID-19  Symptom change since last report: improving  Patient's symptoms include fever, fatigue, nasal congestion, cough and myalgias  Patient denies chills, malaise, rhinorrhea, sore throat, anosmia, loss of taste, shortness of breath, chest tightness, abdominal pain, nausea, vomiting, diarrhea and headaches  COVID-19 vaccination status: Fully vaccinated (primary series)    Olesya Ellis has been staying home and has isolated themselves in her home  She is taking care to not share personal items and is cleaning all surfaces that are touched often, like counters, tabletops, and doorknobs using household cleaning sprays or wipes  She is wearing a mask when she leaves her room  Significant decreased appetite and nutritional intake  Urged her to please attempt to get adequate nutrition that will help with her resolution of virus    Told if over the weekend increasing fatigue she needs to go to the emergency room for immediate evaluation    Lab Results   Component Value Date    SARSCOV2 Negative 01/10/2022    SARSCOV2 Not Detected 07/24/2020     Past Medical History:   Diagnosis Date    Hyperlipidemia     Migraine      Past Surgical History:   Procedure Laterality Date    BREAST EXCISIONAL BIOPSY Right 1986    HARTMANS PROCEDURE N/A 3/8/2020    Procedure: Laparoscopic drainage of intra peritoneal abscess, sigmoid rescetion,;  Surgeon: Elma Moon MD;  Location: [facility];  Service: Colorectal    HYSTERECTOMY  1978    age 25   [place] NASAL SEPTUM SURGERY      deviation repair    ID LAP, VENTRAL HERNIA REPAIR,REDUCIBLE N/A 1/26/2021    Procedure: REPAIR HERNIA VENTRAL LAPAROSCOPIC;  Surgeon: Elma Moon MD;  [facility]: [facility];  Service: Colorectal    ID LAP,DIAGNOSTIC ABDOMEN N/A 3/8/2020    Procedure: LAPAROSCOPY DIAGNOSTIC;  Surgeon: Debra Fink MD;  [facility]: [facility];  Service: Colorectal    IA SIGMOIDOSCOPY FLX DX W/COLLJ SPEC BR/WA IF PFRMD N/A 3/8/2020    Procedure: SIGMOIDOSCOPY FLEXIBLE;  Surgeon: Debra Fink MD;  [facility];  Service: Colorectal    SHOULDER SURGERY       Current Outpatient Medications   Medication Sig Dispense Refill    albuterol (Ventolin HFA) 90 mcg/act inhaler Inhale 2 puffs every 6 (six) hours as needed for wheezing 18 g 3    aspirin (ECOTRIN) 325 mg EC tablet Take 1 tablet (325 mg total) by mouth daily 30 tablet 0    citalopram (CeleXA) 20 mg tablet Take 1 tablet (20 mg total) by mouth daily 90 tablet 3    divalproex sodium (DEPAKOTE ER) 250 mg 24 hr tablet Take 1 tablet (250 mg total) by mouth 2 (two) times a day 180 tablet 6    famotidine (PEPCID) 40 MG tablet Every night before bed 90 tablet 2    levofloxacin (LEVAQUIN) 500 mg tablet Take 1 tablet (500 mg total) by mouth every 24 hours for 5 days 5 tablet 0    naproxen (NAPROSYN) 500 mg tablet Take 1 tablet (500 mg total) by mouth 2 (two) times a day as needed for headaches 90 tablet 4    ondansetron (ZOFRAN) 4 mg tablet Take 1 tablet (4 mg total) by mouth every 8 (eight) hours as needed for nausea or vomiting 20 tablet 4    polyethylene glycol (GLYCOLAX) 17 GM/SCOOP powder Take 17 g by mouth daily , increase to twice daily if needed 850 g 3    predniSONE 10 mg tablet Take 1 tablet (10 mg total) by mouth in the morning  Two pills twice a day with food for 3 days then 1 pill twice a day with food for 3 days then 1 pill daily till finished  21 tablet 0    simvastatin (ZOCOR) 40 mg tablet TAKE 1 TABLET BY MOUTH DAILY 90 tablet 3    SUMAtriptan (IMITREX) 100 mg tablet TAKE 1 TABLET AT ONSET OF MIGRAINE HEADACHE   MAY REPEAT IN 2 HOURS IF NEEDED, NO MORE THAN 2 IN 24 HOURS AND NO MORE THAN 3 IN A WEEK 27 tablet 0    zolpidem (AMBIEN) 5 mg tablet One pill at bedtime as needed to help with sleep 90 tablet 1    Cholecalciferol (VITAMIN D3 PO) Take by mouth daily  (Patient not taking: No sig reported)      cyanocobalamin (VITAMIN B-12) 1000 MCG tablet Take 1,000 mcg by mouth daily  (Patient not taking: No sig reported)      docusate sodium (COLACE) 100 mg capsule Take 1 capsule (100 mg total) by mouth 2 (two) times a day (Patient not taking: No sig reported) 180 capsule 2    Lidocaine Viscous HCl (XYLOCAINE) 2 % mucosal solution  (Patient not taking: No sig reported)      Magnesium 500 MG CAPS Take by mouth daily  (Patient not taking: No sig reported)      pyridoxine (B-6) 100 MG tablet Take 100 mg by mouth daily  (Patient not taking: No sig reported)      Riboflavin (B2) 100 MG TABS Take by mouth daily  (Patient not taking: No sig reported)       Current Facility-Administered Medications   Medication Dose Route Frequency Provider Last Rate Last Admin    Botulinum Toxin Type A SOLR 200 Units  200 Units Injection Q3 Months Rubin Braden MD   200 Units at 03/15/22 1509     Allergies   Allergen Reactions    Penicillins Anaphylaxis    Azithromycin Hives    Nisoldipine      Reaction Date: 14Apr2011;     Sulfa Antibiotics Hives       Review of Systems   Constitutional: Positive for fatigue and fever  Negative for chills  HENT: Positive for congestion  Negative for rhinorrhea and sore throat  Respiratory: Positive for cough  Negative for chest tightness and shortness of breath  Gastrointestinal: Negative for abdominal pain, diarrhea, nausea and vomiting  Musculoskeletal: Positive for myalgias  Neurological: Negative for headaches  Objective:    Vitals:    05/26/22 1052   Temp: 98 7 °F (37 1 °C)       Physical Exam  Vitals and nursing note reviewed  Constitutional:       Comments: Patient is interviewed over the telephone    Is awake alert sounds like less congestion, no cough during conversation   Pulmonary:      Comments: No respiratory compromise noted during exam  Neurological: Mental Status: She is alert and oriented to person, place, and time  Psychiatric:         Mood and Affect: Mood normal          Behavior: Behavior normal          Thought Content: Thought content normal          Judgment: Judgment normal          VIRTUAL VISIT DISCLAIMER    Eloy Fischer verbally agrees to participate in [employer]  Pt is aware that [employer] could be limited without vital signs or the ability to perform a full hands-on physical exam  Rosemary Bhakta understands she or the provider may request at any time to terminate the video visit and request the patient to seek care or treatment in person

## 2022-05-26 NOTE — ASSESSMENT & PLAN NOTE
Despite her illness she is still smoking    Told that it is extremely important that she quit now a and this will be the perfect opportunity with her upper respiratory symptoms and COVID virus

## 2022-05-26 NOTE — ASSESSMENT & PLAN NOTE
The patient had developed slight case of hives yesterday after taking antibiotic  She states that she did take a Benadryl and the highs went away  She states the cough is much better less shortness of breath  Her biggest problem at this point time is getting adequate nutrition and is not eating enough to sustain her at this time  She was told even though she does not have an appetite it is extremely important that she get nutrition even if she is drinking supplements like Ensure to make sure that she is getting proper calories to help her during her illness  She did have a low-grade temperature last night up to approximately 100 and Tylenol did help bring this down  The she does have some fatigue  Is getting adequate hydration  Patient is slowly improving  She was told that she should take another doses of Levaquin if she can tolerate this and take Benadryl if she develops a hive  Was reassure there is always a doctor on-call discuss things    She feels that she may be well enough to return to work next Wednesday

## 2022-05-26 NOTE — LETTER
May 26, 2022    Patient: Omari Grossman  YOB: 1950  Date of Last Encounter: 5/24/2022      To whom it may concern:     Omari Grossman has tested positive for COVID-19 (Coronavirus)  She may return to work on June 1, 2022, which is 10 days from illness onset (provided symptoms are improving) and 24 hours without fever      Sincerely,         Peggye Overcast, DO

## 2022-05-31 ENCOUNTER — TELEPHONE (OUTPATIENT)
Dept: INTERNAL MEDICINE CLINIC | Facility: CLINIC | Age: 72
End: 2022-05-31

## 2022-05-31 NOTE — TELEPHONE ENCOUNTER
Anneliese Mendiola called asking if you could give her a letter to return to work tomorrow  Please advise  Please call when ready at [phone]  She will come pick it up today

## 2022-06-15 DIAGNOSIS — G43.709 CHRONIC MIGRAINE WITHOUT AURA WITHOUT STATUS MIGRAINOSUS, NOT INTRACTABLE: ICD-10-CM

## 2022-06-15 RX ORDER — DIVALPROEX SODIUM 250 MG/1
250 TABLET, EXTENDED RELEASE ORAL 2 TIMES DAILY
Qty: 180 TABLET | Refills: 3 | Status: SHIPPED | OUTPATIENT
Start: 2022-06-15

## 2022-06-15 NOTE — TELEPHONE ENCOUNTER
Received VM from patient requesting refill of Depakote  mg to go to [facility]  Script pended for review and sig, thanks!

## 2022-06-20 ENCOUNTER — OFFICE VISIT (OUTPATIENT)
Dept: INTERNAL MEDICINE CLINIC | Facility: CLINIC | Age: 72
End: 2022-06-20
Payer: COMMERCIAL

## 2022-06-20 VITALS
SYSTOLIC BLOOD PRESSURE: 128 MMHG | HEART RATE: 76 BPM | HEIGHT: 66 IN | BODY MASS INDEX: 24.27 KG/M2 | TEMPERATURE: 97.5 F | OXYGEN SATURATION: 95 % | DIASTOLIC BLOOD PRESSURE: 70 MMHG | WEIGHT: 151 LBS

## 2022-06-20 DIAGNOSIS — R11.0 NAUSEA: ICD-10-CM

## 2022-06-20 DIAGNOSIS — F17.200 SMOKING: ICD-10-CM

## 2022-06-20 DIAGNOSIS — E78.01 FAMILIAL HYPERCHOLESTEROLEMIA: ICD-10-CM

## 2022-06-20 DIAGNOSIS — U07.1 COVID: Primary | ICD-10-CM

## 2022-06-20 DIAGNOSIS — Z00.00 HEALTHCARE MAINTENANCE: ICD-10-CM

## 2022-06-20 DIAGNOSIS — E03.9 ACQUIRED HYPOTHYROIDISM: ICD-10-CM

## 2022-06-20 DIAGNOSIS — R73.9 HYPERGLYCEMIA: ICD-10-CM

## 2022-06-20 DIAGNOSIS — F41.9 ANXIETY: ICD-10-CM

## 2022-06-20 PROCEDURE — 99214 OFFICE O/P EST MOD 30 MIN: CPT | Performed by: INTERNAL MEDICINE

## 2022-06-20 PROCEDURE — G0439 PPPS, SUBSEQ VISIT: HCPCS | Performed by: INTERNAL MEDICINE

## 2022-06-20 RX ORDER — ONDANSETRON 4 MG/1
4 TABLET, FILM COATED ORAL EVERY 8 HOURS PRN
Qty: 20 TABLET | Refills: 4 | Status: SHIPPED | OUTPATIENT
Start: 2022-06-20 | End: 2022-07-14 | Stop reason: SDUPTHER

## 2022-06-20 RX ORDER — CITALOPRAM 20 MG/1
20 TABLET ORAL DAILY
Qty: 90 TABLET | Refills: 3 | Status: SHIPPED | OUTPATIENT
Start: 2022-06-20

## 2022-06-20 RX ORDER — PROMETHAZINE HYDROCHLORIDE AND CODEINE PHOSPHATE 6.25; 1 MG/5ML; MG/5ML
5 SYRUP ORAL EVERY 6 HOURS PRN
Qty: 100 ML | Refills: 0 | Status: SHIPPED | OUTPATIENT
Start: 2022-06-20 | End: 2022-07-13 | Stop reason: SDUPTHER

## 2022-06-20 NOTE — ASSESSMENT & PLAN NOTE
A 33-year-old female here today for repeat Medicare wellness visit  She did have labs performed prior to visit today we did discuss the results of length patient  Patient relates in general she is doing relatively well but has a persistent cough after recent COVID virus infection  She states occasionally she brings up slight amount of yellow mucus but otherwise this is clear  She states the cough is worse at nighttime  Denies any shortness of breath  No other residual symptoms from the COVID virus  She is still smoking  Patient did undergo full physical exam today in the office and lungs are clear and and no acute abnormalities  Patient will have repeat fasting blood sugar hemoglobin A1c performed in approximately 6 months but in the interim she was told if any new problems or concerns or if her cough has not subsided to please call

## 2022-06-20 NOTE — ASSESSMENT & PLAN NOTE
History in the past of elevated TSH levels  No overt hypothyroidism    Will continue to monitor this

## 2022-06-20 NOTE — PROGRESS NOTES
Assessment and Plan:     Problem List Items Addressed This Visit    None          Preventive health issues were discussed with patient, and age appropriate screening tests were ordered as noted in patient's After Visit Summary  Personalized health advice and appropriate referrals for health education or preventive services given if needed, as noted in patient's After Visit Summary       History of Present Illness:     Patient presents for a Medicare Wellness Visit    HPI   Patient Care Team:  Mary Larson DO as PCP - General  Amita Smith MD (Vascular Surgery)  Darryl Martinez MD (Colon and Rectal Surgery)     Review of Systems:     Review of Systems     Problem List:     Patient Active Problem List   Diagnosis    Chronic migraine without aura without status migrainosus, not intractable    Chronic migraine without aura    Hyperlipidemia    Smoking    Healthcare maintenance    Bronchitis    Fecal occult blood test positive    Primary insomnia    Bruit of right carotid artery    Simple chronic bronchitis (HCC)    Bilateral carotid artery stenosis    Fibromuscular dysplasia of cervicocranial artery (HCC)    Depression    Herpes simplex    Macular degeneration of right eye    LLQ abdominal pain    Perforated diverticulum    Acute left-sided low back pain with sciatica    Pyuria    Acquired hypothyroidism    Overweight    Ventral hernia without obstruction or gangrene    Cervicalgia    Chronic fatigue    Nausea    Cervical spondylosis    COVID      Past Medical and Surgical History:     Past Medical History:   Diagnosis Date    Hyperlipidemia     Migraine      Past Surgical History:   Procedure Laterality Date    BREAST EXCISIONAL BIOPSY Right 1986    HARTMANS PROCEDURE N/A 3/8/2020    Procedure: Laparoscopic drainage of intra peritoneal abscess, sigmoid rescetion,;  Surgeon: Darryl Martinez MD;  [facility]: [facility];  Service: Colorectal    HYSTERECTOMY  1978    age 25  NASAL SEPTUM SURGERY      deviation repair    VA LAP, VENTRAL HERNIA REPAIR,REDUCIBLE N/A 1/26/2021    Procedure: REPAIR HERNIA VENTRAL LAPAROSCOPIC;  Surgeon: Yves Corado MD;  [facility];  Service: Colorectal    VA LAP,DIAGNOSTIC ABDOMEN N/A 3/8/2020    Procedure: LAPAROSCOPY DIAGNOSTIC;  Surgeon: Yves Corado MD;  [facility];  Service: Colorectal    VA SIGMOIDOSCOPY FLX DX W/COLLJ SPEC BR/WA IF PFRMD N/A 3/8/2020    Procedure: Dayami Rom;  Surgeon: Yves Corado MD;  [facility];  Service: Colorectal    SHOULDER SURGERY        Family History:     Family History   Problem Relation Age of Onset    Breast cancer Mother 48    Heart disease Father     Diabetes Sister     Leukemia Sister     No Known Problems Maternal Grandmother     No Known Problems Maternal Grandfather     No Known Problems Paternal Grandmother     No Known Problems Paternal Grandfather     No Known Problems Sister     Breast cancer Maternal Aunt 48    No Known Problems Maternal Aunt     No Known Problems Paternal Aunt     Colon cancer Maternal Uncle     No Known Problems Son     No Known Problems Son     Lung cancer Other 52      Social History:     Social History     Socioeconomic History    Marital status: /Civil Union     Spouse name: Not on file    Number of children: Not on file    Years of education: Not on file    Highest education level: Not on file   Occupational History    Not on file   Tobacco Use    Smoking status: Current Every Day Smoker     Packs/day: 0 50     Years: 53 00     Pack years: 26 50     Start date: 1965    Smokeless tobacco: Never Used   Vaping Use    Vaping Use: Never used   Substance and Sexual Activity    Alcohol use:  Yes    Drug use: No    Sexual activity: Not on file   Other Topics Concern    Not on file   Social History Narrative    Caffeine use      Social Determinants of Health     Financial Resource Strain: Not on file   Food Insecurity: Not on file   Transportation Needs: Not on file   Physical Activity: Not on file   Stress: Not on file   Social Connections: Not on file   Intimate Partner Violence: Not on file   Housing Stability: Not on file      Medications and Allergies:     Current Outpatient Medications   Medication Sig Dispense Refill    albuterol (Ventolin HFA) 90 mcg/act inhaler Inhale 2 puffs every 6 (six) hours as needed for wheezing 18 g 3    aspirin (ECOTRIN) 325 mg EC tablet Take 1 tablet (325 mg total) by mouth daily 30 tablet 0    Cholecalciferol (VITAMIN D3 PO) Take by mouth daily  (Patient not taking: No sig reported)      citalopram (CeleXA) 20 mg tablet Take 1 tablet (20 mg total) by mouth daily 90 tablet 3    cyanocobalamin (VITAMIN B-12) 1000 MCG tablet Take 1,000 mcg by mouth daily  (Patient not taking: No sig reported)      divalproex sodium (DEPAKOTE ER) 250 mg 24 hr tablet Take 1 tablet (250 mg total) by mouth 2 (two) times a day 180 tablet 3    docusate sodium (COLACE) 100 mg capsule Take 1 capsule (100 mg total) by mouth 2 (two) times a day (Patient not taking: No sig reported) 180 capsule 2    famotidine (PEPCID) 40 MG tablet Every night before bed 90 tablet 2    Lidocaine Viscous HCl (XYLOCAINE) 2 % mucosal solution  (Patient not taking: No sig reported)      Magnesium 500 MG CAPS Take by mouth daily  (Patient not taking: No sig reported)      naproxen (NAPROSYN) 500 mg tablet Take 1 tablet (500 mg total) by mouth 2 (two) times a day as needed for headaches 90 tablet 4    ondansetron (ZOFRAN) 4 mg tablet Take 1 tablet (4 mg total) by mouth every 8 (eight) hours as needed for nausea or vomiting 20 tablet 4    polyethylene glycol (GLYCOLAX) 17 GM/SCOOP powder Take 17 g by mouth daily , increase to twice daily if needed 850 g 3    predniSONE 10 mg tablet Take 1 tablet (10 mg total) by mouth in the morning   Two pills twice a day with food for 3 days then 1 pill twice a day with food for 3 days then 1 pill daily till finished  21 tablet 0    pyridoxine (B-6) 100 MG tablet Take 100 mg by mouth daily  (Patient not taking: No sig reported)      Riboflavin (B2) 100 MG TABS Take by mouth daily  (Patient not taking: No sig reported)      simvastatin (ZOCOR) 40 mg tablet TAKE 1 TABLET BY MOUTH DAILY 90 tablet 3    SUMAtriptan (IMITREX) 100 mg tablet TAKE 1 TABLET AT ONSET OF MIGRAINE HEADACHE  MAY REPEAT IN 2 HOURS IF NEEDED, NO MORE THAN 2 IN 24 HOURS AND NO MORE THAN 3 IN A WEEK 27 tablet 0    zolpidem (AMBIEN) 5 mg tablet One pill at bedtime as needed to help with sleep 90 tablet 1     Current Facility-Administered Medications   Medication Dose Route Frequency Provider Last Rate Last Admin    Botulinum Toxin Type A SOLR 200 Units  200 Units Injection Q3 Months Bernarda Fuentes MD   200 Units at 03/15/22 1509     Allergies   Allergen Reactions    Penicillins Anaphylaxis    Azithromycin Hives    Nisoldipine      Reaction Date: 14Apr2011;     Sulfa Antibiotics Hives      Immunizations:     Immunization History   Administered Date(s) Administered    COVID-19 PFIZER VACCINE 0 3 ML IM 12/22/2020, 01/12/2021    H1N1, All Formulations 11/06/2009    INFLUENZA 10/07/2019, 09/29/2020    Pneumococcal Conjugate 13-Valent 03/31/2016    Pneumococcal Polysaccharide PPV23 11/09/2020      Health Maintenance:         Topic Date Due    Lung Cancer Screening  Never done    Breast Cancer Screening: Mammogram  12/27/2023    Colorectal Cancer Screening  08/03/2025    Hepatitis C Screening  Completed         Topic Date Due    DTaP,Tdap,and Td Vaccines (1 - Tdap) Never done    COVID-19 Vaccine (3 - Booster for Pfizer series) 06/12/2021    Influenza Vaccine (Season Ended) 09/01/2022      Medicare Screening Tests and Risk Assessments:     Betty Granados is here for her Subsequent Wellness visit  Health Risk Assessment:   Patient rates overall health as good   Patient feels that their physical health rating is same  Patient is very satisfied with their life  Eyesight was rated as same  Hearing was rated as same  Patient feels that their emotional and mental health rating is same  Patients states they are never, rarely angry  Patient states they are sometimes unusually tired/fatigued  Pain experienced in the last 7 days has been none  Patient states that she has experienced no weight loss or gain in last 6 months  Fall Risk Screening: In the past year, patient has experienced: no history of falling in past year      Urinary Incontinence Screening:   Patient has not leaked urine accidently in the last six months  Home Safety:  Patient does not have trouble with stairs inside or outside of their home  Patient has working smoke alarms and has no working carbon monoxide detector  Home safety hazards include: none  Nutrition:   Current diet is Regular  Medications:   Patient is not currently taking any over-the-counter supplements  Patient is able to manage medications  Activities of Daily Living (ADLs)/Instrumental Activities of Daily Living (IADLs):   Walk and transfer into and out of bed and chair?: Yes  Dress and groom yourself?: Yes    Bathe or shower yourself?: Yes    Feed yourself?  Yes  Do your laundry/housekeeping?: Yes  Manage your money, pay your bills and track your expenses?: Yes  Make your own meals?: Yes    Do your own shopping?: Yes    Previous Hospitalizations:   Any hospitalizations or ED visits within the last 12 months?: No      Advance Care Planning:   Living will: No    Durable POA for healthcare: No    Advanced directive: No      PREVENTIVE SCREENINGS      Cardiovascular Screening:    General: Screening Not Indicated and History Lipid Disorder      Diabetes Screening:     General: Screening Current      Colorectal Cancer Screening:     General: Screening Current      Breast Cancer Screening:     General: Screening Current      Cervical Cancer Screening:    General: Screening Not Indicated      Lung Cancer Screening:     General: Screening Not Indicated      Hepatitis C Screening:    General: Screening Current    Screening, Brief Intervention, and Referral to Treatment (SBIRT)    Screening  Typical number of drinks in a day: 0  Typical number of drinks in a week: 0  Interpretation: Low risk drinking behavior  AUDIT-C Screenin) How often did you have a drink containing alcohol in the past year? never  2) How many drinks did you have on a typical day when you were drinking in the past year? 0  3) How often did you have 6 or more drinks on one occasion in the past year? never    AUDIT-C Score: 0  Interpretation: Score 0-2 (female): Negative screen for alcohol misuse    Single Item Drug Screening:  How often have you used an illegal drug (including marijuana) or a prescription medication for non-medical reasons in the past year? never    Single Item Drug Screen Score: 0  Interpretation: Negative screen for possible drug use disorder    No exam data present     Physical Exam:     There were no vitals taken for this visit      Physical Exam     Krys Pandey DO

## 2022-06-20 NOTE — ASSESSMENT & PLAN NOTE
COVID infection  Still has residual cough  Did not have complete resolution of symptoms with treatment including oral antibiotics and oral prednisone  Patient was given prescription for Phenergan with codeine to help with the cough especially at nighttime and patient is to continue to use her inhalers as directed    If not improving would consider sending patient for chest x-ray

## 2022-06-20 NOTE — ASSESSMENT & PLAN NOTE
Total cholesterol is controlled but patient does have a slight increase in her triglycerides levels which may be secondary to hyperglycemia  We will continue to monitor this initiate medicine in the future if indicated

## 2022-06-20 NOTE — PROGRESS NOTES
Assessment/Plan:    Healthcare maintenance  A 75-year-old female here today for repeat Medicare wellness visit  She did have labs performed prior to visit today we did discuss the results of length patient  Patient relates in general she is doing relatively well but has a persistent cough after recent COVID virus infection  She states occasionally she brings up slight amount of yellow mucus but otherwise this is clear  She states the cough is worse at nighttime  Denies any shortness of breath  No other residual symptoms from the COVID virus  She is still smoking  Patient did undergo full physical exam today in the office and lungs are clear and and no acute abnormalities  Patient will have repeat fasting blood sugar hemoglobin A1c performed in approximately 6 months but in the interim she was told if any new problems or concerns or if her cough has not subsided to please call  Hyperglycemia  Again with lab testing patient has a slightly elevated fasting blood sugar level  She states that she does watch her diet and does not eat a lot of concentrated sweets but does have intake of simple carbohydrates  She was told the importance of watching closely her intake of food products especially in light of the fact that she has a slight elevation in blood sugars  Check a fasting blood sugar hemoglobin A1c with her next visit  Smoking  Despite respiratory symptoms that were worse after her COVID virus infection she is still smoking but she states she has cut down  Again we reinforced the importance of quitting completely for her health and welfare especially in light of other medical problems including hyperlipidemia and now hyperglycemia  COVID  COVID infection  Still has residual cough  Did not have complete resolution of symptoms with treatment including oral antibiotics and oral prednisone    Patient was given prescription for Phenergan with codeine to help with the cough especially at nighttime and patient is to continue to use her inhalers as directed  If not improving would consider sending patient for chest x-ray    Nausea  Patient continues to take Zofran intermittently for episodes of nausea  She states these are rare  Patient has a prescription for Zofran to take as needed    Hyperlipidemia  Total cholesterol is controlled but patient does have a slight increase in her triglycerides levels which may be secondary to hyperglycemia  We will continue to monitor this initiate medicine in the future if indicated  Acquired hypothyroidism  History in the past of elevated TSH levels  No overt hypothyroidism  Will continue to monitor this       Diagnoses and all orders for this visit:    COVID  -     promethazine-codeine (PHENERGAN WITH CODEINE) 6 25-10 mg/5 mL syrup; Take 5 mL by mouth every 6 (six) hours as needed for cough    Nausea  -     ondansetron (ZOFRAN) 4 mg tablet; Take 1 tablet (4 mg total) by mouth every 8 (eight) hours as needed for nausea or vomiting    Anxiety  -     citalopram (CeleXA) 20 mg tablet; Take 1 tablet (20 mg total) by mouth daily    Hyperglycemia  -     Glucose, fasting; Future  -     Hemoglobin A1C; Future    Smoking    Healthcare maintenance    Familial hypercholesterolemia  -     Lipid panel; Future    Acquired hypothyroidism  -     TSH, 3rd generation with Free T4 reflex; Future          Subjective:      Patient ID: Shahnaz Birch is a 74 y o  female  The 26-year-old female history of medical problems as outlined previously who is here today for repeat Medicare wellness visit  Recently had infection with COVID virus and states overall doing better but still has residual cough and she is still smoking  She did have labs performed prior to the visit today we did discuss the results of length the patient and her  who was in attendance        The following portions of the patient's history were reviewed and updated as appropriate:   She  has a past medical history of Hyperlipidemia and Migraine  She   Patient Active Problem List    Diagnosis Date Noted    Hyperglycemia 06/20/2022    COVID 05/19/2022    Cervical spondylosis 07/26/2021    Chronic fatigue 05/10/2021    Nausea 05/10/2021    Cervicalgia 03/03/2021    Ventral hernia without obstruction or gangrene 12/07/2020    Pyuria 11/09/2020    Acquired hypothyroidism 11/09/2020    Overweight 11/09/2020    Acute left-sided low back pain with sciatica 07/20/2020    Perforated diverticulum 03/08/2020    LLQ abdominal pain 02/21/2020    Macular degeneration of right eye 11/05/2019    Herpes simplex 10/14/2019    Fibromuscular dysplasia of cervicocranial artery (HCC) 08/27/2019    Simple chronic bronchitis ([address] ) 07/22/2019    Bilateral carotid artery stenosis 07/22/2019    Fecal occult blood test positive 06/27/2019    Primary insomnia 06/27/2019    Bruit of right carotid artery 06/27/2019    Bronchitis 12/21/2018    Healthcare maintenance 05/17/2018    Chronic migraine without aura without status migrainosus, not intractable 03/15/2018    Smoking 07/16/2015    Chronic migraine without aura 12/04/2014    Hyperlipidemia 09/07/2012    Depression 09/07/2012     She  has a past surgical history that includes Nasal septum surgery; Shoulder surgery; Hysterectomy (1978); Breast excisional biopsy (Right, 1986); pr lap,diagnostic abdomen (N/A, 3/8/2020); pr sigmoidoscopy flx dx w/collj spec br/wa if pfrmd (N/A, 3/8/2020); HARTMANS PROCEDURE (N/A, 3/8/2020); and pr lap, ventral hernia repair,reducible (N/A, 1/26/2021)  Her family history includes Breast cancer (age of onset: 48) in her maternal aunt and mother; Colon cancer in her maternal uncle; Diabetes in her sister;  Heart disease in her father; Leukemia in her sister; Lung cancer (age of onset: 52) in her other; No Known Problems in her maternal aunt, maternal grandfather, maternal grandmother, paternal aunt, paternal grandfather, paternal grandmother, sister, son, and son  She  reports that she has been smoking  She started smoking about 57 years ago  She has a 26 50 pack-year smoking history  She has never used smokeless tobacco  She reports current alcohol use  She reports that she does not use drugs  Current Outpatient Medications   Medication Sig Dispense Refill    albuterol (Ventolin HFA) 90 mcg/act inhaler Inhale 2 puffs every 6 (six) hours as needed for wheezing 18 g 3    aspirin (ECOTRIN) 325 mg EC tablet Take 1 tablet (325 mg total) by mouth daily 30 tablet 0    citalopram (CeleXA) 20 mg tablet Take 1 tablet (20 mg total) by mouth daily 90 tablet 3    divalproex sodium (DEPAKOTE ER) 250 mg 24 hr tablet Take 1 tablet (250 mg total) by mouth 2 (two) times a day 180 tablet 3    famotidine (PEPCID) 40 MG tablet Every night before bed 90 tablet 2    Magnesium 500 MG CAPS Take by mouth daily      naproxen (NAPROSYN) 500 mg tablet Take 1 tablet (500 mg total) by mouth 2 (two) times a day as needed for headaches 90 tablet 4    ondansetron (ZOFRAN) 4 mg tablet Take 1 tablet (4 mg total) by mouth every 8 (eight) hours as needed for nausea or vomiting 20 tablet 4    polyethylene glycol (GLYCOLAX) 17 GM/SCOOP powder Take 17 g by mouth daily , increase to twice daily if needed 850 g 3    predniSONE 10 mg tablet Take 1 tablet (10 mg total) by mouth in the morning  Two pills twice a day with food for 3 days then 1 pill twice a day with food for 3 days then 1 pill daily till finished  21 tablet 0    promethazine-codeine (PHENERGAN WITH CODEINE) 6 25-10 mg/5 mL syrup Take 5 mL by mouth every 6 (six) hours as needed for cough 100 mL 0    simvastatin (ZOCOR) 40 mg tablet TAKE 1 TABLET BY MOUTH DAILY 90 tablet 3    SUMAtriptan (IMITREX) 100 mg tablet TAKE 1 TABLET AT ONSET OF MIGRAINE HEADACHE   MAY REPEAT IN 2 HOURS IF NEEDED, NO MORE THAN 2 IN 24 HOURS AND NO MORE THAN 3 IN A WEEK 27 tablet 0    zolpidem (AMBIEN) 5 mg tablet One pill at bedtime as needed to help with sleep 90 tablet 1    Cholecalciferol (VITAMIN D3 PO) Take by mouth daily  (Patient not taking: No sig reported)      cyanocobalamin (VITAMIN B-12) 1000 MCG tablet Take 1,000 mcg by mouth daily  (Patient not taking: No sig reported)      docusate sodium (COLACE) 100 mg capsule Take 1 capsule (100 mg total) by mouth 2 (two) times a day (Patient not taking: No sig reported) 180 capsule 2    Lidocaine Viscous HCl (XYLOCAINE) 2 % mucosal solution  (Patient not taking: No sig reported)      pyridoxine (B-6) 100 MG tablet Take 100 mg by mouth daily  (Patient not taking: No sig reported)      Riboflavin (B2) 100 MG TABS Take by mouth daily  (Patient not taking: No sig reported)       Current Facility-Administered Medications   Medication Dose Route Frequency Provider Last Rate Last Admin    Botulinum Toxin Type A SOLR 200 Units  200 Units Injection Q3 Months Ramy Lowe MD   200 Units at 03/15/22 8680     Current Outpatient Medications on File Prior to Visit   Medication Sig    albuterol (Ventolin HFA) 90 mcg/act inhaler Inhale 2 puffs every 6 (six) hours as needed for wheezing    aspirin (ECOTRIN) 325 mg EC tablet Take 1 tablet (325 mg total) by mouth daily    divalproex sodium (DEPAKOTE ER) 250 mg 24 hr tablet Take 1 tablet (250 mg total) by mouth 2 (two) times a day    famotidine (PEPCID) 40 MG tablet Every night before bed    Magnesium 500 MG CAPS Take by mouth daily    naproxen (NAPROSYN) 500 mg tablet Take 1 tablet (500 mg total) by mouth 2 (two) times a day as needed for headaches    polyethylene glycol (GLYCOLAX) 17 GM/SCOOP powder Take 17 g by mouth daily , increase to twice daily if needed    predniSONE 10 mg tablet Take 1 tablet (10 mg total) by mouth in the morning  Two pills twice a day with food for 3 days then 1 pill twice a day with food for 3 days then 1 pill daily till finished      simvastatin (ZOCOR) 40 mg tablet TAKE 1 TABLET BY MOUTH DAILY    SUMAtriptan (IMITREX) 100 mg tablet TAKE 1 TABLET AT ONSET OF MIGRAINE HEADACHE  MAY REPEAT IN 2 HOURS IF NEEDED, NO MORE THAN 2 IN 24 HOURS AND NO MORE THAN 3 IN A WEEK    zolpidem (AMBIEN) 5 mg tablet One pill at bedtime as needed to help with sleep    [DISCONTINUED] citalopram (CeleXA) 20 mg tablet Take 1 tablet (20 mg total) by mouth daily    [DISCONTINUED] ondansetron (ZOFRAN) 4 mg tablet Take 1 tablet (4 mg total) by mouth every 8 (eight) hours as needed for nausea or vomiting    Cholecalciferol (VITAMIN D3 PO) Take by mouth daily  (Patient not taking: No sig reported)    cyanocobalamin (VITAMIN B-12) 1000 MCG tablet Take 1,000 mcg by mouth daily  (Patient not taking: No sig reported)    docusate sodium (COLACE) 100 mg capsule Take 1 capsule (100 mg total) by mouth 2 (two) times a day (Patient not taking: No sig reported)    Lidocaine Viscous HCl (XYLOCAINE) 2 % mucosal solution  (Patient not taking: No sig reported)    pyridoxine (B-6) 100 MG tablet Take 100 mg by mouth daily  (Patient not taking: No sig reported)    Riboflavin (B2) 100 MG TABS Take by mouth daily  (Patient not taking: No sig reported)     Current Facility-Administered Medications on File Prior to Visit   Medication    Botulinum Toxin Type A SOLR 200 Units     She is allergic to penicillins, azithromycin, nisoldipine, and sulfa antibiotics       Review of Systems   Constitutional: Negative  Patient states that she did have improvement with her energy level when she was placed on steroids   HENT: Negative  Eyes: Negative  Respiratory: Positive for cough  Negative for apnea, choking, chest tightness, shortness of breath, wheezing and stridor  Cardiovascular: Negative  Gastrointestinal: Negative  Endocrine: Negative  Genitourinary: Negative  Musculoskeletal: Negative  Skin: Negative  Allergic/Immunologic: Negative  Neurological: Negative  Hematological: Negative  Psychiatric/Behavioral: Negative  Objective:      /70   Pulse 76   Temp 97 5 °F (36 4 °C)   Ht 5' 6" (1 676 m)   Wt 68 5 kg (151 lb)   SpO2 95%   BMI 24 37 kg/m²          Physical Exam  Vitals and nursing note reviewed  Constitutional:       General: She is not in acute distress  Appearance: Normal appearance  She is normal weight  She is not ill-appearing, toxic-appearing or diaphoretic  Comments: Pleasant 53-year-old female who is awake alert no acute distress oriented x3   HENT:      Head: Normocephalic and atraumatic  Right Ear: Ear canal and external ear normal  There is no impacted cerumen  Left Ear: Tympanic membrane, ear canal and external ear normal  There is no impacted cerumen  Ears:      Comments: Chronically dull tympanic membrane noted on the right  No erythema or bulging     Nose: Nose normal  No congestion or rhinorrhea  Mouth/Throat:      Mouth: Mucous membranes are moist       Pharynx: Oropharynx is clear  No oropharyngeal exudate or posterior oropharyngeal erythema  Eyes:      General: No scleral icterus  Right eye: No discharge  Left eye: No discharge  Extraocular Movements: Extraocular movements intact  Conjunctiva/sclera: Conjunctivae normal       Pupils: Pupils are equal, round, and reactive to light  Neck:      Vascular: No carotid bruit  Cardiovascular:      Rate and Rhythm: Normal rate and regular rhythm  Heart sounds: Normal heart sounds  No murmur heard  No friction rub  No gallop  Pulmonary:      Effort: Pulmonary effort is normal  No respiratory distress  Breath sounds: Normal breath sounds  No stridor  No wheezing, rhonchi or rales  Comments: Patient does have occasional productive cough after deep breathing, no noticeable shortness of breath  Chest:      Chest wall: No tenderness  Abdominal:      General: Abdomen is flat  Bowel sounds are normal  There is no distension  Palpations: Abdomen is soft   There is no mass       Tenderness: There is no abdominal tenderness  There is no right CVA tenderness, left CVA tenderness, guarding or rebound  Hernia: No hernia is present  Comments: With history bowel surgery she was told the 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   Musculoskeletal:         General: No swelling, tenderness, deformity or signs of injury  Normal range of motion  Cervical back: Normal range of motion and neck supple  No rigidity or tenderness  Right lower leg: No edema  Left lower leg: No edema  Lymphadenopathy:      Cervical: No cervical adenopathy  Skin:     General: Skin is warm and dry  Capillary Refill: Capillary refill takes less than 2 seconds  Coloration: Skin is not jaundiced or pale  Findings: No bruising, erythema, lesion or rash  Neurological:      General: No focal deficit present  Mental Status: She is alert and oriented to person, place, and time  Mental status is at baseline  Cranial Nerves: No cranial nerve deficit  Sensory: No sensory deficit  Motor: No weakness  Coordination: Coordination normal       Gait: Gait normal       Deep Tendon Reflexes: Reflexes normal    Psychiatric:         Mood and Affect: Mood normal          Behavior: Behavior normal          Thought Content:  Thought content normal          Judgment: Judgment normal

## 2022-06-20 NOTE — ASSESSMENT & PLAN NOTE
Patient continues to take Zofran intermittently for episodes of nausea  She states these are rare    Patient has a prescription for Zofran to take as needed

## 2022-06-20 NOTE — PATIENT INSTRUCTIONS
Medicare Preventive Visit Patient Instructions  Thank you for completing your Welcome to Medicare Visit or Medicare Annual Wellness Visit today  Your next wellness visit will be due in one year (6/21/2023)  The screening/preventive services that you may require over the next 5-10 years are detailed below  Some tests may not apply to you based off risk factors and/or age  Screening tests ordered at today's visit but not completed yet may show as past due  Also, please note that scanned in results may not display below  Preventive Screenings:  Service Recommendations Previous Testing/Comments   Colorectal Cancer Screening  * Colonoscopy    * Fecal Occult Blood Test (FOBT)/Fecal Immunochemical Test (FIT)  * Fecal DNA/Cologuard Test  * Flexible Sigmoidoscopy Age: 54-65 years old   Colonoscopy: every 10 years (may be performed more frequently if at higher risk)  OR  FOBT/FIT: every 1 year  OR  Cologuard: every 3 years  OR  Sigmoidoscopy: every 5 years  Screening may be recommended earlier than age 48 if at higher risk for colorectal cancer  Also, an individualized decision between you and your healthcare provider will decide whether screening between the ages of 74-80 would be appropriate  Colonoscopy: 08/03/2020  FOBT/FIT: 10/27/2021  Cologuard: Not on file  Sigmoidoscopy: 03/08/2020    Screening Current     Breast Cancer Screening Age: 36 years old  Frequency: every 1-2 years  Not required if history of left and right mastectomy Mammogram: 12/27/2021    Screening Current   Cervical Cancer Screening Between the ages of 21-29, pap smear recommended once every 3 years  Between the ages of 33-67, can perform pap smear with HPV co-testing every 5 years     Recommendations may differ for women with a history of total hysterectomy, cervical cancer, or abnormal pap smears in past  Pap Smear: Not on file    Screening Not Indicated   Hepatitis C Screening Once for adults born between 1945 and 1965  More frequently in patients at high risk for Hepatitis C Hep C Antibody: 03/28/2018    Screening Current   Diabetes Screening 1-2 times per year if you're at risk for diabetes or have pre-diabetes Fasting glucose: 104 mg/dL   A1C: 5 7 %    Screening Current   Cholesterol Screening Once every 5 years if you don't have a lipid disorder  May order more often based on risk factors  Lipid panel: 05/09/2022    Screening Not Indicated  History Lipid Disorder     Other Preventive Screenings Covered by Medicare:  1  Abdominal Aortic Aneurysm (AAA) Screening: covered once if your at risk  You're considered to be at risk if you have a family history of AAA  2  Lung Cancer Screening: covers low dose CT scan once per year if you meet all of the following conditions: (1) Age 50-69; (2) No signs or symptoms of lung cancer; (3) Current smoker or have quit smoking within the last 15 years; (4) You have a tobacco smoking history of at least 30 pack years (packs per day multiplied by number of years you smoked); (5) You get a written order from a healthcare provider  3  Glaucoma Screening: covered annually if you're considered high risk: (1) You have diabetes OR (2) Family history of glaucoma OR (3)  aged 48 and older OR (3)  American aged 72 and older  3  Osteoporosis Screening: covered every 2 years if you meet one of the following conditions: (1) You're estrogen deficient and at risk for osteoporosis based off medical history and other findings; (2) Have a vertebral abnormality; (3) On glucocorticoid therapy for more than 3 months; (4) Have primary hyperparathyroidism; (5) On osteoporosis medications and need to assess response to drug therapy  · Last bone density test (DXA Scan): 05/25/2016   5  HIV Screening: covered annually if you're between the age of 15-65  Also covered annually if you are younger than 13 and older than 72 with risk factors for HIV infection   For pregnant patients, it is covered up to 3 times per pregnancy  Immunizations:  Immunization Recommendations   Influenza Vaccine Annual influenza vaccination during flu season is recommended for all persons aged >= 6 months who do not have contraindications   Pneumococcal Vaccine (Prevnar and Pneumovax)  * Prevnar = PCV13  * Pneumovax = PPSV23   Adults 25-60 years old: 1-3 doses may be recommended based on certain risk factors  Adults 72 years old: Prevnar (PCV13) vaccine recommended followed by Pneumovax (PPSV23) vaccine  If already received PPSV23 since turning 65, then PCV13 recommended at least one year after PPSV23 dose  Hepatitis B Vaccine 3 dose series if at intermediate or high risk (ex: diabetes, end stage renal disease, liver disease)   Tetanus (Td) Vaccine - COST NOT COVERED BY MEDICARE PART B Following completion of primary series, a booster dose should be given every 10 years to maintain immunity against tetanus  Td may also be given as tetanus wound prophylaxis  Tdap Vaccine - COST NOT COVERED BY MEDICARE PART B Recommended at least once for all adults  For pregnant patients, recommended with each pregnancy  Shingles Vaccine (Shingrix) - COST NOT COVERED BY MEDICARE PART B  2 shot series recommended in those aged 48 and above     Health Maintenance Due:      Topic Date Due    Lung Cancer Screening  Never done    Breast Cancer Screening: Mammogram  12/27/2023    Colorectal Cancer Screening  08/03/2025    Hepatitis C Screening  Completed     Immunizations Due:      Topic Date Due    DTaP,Tdap,and Td Vaccines (1 - Tdap) Never done    COVID-19 Vaccine (3 - Booster for Pfizer series) 06/12/2021    Influenza Vaccine (Season Ended) 09/01/2022     Advance Directives   What are advance directives? Advance directives are legal documents that state your wishes and plans for medical care  These plans are made ahead of time in case you lose your ability to make decisions for yourself   Advance directives can apply to any medical decision, such as the treatments you want, and if you want to donate organs  What are the types of advance directives? There are many types of advance directives, and each state has rules about how to use them  You may choose a combination of any of the following:  · Living will: This is a written record of the treatment you want  You can also choose which treatments you do not want, which to limit, and which to stop at a certain time  This includes surgery, medicine, IV fluid, and tube feedings  · Durable power of  for healthcare [facility]): This is a written record that states who you want to make healthcare choices for you when you are unable to make them for yourself  This person, called a proxy, is usually a family member or a friend  You may choose more than 1 proxy  · Do not resuscitate (DNR) order:  A DNR order is used in case your heart stops beating or you stop breathing  It is a request not to have certain forms of treatment, such as CPR  A DNR order may be included in other types of advance directives  · Medical directive: This covers the care that you want if you are in a coma, near death, or unable to make decisions for yourself  You can list the treatments you want for each condition  Treatment may include pain medicine, surgery, blood transfusions, dialysis, IV or tube feedings, and a ventilator (breathing machine)  · Values history: This document has questions about your views, beliefs, and how you feel and think about life  This information can help others choose the care that you would choose  Why are advance directives important? An advance directive helps you control your care  Although spoken wishes may be used, it is better to have your wishes written down  Spoken wishes can be misunderstood, or not followed  Treatments may be given even if you do not want them  An advance directive may make it easier for your family to make difficult choices about your care     Cigarette Smoking and Your Health Risks to your health if you smoke:  Nicotine and other chemicals found in tobacco damage every cell in your body  Even if you are a light smoker, you have an increased risk for cancer, heart disease, and lung disease  If you are pregnant or have diabetes, smoking increases your risk for complications  Benefits to your health if you stop smoking:   · You decrease respiratory symptoms such as coughing, wheezing, and shortness of breath  · You reduce your risk for cancers of the lung, mouth, throat, kidney, bladder, pancreas, stomach, and cervix  If you already have cancer, you increase the benefits of chemotherapy  You also reduce your risk for cancer returning or a second cancer from developing  · You reduce your risk for heart disease, blood clots, heart attack, and stroke  · You reduce your risk for lung infections, and diseases such as pneumonia, asthma, chronic bronchitis, and emphysema  · Your circulation improves  More oxygen can be delivered to your body  If you have diabetes, you lower your risk for complications, such as kidney, artery, and eye diseases  You also lower your risk for nerve damage  Nerve damage can lead to amputations, poor vision, and blindness  · You improve your body's ability to heal and to fight infections  For more information and support to stop smoking:   · [place]  Phone: 9- 437 [phone]  Web Address: www [employer]  Weight Management   Why it is important to manage your weight:  Being overweight increases your risk of health conditions such as heart disease, high blood pressure, type 2 diabetes, and certain types of cancer  It can also increase your risk for osteoarthritis, sleep apnea, and other respiratory problems  Aim for a slow, steady weight loss  Even a small amount of weight loss can lower your risk of health problems  How to lose weight safely:  A safe and healthy way to lose weight is to eat fewer calories and get regular exercise   You can lose up about 1 pound a week by decreasing the number of calories you eat by 500 calories each day  Healthy meal plan for weight management:  A healthy meal plan includes a variety of foods, contains fewer calories, and helps you stay healthy  A healthy meal plan includes the following:  · Eat whole-grain foods more often  A healthy meal plan should contain fiber  Fiber is the part of grains, fruits, and vegetables that is not broken down by your body  Whole-grain foods are healthy and provide extra fiber in your diet  Some examples of whole-grain foods are whole-wheat breads and pastas, oatmeal, brown rice, and bulgur  · Eat a variety of vegetables every day  Include dark, leafy greens such as spinach, kale, khoa greens, and mustard greens  Eat yellow and orange vegetables such as carrots, sweet potatoes, and winter squash  · Eat a variety of fruits every day  Choose fresh or canned fruit (canned in its own juice or light syrup) instead of juice  Fruit juice has very little or no fiber  · Eat low-fat dairy foods  Drink fat-free (skim) milk or 1% milk  Eat fat-free yogurt and low-fat cottage cheese  Try low-fat cheeses such as mozzarella and other reduced-fat cheeses  · Choose meat and other protein foods that are low in fat  Choose beans or other legumes such as split peas or lentils  Choose fish, skinless poultry (chicken or turkey), or lean cuts of red meat (beef or pork)  Before you cook meat or poultry, cut off any visible fat  · Use less fat and oil  Try baking foods instead of frying them  Add less fat, such as margarine, sour cream, regular salad dressing and mayonnaise to foods  Eat fewer high-fat foods  Some examples of high-fat foods include french fries, doughnuts, ice cream, and cakes  · Eat fewer sweets  Limit foods and drinks that are high in sugar  This includes candy, cookies, regular soda, and sweetened drinks  Exercise:  Exercise at least 30 minutes per day on most days of the week  Some examples of exercise include walking, biking, dancing, and swimming  You can also fit in more physical activity by taking the stairs instead of the elevator or parking farther away from stores  Ask your healthcare provider about the best exercise plan for you  © Copyright [employer] 2018 Information is for End User's use only and may not be sold, redistributed or otherwise used for commercial purposes   All illustrations and images included in CareNotes® are the copyrighted property of A D A M , Inc  or [address] [place] [address]

## 2022-06-20 NOTE — ASSESSMENT & PLAN NOTE
Despite respiratory symptoms that were worse after her COVID virus infection she is still smoking but she states she has cut down  Again we reinforced the importance of quitting completely for her health and welfare especially in light of other medical problems including hyperlipidemia and now hyperglycemia

## 2022-06-20 NOTE — ASSESSMENT & PLAN NOTE
Again with lab testing patient has a slightly elevated fasting blood sugar level  She states that she does watch her diet and does not eat a lot of concentrated sweets but does have intake of simple carbohydrates  She was told the importance of watching closely her intake of food products especially in light of the fact that she has a slight elevation in blood sugars  Check a fasting blood sugar hemoglobin A1c with her next visit

## 2022-06-22 ENCOUNTER — PROCEDURE VISIT (OUTPATIENT)
Dept: NEUROLOGY | Facility: CLINIC | Age: 72
End: 2022-06-22
Payer: COMMERCIAL

## 2022-06-22 VITALS
DIASTOLIC BLOOD PRESSURE: 78 MMHG | TEMPERATURE: 97.6 F | OXYGEN SATURATION: 94 % | SYSTOLIC BLOOD PRESSURE: 118 MMHG | HEART RATE: 70 BPM

## 2022-06-22 DIAGNOSIS — G43.709 CHRONIC MIGRAINE WITHOUT AURA WITHOUT STATUS MIGRAINOSUS, NOT INTRACTABLE: Primary | ICD-10-CM

## 2022-06-22 PROCEDURE — 64615 CHEMODENERV MUSC MIGRAINE: CPT | Performed by: PHYSICIAN ASSISTANT

## 2022-06-22 RX ORDER — TIZANIDINE 2 MG/1
2 TABLET ORAL
Qty: 90 TABLET | Refills: 3 | Status: SHIPPED | OUTPATIENT
Start: 2022-06-22

## 2022-06-22 NOTE — PROGRESS NOTES
Chemodenervation     Date/Time 6/22/2022 11:29 AM     Performed by  Juan De La Fuente PA-C     Authorized by Juan De La Fuente PA-C        Pre-procedure details      Prepped With: Alcohol     Anesthesia  (see MAR for exact dosages): Anesthesia method:  None   Procedure details     Position:  Upright   Botox     Botox Type:  Type A    Brand:  Botox    mL's of Botulinum Toxin:  155    Final Concentration per CC:  200 units    Needle Gauge:  30 G 2 5 inch   Procedures     Botox Procedures: chronic headache      Indications: migraines     Injection Location      Head / Face:  L superior cervical paraspinal, R superior cervical paraspinal, L , R , L frontalis, R frontalis, L medial occipitalis, R medial occipitalis, procerus, L temporalis, R temporalis, L superior trapezius and R superior trapezius    L  injection amount:  5 unit(s)    R  injection amount:  5 unit(s)    L lateral frontalis:  5 unit(s)    R lateral frontalis:  5 unit(s)    L medial frontalis:  5 unit(s)    R medial frontalis:  5 unit(s)    L temporalis injection amount:  20 unit(s)    R temporalis injection amount:  20 unit(s)    Procerus injection amount:  5 unit(s)    L medial occipitalis injection amount:  15 unit(s)    R medial occipitalis injection amount:  15 unit(s)    L superior cervical paraspinal injection amount:  10 unit(s)    R superior cervical paraspinal injection amount:  10 unit(s)    L superior trapezius injection amount:  15 unit(s)    R superior trapezius injection amount:  15 unit(s)   Total Units     Total units used:  155    Total units discarded:  45   Post-procedure details      Chemodenervation:  Chronic migraine    Facial Nerve Location[de-identified]  Bilateral facial nerve    Patient tolerance of procedure:   Tolerated well, no immediate complications

## 2022-07-13 DIAGNOSIS — U07.1 COVID: ICD-10-CM

## 2022-07-13 DIAGNOSIS — G43.709 CHRONIC MIGRAINE WITHOUT AURA WITHOUT STATUS MIGRAINOSUS, NOT INTRACTABLE: ICD-10-CM

## 2022-07-13 RX ORDER — PROMETHAZINE HYDROCHLORIDE AND CODEINE PHOSPHATE 6.25; 1 MG/5ML; MG/5ML
5 SYRUP ORAL EVERY 6 HOURS PRN
Qty: 100 ML | Refills: 0 | Status: SHIPPED | OUTPATIENT
Start: 2022-07-13 | End: 2022-08-15 | Stop reason: SDUPTHER

## 2022-07-13 RX ORDER — SUMATRIPTAN 100 MG/1
TABLET, FILM COATED ORAL
Qty: 27 TABLET | Refills: 0 | Status: SHIPPED | OUTPATIENT
Start: 2022-07-13 | End: 2022-09-27 | Stop reason: SDUPTHER

## 2022-07-13 NOTE — TELEPHONE ENCOUNTER
Pt left voicemail on refill line  Requesting refill of sumatriptan to [facility]  Script pended  Please review and sign if agreeable

## 2022-07-14 DIAGNOSIS — R11.0 NAUSEA: ICD-10-CM

## 2022-07-14 RX ORDER — ONDANSETRON 4 MG/1
4 TABLET, FILM COATED ORAL EVERY 8 HOURS PRN
Qty: 20 TABLET | Refills: 4 | Status: SHIPPED | OUTPATIENT
Start: 2022-07-14 | End: 2022-08-05 | Stop reason: SDUPTHER

## 2022-08-02 ENCOUNTER — APPOINTMENT (OUTPATIENT)
Dept: LAB | Age: 72
End: 2022-08-02
Payer: COMMERCIAL

## 2022-08-05 DIAGNOSIS — J40 BRONCHITIS: ICD-10-CM

## 2022-08-05 DIAGNOSIS — R11.0 NAUSEA: ICD-10-CM

## 2022-08-05 RX ORDER — ALBUTEROL SULFATE 90 UG/1
2 AEROSOL, METERED RESPIRATORY (INHALATION) EVERY 6 HOURS PRN
Qty: 18 G | Refills: 3 | Status: SHIPPED | OUTPATIENT
Start: 2022-08-05

## 2022-08-05 RX ORDER — ONDANSETRON 4 MG/1
4 TABLET, FILM COATED ORAL EVERY 8 HOURS PRN
Qty: 20 TABLET | Refills: 4 | Status: SHIPPED | OUTPATIENT
Start: 2022-08-05

## 2022-08-09 ENCOUNTER — OFFICE VISIT (OUTPATIENT)
Dept: NEUROLOGY | Facility: CLINIC | Age: 72
End: 2022-08-09
Payer: COMMERCIAL

## 2022-08-09 VITALS
DIASTOLIC BLOOD PRESSURE: 78 MMHG | TEMPERATURE: 95.6 F | BODY MASS INDEX: 24.94 KG/M2 | SYSTOLIC BLOOD PRESSURE: 130 MMHG | HEART RATE: 90 BPM | HEIGHT: 66 IN | WEIGHT: 155.2 LBS | RESPIRATION RATE: 12 BRPM

## 2022-08-09 DIAGNOSIS — G43.709 CHRONIC MIGRAINE WITHOUT AURA WITHOUT STATUS MIGRAINOSUS, NOT INTRACTABLE: Primary | ICD-10-CM

## 2022-08-09 DIAGNOSIS — G43.009 MIGRAINE WITHOUT AURA AND WITHOUT STATUS MIGRAINOSUS, NOT INTRACTABLE: ICD-10-CM

## 2022-08-09 PROCEDURE — 99214 OFFICE O/P EST MOD 30 MIN: CPT | Performed by: PHYSICIAN ASSISTANT

## 2022-08-09 RX ORDER — RIMEGEPANT SULFATE 75 MG/75MG
75 TABLET, ORALLY DISINTEGRATING ORAL AS NEEDED
Qty: 48 TABLET | Refills: 3 | Status: SHIPPED | OUTPATIENT
Start: 2022-08-09

## 2022-08-09 NOTE — PATIENT INSTRUCTIONS
Chronic migraine without aura without status migrainosus, not intractable  Pt will continue Botox injections  She understands that it can take up to 3 injections to see improvement in migraines  Discussed changing to Nurtec for treatment of migraines  She will take 1 tab at the onset of migraine  She may take it up to every other day  She will continue valproic acid as well  I have spent 30 minutes with Patient and family today in which greater than 50% of this time was spent in counseling/coordination of care regarding Diagnostic results, Prognosis, Risks and benefits of tx options, Intructions for management, Patient and family education, Importance of tx compliance, Risk factor reductions and Impressions  She will follow-up in 4 months

## 2022-08-09 NOTE — PROGRESS NOTES
Patient ID: Sheila Miller is a 74 y o  female  Assessment/Plan:    Chronic migraine without aura without status migrainosus, not intractable  · Pt will continue Botox injections  She understands that it can take up to 3 injections to see improvement in migraines  · Discussed changing to Nurtec for treatment of migraines  She will take 1 tab at the onset of migraine  She may take it up to every other day  · She will continue valproic acid as well  I have spent 30 minutes with Patient and family today in which greater than 50% of this time was spent in counseling/coordination of care regarding Diagnostic results, Prognosis, Risks and benefits of tx options, Intructions for management, Patient and family education, Importance of tx compliance, Risk factor reductions and Impressions  She will follow-up in 4 months  Problem List Items Addressed This Visit        Cardiovascular and Mediastinum    Chronic migraine without aura without status migrainosus, not intractable - Primary     · Pt will continue Botox injections  She understands that it can take up to 3 injections to see improvement in migraines  · Discussed changing to Nurtec for treatment of migraines  She will take 1 tab at the onset of migraine  She may take it up to every other day  · She will continue valproic acid as well  I have spent 30 minutes with Patient and family today in which greater than 50% of this time was spent in counseling/coordination of care regarding Diagnostic results, Prognosis, Risks and benefits of tx options, Intructions for management, Patient and family education, Importance of tx compliance, Risk factor reductions and Impressions  She will follow-up in 4 months           Relevant Medications    Rimegepant Sulfate (Nurtec) 75 MG TBDP      Other Visit Diagnoses     Migraine without aura and without status migrainosus, not intractable        Relevant Medications    Rimegepant Sulfate (Nurtec) 75 MG TBDP Subjective:    ELIZA Conte is a 64yo female, with mood disorder and hyperlipidemia, who follows in the office due to migraine headaches  She has had 2 rounds of Botox injections  She remains on Depakote as well  She has not yet noticed significant improvement in migraine headaches  She has a migraine headache every other day  She does take sumatriptan and naproxen to treat the migraines  She denies any side effects of the medications  Headaches are typically 8/10  They will start in the Lt occipital area and extend to the Lt eye  They can last anywhere from a couple hours to a few days  PREVENTIVE: Citalopram, Topamax, depakote, Botox  ABORTIVE: Naratriptan, Sumavel, Dexamethasone, maxalt, imtrex     The following portions of the patient's history were reviewed and updated as appropriate: allergies, current medications, past family history, past medical history, past social history, past surgical history and problem list          Objective:    Blood pressure 130/78, pulse 90, temperature (!) 95 6 °F (35 3 °C), temperature source Temporal, resp  rate 12, height 5' 6" (1 676 m), weight 70 4 kg (155 lb 3 2 oz), not currently breastfeeding  Physical Exam  Vitals reviewed  Eyes:      General: Lids are normal       Extraocular Movements: Extraocular movements intact  Pupils: Pupils are equal, round, and reactive to light  Neurological:      Coordination: Coordination is intact  Deep Tendon Reflexes: Strength normal and reflexes are normal and symmetric  Psychiatric:         Speech: Speech normal          Neurological Exam  Mental Status   Recent and remote memory are intact  Speech is normal  Language is fluent with no aphasia  Attention and concentration are normal  Fund of knowledge is appropriate for level of education  Apraxia absent  Cranial Nerves  CN II: Visual acuity is normal  Visual fields full to confrontation  CN III, IV, VI: Extraocular movements intact bilaterally  Normal lids and orbits bilaterally  Pupils equal round and reactive to light bilaterally  CN V: Facial sensation is normal   CN VII: Full and symmetric facial movement  CN VIII: Hearing is normal   CN IX, X: Palate elevates symmetrically  Normal gag reflex  CN XI: Shoulder shrug strength is normal   CN XII: Tongue midline without atrophy or fasciculations  Motor   Strength is 5/5 throughout all four extremities  Sensory  Sensation is intact to light touch, pinprick, vibration and proprioception in all four extremities  Reflexes  Deep tendon reflexes are 2+ and symmetric in all four extremities  Coordination    Finger-to-nose, rapid alternating movements and heel-to-shin normal bilaterally without dysmetria  Gait  Normal casual, toe, heel and tandem gait  ROS:    Review of Systems   Constitutional: Negative  Negative for appetite change and fever  HENT: Negative  Negative for hearing loss, tinnitus, trouble swallowing and voice change  Eyes: Negative  Negative for photophobia and pain  Respiratory: Negative  Negative for shortness of breath  Cardiovascular: Negative  Negative for palpitations  Gastrointestinal: Negative  Negative for nausea and vomiting  Endocrine: Negative  Negative for cold intolerance  Genitourinary: Negative  Negative for dysuria, frequency and urgency  Musculoskeletal: Negative  Negative for myalgias and neck pain  Skin: Negative  Negative for rash  Neurological: Positive for headaches (Every other day)  Negative for dizziness, tremors, seizures, syncope, facial asymmetry, speech difficulty, weakness, light-headedness and numbness  Hematological: Negative  Does not bruise/bleed easily  Psychiatric/Behavioral: Negative  Negative for confusion, hallucinations and sleep disturbance  Review of systems personally reviewed

## 2022-08-09 NOTE — ASSESSMENT & PLAN NOTE
· Pt will continue Botox injections  She understands that it can take up to 3 injections to see improvement in migraines  · Discussed changing to Nurtec for treatment of migraines  She will take 1 tab at the onset of migraine  She may take it up to every other day  · She will continue valproic acid as well  I have spent 30 minutes with Patient and family today in which greater than 50% of this time was spent in counseling/coordination of care regarding Diagnostic results, Prognosis, Risks and benefits of tx options, Intructions for management, Patient and family education, Importance of tx compliance, Risk factor reductions and Impressions  She will follow-up in 4 months

## 2022-08-15 ENCOUNTER — TELEPHONE (OUTPATIENT)
Dept: INTERNAL MEDICINE CLINIC | Facility: CLINIC | Age: 72
End: 2022-08-15

## 2022-08-15 DIAGNOSIS — J40 BRONCHITIS: Primary | ICD-10-CM

## 2022-08-15 DIAGNOSIS — U07.1 COVID: ICD-10-CM

## 2022-08-15 RX ORDER — PREDNISONE 10 MG/1
10 TABLET ORAL DAILY
Qty: 21 TABLET | Refills: 0 | Status: SHIPPED | OUTPATIENT
Start: 2022-08-15

## 2022-08-15 RX ORDER — PROMETHAZINE HYDROCHLORIDE AND CODEINE PHOSPHATE 6.25; 1 MG/5ML; MG/5ML
5 SYRUP ORAL EVERY 6 HOURS PRN
Qty: 100 ML | Refills: 0 | Status: SHIPPED | OUTPATIENT
Start: 2022-08-15 | End: 2022-09-20 | Stop reason: SDUPTHER

## 2022-08-15 NOTE — ASSESSMENT & PLAN NOTE
Still is having problems with cough and chest congestion. Denies any fever and chills. States the cough is nonproductive. Wants another prescription for prednisone and cough medicine. Patient was told she needs to be seen and/or consultation by pulmonology but she is refusing. Still smoking.   Will be sending for chest x-ray

## 2022-08-15 NOTE — TELEPHONE ENCOUNTER
Patient called and has been coughing and asked for prednisone and cough medicine  She can be reached at [phone]    Thank you

## 2022-08-18 ENCOUNTER — APPOINTMENT (OUTPATIENT)
Dept: RADIOLOGY | Age: 72
End: 2022-08-18
Payer: COMMERCIAL

## 2022-08-18 DIAGNOSIS — J40 BRONCHITIS: ICD-10-CM

## 2022-08-18 PROCEDURE — 71046 X-RAY EXAM CHEST 2 VIEWS: CPT

## 2022-08-22 DIAGNOSIS — F51.01 PRIMARY INSOMNIA: ICD-10-CM

## 2022-08-22 RX ORDER — ZOLPIDEM TARTRATE 5 MG/1
TABLET ORAL
Qty: 90 TABLET | Refills: 1 | Status: SHIPPED | OUTPATIENT
Start: 2022-08-22

## 2022-08-23 ENCOUNTER — TELEPHONE (OUTPATIENT)
Dept: INTERNAL MEDICINE CLINIC | Facility: CLINIC | Age: 72
End: 2022-08-23

## 2022-08-23 NOTE — TELEPHONE ENCOUNTER
I discussed the results of her chest x-ray which was essentially normal   Patient states she is 95% better  To call if any changes

## 2022-08-31 ENCOUNTER — TELEPHONE (OUTPATIENT)
Dept: NEUROLOGY | Facility: CLINIC | Age: 72
End: 2022-08-31

## 2022-08-31 NOTE — TELEPHONE ENCOUNTER
Called and spoke with Ree at [facility] who advised that botox is covered under the pharmacy plan and requires authorization  Initiated [facility] verbally over the phone  Requested that case be expedited  Per Ree there is a 24 hour turn around time  Specialty [facility] that can be used for the [facility]  Called and spoke with Brandi Banks (call ref # [id number]) at [address]  Per Brandi Banks prior authorization is not needed for Victor Manuel Zaldivar or CPT [address]

## 2022-09-06 NOTE — TELEPHONE ENCOUNTER
Called and spoke to Helen Salazar at [facility] for determination  Denied   Reason: patient has not tried a CGRP-specifically aimovig, emgality, nurtec    This is a new insurance for the patient and insurance was made aware that this would be a continuation of the medication  Would you like the patient to try one of those medications? Should I pursue a peer to peer and if so is there specific reasoning that the botox is required over a CGRP? Addendum: Please disregard above  As long as medical plan is billed prior authorization is not required

## 2022-09-07 ENCOUNTER — TELEPHONE (OUTPATIENT)
Dept: NEUROLOGY | Facility: CLINIC | Age: 72
End: 2022-09-07

## 2022-09-07 NOTE — TELEPHONE ENCOUNTER
Attempted to call pt for reminder of appt with Harsha Lord on 09/20/22, no answer  Left vm to call back when available

## 2022-09-14 DIAGNOSIS — G43.009 MIGRAINE WITHOUT AURA AND WITHOUT STATUS MIGRAINOSUS, NOT INTRACTABLE: ICD-10-CM

## 2022-09-14 DIAGNOSIS — G43.709 CHRONIC MIGRAINE WITHOUT AURA WITHOUT STATUS MIGRAINOSUS, NOT INTRACTABLE: ICD-10-CM

## 2022-09-14 RX ORDER — NAPROXEN 500 MG/1
500 TABLET ORAL 2 TIMES DAILY PRN
Qty: 90 TABLET | Refills: 4 | Status: SHIPPED | OUTPATIENT
Start: 2022-09-14

## 2022-09-14 NOTE — TELEPHONE ENCOUNTER
Patient left  asking for refills on the following medications:    Naproxen 500 mg tabs take 1 tab BID prn     Divalproex sodium 250 mg 1 tab BID ( just sent in on 6/15/22 with refills)    Last OV 8/9/22  Next appt is scheduled 9/20/22

## 2022-09-15 ENCOUNTER — TELEPHONE (OUTPATIENT)
Dept: NEUROLOGY | Facility: CLINIC | Age: 72
End: 2022-09-15

## 2022-09-15 NOTE — TELEPHONE ENCOUNTER
Patient left vm to refill naproxen and divalproex   [phone]  I returned call and let her know naproxen script sent to mail order pharmacy yesterday and divalproex still has refills  She will call mail order pharmacy to fill both  All questions answered at this time

## 2022-09-20 ENCOUNTER — TELEPHONE (OUTPATIENT)
Dept: INTERNAL MEDICINE CLINIC | Facility: CLINIC | Age: 72
End: 2022-09-20

## 2022-09-20 ENCOUNTER — PROCEDURE VISIT (OUTPATIENT)
Dept: NEUROLOGY | Facility: CLINIC | Age: 72
End: 2022-09-20
Payer: COMMERCIAL

## 2022-09-20 VITALS
HEART RATE: 72 BPM | TEMPERATURE: 97 F | RESPIRATION RATE: 14 BRPM | SYSTOLIC BLOOD PRESSURE: 110 MMHG | DIASTOLIC BLOOD PRESSURE: 60 MMHG

## 2022-09-20 DIAGNOSIS — G43.709 CHRONIC MIGRAINE WITHOUT AURA WITHOUT STATUS MIGRAINOSUS, NOT INTRACTABLE: Primary | ICD-10-CM

## 2022-09-20 DIAGNOSIS — F17.200 SMOKING: ICD-10-CM

## 2022-09-20 DIAGNOSIS — R05.3 CHRONIC COUGH: Primary | ICD-10-CM

## 2022-09-20 DIAGNOSIS — U07.1 COVID: ICD-10-CM

## 2022-09-20 PROCEDURE — 64615 CHEMODENERV MUSC MIGRAINE: CPT | Performed by: PHYSICIAN ASSISTANT

## 2022-09-20 RX ORDER — PROMETHAZINE HYDROCHLORIDE AND CODEINE PHOSPHATE 6.25; 1 MG/5ML; MG/5ML
5 SYRUP ORAL EVERY 6 HOURS PRN
Qty: 100 ML | Refills: 0 | Status: SHIPPED | OUTPATIENT
Start: 2022-09-20

## 2022-09-20 NOTE — TELEPHONE ENCOUNTER
Patient called and asked if you would be able to please order cough medicine and prednisone for a dry couth  She can be reached at [phone]    Thank you

## 2022-09-20 NOTE — ASSESSMENT & PLAN NOTE
Patient is still having problems with a chronic dry cough  She has been taking the promethazine and codeine at nighttime which she states helps but the cough has been persistent and we do have concerns  Chest x-ray that was performed 1 month ago was clear  She states this cough began after she had her COVID virus infection and I am unsure how to progress as far as treatment is concerned and a consult and evaluation has been sent for the patient be seen by pulmonology

## 2022-09-20 NOTE — PROGRESS NOTES
Chemodenervation     Date/Time 9/20/2022 11:05 AM     Performed by  Casandra Munoz PA-C     Authorized by Casandra Munoz PA-C        Pre-procedure details      Prepped With: Alcohol     Anesthesia  (see MAR for exact dosages): Anesthesia method:  None   Procedure details     Position:  Upright   Botox     Botox Type:  Type A    Brand:  Botox    mL's of Botulinum Toxin:  200    Final Concentration per CC:  200 units    Needle Gauge:  30 G 2 5 inch   Procedures     Botox Procedures: chronic headache      Indications: migraines     Injection Location      Head / Face:  L superior cervical paraspinal, R superior cervical paraspinal, L , R , L frontalis, R frontalis, L medial occipitalis, R medial occipitalis, procerus, L temporalis, R temporalis, L superior trapezius and R superior trapezius    L  injection amount:  5 unit(s)    R  injection amount:  5 unit(s)    L lateral frontalis:  5 unit(s)    R lateral frontalis:  5 unit(s)    L medial frontalis:  5 unit(s)    R medial frontalis:  5 unit(s)    L temporalis injection amount:  20 unit(s)    R temporalis injection amount:  20 unit(s)    Procerus injection amount:  5 unit(s)    L medial occipitalis injection amount:  15 unit(s)    R medial occipitalis injection amount:  15 unit(s)    L superior cervical paraspinal injection amount:  10 unit(s)    R superior cervical paraspinal injection amount:  10 unit(s)    L superior trapezius injection amount:  15 unit(s)    R superior trapezius injection amount:  15 unit(s)   Total Units     Total units used:  200    Total units discarded:  0   Post-procedure details      Chemodenervation:  Chronic migraine    Facial Nerve Location[de-identified]  Bilateral facial nerve    Patient tolerance of procedure:   Tolerated well, no immediate complications   Comments      45U bilat occipitalis and paraspinal  All medically necessary

## 2022-09-21 ENCOUNTER — TELEPHONE (OUTPATIENT)
Dept: NEUROLOGY | Facility: CLINIC | Age: 72
End: 2022-09-21

## 2022-09-21 NOTE — TELEPHONE ENCOUNTER
Patient left voicemail asking for refill of naproxen a 90 day supply  Requested call back [phone]  I let patient know prescription was sent last month to [facility] with refills    She verbalized understanding and will call pharmacy to fill

## 2022-09-27 DIAGNOSIS — G43.709 CHRONIC MIGRAINE WITHOUT AURA WITHOUT STATUS MIGRAINOSUS, NOT INTRACTABLE: ICD-10-CM

## 2022-09-27 RX ORDER — SUMATRIPTAN 100 MG/1
TABLET, FILM COATED ORAL
Qty: 27 TABLET | Refills: 0 | Status: SHIPPED | OUTPATIENT
Start: 2022-09-27

## 2022-09-27 NOTE — TELEPHONE ENCOUNTER
Voicemail transcript-  This is maryjane Savage  My birthday is [birth year]  [phone]  I would like to have a refill on the some trip in its a 100 milligrams is needed for my grades  My number is [phone]  Thank you      Pt called for refill of sumatriptan 100mg prn  [phone]    Refill entered, please sign off

## 2022-10-19 DIAGNOSIS — E78.00 PURE HYPERCHOLESTEROLEMIA: ICD-10-CM

## 2022-10-19 RX ORDER — SIMVASTATIN 40 MG
40 TABLET ORAL DAILY
Qty: 90 TABLET | Refills: 3 | Status: SHIPPED | OUTPATIENT
Start: 2022-10-19

## 2022-12-12 ENCOUNTER — APPOINTMENT (OUTPATIENT)
Dept: LAB | Age: 72
End: 2022-12-12

## 2022-12-12 DIAGNOSIS — E78.01 FAMILIAL HYPERCHOLESTEROLEMIA: ICD-10-CM

## 2022-12-12 DIAGNOSIS — R73.9 HYPERGLYCEMIA: ICD-10-CM

## 2022-12-12 DIAGNOSIS — E03.9 ACQUIRED HYPOTHYROIDISM: ICD-10-CM

## 2022-12-12 LAB
CHOLEST SERPL-MCNC: 167 MG/DL
EST. AVERAGE GLUCOSE BLD GHB EST-MCNC: 117 MG/DL
GLUCOSE P FAST SERPL-MCNC: 105 MG/DL (ref 65–99)
HBA1C MFR BLD: 5.7 %
HDLC SERPL-MCNC: 58 MG/DL
LDLC SERPL CALC-MCNC: 85 MG/DL (ref 0–100)
NONHDLC SERPL-MCNC: 109 MG/DL
TRIGL SERPL-MCNC: 119 MG/DL
TSH SERPL DL<=0.05 MIU/L-ACNC: 3.19 UIU/ML (ref 0.45–4.5)

## 2022-12-14 ENCOUNTER — RA CDI HCC (OUTPATIENT)
Dept: OTHER | Facility: HOSPITAL | Age: 72
End: 2022-12-14

## 2022-12-20 ENCOUNTER — OFFICE VISIT (OUTPATIENT)
Dept: INTERNAL MEDICINE CLINIC | Facility: CLINIC | Age: 72
End: 2022-12-20

## 2022-12-20 ENCOUNTER — PROCEDURE VISIT (OUTPATIENT)
Dept: NEUROLOGY | Facility: CLINIC | Age: 72
End: 2022-12-20

## 2022-12-20 VITALS
SYSTOLIC BLOOD PRESSURE: 132 MMHG | HEART RATE: 75 BPM | TEMPERATURE: 97.6 F | BODY MASS INDEX: 24.43 KG/M2 | OXYGEN SATURATION: 94 % | WEIGHT: 152 LBS | RESPIRATION RATE: 18 BRPM | DIASTOLIC BLOOD PRESSURE: 80 MMHG | HEIGHT: 66 IN

## 2022-12-20 VITALS
SYSTOLIC BLOOD PRESSURE: 145 MMHG | TEMPERATURE: 98 F | DIASTOLIC BLOOD PRESSURE: 69 MMHG | HEART RATE: 76 BPM | WEIGHT: 155.3 LBS | BODY MASS INDEX: 24.96 KG/M2 | HEIGHT: 66 IN

## 2022-12-20 DIAGNOSIS — R73.9 HYPERGLYCEMIA: ICD-10-CM

## 2022-12-20 DIAGNOSIS — G43.709 CHRONIC MIGRAINE WITHOUT AURA WITHOUT STATUS MIGRAINOSUS, NOT INTRACTABLE: Primary | ICD-10-CM

## 2022-12-20 DIAGNOSIS — E03.9 ACQUIRED HYPOTHYROIDISM: ICD-10-CM

## 2022-12-20 DIAGNOSIS — E78.01 FAMILIAL HYPERCHOLESTEROLEMIA: ICD-10-CM

## 2022-12-20 DIAGNOSIS — G43.709 CHRONIC MIGRAINE WITHOUT AURA WITHOUT STATUS MIGRAINOSUS, NOT INTRACTABLE: ICD-10-CM

## 2022-12-20 DIAGNOSIS — Z00.00 HEALTHCARE MAINTENANCE: ICD-10-CM

## 2022-12-20 DIAGNOSIS — F17.200 SMOKING: Primary | ICD-10-CM

## 2022-12-20 RX ORDER — SUMATRIPTAN 100 MG/1
TABLET, FILM COATED ORAL
Qty: 27 TABLET | Refills: 0 | Status: SHIPPED | OUTPATIENT
Start: 2022-12-20

## 2022-12-20 NOTE — PROGRESS NOTES
Name: Leigha Grubbs      : 1950      MRN: [id number]  Encounter Provider: Triny Nguyễn DO  Encounter Date: 2022   Encounter department: [facility]    Assessment & Plan     1  Smoking  Assessment & Plan:  Patient is still smoking approximately one half a pack of cigarettes per day  Has previously she does understand the long-term repercussions of tobacco abuse  Risk of cancer, heart disease, peripheral vascular disease  Denies any difficulties with her breathing  Lungs were clear to auscultation  We have offered different modalities in the past more to help her quit but she is refusing  2  Hyperglycemia  Assessment & Plan:  Patient has a fasting blood sugar with elevated hemoglobin A1c is in the prediabetic range  We discussed with the patient importance of watching her caloric intake, cutting back on her intake of concentrated sweets and simple carbohydrates  Check a fasting blood sugar hemoglobin A1c with her next visit  Orders:  -     Hemoglobin A1C; Future    3  Acquired hypothyroidism  Assessment & Plan:  History of hypothyroidism  She remains off of all medications at this point in time  We continue to monitor her thyroid levels and we will introduce treatment as needed in the future  Patient states her energy level is good  No substantial recent problems with weight gain or loss  [place] treatment if needed in the future  Orders:  -     TSH, 3rd generation with Free T4 reflex; Future    4  Chronic migraine without aura without status migrainosus, not intractable  Assessment & Plan:  Recently seen by her neurologist and did receive Botox injections which she gets in order to help her with her migraines  She states has been helpful and relates not excessive breakthrough with treatment  Continues to take Imitrex as needed      5  Healthcare maintenance  Assessment & Plan:   Will be due for repeat Medicare wellness visit when she returns to the office in 6 months  Patient was given a slip for complete labs to be performed prior to that visit  In the interim patient was told if any new medical problems or concerns to please call  Orders:  -     Comprehensive metabolic panel; Future  -     CBC and differential; Future  -     Lipid panel; Future  -     UA (URINE) with reflex to Scope; Future; Expected date: 12/20/2022  -     TSH, 3rd generation with Free T4 reflex; Future  -     Occult Blood, Fecal Immunochemical; Future  -     Hemoglobin A1C; Future    6  Familial hypercholesterolemia  Assessment & Plan:  History of hyperlipidemia  Patient remains on a statin drug  States that she is not always watching her intake of fats and cholesterol  Check a lipid profile with her next visit and make modification of treatment if necessary  Patient was told with her having high cholesterol and history of tobacco abuse she is at increased risk for heart disease             Subjective     Patient is a 77-year-old female history of multiple medical problems who is here today for routine follow-up after 6-month period of time  She did have labs performed prior to the visit today and we did discuss her results at length with the patient  Patient states in general doing relatively well with no new complaints or concerns  Patient looking forward to spending time with her  during the holidays  Chest was recently seen by neurology did have Botox injections for her chronic migraine headaches  Review of Systems   Constitutional: Negative  HENT: Negative  Eyes: Negative  Respiratory: Negative  Cardiovascular: Negative  Gastrointestinal: Negative  Endocrine: Negative  Genitourinary: Negative  Musculoskeletal: Negative  Skin: Negative  Allergic/Immunologic: Negative  Neurological: Positive for headaches   Negative for dizziness, tremors, seizures, syncope, facial asymmetry, speech difficulty, weakness, light-headedness and numbness  Hematological: Negative  Psychiatric/Behavioral: Negative          Past Medical History:   Diagnosis Date   • Hyperlipidemia    • Migraine      Past Surgical History:   Procedure Laterality Date   • BREAST EXCISIONAL BIOPSY Right 1986   • HARTMANS PROCEDURE N/A 3/8/2020    Procedure: Laparoscopic drainage of intra peritoneal abscess, sigmoid rescetion,;  Surgeon: Corin Hess MD;  Location: [facility];  Service: Colorectal   • HYSTERECTOMY  1978    age 25   • NASAL SEPTUM SURGERY      deviation repair   • MS LAP, VENTRAL HERNIA REPAIR,REDUCIBLE N/A 1/26/2021    Procedure: REPAIR HERNIA VENTRAL LAPAROSCOPIC;  Surgeon: Corin Hess MD;  Location: [facility];  Service: Colorectal   • MS LAP,DIAGNOSTIC ABDOMEN N/A 3/8/2020    Procedure: LAPAROSCOPY DIAGNOSTIC;  Surgeon: Corin Hess MD;  Location: [facility];  Service: Colorectal   • MS SIGMOIDOSCOPY FLX DX W/COLLJ SPEC BR/WA IF PFRMD N/A 3/8/2020    Procedure: Daljit Chi;  Surgeon: Corin Hess MD;  Location: [facility];  Service: Colorectal   • SHOULDER SURGERY       Family History   Problem Relation Age of Onset   • Breast cancer Mother 48   • Heart disease Father    • Diabetes Sister    • Leukemia Sister    • No Known Problems Maternal Grandmother    • No Known Problems Maternal Grandfather    • No Known Problems Paternal Grandmother    • No Known Problems Paternal Grandfather    • No Known Problems Sister    • Breast cancer Maternal Aunt 50   • No Known Problems Maternal Aunt    • No Known Problems Paternal Aunt    • Colon cancer Maternal Uncle    • No Known Problems Son    • No Known Problems Son    • Lung cancer Other 52     Social History     Socioeconomic History   • Marital status: /Civil Union     Spouse name: None   • Number of children: None   • Years of education: None   • Highest education level: None   Occupational History   • None   Tobacco Use   • Smoking status: Every Day     Packs/day: 0 50 Years: 53 00     Pack years: 26 50     Types: Cigarettes     Start date: 1965   • Smokeless tobacco: Never   Vaping Use   • Vaping Use: Never used   Substance and Sexual Activity   • Alcohol use: Yes     Comment: Rarely   • Drug use: No   • Sexual activity: None   Other Topics Concern   • None   Social History Narrative    Caffeine use      Social Determinants of Health     Financial Resource Strain: Not on file   Food Insecurity: Not on file   Transportation Needs: Not on file   Physical Activity: Not on file   Stress: Not on file   Social Connections: Not on file   Intimate Partner Violence: Not on file   Housing Stability: Not on file     Current Outpatient Medications on File Prior to Visit   Medication Sig   • albuterol (Ventolin HFA) 90 mcg/act inhaler Inhale 2 puffs every 6 (six) hours as needed for wheezing   • aspirin (ECOTRIN) 325 mg EC tablet Take 1 tablet (325 mg total) by mouth daily   • Cholecalciferol (VITAMIN D3 PO) Take by mouth daily   • citalopram (CeleXA) 20 mg tablet Take 1 tablet (20 mg total) by mouth daily   • cyanocobalamin (VITAMIN B-12) 1000 MCG tablet Take 1,000 mcg by mouth daily   • divalproex sodium (DEPAKOTE ER) 250 mg 24 hr tablet Take 1 tablet (250 mg total) by mouth 2 (two) times a day   • docusate sodium (COLACE) 100 mg capsule Take 1 capsule (100 mg total) by mouth 2 (two) times a day   • famotidine (PEPCID) 40 MG tablet Every night before bed   • Lidocaine Viscous HCl (XYLOCAINE) 2 % mucosal solution    • Magnesium 500 MG CAPS Take by mouth daily   • naproxen (NAPROSYN) 500 mg tablet Take 1 tablet (500 mg total) by mouth 2 (two) times a day as needed for headaches   • ondansetron (ZOFRAN) 4 mg tablet Take 1 tablet (4 mg total) by mouth every 8 (eight) hours as needed for nausea or vomiting   • polyethylene glycol (GLYCOLAX) 17 GM/SCOOP powder Take 17 g by mouth daily , increase to twice daily if needed   • predniSONE 10 mg tablet Take 1 tablet (10 mg total) by mouth daily Two pills twice a day with food for 3 days then 1 pill twice a day with food for 3 days then 1 pill daily till finished   • promethazine-codeine (PHENERGAN WITH CODEINE) 6 25-10 mg/5 mL syrup Take 5 mL by mouth every 6 (six) hours as needed for cough   • pyridoxine (B-6) 100 MG tablet Take 100 mg by mouth daily   • Riboflavin (B2) 100 MG TABS Take by mouth daily   • Rimegepant Sulfate (Nurtec) 75 MG TBDP Take 75 mg by mouth if needed (migraine, may take up to every other day)   • simvastatin (ZOCOR) 40 mg tablet Take 1 tablet (40 mg total) by mouth daily   • tiZANidine (ZANAFLEX) 2 mg tablet Take 1 tablet (2 mg total) by mouth daily at bedtime   • zolpidem (AMBIEN) 5 mg tablet One pill at bedtime as needed to help with sleep   • [DISCONTINUED] SUMAtriptan (IMITREX) 100 mg tablet TAKE 1 TABLET AT ONSET OF MIGRAINE HEADACHE  MAY REPEAT IN 2 HOURS IF NEEDED, NO MORE THAN 2 IN 24 HOURS AND NO MORE THAN 3 IN A WEEK     Allergies   Allergen Reactions   • Penicillins Anaphylaxis   • Azithromycin Hives   • Nisoldipine      Reaction Date: 14Apr2011;    • Sulfa Antibiotics Hives     Immunization History   Administered Date(s) Administered   • COVID-19 PFIZER VACCINE 0 3 ML IM 12/22/2020, 01/12/2021   • H1N1, All Formulations 11/06/2009   • INFLUENZA 10/07/2019, 09/29/2020   • Pneumococcal Conjugate 13-Valent 03/31/2016   • Pneumococcal Polysaccharide PPV23 11/09/2020       Objective     /80 (BP Location: Left arm, Patient Position: Sitting, Cuff Size: Adult)   Pulse 75   Temp 97 6 °F (36 4 °C) (Tympanic)   Resp 18   Ht 5' 6" (1 676 m)   Wt 68 9 kg (152 lb)   SpO2 94%   BMI 24 53 kg/m²     Physical Exam  Vitals and nursing note reviewed  Constitutional:       General: She is not in acute distress  Appearance: Normal appearance  She is normal weight  She is not ill-appearing, toxic-appearing or diaphoretic        Comments: Pleasant 49-year-old female who is awake alert in no acute distress oriented x3   HENT: Head: Normocephalic and atraumatic  Right Ear: Tympanic membrane, ear canal and external ear normal  There is no impacted cerumen  Left Ear: Tympanic membrane, ear canal and external ear normal  There is no impacted cerumen  Nose: Nose normal  No congestion or rhinorrhea  Mouth/Throat:      Mouth: Mucous membranes are moist       Pharynx: Oropharynx is clear  No oropharyngeal exudate or posterior oropharyngeal erythema  Eyes:      General: No scleral icterus  Right eye: No discharge  Left eye: No discharge  Extraocular Movements: Extraocular movements intact  Conjunctiva/sclera: Conjunctivae normal       Pupils: Pupils are equal, round, and reactive to light  Neck:      Vascular: No carotid bruit  Cardiovascular:      Rate and Rhythm: Normal rate and regular rhythm  Pulses: Normal pulses  Heart sounds: Normal heart sounds  No murmur heard  No friction rub  No gallop  Pulmonary:      Effort: Pulmonary effort is normal  No respiratory distress  Breath sounds: Normal breath sounds  No stridor  No wheezing, rhonchi or rales  Chest:      Chest wall: No tenderness  Abdominal:      General: Bowel sounds are normal  There is no distension  Palpations: Abdomen is soft  There is no mass  Tenderness: There is no abdominal tenderness  There is no right CVA tenderness, left CVA tenderness, guarding or rebound  Hernia: No hernia is present  Musculoskeletal:         General: No swelling, tenderness, deformity or signs of injury  Normal range of motion  Cervical back: Normal range of motion and neck supple  No rigidity or tenderness  Right lower leg: No edema  Left lower leg: No edema  Lymphadenopathy:      Cervical: No cervical adenopathy  Skin:     General: Skin is warm and dry  Capillary Refill: Capillary refill takes less than 2 seconds  Coloration: Skin is not jaundiced or pale        Findings: No bruising, erythema, lesion or rash  Neurological:      General: No focal deficit present  Mental Status: She is alert and oriented to person, place, and time  Mental status is at baseline  Cranial Nerves: No cranial nerve deficit  Sensory: No sensory deficit  Motor: No weakness  Coordination: Coordination normal       Gait: Gait normal       Deep Tendon Reflexes: Reflexes normal    Psychiatric:         Mood and Affect: Mood normal          Behavior: Behavior normal          Thought Content:  Thought content normal          Judgment: Judgment normal        Satinder Underwood DO

## 2022-12-20 NOTE — ASSESSMENT & PLAN NOTE
Recently seen by her neurologist and did receive Botox injections which she gets in order to help her with her migraines  She states has been helpful and relates not excessive breakthrough with treatment    Continues to take Imitrex as needed

## 2022-12-20 NOTE — ASSESSMENT & PLAN NOTE
Will be due for repeat Medicare wellness visit when she returns to the office in 6 months  Patient was given a slip for complete labs to be performed prior to that visit  In the interim patient was told if any new medical problems or concerns to please call

## 2022-12-20 NOTE — ASSESSMENT & PLAN NOTE
History of hypothyroidism  She remains off of all medications at this point in time  We continue to monitor her thyroid levels and we will introduce treatment as needed in the future  Patient states her energy level is good  No substantial recent problems with weight gain or loss  [place] treatment if needed in the future

## 2022-12-20 NOTE — PROGRESS NOTES
Chemodenervation     Date/Time 12/20/2022 11:42 AM     Performed by  Kirk Sheikh PA-C     Authorized by Kirk Sheikh PA-C        Pre-procedure details      Prepped With: Alcohol     Anesthesia  (see MAR for exact dosages): Anesthesia method:  None   Procedure details     Position:  Upright   Botox     Botox Type:  Type A    Brand:  Botox    mL's of Botulinum Toxin:  200    Final Concentration per CC:  200 units    Needle Gauge:  30 G 2 5 inch   Procedures     Botox Procedures: chronic headache      Indications: migraines     Injection Location      Head / Face:  L superior cervical paraspinal, R superior cervical paraspinal, L , R , L frontalis, R frontalis, L medial occipitalis, R medial occipitalis, procerus, L temporalis, R temporalis, L superior trapezius and R superior trapezius    L  injection amount:  5 unit(s)    R  injection amount:  5 unit(s)    L lateral frontalis:  5 unit(s)    R lateral frontalis:  5 unit(s)    L medial frontalis:  5 unit(s)    R medial frontalis:  5 unit(s)    L temporalis injection amount:  20 unit(s)    R temporalis injection amount:  20 unit(s)    Procerus injection amount:  5 unit(s)    L medial occipitalis injection amount:  15 unit(s)    R medial occipitalis injection amount:  15 unit(s)    L superior cervical paraspinal injection amount:  10 unit(s)    R superior cervical paraspinal injection amount:  10 unit(s)    L superior trapezius injection amount:  15 unit(s)    R superior trapezius injection amount:  15 unit(s)   Total Units     Total units used:  200    Total units discarded:  0   Post-procedure details      Chemodenervation:  Chronic migraine    Facial Nerve Location[de-identified]  Bilateral facial nerve    Patient tolerance of procedure:   Tolerated well, no immediate complications   Comments      45U bilat occipitalis and paraspinal  All medically necessary

## 2022-12-20 NOTE — ASSESSMENT & PLAN NOTE
History of hyperlipidemia  Patient remains on a statin drug  States that she is not always watching her intake of fats and cholesterol  Check a lipid profile with her next visit and make modification of treatment if necessary    Patient was told with her having high cholesterol and history of tobacco abuse she is at increased risk for heart disease

## 2022-12-20 NOTE — ASSESSMENT & PLAN NOTE
Patient is still smoking approximately one half a pack of cigarettes per day  Has previously she does understand the long-term repercussions of tobacco abuse  Risk of cancer, heart disease, peripheral vascular disease  Denies any difficulties with her breathing  Lungs were clear to auscultation  We have offered different modalities in the past more to help her quit but she is refusing

## 2022-12-20 NOTE — ASSESSMENT & PLAN NOTE
Patient has a fasting blood sugar with elevated hemoglobin A1c is in the prediabetic range  We discussed with the patient importance of watching her caloric intake, cutting back on her intake of concentrated sweets and simple carbohydrates  Check a fasting blood sugar hemoglobin A1c with her next visit

## 2023-01-17 DIAGNOSIS — F51.01 PRIMARY INSOMNIA: ICD-10-CM

## 2023-01-17 DIAGNOSIS — G43.709 CHRONIC MIGRAINE WITHOUT AURA WITHOUT STATUS MIGRAINOSUS, NOT INTRACTABLE: ICD-10-CM

## 2023-01-17 DIAGNOSIS — E78.00 PURE HYPERCHOLESTEROLEMIA: ICD-10-CM

## 2023-01-17 RX ORDER — SIMVASTATIN 40 MG
40 TABLET ORAL DAILY
Qty: 90 TABLET | Refills: 3 | Status: SHIPPED | OUTPATIENT
Start: 2023-01-17

## 2023-01-17 RX ORDER — ZOLPIDEM TARTRATE 5 MG/1
TABLET ORAL
Qty: 90 TABLET | Refills: 1 | Status: SHIPPED | OUTPATIENT
Start: 2023-01-17

## 2023-01-17 NOTE — TELEPHONE ENCOUNTER
Pt left VM requesting Zanaflex be sent to a new pharmacy, [facility]  Please sign off if agreeable  Thank you  RUSTY: Pt was inquiring if someone from [employer] could call her re: insurance please

## 2023-01-18 RX ORDER — TIZANIDINE 2 MG/1
2 TABLET ORAL
Qty: 90 TABLET | Refills: 3 | Status: SHIPPED | OUTPATIENT
Start: 2023-01-18

## 2023-02-24 ENCOUNTER — TELEPHONE (OUTPATIENT)
Dept: NEUROLOGY | Facility: CLINIC | Age: 73
End: 2023-02-24

## 2023-02-24 NOTE — TELEPHONE ENCOUNTER
Pt has a new medicare rep plan, after per checking requirements of this plan for pt Botox the  requires authorization and the [address]25 does not require authorization  Spoke to [facility] ([id number]) whom noted to either call number [phone] to initiate author fax a cover sheet with requested Botox information with clinicals to fax [phone]  Fax sent as buy and bill will ll information noted above  Botox 200 UNITS  Qty  1  DX [id number]  Sig: Inject up to 200 UNITS I M into the various sites in the head and neck once every three months for one year with  Refills: 3     If you agree to this order transcribed above please respond directly if you agree or not, so I may proceed further with authorization and for use of Verbal Order  Thank you

## 2023-02-28 NOTE — TELEPHONE ENCOUNTER
Received fax from [facility] with approval details below  Approved   Botox 200 UNITS  Qty  1  Auth# [id number] [id number]  Valid:2/24/2023-2/23/2024  Visits: 4    Please use STOCK

## 2023-03-13 DIAGNOSIS — G43.009 MIGRAINE WITHOUT AURA AND WITHOUT STATUS MIGRAINOSUS, NOT INTRACTABLE: ICD-10-CM

## 2023-03-13 DIAGNOSIS — F41.9 ANXIETY: ICD-10-CM

## 2023-03-13 DIAGNOSIS — G43.709 CHRONIC MIGRAINE WITHOUT AURA WITHOUT STATUS MIGRAINOSUS, NOT INTRACTABLE: ICD-10-CM

## 2023-03-13 RX ORDER — NAPROXEN 500 MG/1
500 TABLET ORAL 2 TIMES DAILY PRN
Qty: 90 TABLET | Refills: 4 | Status: SHIPPED | OUTPATIENT
Start: 2023-03-13

## 2023-03-13 RX ORDER — DIVALPROEX SODIUM 250 MG/1
250 TABLET, EXTENDED RELEASE ORAL 2 TIMES DAILY
Qty: 180 TABLET | Refills: 3 | Status: SHIPPED | OUTPATIENT
Start: 2023-03-13

## 2023-03-13 RX ORDER — SUMATRIPTAN 100 MG/1
TABLET, FILM COATED ORAL
Qty: 27 TABLET | Refills: 0 | Status: SHIPPED | OUTPATIENT
Start: 2023-03-13

## 2023-03-13 RX ORDER — CITALOPRAM 20 MG/1
20 TABLET ORAL DAILY
Qty: 90 TABLET | Refills: 3 | Status: SHIPPED | OUTPATIENT
Start: 2023-03-13

## 2023-03-13 NOTE — TELEPHONE ENCOUNTER
Patient left voicemail requesting refill of naproxen, sumatriptan and divalproex to [facility]  Call returned to patient  Acknowledged voicemail received and forwarded to provider  No

## 2023-03-21 ENCOUNTER — PROCEDURE VISIT (OUTPATIENT)
Dept: NEUROLOGY | Facility: CLINIC | Age: 73
End: 2023-03-21

## 2023-03-21 VITALS — TEMPERATURE: 97.7 F | SYSTOLIC BLOOD PRESSURE: 133 MMHG | DIASTOLIC BLOOD PRESSURE: 64 MMHG

## 2023-03-21 DIAGNOSIS — G43.709 CHRONIC MIGRAINE WITHOUT AURA WITHOUT STATUS MIGRAINOSUS, NOT INTRACTABLE: Primary | ICD-10-CM

## 2023-03-21 NOTE — PROGRESS NOTES
Chemodenervation     Date/Time 3/21/2023 11:58 AM     Performed by  Salo Garner PA-C     Authorized by Salo Garner PA-C        Pre-procedure details      Prepped With: Alcohol     Anesthesia  (see MAR for exact dosages): Anesthesia method:  None   Procedure details     Position:  Upright   Botox     Botox Type:  Type A    Brand:  Botox    mL's of Botulinum Toxin:  200    Final Concentration per CC:  200 units    Needle Gauge:  30 G 2 5 inch   Procedures     Botox Procedures: chronic headache      Indications: migraines     Injection Location      Head / Face:  L superior cervical paraspinal, R superior cervical paraspinal, L , R , L frontalis, R frontalis, L medial occipitalis, R medial occipitalis, procerus, L temporalis, R temporalis, L superior trapezius and R superior trapezius    L  injection amount:  5 unit(s)    R  injection amount:  5 unit(s)    L lateral frontalis:  5 unit(s)    R lateral frontalis:  5 unit(s)    L medial frontalis:  5 unit(s)    R medial frontalis:  5 unit(s)    L temporalis injection amount:  20 unit(s)    R temporalis injection amount:  20 unit(s)    Procerus injection amount:  5 unit(s)    L medial occipitalis injection amount:  15 unit(s)    R medial occipitalis injection amount:  15 unit(s)    L superior cervical paraspinal injection amount:  10 unit(s)    R superior cervical paraspinal injection amount:  10 unit(s)    L superior trapezius injection amount:  15 unit(s)    R superior trapezius injection amount:  15 unit(s)   Total Units     Total units used:  200    Total units discarded:  0   Post-procedure details      Chemodenervation:  Chronic migraine    Facial Nerve Location[de-identified]  Bilateral facial nerve    Patient tolerance of procedure:   Tolerated well, no immediate complications   Comments      45U bilat occipitalis and paraspinal  All medically necessary

## 2023-03-27 DIAGNOSIS — G43.709 CHRONIC MIGRAINE WITHOUT AURA WITHOUT STATUS MIGRAINOSUS, NOT INTRACTABLE: ICD-10-CM

## 2023-03-27 DIAGNOSIS — G43.009 MIGRAINE WITHOUT AURA AND WITHOUT STATUS MIGRAINOSUS, NOT INTRACTABLE: ICD-10-CM

## 2023-03-27 RX ORDER — DIVALPROEX SODIUM 250 MG/1
250 TABLET, EXTENDED RELEASE ORAL 2 TIMES DAILY
Qty: 180 TABLET | Refills: 3 | Status: SHIPPED | OUTPATIENT
Start: 2023-03-27

## 2023-03-27 RX ORDER — NAPROXEN 500 MG/1
500 TABLET ORAL 2 TIMES DAILY PRN
Qty: 90 TABLET | Refills: 4 | Status: SHIPPED | OUTPATIENT
Start: 2023-03-27

## 2023-03-27 RX ORDER — SUMATRIPTAN 100 MG/1
TABLET, FILM COATED ORAL
Qty: 27 TABLET | Refills: 0 | Status: SHIPPED | OUTPATIENT
Start: 2023-03-27

## 2023-03-27 NOTE — TELEPHONE ENCOUNTER
Anna brown: This is Sienna High    My birthday is 1950  And I got a notice from [facility] about my prescriptions  And I am remi, send them to the wrong place  [facility] and I needed a prescription for d  I V A L P R O E X 250 milligrams, and a prescription for sumatriptan and naproxen and my number [phone]  Thank you  I spoke to patient; confirmed [facility]; please sign if in agreement, thank you

## 2023-05-02 ENCOUNTER — OFFICE VISIT (OUTPATIENT)
Dept: NEUROLOGY | Facility: CLINIC | Age: 73
End: 2023-05-02

## 2023-05-02 VITALS
TEMPERATURE: 97.6 F | HEIGHT: 66 IN | SYSTOLIC BLOOD PRESSURE: 129 MMHG | HEART RATE: 69 BPM | BODY MASS INDEX: 25.52 KG/M2 | DIASTOLIC BLOOD PRESSURE: 66 MMHG | WEIGHT: 158.8 LBS

## 2023-05-02 DIAGNOSIS — I77.3 FIBROMUSCULAR DYSPLASIA OF CERVICOCRANIAL ARTERY (HCC): ICD-10-CM

## 2023-05-02 DIAGNOSIS — G43.709 CHRONIC MIGRAINE WITHOUT AURA WITHOUT STATUS MIGRAINOSUS, NOT INTRACTABLE: Primary | ICD-10-CM

## 2023-05-02 DIAGNOSIS — M54.2 CERVICALGIA: ICD-10-CM

## 2023-05-02 PROBLEM — G43.009 MIGRAINE WITHOUT AURA AND WITHOUT STATUS MIGRAINOSUS, NOT INTRACTABLE: Status: ACTIVE | Noted: 2023-05-02

## 2023-05-02 RX ORDER — CYCLOBENZAPRINE HCL 5 MG
5 TABLET ORAL
Qty: 90 TABLET | Refills: 3 | Status: SHIPPED | OUTPATIENT
Start: 2023-05-02

## 2023-05-02 NOTE — PATIENT INSTRUCTIONS
Chronic migraine without aura without status migrainosus, not intractable  Pt reports that Botox has been helpful  Since starting Botox pt has had a reduction in migraine headaches by 7 days as correlated by a headache diary  Pt has had decreased trips to the ER and decreased use of abortive medications  She will continue valproic acid for migraine prevention as well  I have spent a total time of 30 minutes on 05/02/23 in caring for this patient including Diagnostic results, Prognosis, Risks and benefits of tx options, Instructions for management, Patient and family education, Importance of tx compliance, Risk factor reductions, Impressions, Counseling / Coordination of care, Documenting in the medical record, Reviewing / ordering tests, medicine, procedures   and Obtaining or reviewing history    She will follow-up in 6 months  Migraine without aura and without status migrainosus, not intractable  She will continue Naproxen and sumatriptan for treatment of acute migraines  Fibromuscular dysplasia of cervicocranial artery (HCC)  She is overdue for a carotid ultrasound  It will be ordered so she can get it done in the next few weeks

## 2023-05-02 NOTE — PROGRESS NOTES
Patient ID: May Cordova is a 74 y o  female  Assessment/Plan:    Chronic migraine without aura without status migrainosus, not intractable  · Pt reports that Botox has been helpful  · Since starting Botox pt has had a reduction in migraine headaches by 7 days as correlated by a headache diary  Pt has had decreased trips to the ER and decreased use of abortive medications  · She will continue valproic acid for migraine prevention as well  I have spent a total time of 30 minutes on 05/02/23 in caring for this patient including Diagnostic results, Prognosis, Risks and benefits of tx options, Instructions for management, Patient and family education, Importance of tx compliance, Risk factor reductions, Impressions, Counseling / Coordination of care, Documenting in the medical record, Reviewing / ordering tests, medicine, procedures   and Obtaining or reviewing history    She will follow-up in 6 months  Migraine without aura and without status migrainosus, not intractable  · She will continue Naproxen and sumatriptan for treatment of acute migraines  Fibromuscular dysplasia of cervicocranial artery (HCC)  · She is overdue for a carotid ultrasound  · It will be ordered so she can get it done in the next few weeks  Problem List Items Addressed This Visit        Cardiovascular and Mediastinum    Chronic migraine without aura without status migrainosus, not intractable - Primary     · Pt reports that Botox has been helpful  · Since starting Botox pt has had a reduction in migraine headaches by 7 days as correlated by a headache diary  Pt has had decreased trips to the ER and decreased use of abortive medications  · She will continue valproic acid for migraine prevention as well    I have spent a total time of 30 minutes on 05/02/23 in caring for this patient including Diagnostic results, Prognosis, Risks and benefits of tx options, Instructions for management, Patient and family education, Importance of tx compliance, Risk factor reductions, Impressions, Counseling / Coordination of care, Documenting in the medical record, Reviewing / ordering tests, medicine, procedures   and Obtaining or reviewing history    She will follow-up in 6 months  Relevant Medications    cyclobenzaprine (FLEXERIL) 5 mg tablet    Fibromuscular dysplasia of cervicocranial artery (HCC)     · She is overdue for a carotid ultrasound  · It will be ordered so she can get it done in the next few weeks  Relevant Orders    VAS carotid complete study       Other    Cervicalgia    Relevant Medications    cyclobenzaprine (FLEXERIL) 5 mg tablet          Subjective:    HPI    Jairon Mcdaniel is a 75yo female, with mood disorder and hyperlipidemia, who follows in the office due to migraine headaches  She has had 2 rounds of Botox injections  She remains on Depakote as well  After the third round of Botox injections she noticed an improvement in migraine headaches  She has up to a 2-3 migraines per week  Since starting Botox pt has had a reduction in migraine headaches by 7 days as correlated by a headache diary  Pt has had decreased trips to the ER and decreased use of abortive medications  Migraines typically start upon awakening  Her  states that she does not snore or have an apneic events  She does not feel overly fatigued during the day  She does take sumatriptan and naproxen to treat the migraines  She denies any side effects of the medications  Headaches are typically 8/10  They will start in the Lt occipital area and extend to the Lt eye  They are not lasting longer than a day since starting Botox  PREVENTIVE: Citalopram, Topamax, depakote, Botox, tizanidine  ABORTIVE: Naratriptan, Sumavel, Dexamethasone, maxalt, imtrex    She was following with Vascular Surgery due to possible fibromuscular dysplasia  She is overdue for carotid ultrasound       The following portions of the patient's history were reviewed "and updated as appropriate: allergies, current medications, past family history, past medical history, past social history, past surgical history and problem list          Objective:    Blood pressure 129/66, pulse 69, temperature 97 6 °F (36 4 °C), temperature source Temporal, height 5' 6\" (1 676 m), weight 72 kg (158 lb 12 8 oz), not currently breastfeeding  Physical Exam  Vitals reviewed  Eyes:      General: Lids are normal       Extraocular Movements: Extraocular movements intact  Pupils: Pupils are equal, round, and reactive to light  Neurological:      Motor: Motor strength is normal       Coordination: Coordination is intact  Deep Tendon Reflexes: Reflexes are normal and symmetric  Psychiatric:         Speech: Speech normal          Neurological Exam  Mental Status   Oriented to person, place, time and situation  Recent and remote memory are intact  Speech is normal  Language is fluent with no aphasia  Attention and concentration are normal  Fund of knowledge is appropriate for level of education  Apraxia absent  Cranial Nerves  CN II: Visual acuity is normal  Visual fields full to confrontation  CN III, IV, VI: Extraocular movements intact bilaterally  Normal lids and orbits bilaterally  Pupils equal round and reactive to light bilaterally  CN V: Facial sensation is normal   CN VII: Full and symmetric facial movement  CN VIII: Hearing is normal   CN IX, X: Palate elevates symmetrically  Normal gag reflex  CN XI: Shoulder shrug strength is normal   CN XII: Tongue midline without atrophy or fasciculations  Motor   Strength is 5/5 throughout all four extremities  Sensory  Sensation is intact to light touch, pinprick, vibration and proprioception in all four extremities  Reflexes  Deep tendon reflexes are 2+ and symmetric in all four extremities  Coordination    Finger-to-nose, rapid alternating movements and heel-to-shin normal bilaterally without dysmetria      Gait  Normal " casual, toe, heel and tandem gait  ROS:    Agree with ROS as documented by Echo Mackenzie Conte LPN  Review of systems personally reviewed

## 2023-05-02 NOTE — ASSESSMENT & PLAN NOTE
· Pt reports that Botox has been helpful  · Since starting Botox pt has had a reduction in migraine headaches by 7 days as correlated by a headache diary  Pt has had decreased trips to the ER and decreased use of abortive medications  · She will continue valproic acid for migraine prevention as well  I have spent a total time of 30 minutes on 05/02/23 in caring for this patient including Diagnostic results, Prognosis, Risks and benefits of tx options, Instructions for management, Patient and family education, Importance of tx compliance, Risk factor reductions, Impressions, Counseling / Coordination of care, Documenting in the medical record, Reviewing / ordering tests, medicine, procedures   and Obtaining or reviewing history    She will follow-up in 6 months

## 2023-05-02 NOTE — ASSESSMENT & PLAN NOTE
· She is overdue for a carotid ultrasound  · It will be ordered so she can get it done in the next few weeks

## 2023-05-02 NOTE — PROGRESS NOTES
Review of Systems   Constitutional: Negative  Negative for appetite change, chills and fever  HENT: Negative  Negative for ear pain, hearing loss, sore throat, tinnitus, trouble swallowing and voice change  Eyes: Negative  Negative for photophobia, pain and visual disturbance  Respiratory: Negative  Negative for cough and shortness of breath  Cardiovascular: Negative  Negative for chest pain and palpitations  Gastrointestinal: Negative  Negative for abdominal pain, nausea and vomiting  Endocrine: Negative  Negative for cold intolerance  Genitourinary: Negative  Negative for dysuria, frequency, hematuria and urgency  Musculoskeletal: Negative  Negative for arthralgias, back pain, gait problem, myalgias and neck pain  Skin: Negative  Negative for color change and rash  Allergic/Immunologic: Negative  Neurological: Positive for headaches  Negative for dizziness, tremors, seizures, syncope, facial asymmetry, speech difficulty, weakness, light-headedness and numbness  Hematological: Negative  Does not bruise/bleed easily  Psychiatric/Behavioral: Negative  Negative for confusion, hallucinations and sleep disturbance  All other systems reviewed and are negative

## 2023-06-01 ENCOUNTER — TELEPHONE (OUTPATIENT)
Dept: NEUROLOGY | Facility: CLINIC | Age: 73
End: 2023-06-01

## 2023-06-01 DIAGNOSIS — G43.709 CHRONIC MIGRAINE WITHOUT AURA WITHOUT STATUS MIGRAINOSUS, NOT INTRACTABLE: ICD-10-CM

## 2023-06-01 NOTE — TELEPHONE ENCOUNTER
Patient called and wanted to know if her insurance will cover her botox injection      Please call patient back at [phone]

## 2023-06-01 NOTE — TELEPHONE ENCOUNTER
Received vm from 10:12am-This is Tony Khan,  I have a question to see if [employer] pays my botox shots  My birthday is 1950  My number [phone]   Thank you     --------------------------------------------  Please contact pt

## 2023-06-07 NOTE — TELEPHONE ENCOUNTER
Patient called back and stated that she spoke with her insurance and she was told we have to [facility] and the sumatriptan      # [phone]

## 2023-06-07 NOTE — TELEPHONE ENCOUNTER
Patient of Veleta Denver, last visit 5/2; due for refill of sumatriptan, please sign script if in agreement, thank you

## 2023-06-08 RX ORDER — SUMATRIPTAN 100 MG/1
TABLET, FILM COATED ORAL
Qty: 27 TABLET | Refills: 0 | Status: SHIPPED | OUTPATIENT
Start: 2023-06-08

## 2023-06-12 ENCOUNTER — APPOINTMENT (OUTPATIENT)
Dept: LAB | Age: 73
End: 2023-06-12
Payer: COMMERCIAL

## 2023-06-12 ENCOUNTER — TELEPHONE (OUTPATIENT)
Dept: NEUROLOGY | Facility: CLINIC | Age: 73
End: 2023-06-12

## 2023-06-12 DIAGNOSIS — Z00.00 HEALTHCARE MAINTENANCE: ICD-10-CM

## 2023-06-12 DIAGNOSIS — R73.9 HYPERGLYCEMIA: ICD-10-CM

## 2023-06-12 DIAGNOSIS — E03.9 ACQUIRED HYPOTHYROIDISM: ICD-10-CM

## 2023-06-12 LAB
ALBUMIN SERPL BCP-MCNC: 3.5 G/DL (ref 3.5–5)
ALP SERPL-CCNC: 53 U/L (ref 46–116)
ALT SERPL W P-5'-P-CCNC: 16 U/L (ref 12–78)
ANION GAP SERPL CALCULATED.3IONS-SCNC: 4 MMOL/L (ref 4–13)
AST SERPL W P-5'-P-CCNC: 15 U/L (ref 5–45)
BACTERIA UR QL AUTO: ABNORMAL /HPF
BASOPHILS # BLD AUTO: 0.04 THOUSANDS/ÂΜL (ref 0–0.1)
BASOPHILS NFR BLD AUTO: 1 % (ref 0–1)
BILIRUB SERPL-MCNC: 0.48 MG/DL (ref 0.2–1)
BILIRUB UR QL STRIP: NEGATIVE
BUN SERPL-MCNC: 13 MG/DL (ref 5–25)
CALCIUM SERPL-MCNC: 9.2 MG/DL (ref 8.3–10.1)
CHLORIDE SERPL-SCNC: 98 MMOL/L (ref 96–108)
CHOLEST SERPL-MCNC: 153 MG/DL
CLARITY UR: CLEAR
CO2 SERPL-SCNC: 26 MMOL/L (ref 21–32)
COLOR UR: ABNORMAL
CREAT SERPL-MCNC: 0.92 MG/DL (ref 0.6–1.3)
EOSINOPHIL # BLD AUTO: 0.17 THOUSAND/ÂΜL (ref 0–0.61)
EOSINOPHIL NFR BLD AUTO: 3 % (ref 0–6)
ERYTHROCYTE [DISTWIDTH] IN BLOOD BY AUTOMATED COUNT: 13.6 % (ref 11.6–15.1)
GFR SERPL CREATININE-BSD FRML MDRD: 61 ML/MIN/1.73SQ M
GLUCOSE P FAST SERPL-MCNC: 107 MG/DL (ref 65–99)
GLUCOSE UR STRIP-MCNC: NEGATIVE MG/DL
HCT VFR BLD AUTO: 40 % (ref 34.8–46.1)
HDLC SERPL-MCNC: 48 MG/DL
HEMOCCULT STL QL IA: POSITIVE
HGB BLD-MCNC: 13.9 G/DL (ref 11.5–15.4)
HGB UR QL STRIP.AUTO: ABNORMAL
IMM GRANULOCYTES # BLD AUTO: 0.03 THOUSAND/UL (ref 0–0.2)
IMM GRANULOCYTES NFR BLD AUTO: 1 % (ref 0–2)
KETONES UR STRIP-MCNC: NEGATIVE MG/DL
LDLC SERPL CALC-MCNC: 72 MG/DL (ref 0–100)
LEUKOCYTE ESTERASE UR QL STRIP: ABNORMAL
LYMPHOCYTES # BLD AUTO: 1.93 THOUSANDS/ÂΜL (ref 0.6–4.47)
LYMPHOCYTES NFR BLD AUTO: 34 % (ref 14–44)
MCH RBC QN AUTO: 32 PG (ref 26.8–34.3)
MCHC RBC AUTO-ENTMCNC: 34.8 G/DL (ref 31.4–37.4)
MCV RBC AUTO: 92 FL (ref 82–98)
MONOCYTES # BLD AUTO: 0.53 THOUSAND/ÂΜL (ref 0.17–1.22)
MONOCYTES NFR BLD AUTO: 9 % (ref 4–12)
MUCOUS THREADS UR QL AUTO: ABNORMAL
NEUTROPHILS # BLD AUTO: 2.93 THOUSANDS/ÂΜL (ref 1.85–7.62)
NEUTS SEG NFR BLD AUTO: 52 % (ref 43–75)
NITRITE UR QL STRIP: NEGATIVE
NON-SQ EPI CELLS URNS QL MICRO: ABNORMAL /HPF
NONHDLC SERPL-MCNC: 105 MG/DL
NRBC BLD AUTO-RTO: 0 /100 WBCS
PH UR STRIP.AUTO: 7 [PH]
PLATELET # BLD AUTO: 288 THOUSANDS/UL (ref 149–390)
PMV BLD AUTO: 10.2 FL (ref 8.9–12.7)
POTASSIUM SERPL-SCNC: 5 MMOL/L (ref 3.5–5.3)
PROT SERPL-MCNC: 7 G/DL (ref 6.4–8.4)
PROT UR STRIP-MCNC: NEGATIVE MG/DL
RBC # BLD AUTO: 4.35 MILLION/UL (ref 3.81–5.12)
RBC #/AREA URNS AUTO: ABNORMAL /HPF
RENAL EPI CELLS #/AREA URNS HPF: PRESENT /[HPF]
SODIUM SERPL-SCNC: 128 MMOL/L (ref 135–147)
SP GR UR STRIP.AUTO: 1.01 (ref 1–1.03)
TRANS CELLS #/AREA URNS HPF: PRESENT /[HPF]
TRIGL SERPL-MCNC: 167 MG/DL
TSH SERPL DL<=0.05 MIU/L-ACNC: 2.01 UIU/ML (ref 0.45–4.5)
UROBILINOGEN UR STRIP-ACNC: <2 MG/DL
WBC # BLD AUTO: 5.63 THOUSAND/UL (ref 4.31–10.16)
WBC #/AREA URNS AUTO: ABNORMAL /HPF

## 2023-06-12 PROCEDURE — 85025 COMPLETE CBC W/AUTO DIFF WBC: CPT

## 2023-06-12 PROCEDURE — 83036 HEMOGLOBIN GLYCOSYLATED A1C: CPT

## 2023-06-12 PROCEDURE — 80061 LIPID PANEL: CPT

## 2023-06-12 PROCEDURE — 80053 COMPREHEN METABOLIC PANEL: CPT

## 2023-06-12 PROCEDURE — 84443 ASSAY THYROID STIM HORMONE: CPT

## 2023-06-12 PROCEDURE — 81001 URINALYSIS AUTO W/SCOPE: CPT

## 2023-06-12 PROCEDURE — G0328 FECAL BLOOD SCRN IMMUNOASSAY: HCPCS

## 2023-06-12 PROCEDURE — 36415 COLL VENOUS BLD VENIPUNCTURE: CPT

## 2023-06-12 NOTE — TELEPHONE ENCOUNTER
Called pt, no answer, lvm inquiring about current insurance provider      Will update when available, thank you

## 2023-06-13 LAB
EST. AVERAGE GLUCOSE BLD GHB EST-MCNC: 123 MG/DL
HBA1C MFR BLD: 5.9 %

## 2023-06-16 ENCOUNTER — RA CDI HCC (OUTPATIENT)
Dept: OTHER | Facility: HOSPITAL | Age: 73
End: 2023-06-16

## 2023-06-20 ENCOUNTER — OFFICE VISIT (OUTPATIENT)
Dept: INTERNAL MEDICINE CLINIC | Facility: CLINIC | Age: 73
End: 2023-06-20
Payer: MEDICARE

## 2023-06-20 VITALS
SYSTOLIC BLOOD PRESSURE: 132 MMHG | HEIGHT: 66 IN | WEIGHT: 158.6 LBS | OXYGEN SATURATION: 96 % | DIASTOLIC BLOOD PRESSURE: 68 MMHG | RESPIRATION RATE: 14 BRPM | HEART RATE: 88 BPM | BODY MASS INDEX: 25.49 KG/M2 | TEMPERATURE: 98.2 F

## 2023-06-20 DIAGNOSIS — Z00.00 HEALTHCARE MAINTENANCE: ICD-10-CM

## 2023-06-20 DIAGNOSIS — R05.3 CHRONIC COUGH: ICD-10-CM

## 2023-06-20 DIAGNOSIS — E78.01 FAMILIAL HYPERCHOLESTEROLEMIA: ICD-10-CM

## 2023-06-20 DIAGNOSIS — E66.3 OVERWEIGHT: ICD-10-CM

## 2023-06-20 DIAGNOSIS — J40 BRONCHITIS: ICD-10-CM

## 2023-06-20 DIAGNOSIS — F17.200 SMOKING: ICD-10-CM

## 2023-06-20 DIAGNOSIS — J41.0 SIMPLE CHRONIC BRONCHITIS (HCC): Primary | ICD-10-CM

## 2023-06-20 DIAGNOSIS — R73.9 HYPERGLYCEMIA: ICD-10-CM

## 2023-06-20 PROCEDURE — 99214 OFFICE O/P EST MOD 30 MIN: CPT | Performed by: INTERNAL MEDICINE

## 2023-06-20 RX ORDER — ALBUTEROL SULFATE 90 UG/1
2 AEROSOL, METERED RESPIRATORY (INHALATION) EVERY 6 HOURS PRN
Qty: 18 G | Refills: 3 | Status: SHIPPED | OUTPATIENT
Start: 2023-06-20

## 2023-06-20 RX ORDER — FLUTICASONE PROPIONATE AND SALMETEROL 100; 50 UG/1; UG/1
1 POWDER RESPIRATORY (INHALATION) 2 TIMES DAILY
Qty: 180 BLISTER | Refills: 3 | Status: SHIPPED | OUTPATIENT
Start: 2023-06-20 | End: 2024-06-14

## 2023-06-20 NOTE — ASSESSMENT & PLAN NOTE
History of hyperlipidemia  Patient remains on simvastatin 40 mg daily and cholesterol is showing adequate control although slight elevation in triglyceride level  Patient will continue with present medication and surveillance  Instructed again the importance of watching her intake of fats and cholesterol with her diet

## 2023-06-20 NOTE — ASSESSMENT & PLAN NOTE
Patient given Advair inhaler and rescue inhaler  Prescription sent to the pharmacy    Call if not improving and again as noted pulmonary consult and evaluation if indicated

## 2023-06-20 NOTE — ASSESSMENT & PLAN NOTE
Chronic irritation of the throat with a chronic cough  Patient continues also to smoke  Patient has not been using her rescue inhaler and we made a decision today to try the patient on Advair to see if this helps with her chronic irritation    She was told to call if not improving consider evaluation by pulmonology

## 2023-06-20 NOTE — PROGRESS NOTES
Name: Aggie Chambers      : 1950      MRN: [id number]  Encounter Provider: Rubens Lepe DO  Encounter Date: 2023   Encounter department: [facility]    Assessment & Plan     1  Simple chronic bronchitis ([address] )  Assessment & Plan:  Patient given Advair inhaler and rescue inhaler  Prescription sent to the pharmacy  Call if not improving and again as noted pulmonary consult and evaluation if indicated    Orders:  -     Fluticasone-Salmeterol (Advair) 100-50 mcg/dose inhaler; Inhale 1 puff 2 (two) times a day Rinse mouth after use  2  Healthcare maintenance  -     Comprehensive metabolic panel; Future  -     CBC and differential; Future  -     Lipid panel; Future  -     UA (URINE) with reflex to Scope; Future; Expected date: 2023  -     Hemoglobin A1C; Future    3  Hyperglycemia  Assessment & Plan:  Patient did fasting blood sugar hemoglobin A1c performed prior to the visit  Patient's fasting blood sugar is mildly elevated hemoglobin A1c is in the prediabetic range  Patient was told again the importance of diet watching her intake of concentrated sweets and simple carbohydrates  Check a fasting blood sugar hemoglobin A1c with her next visit and initiate treatment in the future if indicated  Orders:  -     Hemoglobin A1C; Future    4  Familial hypercholesterolemia  Assessment & Plan:  History of hyperlipidemia  Patient remains on simvastatin 40 mg daily and cholesterol is showing adequate control although slight elevation in triglyceride level  Patient will continue with present medication and surveillance  Instructed again the importance of watching her intake of fats and cholesterol with her diet  5  Bronchitis  -     albuterol (Ventolin HFA) 90 mcg/act inhaler; Inhale 2 puffs every 6 (six) hours as needed for wheezing    6  Overweight  Assessment & Plan:  Patient is complaining about problems with her weight    States she has been unable to lose weight but admits that she is not on any type of diet nor does she get any type of routine exercise which may be helpful    In order to lose weight she was told she needs to reduce her caloric intake to approximately 1000 yuly/day and start on some type of routine exercise program to burn off calories      7  Chronic cough  Assessment & Plan:  Chronic irritation of the throat with a chronic cough  Patient continues also to smoke  Patient has not been using her rescue inhaler and we made a decision today to try the patient on Advair to see if this helps with her chronic irritation  She was told to call if not improving consider evaluation by pulmonology      8  Smoking  Assessment & Plan:  Despite lectures and discussion in the past patient continues to smoke  Does understand the long-term repercussions of chronic tobacco abuse             Subjective     Patient is a 75-year-old female with a history of multiple medical problems outlined previously who is here today for routine follow-up after 6-month period of time  Accompanied by her     She did have extensive lab testing performed prior to the visit and we did discuss the results at length with the patient  She continues to have a chronic cough and continues to smoke    Review of Systems   Constitutional: Negative  HENT: Negative  Eyes: Negative  Respiratory: Positive for cough  Negative for apnea, choking, chest tightness, shortness of breath, wheezing and stridor  Cardiovascular: Negative  Gastrointestinal: Negative  Endocrine: Negative  Genitourinary: Negative  Musculoskeletal: Negative  Skin: Negative  Allergic/Immunologic: Negative  Neurological: Negative  Hematological: Negative  Psychiatric/Behavioral: Negative          Past Medical History:   Diagnosis Date   • Hyperlipidemia    • Migraine      Past Surgical History:   Procedure Laterality Date   • BREAST EXCISIONAL BIOPSY Right 1986   • HARTMANS PROCEDURE N/A 3/8/2020    Procedure: Laparoscopic drainage of intra peritoneal abscess, sigmoid rescetion,;  Surgeon: Kulwinder Plata MD;  Location: [facility];  Service: Colorectal   • HYSTERECTOMY  1978    age 25   • NASAL SEPTUM SURGERY      deviation repair   • ME LAPS ABD PRTM&OMENTUM DX W/WO SPEC BR/WA SPX N/A 3/8/2020    Procedure: LAPAROSCOPY DIAGNOSTIC;  Surgeon: Kulwinder Plata MD;  Location: [facility];  Service: Colorectal   • ME LAPS REPAIR HERNIA EXCEPT INCAL/INGUN REDUCIBLE N/A 1/26/2021    Procedure: REPAIR HERNIA VENTRAL LAPAROSCOPIC;  Surgeon: Kulwinder Plata MD;  Location: [facility];  Service: Colorectal   • ME SIGMOIDOSCOPY FLX DX W/COLLJ SPEC BR/WA IF PFRMD N/A 3/8/2020    Procedure: [place];  Surgeon: Kulwinder Plata MD;  Location: [facility];  Service: Colorectal   • SHOULDER SURGERY       Family History   Problem Relation Age of Onset   • Breast cancer Mother 48   • Heart disease Father    • Diabetes Sister    • Leukemia Sister    • No Known Problems Maternal Grandmother    • No Known Problems Maternal Grandfather    • No Known Problems Paternal Grandmother    • No Known Problems Paternal Grandfather    • No Known Problems Sister    • Breast cancer Maternal Aunt 50   • No Known Problems Maternal Aunt    • No Known Problems Paternal Aunt    • Colon cancer Maternal Uncle    • No Known Problems Son    • No Known Problems Son    • Lung cancer Other 52     Social History     Socioeconomic History   • Marital status: /Civil Union     Spouse name: None   • Number of children: None   • Years of education: None   • Highest education level: None   Occupational History   • None   Tobacco Use   • Smoking status: Every Day     Packs/day: 0 50     Years: 53 00     Total pack years: 26 50     Types: Cigarettes     Start date: 1965   • Smokeless tobacco: Never   Vaping Use   • Vaping Use: Never used   Substance and Sexual Activity   • Alcohol use: Yes     Comment: Rarely   • Drug use: No   • Sexual activity: None   Other Topics Concern   • None   Social History Narrative    Caffeine use      Social Determinants of Health     Financial Resource Strain: Not on file   Food Insecurity: Not on file   Transportation Needs: Not on file   Physical Activity: Not on file   Stress: Not on file   Social Connections: Not on file   Intimate Partner Violence: Not on file   Housing Stability: Not on file     Current Outpatient Medications on File Prior to Visit   Medication Sig   • aspirin (ECOTRIN) 325 mg EC tablet Take 1 tablet (325 mg total) by mouth daily   • Cholecalciferol (VITAMIN D3 PO) Take by mouth daily   • citalopram (CeleXA) 20 mg tablet Take 1 tablet (20 mg total) by mouth daily   • cyanocobalamin (VITAMIN B-12) 1000 MCG tablet Take 1,000 mcg by mouth daily   • cyclobenzaprine (FLEXERIL) 5 mg tablet Take 1 tablet (5 mg total) by mouth daily at bedtime   • divalproex sodium (DEPAKOTE ER) 250 mg 24 hr tablet Take 1 tablet (250 mg total) by mouth 2 (two) times a day   • docusate sodium (COLACE) 100 mg capsule Take 1 capsule (100 mg total) by mouth 2 (two) times a day   • famotidine (PEPCID) 40 MG tablet Every night before bed   • Lidocaine Viscous HCl (XYLOCAINE) 2 % mucosal solution    • Magnesium 500 MG CAPS Take by mouth daily   • naproxen (NAPROSYN) 500 mg tablet Take 1 tablet (500 mg total) by mouth 2 (two) times a day as needed for headaches   • ondansetron (ZOFRAN) 4 mg tablet Take 1 tablet (4 mg total) by mouth every 8 (eight) hours as needed for nausea or vomiting   • polyethylene glycol (GLYCOLAX) 17 GM/SCOOP powder Take 17 g by mouth daily , increase to twice daily if needed   • predniSONE 10 mg tablet Take 1 tablet (10 mg total) by mouth daily Two pills twice a day with food for 3 days then 1 pill twice a day with food for 3 days then 1 pill daily till finished   • promethazine-codeine (PHENERGAN WITH CODEINE) 6 25-10 mg/5 mL syrup Take 5 mL by mouth every 6 (six) hours as needed for cough   • "pyridoxine (B-6) 100 MG tablet Take 100 mg by mouth daily   • Riboflavin (B2) 100 MG TABS Take by mouth daily   • simvastatin (ZOCOR) 40 mg tablet Take 1 tablet (40 mg total) by mouth daily   • SUMAtriptan (IMITREX) 100 mg tablet TAKE 1 TABLET AT ONSET OF MIGRAINE HEADACHE  MAY REPEAT IN 2 HOURS IF NEEDED, NO MORE THAN 2 IN 24 HOURS AND NO MORE THAN 3 IN A WEEK   • zolpidem (AMBIEN) 5 mg tablet One pill at bedtime as needed to help with sleep   • [DISCONTINUED] albuterol (Ventolin HFA) 90 mcg/act inhaler Inhale 2 puffs every 6 (six) hours as needed for wheezing     Allergies   Allergen Reactions   • Penicillins Anaphylaxis   • Azithromycin Hives   • Nisoldipine      Reaction Date: 14Apr2011;    • Sulfa Antibiotics Hives     Immunization History   Administered Date(s) Administered   • COVID-19 PFIZER VACCINE 0 3 ML IM 12/22/2020, 01/12/2021   • H1N1, All Formulations 11/06/2009   • INFLUENZA 10/07/2019, 09/29/2020   • Pneumococcal Conjugate 13-Valent 03/31/2016   • Pneumococcal Polysaccharide PPV23 11/09/2020       Objective     /68   Pulse 88   Temp 98 2 °F (36 8 °C)   Resp 14   Ht 5' 6\" (1 676 m)   Wt 71 9 kg (158 lb 9 6 oz)   SpO2 96%   BMI 25 60 kg/m²     Physical Exam  Vitals and nursing note reviewed  Constitutional:       General: She is not in acute distress  Appearance: Normal appearance  She is normal weight  She is not ill-appearing, toxic-appearing or diaphoretic  Comments: 70-year-old female who is awake alert in no acute distress oriented x3 persistent episodic cough but not dyspneic   HENT:      Head: Normocephalic and atraumatic  Right Ear: Tympanic membrane, ear canal and external ear normal  There is no impacted cerumen  Left Ear: Tympanic membrane, ear canal and external ear normal  There is no impacted cerumen  Nose: Nose normal  No congestion or rhinorrhea  Mouth/Throat:      Mouth: Mucous membranes are moist       Pharynx: Oropharynx is clear   No " oropharyngeal exudate or posterior oropharyngeal erythema  Eyes:      General: No scleral icterus  Right eye: No discharge  Left eye: No discharge  Extraocular Movements: Extraocular movements intact  Conjunctiva/sclera: Conjunctivae normal       Pupils: Pupils are equal, round, and reactive to light  Neck:      Vascular: No carotid bruit  Cardiovascular:      Rate and Rhythm: Normal rate and regular rhythm  Pulses: Normal pulses  Heart sounds: Normal heart sounds  No murmur heard  No friction rub  No gallop  Pulmonary:      Effort: Pulmonary effort is normal  No respiratory distress  Breath sounds: No stridor  No wheezing, rhonchi or rales  Comments: Slightly decreased breath sounds anteriorly and posteriorly with no rales rhonchi or wheezes appreciated on exam  Chest:      Chest wall: No tenderness  Abdominal:      General: Bowel sounds are normal  There is no distension  Palpations: Abdomen is soft  There is no mass  Tenderness: There is no abdominal tenderness  There is no right CVA tenderness, left CVA tenderness, guarding or rebound  Hernia: No hernia is present  Musculoskeletal:         General: No swelling, tenderness, deformity or signs of injury  Normal range of motion  Cervical back: Normal range of motion and neck supple  No rigidity or tenderness  Right lower leg: No edema  Left lower leg: No edema  Lymphadenopathy:      Cervical: No cervical adenopathy  Skin:     General: Skin is warm and dry  Capillary Refill: Capillary refill takes less than 2 seconds  Coloration: Skin is not jaundiced or pale  Findings: No bruising, erythema, lesion or rash  Neurological:      General: No focal deficit present  Mental Status: She is alert and oriented to person, place, and time  Mental status is at baseline  Cranial Nerves: No cranial nerve deficit  Sensory: No sensory deficit        Motor: No weakness  Coordination: Coordination normal       Gait: Gait normal       Deep Tendon Reflexes: Reflexes normal    Psychiatric:         Mood and Affect: Mood normal          Behavior: Behavior normal          Thought Content:  Thought content normal          Judgment: Judgment normal        Ric Sánchez DO

## 2023-06-20 NOTE — ASSESSMENT & PLAN NOTE
Despite lectures and discussion in the past patient continues to smoke    Does understand the long-term repercussions of chronic tobacco abuse

## 2023-06-20 NOTE — ASSESSMENT & PLAN NOTE
Patient is complaining about problems with her weight    States she has been unable to lose weight but admits that she is not on any type of diet nor does she get any type of routine exercise which may be helpful    In order to lose weight she was told she needs to reduce her caloric intake to approximately 1000 yuly/day and start on some type of routine exercise program to burn off calories

## 2023-06-20 NOTE — ASSESSMENT & PLAN NOTE
Patient did fasting blood sugar hemoglobin A1c performed prior to the visit  Patient's fasting blood sugar is mildly elevated hemoglobin A1c is in the prediabetic range  Patient was told again the importance of diet watching her intake of concentrated sweets and simple carbohydrates  Check a fasting blood sugar hemoglobin A1c with her next visit and initiate treatment in the future if indicated

## 2023-06-21 ENCOUNTER — PROCEDURE VISIT (OUTPATIENT)
Dept: NEUROLOGY | Facility: CLINIC | Age: 73
End: 2023-06-21
Payer: COMMERCIAL

## 2023-06-21 VITALS — TEMPERATURE: 97.6 F | SYSTOLIC BLOOD PRESSURE: 133 MMHG | HEART RATE: 88 BPM | DIASTOLIC BLOOD PRESSURE: 63 MMHG

## 2023-06-21 DIAGNOSIS — G43.709 CHRONIC MIGRAINE WITHOUT AURA WITHOUT STATUS MIGRAINOSUS, NOT INTRACTABLE: Primary | ICD-10-CM

## 2023-06-21 PROCEDURE — 64615 CHEMODENERV MUSC MIGRAINE: CPT | Performed by: PHYSICIAN ASSISTANT

## 2023-06-21 NOTE — PROGRESS NOTES
Chemodenervation     Date/Time 6/21/2023 11:30 AM     Performed by  Tina Dempsey PA-C   Authorized by Tina Dempsey PA-C       Pre-procedure details      Prepped With: Alcohol     Anesthesia  (see MAR for exact dosages): Anesthesia method:  None   Procedure details     Position:  Upright   Botox     Botox Type:  Type A    Brand:  Botox    mL's of Botulinum Toxin:  200    Final Concentration per CC:  200 units    Needle Gauge:  30 G 2 5 inch   Procedures     Botox Procedures: chronic headache      Indications: migraines     Injection Location      Head / Face:  L superior cervical paraspinal, R superior cervical paraspinal, L , R , L frontalis, R frontalis, L medial occipitalis, R medial occipitalis, procerus, L temporalis, R temporalis, L superior trapezius and R superior trapezius    L  injection amount:  5 unit(s)    R  injection amount:  5 unit(s)    L lateral frontalis:  5 unit(s)    R lateral frontalis:  5 unit(s)    L medial frontalis:  5 unit(s)    R medial frontalis:  5 unit(s)    L temporalis injection amount:  20 unit(s)    R temporalis injection amount:  20 unit(s)    Procerus injection amount:  5 unit(s)    L medial occipitalis injection amount:  15 unit(s)    R medial occipitalis injection amount:  15 unit(s)    L superior cervical paraspinal injection amount:  10 unit(s)    R superior cervical paraspinal injection amount:  10 unit(s)    L superior trapezius injection amount:  15 unit(s)    R superior trapezius injection amount:  15 unit(s)   Total Units     Total units used:  200    Total units discarded:  0   Post-procedure details      Chemodenervation:  Chronic migraine    Facial Nerve Location[de-identified]  Bilateral facial nerve    Patient tolerance of procedure:   Tolerated well, no immediate complications   Comments      45U bilat occipitalis and paraspinal  All medically necessary

## 2023-07-31 DIAGNOSIS — F51.01 PRIMARY INSOMNIA: ICD-10-CM

## 2023-07-31 RX ORDER — ZOLPIDEM TARTRATE 5 MG/1
TABLET ORAL
Qty: 90 TABLET | Refills: 1 | Status: SHIPPED | OUTPATIENT
Start: 2023-07-31

## 2023-09-26 ENCOUNTER — PROCEDURE VISIT (OUTPATIENT)
Dept: NEUROLOGY | Facility: CLINIC | Age: 73
End: 2023-09-26

## 2023-09-26 VITALS
TEMPERATURE: 97.8 F | DIASTOLIC BLOOD PRESSURE: 90 MMHG | HEIGHT: 66 IN | SYSTOLIC BLOOD PRESSURE: 138 MMHG | OXYGEN SATURATION: 95 % | BODY MASS INDEX: 25.6 KG/M2 | HEART RATE: 91 BPM

## 2023-09-26 DIAGNOSIS — G43.709 CHRONIC MIGRAINE WITHOUT AURA WITHOUT STATUS MIGRAINOSUS, NOT INTRACTABLE: Primary | ICD-10-CM

## 2023-09-26 NOTE — PROGRESS NOTES
Chemodenervation     Date/Time 9/26/2023 1:45 PM     Performed by  Grace Guevara PA-C   Authorized by Grace Guevara PA-C       Pre-procedure details      Prepped With: Alcohol     Anesthesia  (see MAR for exact dosages): Anesthesia method:  None   Procedure details     Position:  Upright   Botox     Botox Type:  Type A    Brand:  Botox    mL's of Botulinum Toxin:  200    Final Concentration per CC:  200 units    Needle Gauge:  30 G 2.5 inch   Procedures     Botox Procedures: chronic headache      Indications: migraines     Injection Location      Head / Face:  L superior cervical paraspinal, R superior cervical paraspinal, L , R , L frontalis, R frontalis, L medial occipitalis, R medial occipitalis, procerus, L temporalis, R temporalis, L superior trapezius and R superior trapezius    L  injection amount:  5 unit(s)    R  injection amount:  5 unit(s)    L lateral frontalis:  5 unit(s)    R lateral frontalis:  5 unit(s)    L medial frontalis:  5 unit(s)    R medial frontalis:  5 unit(s)    L temporalis injection amount:  20 unit(s)    R temporalis injection amount:  20 unit(s)    Procerus injection amount:  5 unit(s)    L medial occipitalis injection amount:  15 unit(s)    R medial occipitalis injection amount:  15 unit(s)    L superior cervical paraspinal injection amount:  10 unit(s)    R superior cervical paraspinal injection amount:  10 unit(s)    L superior trapezius injection amount:  15 unit(s)    R superior trapezius injection amount:  15 unit(s)   Total Units     Total units used:  200    Total units discarded:  0   Post-procedure details      Chemodenervation:  Chronic migraine    Facial Nerve Location[de-identified]  Bilateral facial nerve    Patient tolerance of procedure:   Tolerated well, no immediate complications   Comments      45U bilat occipitalis and paraspinal  All medically necessary

## 2023-09-27 DIAGNOSIS — G43.709 CHRONIC MIGRAINE WITHOUT AURA WITHOUT STATUS MIGRAINOSUS, NOT INTRACTABLE: ICD-10-CM

## 2023-09-27 RX ORDER — SUMATRIPTAN 100 MG/1
TABLET, FILM COATED ORAL
Qty: 27 TABLET | Refills: 0 | Status: SHIPPED | OUTPATIENT
Start: 2023-09-27

## 2023-09-27 NOTE — TELEPHONE ENCOUNTER
Recd vm 9/26 taken off 9/27  This is [place] [facility]. I was just in there with Mauro James and I forgot to ask her I need a refill on sumatriptan 100 milligrams when I have a migraine my YOB: 1950.  [phone]    _____________  [facility]ill; please sign script if in agreement, thank you.

## 2023-11-08 ENCOUNTER — OFFICE VISIT (OUTPATIENT)
Dept: NEUROLOGY | Facility: CLINIC | Age: 73
End: 2023-11-08
Payer: COMMERCIAL

## 2023-11-08 VITALS
TEMPERATURE: 97.4 F | SYSTOLIC BLOOD PRESSURE: 136 MMHG | DIASTOLIC BLOOD PRESSURE: 88 MMHG | BODY MASS INDEX: 25.43 KG/M2 | HEIGHT: 66 IN | WEIGHT: 158.2 LBS | HEART RATE: 82 BPM | OXYGEN SATURATION: 94 %

## 2023-11-08 DIAGNOSIS — G43.009 MIGRAINE WITHOUT AURA AND WITHOUT STATUS MIGRAINOSUS, NOT INTRACTABLE: ICD-10-CM

## 2023-11-08 DIAGNOSIS — G43.709 CHRONIC MIGRAINE WITHOUT AURA WITHOUT STATUS MIGRAINOSUS, NOT INTRACTABLE: Primary | ICD-10-CM

## 2023-11-08 DIAGNOSIS — M54.2 CERVICALGIA: ICD-10-CM

## 2023-11-08 PROCEDURE — 99214 OFFICE O/P EST MOD 30 MIN: CPT | Performed by: PHYSICIAN ASSISTANT

## 2023-11-08 RX ORDER — CYCLOBENZAPRINE HCL 10 MG
10 TABLET ORAL
Qty: 90 TABLET | Refills: 3 | Status: SHIPPED | OUTPATIENT
Start: 2023-11-08

## 2023-11-08 NOTE — PROGRESS NOTES
Patient ID: Silvana Siegel is a 74 y.o. female. Assessment/Plan:    Chronic migraine without aura without status migrainosus, not intractable  Since starting Botox pt has had a reduction in migraine headaches by 7 days as correlated by a headache diary. Pt has had decreased trips to the ER and decreased use of abortive medications. She will continue Botox injections. Will monitor Lt eyelid drooping. I have spent a total time of 30 minutes on 11/08/23 in caring for this patient including Diagnostic results, Prognosis, Risks and benefits of tx options, Instructions for management, Patient and family education, Importance of tx compliance, Risk factor reductions, Impressions, Counseling / Coordination of care, Documenting in the medical record, Reviewing / ordering tests, medicine, procedures  , and Obtaining or reviewing history  . She will follow-up in 6 months. Migraine without aura and without status migrainosus, not intractable  She will continue sumatriptan and Naproxen as they are effective. Diagnoses and all orders for this visit:    Chronic migraine without aura without status migrainosus, not intractable    Migraine without aura and without status migrainosus, not intractable    Cervicalgia  -     cyclobenzaprine (FLEXERIL) 10 mg tablet; Take 1 tablet (10 mg total) by mouth daily at bedtime           Subjective:    HPI    Silvana Siegel is a 75yo female, with mood disorder and hyperlipidemia, who follows in the office due to migraine headaches. She reports that Botox has been helpful. She remains on Depakote as well. She did have some drooping of the Lt eyelid after the last Botox injection. Since starting Botox pt has had a reduction in migraine headaches by 7 days as correlated by a headache diary. Pt has had decreased trips to the ER and decreased use of abortive medications. She has up to a 2 migraines per week. Migraines typically start upon awakening.  Her  states that she does not snore or have an apneic events. She does not feel overly fatigued during the day. She does take sumatriptan and naproxen to treat the migraines. She denies any side effects of the medications. Headaches are typically 8/10 but she had a 9/10 last night. They will start in the Lt occipital area and extend to the Lt eye. They are not lasting longer than a day since starting Botox. PREVENTIVE: Citalopram, Topamax, depakote, Botox, tizanidine  ABORTIVE: Naratriptan, Sumavel, Dexamethasone, maxalt, imtrex     She was following with Vascular Surgery due to possible fibromuscular dysplasia. She is overdue for carotid ultrasound. It is ordered but needs to be scheduled. The following portions of the patient's history were reviewed and updated as appropriate: allergies, current medications, past family history, past medical history, past social history, past surgical history, and problem list.         Objective:    Blood pressure 136/88, pulse 82, temperature (!) 97.4 °F (36.3 °C), temperature source Temporal, height 5' 6" (1.676 m), weight 71.8 kg (158 lb 3.2 oz), SpO2 94 %, not currently breastfeeding. Physical Exam  Vitals reviewed. Eyes:      General: Lids are normal.      Extraocular Movements: Extraocular movements intact. Pupils: Pupils are equal, round, and reactive to light. Neurological:      Motor: Motor strength is normal.  Psychiatric:         Speech: Speech normal.         Neurological Exam  Mental Status   Oriented to person, place, time and situation. Speech is normal. Language is fluent with no aphasia. Attention and concentration are normal. Fund of knowledge is appropriate for level of education. Apraxia absent. Cranial Nerves  CN II: Visual fields full to confrontation. CN III, IV, VI: Extraocular movements intact bilaterally. Normal lids and orbits bilaterally. Pupils equal round and reactive to light bilaterally.   CN V: Facial sensation is normal.  CN VII: Full and symmetric facial movement. CN XI: Shoulder shrug strength is normal.  CN XII: Tongue midline without atrophy or fasciculations. Motor   Strength is 5/5 throughout all four extremities. Reflexes  Deep tendon reflexes are 2+ and symmetric except as noted. 1/4 throughout. Gait  Casual gait is normal including stance, stride, and arm swing. ROS:    Review of Systems   Constitutional:  Negative for appetite change, fatigue and fever. HENT: Negative. Negative for hearing loss, tinnitus, trouble swallowing and voice change. Eyes: Negative. Negative for photophobia, pain and visual disturbance. Respiratory: Negative. Negative for shortness of breath. Cardiovascular: Negative. Negative for palpitations. Gastrointestinal: Negative. Negative for nausea and vomiting. Endocrine: Negative. Negative for cold intolerance. Genitourinary: Negative. Negative for dysuria, frequency and urgency. Musculoskeletal:  Negative for back pain, gait problem, myalgias and neck pain. Skin: Negative. Negative for rash. Allergic/Immunologic: Negative. Neurological:  Positive for headaches. Negative for dizziness, tremors, seizures, syncope, facial asymmetry, speech difficulty, weakness, light-headedness and numbness. Hematological: Negative. Does not bruise/bleed easily. Psychiatric/Behavioral: Negative. Negative for confusion, hallucinations and sleep disturbance. All other systems reviewed and are negative. Review of systems personally reviewed.

## 2023-11-08 NOTE — PATIENT INSTRUCTIONS
Chronic migraine without aura without status migrainosus, not intractable  Since starting Botox pt has had a reduction in migraine headaches by 7 days as correlated by a headache diary. Pt has had decreased trips to the ER and decreased use of abortive medications. She will continue Botox injections. Will monitor Lt eyelid drooping. I have spent a total time of 30 minutes on 11/08/23 in caring for this patient including Diagnostic results, Prognosis, Risks and benefits of tx options, Instructions for management, Patient and family education, Importance of tx compliance, Risk factor reductions, Impressions, Counseling / Coordination of care, Documenting in the medical record, Reviewing / ordering tests, medicine, procedures  , and Obtaining or reviewing history  . She will follow-up in 6 months. Migraine without aura and without status migrainosus, not intractable  She will continue sumatriptan and Naproxen as they are effective.

## 2023-11-08 NOTE — ASSESSMENT & PLAN NOTE
Since starting Botox pt has had a reduction in migraine headaches by 7 days as correlated by a headache diary. Pt has had decreased trips to the ER and decreased use of abortive medications. She will continue Botox injections. Will monitor Lt eyelid drooping. I have spent a total time of 30 minutes on 11/08/23 in caring for this patient including Diagnostic results, Prognosis, Risks and benefits of tx options, Instructions for management, Patient and family education, Importance of tx compliance, Risk factor reductions, Impressions, Counseling / Coordination of care, Documenting in the medical record, Reviewing / ordering tests, medicine, procedures  , and Obtaining or reviewing history  . She will follow-up in 6 months.

## 2023-11-28 ENCOUNTER — APPOINTMENT (OUTPATIENT)
Dept: LAB | Age: 73
End: 2023-11-28
Payer: COMMERCIAL

## 2023-11-28 DIAGNOSIS — R73.9 HYPERGLYCEMIA: ICD-10-CM

## 2023-11-28 DIAGNOSIS — Z00.00 HEALTHCARE MAINTENANCE: ICD-10-CM

## 2023-11-28 LAB
ALBUMIN SERPL BCP-MCNC: 4 G/DL (ref 3.5–5)
ALP SERPL-CCNC: 53 U/L (ref 34–104)
ALT SERPL W P-5'-P-CCNC: 8 U/L (ref 7–52)
ANION GAP SERPL CALCULATED.3IONS-SCNC: 5 MMOL/L
AST SERPL W P-5'-P-CCNC: 15 U/L (ref 13–39)
BACTERIA UR QL AUTO: ABNORMAL /HPF
BASOPHILS # BLD AUTO: 0.04 THOUSANDS/ÂΜL (ref 0–0.1)
BASOPHILS NFR BLD AUTO: 1 % (ref 0–1)
BILIRUB SERPL-MCNC: 0.41 MG/DL (ref 0.2–1)
BILIRUB UR QL STRIP: NEGATIVE
BUN SERPL-MCNC: 12 MG/DL (ref 5–25)
CALCIUM SERPL-MCNC: 9 MG/DL (ref 8.4–10.2)
CHLORIDE SERPL-SCNC: 90 MMOL/L (ref 96–108)
CHOLEST SERPL-MCNC: 163 MG/DL
CLARITY UR: CLEAR
CO2 SERPL-SCNC: 28 MMOL/L (ref 21–32)
COLOR UR: COLORLESS
CREAT SERPL-MCNC: 0.75 MG/DL (ref 0.6–1.3)
EOSINOPHIL # BLD AUTO: 0.14 THOUSAND/ÂΜL (ref 0–0.61)
EOSINOPHIL NFR BLD AUTO: 2 % (ref 0–6)
ERYTHROCYTE [DISTWIDTH] IN BLOOD BY AUTOMATED COUNT: 13.8 % (ref 11.6–15.1)
EST. AVERAGE GLUCOSE BLD GHB EST-MCNC: 128 MG/DL
GFR SERPL CREATININE-BSD FRML MDRD: 78 ML/MIN/1.73SQ M
GLUCOSE P FAST SERPL-MCNC: 102 MG/DL (ref 65–99)
GLUCOSE UR STRIP-MCNC: NEGATIVE MG/DL
HBA1C MFR BLD: 6.1 %
HCT VFR BLD AUTO: 39.4 % (ref 34.8–46.1)
HDLC SERPL-MCNC: 52 MG/DL
HGB BLD-MCNC: 13.1 G/DL (ref 11.5–15.4)
HGB UR QL STRIP.AUTO: ABNORMAL
IMM GRANULOCYTES # BLD AUTO: 0.03 THOUSAND/UL (ref 0–0.2)
IMM GRANULOCYTES NFR BLD AUTO: 1 % (ref 0–2)
KETONES UR STRIP-MCNC: NEGATIVE MG/DL
LDLC SERPL CALC-MCNC: 73 MG/DL (ref 0–100)
LEUKOCYTE ESTERASE UR QL STRIP: NEGATIVE
LYMPHOCYTES # BLD AUTO: 2.4 THOUSANDS/ÂΜL (ref 0.6–4.47)
LYMPHOCYTES NFR BLD AUTO: 37 % (ref 14–44)
MCH RBC QN AUTO: 31 PG (ref 26.8–34.3)
MCHC RBC AUTO-ENTMCNC: 33.2 G/DL (ref 31.4–37.4)
MCV RBC AUTO: 93 FL (ref 82–98)
MONOCYTES # BLD AUTO: 0.59 THOUSAND/ÂΜL (ref 0.17–1.22)
MONOCYTES NFR BLD AUTO: 9 % (ref 4–12)
NEUTROPHILS # BLD AUTO: 3.27 THOUSANDS/ÂΜL (ref 1.85–7.62)
NEUTS SEG NFR BLD AUTO: 50 % (ref 43–75)
NITRITE UR QL STRIP: NEGATIVE
NON-SQ EPI CELLS URNS QL MICRO: ABNORMAL /HPF
NONHDLC SERPL-MCNC: 111 MG/DL
NRBC BLD AUTO-RTO: 0 /100 WBCS
PH UR STRIP.AUTO: 7.5 [PH]
PLATELET # BLD AUTO: 304 THOUSANDS/UL (ref 149–390)
PMV BLD AUTO: 9.9 FL (ref 8.9–12.7)
POTASSIUM SERPL-SCNC: 4.3 MMOL/L (ref 3.5–5.3)
PROT SERPL-MCNC: 6.6 G/DL (ref 6.4–8.4)
PROT UR STRIP-MCNC: NEGATIVE MG/DL
RBC # BLD AUTO: 4.22 MILLION/UL (ref 3.81–5.12)
RBC #/AREA URNS AUTO: ABNORMAL /HPF
SODIUM SERPL-SCNC: 123 MMOL/L (ref 135–147)
SP GR UR STRIP.AUTO: 1.01 (ref 1–1.03)
TRIGL SERPL-MCNC: 188 MG/DL
UROBILINOGEN UR STRIP-ACNC: <2 MG/DL
WBC # BLD AUTO: 6.47 THOUSAND/UL (ref 4.31–10.16)
WBC #/AREA URNS AUTO: ABNORMAL /HPF

## 2023-11-28 PROCEDURE — 83036 HEMOGLOBIN GLYCOSYLATED A1C: CPT

## 2023-11-28 PROCEDURE — 81001 URINALYSIS AUTO W/SCOPE: CPT

## 2023-11-28 PROCEDURE — 80053 COMPREHEN METABOLIC PANEL: CPT

## 2023-11-28 PROCEDURE — 36415 COLL VENOUS BLD VENIPUNCTURE: CPT

## 2023-11-28 PROCEDURE — 80061 LIPID PANEL: CPT

## 2023-11-28 PROCEDURE — 85025 COMPLETE CBC W/AUTO DIFF WBC: CPT

## 2023-12-19 DIAGNOSIS — F51.01 PRIMARY INSOMNIA: ICD-10-CM

## 2023-12-19 RX ORDER — ZOLPIDEM TARTRATE 5 MG/1
TABLET ORAL
Qty: 90 TABLET | Refills: 1 | Status: SHIPPED | OUTPATIENT
Start: 2023-12-19

## 2023-12-25 DIAGNOSIS — E78.00 PURE HYPERCHOLESTEROLEMIA: ICD-10-CM

## 2023-12-25 RX ORDER — SIMVASTATIN 40 MG
40 TABLET ORAL DAILY
Qty: 90 TABLET | Refills: 3 | Status: SHIPPED | OUTPATIENT
Start: 2023-12-25

## 2023-12-26 ENCOUNTER — PROCEDURE VISIT (OUTPATIENT)
Dept: NEUROLOGY | Facility: CLINIC | Age: 73
End: 2023-12-26
Payer: COMMERCIAL

## 2023-12-26 VITALS
DIASTOLIC BLOOD PRESSURE: 88 MMHG | WEIGHT: 158.8 LBS | TEMPERATURE: 97.8 F | BODY MASS INDEX: 25.52 KG/M2 | SYSTOLIC BLOOD PRESSURE: 140 MMHG | HEIGHT: 66 IN | RESPIRATION RATE: 98 BRPM | HEART RATE: 66 BPM

## 2023-12-26 DIAGNOSIS — G43.009 MIGRAINE WITHOUT AURA AND WITHOUT STATUS MIGRAINOSUS, NOT INTRACTABLE: ICD-10-CM

## 2023-12-26 DIAGNOSIS — G43.709 CHRONIC MIGRAINE WITHOUT AURA WITHOUT STATUS MIGRAINOSUS, NOT INTRACTABLE: Primary | ICD-10-CM

## 2023-12-26 PROCEDURE — 64615 CHEMODENERV MUSC MIGRAINE: CPT | Performed by: PHYSICIAN ASSISTANT

## 2023-12-26 RX ORDER — SUMATRIPTAN 100 MG/1
TABLET, FILM COATED ORAL
Qty: 27 TABLET | Refills: 0 | Status: SHIPPED | OUTPATIENT
Start: 2023-12-26

## 2023-12-26 RX ORDER — NAPROXEN 500 MG/1
500 TABLET ORAL 2 TIMES DAILY PRN
Qty: 90 TABLET | Refills: 4 | Status: SHIPPED | OUTPATIENT
Start: 2023-12-26

## 2023-12-26 NOTE — PROGRESS NOTES
Chemodenervation     Date/Time  12/26/2023 1:45 PM     Performed by  Gissel Arizmendi PA-C   Authorized by  Gissel Arizmendi PA-C     Pre-procedure details      Prepped With: Alcohol     Anesthesia  (see MAR for exact dosages):     Anesthesia method:  None   Procedure details      Position:  Upright   Botox      Botox Type:  Type A    Brand:  Botox    mL's of Botulinum Toxin:  200    Final Concentration per CC:  200 units    Needle Gauge:  30 G 2.5 inch   Procedures      Botox Procedures: chronic headache      Indications: migraines     Injection Location      Head / Face:  L superior cervical paraspinal, R superior cervical paraspinal, L , R , L frontalis, R frontalis, L medial occipitalis, R medial occipitalis, procerus, L temporalis, R temporalis, L superior trapezius and R superior trapezius    L  injection amount:  5 unit(s)    R  injection amount:  5 unit(s)    L lateral frontalis:  5 unit(s)    R lateral frontalis:  5 unit(s)    L medial frontalis:  5 unit(s)    R medial frontalis:  5 unit(s)    L temporalis injection amount:  20 unit(s)    R temporalis injection amount:  20 unit(s)    Procerus injection amount:  5 unit(s)    L medial occipitalis injection amount:  15 unit(s)    R medial occipitalis injection amount:  15 unit(s)    L superior cervical paraspinal injection amount:  10 unit(s)    R superior cervical paraspinal injection amount:  10 unit(s)    L superior trapezius injection amount:  15 unit(s)    R superior trapezius injection amount:  15 unit(s)   Total Units      Total units used:  200    Total units discarded:  0   Post-procedure details      Chemodenervation:  Chronic migraine    Facial Nerve Location::  Bilateral facial nerve    Patient tolerance of procedure:  Tolerated well, no immediate complications   Comments       45U bilat occipitalis and paraspinal  All medically necessary

## 2024-01-08 ENCOUNTER — OFFICE VISIT (OUTPATIENT)
Dept: INTERNAL MEDICINE CLINIC | Facility: CLINIC | Age: 74
End: 2024-01-08
Payer: COMMERCIAL

## 2024-01-08 VITALS
HEIGHT: 66 IN | BODY MASS INDEX: 25.39 KG/M2 | HEART RATE: 93 BPM | TEMPERATURE: 98.5 F | OXYGEN SATURATION: 95 % | WEIGHT: 158 LBS

## 2024-01-08 DIAGNOSIS — U07.1 COVID: Primary | ICD-10-CM

## 2024-01-08 PROCEDURE — 99213 OFFICE O/P EST LOW 20 MIN: CPT | Performed by: INTERNAL MEDICINE

## 2024-01-08 RX ORDER — NIRMATRELVIR AND RITONAVIR 300-100 MG
3 KIT ORAL 2 TIMES DAILY
Qty: 30 TABLET | Refills: 0 | Status: SHIPPED | OUTPATIENT
Start: 2024-01-08 | End: 2024-01-13

## 2024-01-08 NOTE — PROGRESS NOTES
COVID-19 Outpatient Progress Note    Assessment/Plan:    Problem List Items Addressed This Visit       COVID - Primary     Patient is here today originally for Medicare wellness visit.  Patient states she has been ill for the past 3 days with chest congestion cough, fever, chills, fatigue myalgias and arthralgias.  Because of her symptoms we did check for COVID virus today in the office and this was positive.  Patient is awake alert lethargic.  We did not complete a physical exam today in the office and we discussed treatment for the COVID virus.  She is already taking vitamin C, vitamin D and zinc.  States that she is trying to keep up with hydration and nutrition.  Taking over-the-counter cough medicine such as Omega DM and is willing to be treated for COVID with Paxlovid and prescription was sent to the pharmacy.  She was told that she should stop the simvastatin while she is on this drug and call if not improving or worsening symptoms.  Will be returning to the office next month for Medicare wellness visit.         Relevant Medications    nirmatrelvir & ritonavir (Paxlovid, 300/100,) tablet therapy pack      Disposition:     Rapid antigen testing was performed and the result is POSITIVE for COVID-19. Patient with moderate or severe COVID-19. They should isolate from others through at least day 10. Isolation can be ended if symptoms are improving and they are fever free for the past 24 hours. If they still have fever or other symptoms have not improved, continue to isolate until they improve. Regardless of when you isolation is ended, avoid being around people who are more likely to get very sick from COVID-19 until at least day 11. Patient's with severe COVID-19 may need to isolate up to 20 days from symptom onset.With two sequential negative antigen tests 48 hours apart, they may remove their mask sooner than day 10.     Discussed symptom directed medication options with patient. Discussed vitamin D, vitamin  C, and/or zinc supplementation with patient.     Patient meets criteria for Paxlovid and they have been counseled appropriately regarding risks, benefits, side effects, and alternative treatment options. After discussion, patient agrees to treatment.    Possible side effects of Paxlovid?    Possible side effects of Paxlovid are:  - Liver Problems. Notify us right away if you start to experience loss of appetite, yellowing of your skin and the whites of eyes (jaundice), dark-colored urine, pale colored stools and itchy skin, stomach area (abdominal) pain.  - Resistance to HIV Medicines. If you have untreated HIV infection, Paxlovid may lead to some HIV medicines not working as well in the future.  - Other possible side effects include: altered sense of taste, diarrhea, high blood pressure, or muscle aches.    I have spent a total time of 15 minutes on the day of the encounter for this patient including discussing diagnostic results, discussing prognosis, risks and benefits of treatment options, instructions for management, patient and family education, importance of treatment compliance, risk factor reductions, impressions, counseling/coordination of care, documenting in the medical record, reviewing/ordering tests, medicine, procedures and obtaining or reviewing history. Patient works at hospice.  We have given the patient excuse to be out of work till next Wednesday.      Encounter provider: Edin Choi DO     Provider located at: [address] [place] [address] INTERNAL MEDICINE  [address] [place] [id number]     Recent Visits  No visits were found meeting these conditions.  Showing recent visits within past 7 days and meeting all other requirements  Today's Visits  Date Type Provider Dept   01/08/24 Office Visit Edin Choi DO [facility] Internal Med   Showing today's visits and meeting all other requirements  Future Appointments  No visits were found meeting these conditions.  Showing  future appointments within next 150 days and meeting all other requirements     Subjective:   Rosemary Starkey is a 73 y.o. female who is concerned about COVID-19. Patient's symptoms include fever, chills, fatigue, nasal congestion, rhinorrhea, sore throat, anosmia, loss of taste, cough, chest tightness, nausea, myalgias and headache. Patient denies malaise, shortness of breath, abdominal pain, vomiting and diarrhea.     - Date of symptom onset: 1/5/2024      COVID-19 vaccination status: Fully vaccinated (primary series)    Exposure:   Contact with a person who is under investigation (PUI) for or who is positive for COVID-19 within the last 14 days?: No    Hospitalized recently for fever and/or lower respiratory symptoms?: No      Currently a healthcare worker that is involved in direct patient care?: No      Works in a special setting where the risk of COVID-19 transmission may be high? (this may include long-term care, correctional and intermediate facilities; homeless shelters; assisted-living facilities and group homes.): No      Resident in a special setting where the risk of COVID-19 transmission may be high? (this may include long-term care, correctional and intermediate facilities; homeless shelters; assisted-living facilities and group homes.): No      Lab Results   Component Value Date    SARSCOV2 Negative 01/10/2022    SARSCOV2 Not Detected 07/24/2020       Review of Systems   Constitutional:  Positive for chills, fatigue and fever.   HENT:  Positive for congestion, rhinorrhea and sore throat.    Respiratory:  Positive for cough and chest tightness. Negative for shortness of breath.    Gastrointestinal:  Positive for nausea. Negative for abdominal pain, diarrhea and vomiting.   Musculoskeletal:  Positive for myalgias.   Neurological:  Positive for headaches.     Current Outpatient Medications on File Prior to Visit   Medication Sig    albuterol (Ventolin HFA) 90 mcg/act inhaler Inhale 2 puffs every 6 (six) hours  as needed for wheezing    aspirin (ECOTRIN) 325 mg EC tablet Take 1 tablet (325 mg total) by mouth daily    Cholecalciferol (VITAMIN D3 PO) Take by mouth daily    citalopram (CeleXA) 20 mg tablet Take 1 tablet (20 mg total) by mouth daily    cyanocobalamin (VITAMIN B-12) 1000 MCG tablet Take 1,000 mcg by mouth daily    cyclobenzaprine (FLEXERIL) 10 mg tablet Take 1 tablet (10 mg total) by mouth daily at bedtime    divalproex sodium (DEPAKOTE ER) 250 mg 24 hr tablet Take 1 tablet (250 mg total) by mouth 2 (two) times a day    Fluticasone-Salmeterol (Advair) 100-50 mcg/dose inhaler Inhale 1 puff 2 (two) times a day Rinse mouth after use.    Lidocaine Viscous HCl (XYLOCAINE) 2 % mucosal solution     naproxen (NAPROSYN) 500 mg tablet Take 1 tablet (500 mg total) by mouth 2 (two) times a day as needed for headaches    ondansetron (ZOFRAN) 4 mg tablet Take 1 tablet (4 mg total) by mouth every 8 (eight) hours as needed for nausea or vomiting    polyethylene glycol (GLYCOLAX) 17 GM/SCOOP powder Take 17 g by mouth daily , increase to twice daily if needed    predniSONE 10 mg tablet Take 1 tablet (10 mg total) by mouth daily Two pills twice a day with food for 3 days then 1 pill twice a day with food for 3 days then 1 pill daily till finished    pyridoxine (B-6) 100 MG tablet Take 100 mg by mouth daily    Riboflavin (B2) 100 MG TABS Take by mouth daily    simvastatin (ZOCOR) 40 mg tablet TAKE 1 TABLET DAILY    SUMAtriptan (IMITREX) 100 mg tablet TAKE 1 TABLET AT ONSET OF MIGRAINE HEADACHE. MAY REPEAT IN 2 HOURS IF NEEDED, NO MORE THAN 2 IN 24 HOURS AND NO MORE THAN 3 IN A WEEK    zolpidem (AMBIEN) 5 mg tablet One pill at bedtime as needed to help with sleep    docusate sodium (COLACE) 100 mg capsule Take 1 capsule (100 mg total) by mouth 2 (two) times a day (Patient not taking: Reported on 1/8/2024)    famotidine (PEPCID) 40 MG tablet Every night before bed (Patient not taking: Reported on 11/8/2023)    Magnesium 500 MG CAPS  "Take by mouth daily (Patient not taking: Reported on 11/8/2023)    promethazine-codeine (PHENERGAN WITH CODEINE) 6.25-10 mg/5 mL syrup Take 5 mL by mouth every 6 (six) hours as needed for cough (Patient not taking: Reported on 11/8/2023)       Objective:    Pulse 93   Temp 98.5 °F (36.9 °C)   Ht 5' 6\" (1.676 m)   Wt 71.7 kg (158 lb)   SpO2 95%   BMI 25.50 kg/m²        Physical Exam  Vitals and nursing note reviewed.   Constitutional:       General: She is not in acute distress.     Appearance: She is ill-appearing. She is not toxic-appearing or diaphoretic.      Comments: Lethargic 73-year-old female who is awake alert    Persistent cough during stay at the office today generalized fatigue laying on exam table   HENT:      Head: Normocephalic and atraumatic.   Pulmonary:      Comments: Persistent and intermittent cough during exam.  Did not auscultate the lungs.  Neurological:      Mental Status: She is alert and oriented to person, place, and time.   Psychiatric:         Mood and Affect: Mood normal.         Behavior: Behavior normal.         Thought Content: Thought content normal.         Judgment: Judgment normal.       Edin Choi, DO    "

## 2024-01-08 NOTE — LETTER
January 8, 2024     Patient: Rosemary Starkey  YOB: 1950  Date of Visit: 1/8/2024      To Whom it May Concern:    Rosemary Starkey is under my professional care. Rosemary was seen in my office on 1/8/2024. Rosemary may return to work on January 17, 2024 .    If you have any questions or concerns, please don't hesitate to call.         Sincerely,          Edin Choi,         CC:   No Recipients

## 2024-01-08 NOTE — ASSESSMENT & PLAN NOTE
Patient is here today originally for Medicare wellness visit.  Patient states she has been ill for the past 3 days with chest congestion cough, fever, chills, fatigue myalgias and arthralgias.  Because of her symptoms we did check for COVID virus today in the office and this was positive.  Patient is awake alert lethargic.  We did not complete a physical exam today in the office and we discussed treatment for the COVID virus.  She is already taking vitamin C, vitamin D and zinc.  States that she is trying to keep up with hydration and nutrition.  Taking over-the-counter cough medicine such as [place] DM and is willing to be treated for COVID with Paxlovid and prescription was sent to the pharmacy.  She was told that she should stop the simvastatin while she is on this drug and call if not improving or worsening symptoms.  Will be returning to the office next month for Medicare wellness visit.

## 2024-02-13 DIAGNOSIS — F41.9 ANXIETY: ICD-10-CM

## 2024-02-13 RX ORDER — CITALOPRAM 20 MG/1
20 TABLET ORAL DAILY
Qty: 90 TABLET | Refills: 3 | Status: SHIPPED | OUTPATIENT
Start: 2024-02-13

## 2024-02-17 ENCOUNTER — OFFICE VISIT (OUTPATIENT)
Dept: URGENT CARE | Age: 74
End: 2024-02-17
Payer: COMMERCIAL

## 2024-02-17 ENCOUNTER — APPOINTMENT (EMERGENCY)
Dept: RADIOLOGY | Facility: HOSPITAL | Age: 74
DRG: 193 | End: 2024-02-17
Payer: COMMERCIAL

## 2024-02-17 ENCOUNTER — HOSPITAL ENCOUNTER (INPATIENT)
Facility: HOSPITAL | Age: 74
LOS: 6 days | Discharge: HOME | DRG: 193 | End: 2024-02-23
Attending: EMERGENCY MEDICINE | Admitting: STUDENT IN AN ORGANIZED HEALTH CARE EDUCATION/TRAINING PROGRAM
Payer: COMMERCIAL

## 2024-02-17 VITALS
SYSTOLIC BLOOD PRESSURE: 111 MMHG | RESPIRATION RATE: 20 BRPM | TEMPERATURE: 98.3 F | DIASTOLIC BLOOD PRESSURE: 54 MMHG | HEART RATE: 104 BPM | OXYGEN SATURATION: 89 %

## 2024-02-17 DIAGNOSIS — J06.9 VIRAL URI WITH COUGH: ICD-10-CM

## 2024-02-17 DIAGNOSIS — E87.1 HYPONATREMIA: ICD-10-CM

## 2024-02-17 DIAGNOSIS — J18.9 PNEUMONIA: Primary | ICD-10-CM

## 2024-02-17 DIAGNOSIS — R05.9 COUGH: ICD-10-CM

## 2024-02-17 DIAGNOSIS — R09.02 HYPOXIA: Primary | ICD-10-CM

## 2024-02-17 DIAGNOSIS — R09.02 HYPOXIA: ICD-10-CM

## 2024-02-17 DIAGNOSIS — R05.3 CHRONIC COUGH: ICD-10-CM

## 2024-02-17 DIAGNOSIS — B37.9 CANDIDIASIS: ICD-10-CM

## 2024-02-17 PROBLEM — J44.1 CHRONIC OBSTRUCTIVE PULMONARY DISEASE WITH ACUTE EXACERBATION (HCC): Status: ACTIVE | Noted: 2024-02-17

## 2024-02-17 LAB
ALBUMIN SERPL BCP-MCNC: 3.4 G/DL (ref 3.5–5)
ALP SERPL-CCNC: 55 U/L (ref 34–104)
ALT SERPL W P-5'-P-CCNC: 8 U/L (ref 7–52)
ANION GAP SERPL CALCULATED.3IONS-SCNC: 9 MMOL/L
APTT PPP: 37 SECONDS (ref 23–37)
AST SERPL W P-5'-P-CCNC: 14 U/L (ref 13–39)
BASOPHILS # BLD AUTO: 0.03 THOUSANDS/ÂΜL (ref 0–0.1)
BASOPHILS NFR BLD AUTO: 0 % (ref 0–1)
BILIRUB SERPL-MCNC: 0.26 MG/DL (ref 0.2–1)
BUN SERPL-MCNC: 11 MG/DL (ref 5–25)
CALCIUM ALBUM COR SERPL-MCNC: 9.3 MG/DL (ref 8.3–10.1)
CALCIUM SERPL-MCNC: 8.8 MG/DL (ref 8.4–10.2)
CARDIAC TROPONIN I PNL SERPL HS: 7 NG/L
CHLORIDE SERPL-SCNC: 92 MMOL/L (ref 96–108)
CO2 SERPL-SCNC: 26 MMOL/L (ref 21–32)
CREAT SERPL-MCNC: 0.73 MG/DL (ref 0.6–1.3)
EOSINOPHIL # BLD AUTO: 0.02 THOUSAND/ÂΜL (ref 0–0.61)
EOSINOPHIL NFR BLD AUTO: 0 % (ref 0–6)
ERYTHROCYTE [DISTWIDTH] IN BLOOD BY AUTOMATED COUNT: 14.1 % (ref 11.6–15.1)
FLUAV RNA RESP QL NAA+PROBE: NEGATIVE
FLUBV RNA RESP QL NAA+PROBE: NEGATIVE
GFR SERPL CREATININE-BSD FRML MDRD: 80 ML/MIN/1.73SQ M
GLUCOSE SERPL-MCNC: 106 MG/DL (ref 65–140)
HCT VFR BLD AUTO: 33.1 % (ref 34.8–46.1)
HGB BLD-MCNC: 11.3 G/DL (ref 11.5–15.4)
IMM GRANULOCYTES # BLD AUTO: 0.05 THOUSAND/UL (ref 0–0.2)
IMM GRANULOCYTES NFR BLD AUTO: 0 % (ref 0–2)
INR PPP: 1.16 (ref 0.84–1.19)
LACTATE SERPL-SCNC: 0.8 MMOL/L (ref 0.5–2)
LYMPHOCYTES # BLD AUTO: 1.4 THOUSANDS/ÂΜL (ref 0.6–4.47)
LYMPHOCYTES NFR BLD AUTO: 11 % (ref 14–44)
MCH RBC QN AUTO: 30.9 PG (ref 26.8–34.3)
MCHC RBC AUTO-ENTMCNC: 34.1 G/DL (ref 31.4–37.4)
MCV RBC AUTO: 90 FL (ref 82–98)
MONOCYTES # BLD AUTO: 1.8 THOUSAND/ÂΜL (ref 0.17–1.22)
MONOCYTES NFR BLD AUTO: 14 % (ref 4–12)
NEUTROPHILS # BLD AUTO: 9.35 THOUSANDS/ÂΜL (ref 1.85–7.62)
NEUTS SEG NFR BLD AUTO: 75 % (ref 43–75)
NRBC BLD AUTO-RTO: 0 /100 WBCS
PLATELET # BLD AUTO: 215 THOUSANDS/UL (ref 149–390)
PMV BLD AUTO: 10.3 FL (ref 8.9–12.7)
POTASSIUM SERPL-SCNC: 4.1 MMOL/L (ref 3.5–5.3)
PROCALCITONIN SERPL-MCNC: 0.22 NG/ML
PROT SERPL-MCNC: 6.3 G/DL (ref 6.4–8.4)
PROTHROMBIN TIME: 14.7 SECONDS (ref 11.6–14.5)
RBC # BLD AUTO: 3.66 MILLION/UL (ref 3.81–5.12)
RSV RNA RESP QL NAA+PROBE: NEGATIVE
SARS-COV-2 AG UPPER RESP QL IA: NEGATIVE
SARS-COV-2 RNA RESP QL NAA+PROBE: NEGATIVE
SODIUM SERPL-SCNC: 127 MMOL/L (ref 135–147)
VALID CONTROL: NORMAL
WBC # BLD AUTO: 12.65 THOUSAND/UL (ref 4.31–10.16)

## 2024-02-17 PROCEDURE — 87040 BLOOD CULTURE FOR BACTERIA: CPT

## 2024-02-17 PROCEDURE — 94760 N-INVAS EAR/PLS OXIMETRY 1: CPT

## 2024-02-17 PROCEDURE — 83605 ASSAY OF LACTIC ACID: CPT

## 2024-02-17 PROCEDURE — 99285 EMERGENCY DEPT VISIT HI MDM: CPT | Performed by: EMERGENCY MEDICINE

## 2024-02-17 PROCEDURE — 84145 PROCALCITONIN (PCT): CPT

## 2024-02-17 PROCEDURE — 87811 SARS-COV-2 COVID19 W/OPTIC: CPT

## 2024-02-17 PROCEDURE — 85610 PROTHROMBIN TIME: CPT

## 2024-02-17 PROCEDURE — 0241U HB NFCT DS VIR RESP RNA 4 TRGT: CPT

## 2024-02-17 PROCEDURE — 93005 ELECTROCARDIOGRAM TRACING: CPT

## 2024-02-17 PROCEDURE — 71046 X-RAY EXAM CHEST 2 VIEWS: CPT

## 2024-02-17 PROCEDURE — 36415 COLL VENOUS BLD VENIPUNCTURE: CPT

## 2024-02-17 PROCEDURE — 80053 COMPREHEN METABOLIC PANEL: CPT

## 2024-02-17 PROCEDURE — 99223 1ST HOSP IP/OBS HIGH 75: CPT | Performed by: STUDENT IN AN ORGANIZED HEALTH CARE EDUCATION/TRAINING PROGRAM

## 2024-02-17 PROCEDURE — 94640 AIRWAY INHALATION TREATMENT: CPT

## 2024-02-17 PROCEDURE — 85730 THROMBOPLASTIN TIME PARTIAL: CPT

## 2024-02-17 PROCEDURE — 85025 COMPLETE CBC W/AUTO DIFF WBC: CPT

## 2024-02-17 PROCEDURE — 84484 ASSAY OF TROPONIN QUANT: CPT

## 2024-02-17 PROCEDURE — 99214 OFFICE O/P EST MOD 30 MIN: CPT

## 2024-02-17 PROCEDURE — 99285 EMERGENCY DEPT VISIT HI MDM: CPT

## 2024-02-17 RX ORDER — POLYETHYLENE GLYCOL 3350 17 G/17G
17 POWDER, FOR SOLUTION ORAL DAILY
Status: DISCONTINUED | OUTPATIENT
Start: 2024-02-17 | End: 2024-02-23 | Stop reason: HOSPADM

## 2024-02-17 RX ORDER — PREDNISONE 20 MG/1
40 TABLET ORAL DAILY
Status: COMPLETED | OUTPATIENT
Start: 2024-02-17 | End: 2024-02-21

## 2024-02-17 RX ORDER — ACETAMINOPHEN 325 MG/1
650 TABLET ORAL EVERY 6 HOURS PRN
Status: DISCONTINUED | OUTPATIENT
Start: 2024-02-17 | End: 2024-02-23 | Stop reason: HOSPADM

## 2024-02-17 RX ORDER — ZOLPIDEM TARTRATE 5 MG/1
5 TABLET ORAL
Status: DISCONTINUED | OUTPATIENT
Start: 2024-02-17 | End: 2024-02-23 | Stop reason: HOSPADM

## 2024-02-17 RX ORDER — KETOROLAC TROMETHAMINE 30 MG/ML
15 INJECTION, SOLUTION INTRAMUSCULAR; INTRAVENOUS ONCE
Status: COMPLETED | OUTPATIENT
Start: 2024-02-17 | End: 2024-02-17

## 2024-02-17 RX ORDER — ACETAMINOPHEN 325 MG/1
975 TABLET ORAL ONCE
Status: COMPLETED | OUTPATIENT
Start: 2024-02-17 | End: 2024-02-17

## 2024-02-17 RX ORDER — SUMATRIPTAN 50 MG/1
100 TABLET, FILM COATED ORAL ONCE AS NEEDED
Status: COMPLETED | OUTPATIENT
Start: 2024-02-17 | End: 2024-02-17

## 2024-02-17 RX ORDER — DIVALPROEX SODIUM 250 MG/1
250 TABLET, EXTENDED RELEASE ORAL 2 TIMES DAILY
Status: DISCONTINUED | OUTPATIENT
Start: 2024-02-17 | End: 2024-02-23 | Stop reason: HOSPADM

## 2024-02-17 RX ORDER — NAPROXEN 500 MG/1
500 TABLET ORAL 2 TIMES DAILY PRN
Status: DISCONTINUED | OUTPATIENT
Start: 2024-02-17 | End: 2024-02-23 | Stop reason: HOSPADM

## 2024-02-17 RX ORDER — PRAVASTATIN SODIUM 80 MG/1
80 TABLET ORAL
Status: DISCONTINUED | OUTPATIENT
Start: 2024-02-17 | End: 2024-02-23 | Stop reason: HOSPADM

## 2024-02-17 RX ORDER — ALBUTEROL SULFATE 90 UG/1
2 AEROSOL, METERED RESPIRATORY (INHALATION) EVERY 6 HOURS PRN
Status: DISCONTINUED | OUTPATIENT
Start: 2024-02-17 | End: 2024-02-23 | Stop reason: HOSPADM

## 2024-02-17 RX ORDER — ONDANSETRON 4 MG/1
4 TABLET, ORALLY DISINTEGRATING ORAL EVERY 8 HOURS PRN
Status: DISCONTINUED | OUTPATIENT
Start: 2024-02-17 | End: 2024-02-23 | Stop reason: HOSPADM

## 2024-02-17 RX ORDER — IPRATROPIUM BROMIDE AND ALBUTEROL SULFATE 2.5; .5 MG/3ML; MG/3ML
3 SOLUTION RESPIRATORY (INHALATION)
Status: DISCONTINUED | OUTPATIENT
Start: 2024-02-17 | End: 2024-02-17

## 2024-02-17 RX ORDER — CYCLOBENZAPRINE HCL 10 MG
10 TABLET ORAL
Status: DISCONTINUED | OUTPATIENT
Start: 2024-02-17 | End: 2024-02-23 | Stop reason: HOSPADM

## 2024-02-17 RX ORDER — FLUTICASONE FUROATE AND VILANTEROL 200; 25 UG/1; UG/1
1 POWDER RESPIRATORY (INHALATION)
Status: DISCONTINUED | OUTPATIENT
Start: 2024-02-17 | End: 2024-02-23 | Stop reason: HOSPADM

## 2024-02-17 RX ORDER — GUAIFENESIN 600 MG/1
600 TABLET, EXTENDED RELEASE ORAL EVERY 12 HOURS SCHEDULED
Status: DISCONTINUED | OUTPATIENT
Start: 2024-02-17 | End: 2024-02-21

## 2024-02-17 RX ORDER — CITALOPRAM 20 MG/1
20 TABLET ORAL DAILY
Status: DISCONTINUED | OUTPATIENT
Start: 2024-02-17 | End: 2024-02-23 | Stop reason: HOSPADM

## 2024-02-17 RX ORDER — LEVALBUTEROL INHALATION SOLUTION 1.25 MG/3ML
1.25 SOLUTION RESPIRATORY (INHALATION)
Status: DISCONTINUED | OUTPATIENT
Start: 2024-02-17 | End: 2024-02-22

## 2024-02-17 RX ORDER — ENOXAPARIN SODIUM 100 MG/ML
40 INJECTION SUBCUTANEOUS DAILY
Status: DISCONTINUED | OUTPATIENT
Start: 2024-02-17 | End: 2024-02-23 | Stop reason: HOSPADM

## 2024-02-17 RX ADMIN — DIVALPROEX SODIUM 250 MG: 250 TABLET, EXTENDED RELEASE ORAL at 12:28

## 2024-02-17 RX ADMIN — KETOROLAC TROMETHAMINE 15 MG: 30 INJECTION, SOLUTION INTRAMUSCULAR; INTRAVENOUS at 11:11

## 2024-02-17 RX ADMIN — GUAIFENESIN 600 MG: 600 TABLET, EXTENDED RELEASE ORAL at 12:12

## 2024-02-17 RX ADMIN — IPRATROPIUM BROMIDE 0.5 MG: 0.5 SOLUTION RESPIRATORY (INHALATION) at 19:45

## 2024-02-17 RX ADMIN — SUMATRIPTAN SUCCINATE 100 MG: 50 TABLET ORAL at 15:51

## 2024-02-17 RX ADMIN — CEFTRIAXONE SODIUM 2000 MG: 10 INJECTION, POWDER, FOR SOLUTION INTRAVENOUS at 11:10

## 2024-02-17 RX ADMIN — ACETAMINOPHEN 975 MG: 325 TABLET ORAL at 11:12

## 2024-02-17 RX ADMIN — CYCLOBENZAPRINE HYDROCHLORIDE 10 MG: 10 TABLET, FILM COATED ORAL at 20:52

## 2024-02-17 RX ADMIN — ASPIRIN 325 MG: 325 TABLET, COATED ORAL at 12:29

## 2024-02-17 RX ADMIN — PREDNISONE 40 MG: 20 TABLET ORAL at 12:12

## 2024-02-17 RX ADMIN — NAPROXEN 500 MG: 500 TABLET ORAL at 15:51

## 2024-02-17 RX ADMIN — IPRATROPIUM BROMIDE AND ALBUTEROL SULFATE 3 ML: 2.5; .5 SOLUTION RESPIRATORY (INHALATION) at 13:30

## 2024-02-17 RX ADMIN — LEVALBUTEROL HYDROCHLORIDE 1.25 MG: 1.25 SOLUTION RESPIRATORY (INHALATION) at 19:45

## 2024-02-17 RX ADMIN — ENOXAPARIN SODIUM 40 MG: 40 INJECTION SUBCUTANEOUS at 12:29

## 2024-02-17 RX ADMIN — SODIUM CHLORIDE 1000 ML: 0.9 INJECTION, SOLUTION INTRAVENOUS at 11:09

## 2024-02-17 RX ADMIN — GUAIFENESIN 600 MG: 600 TABLET, EXTENDED RELEASE ORAL at 20:52

## 2024-02-17 RX ADMIN — ZOLPIDEM TARTRATE 5 MG: 5 TABLET ORAL at 20:52

## 2024-02-17 RX ADMIN — PRAVASTATIN SODIUM 80 MG: 80 TABLET ORAL at 15:51

## 2024-02-17 RX ADMIN — CITALOPRAM HYDROBROMIDE 20 MG: 20 TABLET ORAL at 12:29

## 2024-02-17 RX ADMIN — DIVALPROEX SODIUM 250 MG: 250 TABLET, EXTENDED RELEASE ORAL at 17:31

## 2024-02-17 NOTE — ED ATTENDING ATTESTATION
2/17/2024  I, Lakhwinder Helm MD, saw and evaluated the patient. I have discussed the patient with the resident/non-physician practitioner and agree with the resident's/non-physician practitioner's findings, Plan of Care, and MDM as documented in the resident's/non-physician practitioner's note, except where noted. All available labs and Radiology studies were reviewed.  I was present for key portions of any procedure(s) performed by the resident/non-physician practitioner and I was immediately available to provide assistance.       At this point I agree with the current assessment done in the Emergency Department.  I have conducted an independent evaluation of this patient a history and physical is as follows:    ED Course     73-year-old female, history of hyperlipidemia and migraines, lifelong smoker, presenting to the emergency department for evaluation of 3-day history of nasal congestion, cough, generalized myalgias, generalized headache that was gradual in onset and unrelieved with sumatriptan.  Patient additionally feels fatigued.  Patient went to an urgent care center where she was found to have an oxygen saturation of 89% on room air.    Past Medical History:   Diagnosis Date    Hyperlipidemia     Migraine      The patient is resting comfortably on a stretcher in no acute respiratory distress. The patient appears nontoxic. HEENT reveals moist mucous membranes. Head is normocephalic and atraumatic. Conjunctiva and sclera are normal. Neck is nontender and supple with full range of motion to flexion, extension, lateral rotation. No meningismus appreciated. No masses are appreciated.  Lung sounds are diminished bilateral bases, with the right being worse affected than the left.  No wheezing, however prolonged expiratory time.. Heart is regular rate and rhythm without any murmurs, rubs or gallops. Abdomen is soft and nontender without any rebound or guarding. Extremities appear grossly normal without any  significant arthropathy. Patient is awake, alert, and oriented x3. The patient has normal interaction.  Cranial nerves grossly intact.  Motor is 5 out of 5 bilateral upper and lower extremities.    MEDICAL DECISION MAKING    Number and Complexity of Problems  Differential diagnosis: COPD with exacerbation, pneumonia, influenza, COVID, anemia, ACS.    Medical Decision Making Data  External documents reviewed: Reviewed urgent care note from today.  My EKG interpretation: Normal sinus rhythm.  No acute ST or T wave changes.  My X-ray interpretation: Left lower lobe infiltrate.    XR chest 2 views   ED Interpretation   Left lower lobe infiltrate.          Labs Reviewed   CBC AND DIFFERENTIAL - Abnormal       Result Value Ref Range Status    WBC 12.65 (*) 4.31 - 10.16 Thousand/uL Final    RBC 3.66 (*) 3.81 - 5.12 Million/uL Final    Hemoglobin 11.3 (*) 11.5 - 15.4 g/dL Final    Hematocrit 33.1 (*) 34.8 - 46.1 % Final    MCV 90  82 - 98 fL Final    MCH 30.9  26.8 - 34.3 pg Final    MCHC 34.1  31.4 - 37.4 g/dL Final    RDW 14.1  11.6 - 15.1 % Final    MPV 10.3  8.9 - 12.7 fL Final    Platelets 215  149 - 390 Thousands/uL Final    nRBC 0  /100 WBCs Final    Neutrophils Relative 75  43 - 75 % Final    Immat GRANS % 0  0 - 2 % Final    Lymphocytes Relative 11 (*) 14 - 44 % Final    Monocytes Relative 14 (*) 4 - 12 % Final    Eosinophils Relative 0  0 - 6 % Final    Basophils Relative 0  0 - 1 % Final    Neutrophils Absolute 9.35 (*) 1.85 - 7.62 Thousands/µL Final    Immature Grans Absolute 0.05  0.00 - 0.20 Thousand/uL Final    Lymphocytes Absolute 1.40  0.60 - 4.47 Thousands/µL Final    Monocytes Absolute 1.80 (*) 0.17 - 1.22 Thousand/µL Final    Eosinophils Absolute 0.02  0.00 - 0.61 Thousand/µL Final    Basophils Absolute 0.03  0.00 - 0.10 Thousands/µL Final   COMPREHENSIVE METABOLIC PANEL - Abnormal    Sodium 127 (*) 135 - 147 mmol/L Final    Potassium 4.1  3.5 - 5.3 mmol/L Final    Chloride 92 (*) 96 - 108 mmol/L Final  "   CO2 26  21 - 32 mmol/L Final    ANION GAP 9  mmol/L Final    BUN 11  5 - 25 mg/dL Final    Creatinine 0.73  0.60 - 1.30 mg/dL Final    Comment: Standardized to IDMS reference method    Glucose 106  65 - 140 mg/dL Final    Comment: If the patient is fasting, the ADA then defines impaired fasting glucose as > 100 mg/dL and diabetes as > or equal to 123 mg/dL.    Calcium 8.8  8.4 - 10.2 mg/dL Final    Corrected Calcium 9.3  8.3 - 10.1 mg/dL Final    AST 14  13 - 39 U/L Final    ALT 8  7 - 52 U/L Final    Comment: Specimen collection should occur prior to Sulfasalazine administration due to the potential for falsely depressed results.     Alkaline Phosphatase 55  34 - 104 U/L Final    Total Protein 6.3 (*) 6.4 - 8.4 g/dL Final    Albumin 3.4 (*) 3.5 - 5.0 g/dL Final    Total Bilirubin 0.26  0.20 - 1.00 mg/dL Final    Comment: Use of this assay is not recommended for patients undergoing treatment with eltrombopag due to the potential for falsely elevated results.  N-acetyl-p-benzoquinone imine (metabolite of Acetaminophen) will generate erroneously low results in samples for patients that have taken an overdose of Acetaminophen.    eGFR 80  ml/min/1.73sq m Final    Narrative:     National Kidney Disease Foundation guidelines for Chronic Kidney Disease (CKD):     Stage 1 with normal or high GFR (GFR > 90 mL/min/1.73 square meters)    Stage 2 Mild CKD (GFR = 60-89 mL/min/1.73 square meters)    Stage 3A Moderate CKD (GFR = 45-59 mL/min/1.73 square meters)    Stage 3B Moderate CKD (GFR = 30-44 mL/min/1.73 square meters)    Stage 4 Severe CKD (GFR = 15-29 mL/min/1.73 square meters)    Stage 5 End Stage CKD (GFR <15 mL/min/1.73 square meters)  Note: GFR calculation is accurate only with a steady state creatinine   HS TROPONIN I 0HR - Normal    hs TnI 0hr 7  \"Refer to ACS Flowchart\"- see link ng/L Final    Comment:                                              Initial (time 0) result  If >=50 ng/L, Myocardial injury suggested " ;  Type of myocardial injury and treatment strategy  to be determined.  If 5-49 ng/L, a delta result at 2 hours and or 4 hours will be needed to further evaluate.  If <4 ng/L, and chest pain has been >3 hours since onset, patient may qualify for discharge based on the HEART score in the ED.  If <5 ng/L and <3hours since onset of chest pain, a delta result at 2 hours will be needed to further evaluate.    HS Troponin 99th Percentile URL of a Health Population=12 ng/L with a 95% Confidence Interval of 8-18 ng/L.    Second Troponin (time 2 hours)  If calculated delta >= 20 ng/L,  Myocardial injury suggested ; Type of myocardial injury and treatment strategy to be determined.  If 5-49 ng/L and the calculated delta is 5-19 ng/L, consult medical service for evaluation.  Continue evaluation for ischemia on ecg and other possible etiology and repeat hs troponin at 4 hours.  If delta is <5 ng/L at 2 hours, consider discharge based on risk stratification via the HEART score (if in ED), or PHOEBE risk score in IP/Observation.    HS Troponin 99th Percentile URL of a Health Population=12 ng/L with a 95% Confidence Interval of 8-18 ng/L.   COVID19, INFLUENZA A/B, RSV PCR, [facility]   HS TROPONIN I 2HR       Labs reviewed by me are significant for:  White count of 12,000.    Clinical decision rules/scores are significant for: Patient with heart rate greater than 90, white blood cell count greater than 12,000, and diagnosed with pneumonia by chest x-ray.  Time 0 for sepsis identification was 10:49 AM.    Discussed case with: Admitting medical physician.  Considered admission for: Pneumonia.    Treatment and Disposition  ED course: Patient remained hemodynamically stable in the emergency department.  Patient was identified as having pneumonia at 10:49 AM.  Patient was administered antibiotics.  Shared decision making: Patient agreeable with admission.  Code status: Full code.            Critical Care Time  Procedures

## 2024-02-17 NOTE — LETTER
[facility]  [address] [facility] [address]  Dept: [phone]    February 23, 2024     Patient: Rosemary Starkey   YOB: 1950   Date of Visit: 2/17/2024       To Whom it May Concern:    Rosemary Starkey is under my professional care. She was seen in the hospital from 2/17/2024 to 02/23/24. She may return to work on 3/11/2024  without limitations.    If you have any questions or concerns, please don't hesitate to call.         Sincerely,          Nenita Chawla MD

## 2024-02-17 NOTE — ASSESSMENT & PLAN NOTE
In the setting of left lower lobe pneumonia and possible COPD exacerbation.  Continue to monitor oxygen, wean as tolerated.  Treatment of pneumonia as above.

## 2024-02-17 NOTE — ASSESSMENT & PLAN NOTE
Presenting with 4 days of fever, chills, cough and headache. Seen at urgent care, SpO2 reportedly was 80% on room air and was sent to the ED  Longstanding history of smoking, emphysema on x-ray.  No official COPD diagnosis.  CXR with LLL pneumonia.   Currently afebrile, nontoxic.  WBCs downtrending.  Continue ceftriaxone.  Continue prednisone for suspected COPD exacerbation in the setting of pneumonia.  Check urine Legionella and strep antigens.  Follow blood cultures  Respiratory protocol albuterol/ipratropium nebs every 6 hours   Mucinex as needed for cough.  Repeat chest x-ray in 6 weeks as outpatient to ensure resolution

## 2024-02-17 NOTE — ASSESSMENT & PLAN NOTE
In precontemplation, smokes 13 cigarettes a day, has emphysema on x-ray  Counseled on smoking cessation and offered but patient refused nicotine replacement while inpatient  Recommend lung screening per guidelines as outpatient when follow-up with PCP

## 2024-02-17 NOTE — ASSESSMENT & PLAN NOTE
Smokes 13 cigarettes a day, has emphysema on x-ray  Counseled on smoking cessation and offered but patient refused nicotine replacement while inpatient  Recommend lung screening per guidelines as outpatient when follow-up with PCP

## 2024-02-17 NOTE — RESPIRATORY THERAPY NOTE
RT Protocol Note  Rosemary Starkey 73 y.o. female MRN: [id number]  Unit/Bed#: [facility] 925-01 Encounter: [id number]    Assessment    Principal Problem:    Bronchitis  Active Problems:    Smoking    Bilateral carotid artery stenosis    Cervical spondylosis    Migraine without aura and without status migrainosus, not intractable    Hypoxemia    Chronic obstructive pulmonary disease with acute exacerbation (HCC)      Home Pulmonary Medications:    Home Devices/Therapy: (P) Other (Comment)    Past Medical History:   Diagnosis Date    Hyperlipidemia     Migraine      Social History     Socioeconomic History    Marital status: /Civil Union     Spouse name: None    Number of children: None    Years of education: None    Highest education level: None   Occupational History    None   Tobacco Use    Smoking status: Every Day     Current packs/day: 0.50     Average packs/day: 0.5 packs/day for 59.1 years (29.6 ttl pk-yrs)     Types: Cigarettes     Start date: 1965    Smokeless tobacco: Never   Vaping Use    Vaping status: Never Used   Substance and Sexual Activity    Alcohol use: Yes     Comment: Rarely    Drug use: No    Sexual activity: None   Other Topics Concern    None   Social History Narrative    Caffeine use      Social Determinants of Health     Financial Resource Strain: Not on file   Food Insecurity: Not on file   Transportation Needs: Not on file   Physical Activity: Not on file   Stress: Not on file   Social Connections: Not on file   Intimate Partner Violence: Not on file   Housing Stability: Not on file       Subjective         Objective    Physical Exam:   Assessment Type: (P) Assess only  General Appearance: (P) Alert, Awake  Respiratory Pattern: (P) Normal  Chest Assessment: (P) Chest expansion symmetrical  Bilateral Breath Sounds: (P) Diminished, Clear  O2 Device: (P) Nasal cannula    Vitals:  Blood pressure 117/72, pulse 89, temperature 98.9 °F (37.2 °C), resp. rate 17, SpO2 (!) 88%, not currently  breastfeeding.          Imaging and other studies: I have personally reviewed pertinent reports.      O2 Device: (P) Nasal cannula     Plan    Respiratory Plan: (P) Mild Distress pathway, Home Bronchodilator Patient pathway        Resp Comments: (P) Pt admitted for Bronchitis. Pt has hx of COPD uses MDI at home prn although states not using. Pt ordered UDN Q6. Changed to TID.

## 2024-02-17 NOTE — ASSESSMENT & PLAN NOTE
Clinically; no no prior PFTs.   Long standing hx of smoking, wheezing this presentation.  Continue albuterol, Advair and prednisone burst.  Refer to pulmonology at discharge for follow-up.

## 2024-02-17 NOTE — ED PROVIDER NOTES
History  Chief Complaint   Patient presents with    Flu Symptoms     Cough, congestion, fever, for about a week.  Denies N/V/D     73-year-old female with history of long-term smoking (13 cigarettes/day since the age of 15), hyperlipidemia, presents emergency department due to dyspnea, cough, fevers, malaise.  Symptoms started about 3 days ago.  Patient went to urgent care today for evaluation and was noted to be hypoxic into the 80s so sent to the emergency department for further evaluation.  She denies any specific dyspnea on exertion, but does endorse improvement with supplemental oxygen.  Denies chest pain, nausea, abdominal pain, or other complaints.  No recent travel.  COVID swab at urgent care was negative.    Prior to Admission Medications   Prescriptions Last Dose Informant Patient Reported? Taking?   Fluticasone-Salmeterol (Advair) 100-50 mcg/dose inhaler 2/16/2024 Self No Yes   Sig: Inhale 1 puff 2 (two) times a day Rinse mouth after use.   Lidocaine Viscous HCl (XYLOCAINE) 2 % mucosal solution  Self Yes No   Magnesium 500 MG CAPS Not Taking Self Yes No   Sig: Take by mouth daily   Patient not taking: Reported on 11/8/2023   SUMAtriptan (IMITREX) 100 mg tablet 2/16/2024 Self No Yes   Sig: TAKE 1 TABLET AT ONSET OF MIGRAINE HEADACHE. MAY REPEAT IN 2 HOURS IF NEEDED, NO MORE THAN 2 IN 24 HOURS AND NO MORE THAN 3 IN A WEEK   albuterol (Ventolin HFA) 90 mcg/act inhaler 2/17/2024 Self No Yes   Sig: Inhale 2 puffs every 6 (six) hours as needed for wheezing   aspirin (ECOTRIN) 325 mg EC tablet 2/16/2024 Self No Yes   Sig: Take 1 tablet (325 mg total) by mouth daily   citalopram (CeleXA) 20 mg tablet 2/16/2024  No Yes   Sig: TAKE 1 TABLET DAILY   cyclobenzaprine (FLEXERIL) 10 mg tablet 2/16/2024 Self No Yes   Sig: Take 1 tablet (10 mg total) by mouth daily at bedtime   divalproex sodium (DEPAKOTE ER) 250 mg 24 hr tablet 2/16/2024 Self No Yes   Sig: Take 1 tablet (250 mg total) by mouth 2 (two) times a day    naproxen (NAPROSYN) 500 mg tablet 2/16/2024 Self No Yes   Sig: Take 1 tablet (500 mg total) by mouth 2 (two) times a day as needed for headaches   ondansetron (ZOFRAN) 4 mg tablet  Self No No   Sig: Take 1 tablet (4 mg total) by mouth every 8 (eight) hours as needed for nausea or vomiting   polyethylene glycol (GLYCOLAX) 17 GM/SCOOP powder 2/16/2024 Self No Yes   Sig: Take 17 g by mouth daily , increase to twice daily if needed   promethazine-codeine (PHENERGAN WITH CODEINE) 6.25-10 mg/5 mL syrup Not Taking Self No No   Sig: Take 5 mL by mouth every 6 (six) hours as needed for cough   Patient not taking: Reported on 11/8/2023   simvastatin (ZOCOR) 40 mg tablet 2/16/2024 Self No Yes   Sig: TAKE 1 TABLET DAILY   zolpidem (AMBIEN) 5 mg tablet 2/16/2024 Self No Yes   Sig: One pill at bedtime as needed to help with sleep      Facility-Administered Medications: None       Past Medical History:   Diagnosis Date    Hyperlipidemia     Migraine        Past Surgical History:   Procedure Laterality Date    BREAST EXCISIONAL BIOPSY Right 1986    HARTMANS PROCEDURE N/A 3/8/2020    Procedure: Laparoscopic drainage of intra peritoneal abscess, sigmoid rescetion,;  Surgeon: NOEL Ogden MD;  Location: [facility];  Service: Colorectal    HYSTERECTOMY  1978    age 28    NASAL SEPTUM SURGERY      deviation repair    OK LAPS ABD PRTM&OMENTUM DX W/WO SPEC BR/WA SPX N/A 3/8/2020    Procedure: LAPAROSCOPY DIAGNOSTIC;  Surgeon: NOEL Ogden MD;  Location: [facility];  Service: Colorectal    OK LAPS REPAIR HERNIA EXCEPT INCAL/INGUN REDUCIBLE N/A 1/26/2021    Procedure: REPAIR HERNIA VENTRAL LAPAROSCOPIC;  Surgeon: NOEL Ogden MD;  Location: [facility];  Service: Colorectal    OK SIGMOIDOSCOPY FLX DX W/COLLJ SPEC BR/WA IF PFRMD N/A 3/8/2020    Procedure: SIGMOIDOSCOPY FLEXIBLE;  Surgeon: NOEL Ogden MD;  Location: [facility];  Service: Colorectal    SHOULDER SURGERY         Family History   Problem Relation Age of  Onset    Breast cancer Mother 50    Heart disease Father     Diabetes Sister     Leukemia Sister     No Known Problems Maternal Grandmother     No Known Problems Maternal Grandfather     No Known Problems Paternal Grandmother     No Known Problems Paternal Grandfather     No Known Problems Sister     Breast cancer Maternal Aunt 50    No Known Problems Maternal Aunt     No Known Problems Paternal Aunt     Colon cancer Maternal Uncle     No Known Problems Son     No Known Problems Son     Lung cancer Other 47     I have reviewed and agree with the history as documented.    E-Cigarette/Vaping    E-Cigarette Use Never User      E-Cigarette/Vaping Substances    Nicotine No     THC No     CBD No     Flavoring No     Other No     Unknown No      Social History     Tobacco Use    Smoking status: Every Day     Current packs/day: 0.50     Average packs/day: 0.5 packs/day for 59.1 years (29.6 ttl pk-yrs)     Types: Cigarettes     Start date: 1965    Smokeless tobacco: Never   Vaping Use    Vaping status: Never Used   Substance Use Topics    Alcohol use: Yes     Comment: Rarely    Drug use: No        Review of Systems   All other systems reviewed and are negative.      Physical Exam  ED Triage Vitals   Temperature Pulse Respirations Blood Pressure SpO2   02/17/24 0915 02/17/24 0915 02/17/24 0915 02/17/24 0915 02/17/24 0915   98.4 °F (36.9 °C) 101 20 143/96 91 %      Temp Source Heart Rate Source Patient Position - Orthostatic VS BP Location FiO2 (%)   02/17/24 0915 02/17/24 1000 02/17/24 1000 02/17/24 1000 --   Oral Monitor Lying Left arm       Pain Score       02/17/24 0915       No Pain             Orthostatic Vital Signs  Vitals:    02/17/24 1500 02/17/24 1705 02/17/24 2206 02/18/24 0755   BP: 124/73 117/72 117/73 144/74   Pulse: 91 89 97 80   Patient Position - Orthostatic VS: Lying  Lying        Physical Exam  Vitals and nursing note reviewed.   Constitutional:       General: She is not in acute distress.     Appearance:  She is well-developed.   HENT:      Head: Normocephalic and atraumatic.   Eyes:      Conjunctiva/sclera: Conjunctivae normal.   Cardiovascular:      Rate and Rhythm: Normal rate and regular rhythm.      Heart sounds: No murmur heard.  Pulmonary:      Effort: Pulmonary effort is normal. No respiratory distress.      Breath sounds: Rales (Minimal at bilateral bases.) present.   Abdominal:      Palpations: Abdomen is soft.      Tenderness: There is no abdominal tenderness.   Musculoskeletal:         General: No swelling.      Cervical back: Neck supple.   Skin:     General: Skin is warm and dry.      Capillary Refill: Capillary refill takes less than 2 seconds.   Neurological:      General: No focal deficit present.      Mental Status: She is alert.   Psychiatric:         Mood and Affect: Mood normal.         ED Medications  Medications   acetaminophen (TYLENOL) tablet 650 mg (has no administration in time range)   ceftriaxone (ROCEPHIN) 1 g/50 mL in dextrose IVPB (has no administration in time range)   albuterol (PROVENTIL HFA,VENTOLIN HFA) inhaler 2 puff (has no administration in time range)   aspirin (ECOTRIN) EC tablet 325 mg (325 mg Oral Given 2/18/24 0811)   citalopram (CeleXA) tablet 20 mg (20 mg Oral Given 2/18/24 0811)   cyclobenzaprine (FLEXERIL) tablet 10 mg (10 mg Oral Given 2/17/24 2052)   divalproex sodium (DEPAKOTE ER) 24 hr tablet 250 mg (250 mg Oral Given 2/18/24 0811)   fluticasone-vilanterol 200-25 mcg/actuation 1 puff (1 puff Inhalation Given 2/18/24 0925)   naproxen (NAPROSYN) tablet 500 mg (500 mg Oral Given 2/17/24 1551)   ondansetron (ZOFRAN-ODT) dispersible tablet 4 mg (has no administration in time range)   polyethylene glycol (MIRALAX) packet 17 g (17 g Oral Not Given 2/17/24 2045)   pravastatin (PRAVACHOL) tablet 80 mg (80 mg Oral Given 2/17/24 1551)   zolpidem (AMBIEN) tablet 5 mg (5 mg Oral Given 2/17/24 2052)   guaiFENesin (MUCINEX) 12 hr tablet 600 mg (600 mg Oral Given 2/18/24 0811)    predniSONE tablet 40 mg (40 mg Oral Given 2/18/24 0811)   enoxaparin (LOVENOX) subcutaneous injection 40 mg (40 mg Subcutaneous Given 2/18/24 0810)   ipratropium (ATROVENT) 0.02 % inhalation solution 0.5 mg (0.5 mg Nebulization Given 2/18/24 0839)   levalbuterol (XOPENEX) inhalation solution 1.25 mg (1.25 mg Nebulization Given 2/18/24 0840)   sodium chloride 0.9 % bolus 1,000 mL (0 mL Intravenous Stopped 2/17/24 1209)   ceftriaxone (ROCEPHIN) 2 g/50 mL in dextrose IVPB (0 mg Intravenous Stopped 2/17/24 1140)   acetaminophen (TYLENOL) tablet 975 mg (975 mg Oral Given 2/17/24 1112)   ketorolac (TORADOL) injection 15 mg (15 mg Intravenous Given 2/17/24 1111)   SUMAtriptan (IMITREX) tablet 100 mg (100 mg Oral Given 2/17/24 1551)       Diagnostic Studies  Results Reviewed       Procedure Component Value Units Date/Time    Comprehensive metabolic panel [[id number]]  (Abnormal) Collected: 02/18/24 0452    Lab Status: Final result Specimen: Blood from Arm, Right Updated: 02/18/24 0610     Sodium 128 mmol/L      Potassium 4.4 mmol/L      Chloride 93 mmol/L      CO2 28 mmol/L      ANION GAP 7 mmol/L      BUN 12 mg/dL      Creatinine 0.62 mg/dL      Glucose 110 mg/dL      Calcium 8.4 mg/dL      Corrected Calcium 9.0 mg/dL      AST 16 U/L      ALT 10 U/L      Alkaline Phosphatase 54 U/L      Total Protein 5.8 g/dL      Albumin 3.2 g/dL      Total Bilirubin 0.20 mg/dL      eGFR 88 ml/min/1.73sq m     Narrative:      National Kidney Disease Foundation guidelines for Chronic Kidney Disease (CKD):     Stage 1 with normal or high GFR (GFR > 90 mL/min/1.73 square meters)    Stage 2 Mild CKD (GFR = 60-89 mL/min/1.73 square meters)    Stage 3A Moderate CKD (GFR = 45-59 mL/min/1.73 square meters)    Stage 3B Moderate CKD (GFR = 30-44 mL/min/1.73 square meters)    Stage 4 Severe CKD (GFR = 15-29 mL/min/1.73 square meters)    Stage 5 End Stage CKD (GFR <15 mL/min/1.73 square meters)  Note: GFR calculation is accurate only with a steady  state creatinine    CBC and differential [[id number]]  (Abnormal) Collected: 02/18/24 0452    Lab Status: Final result Specimen: Blood from Arm, Right Updated: 02/18/24 0545     WBC 11.04 Thousand/uL      RBC 3.43 Million/uL      Hemoglobin 10.7 g/dL      Hematocrit 32.6 %      MCV 95 fL      MCH 31.2 pg      MCHC 32.8 g/dL      RDW 14.1 %      MPV 9.5 fL      Platelets 260 Thousands/uL      nRBC 0 /100 WBCs      Neutrophils Relative 82 %      Immat GRANS % 1 %      Lymphocytes Relative 10 %      Monocytes Relative 7 %      Eosinophils Relative 0 %      Basophils Relative 0 %      Neutrophils Absolute 9.03 Thousands/µL      Immature Grans Absolute 0.08 Thousand/uL      Lymphocytes Absolute 1.10 Thousands/µL      Monocytes Absolute 0.81 Thousand/µL      Eosinophils Absolute 0.00 Thousand/µL      Basophils Absolute 0.02 Thousands/µL     Blood culture #1 [[id number]] Collected: 02/17/24 1106    Lab Status: Preliminary result Specimen: Blood from Arm, Left Updated: 02/17/24 1601     Blood Culture Received in Microbiology Lab. Culture in Progress.    Blood culture #2 [[id number]] Collected: 02/17/24 1106    Lab Status: Preliminary result Specimen: Blood from Arm, Right Updated: 02/17/24 1601     Blood Culture Received in Microbiology Lab. Culture in Progress.    Protime-INR [[id number]]  (Abnormal) Collected: 02/17/24 1106    Lab Status: Final result Specimen: Blood from Arm, Right Updated: 02/17/24 1156     Protime 14.7 seconds      INR 1.16    APTT [[id number]]  (Normal) Collected: 02/17/24 1106    Lab Status: Final result Specimen: Blood from Arm, Right Updated: 02/17/24 1156     PTT 37 seconds     Procalcitonin [[id number]]  (Normal) Collected: 02/17/24 1106    Lab Status: Final result Specimen: Blood from Arm, Right Updated: 02/17/24 1156     Procalcitonin 0.22 ng/ml     Lactic acid, plasma (w/reflex if result > 2.0) [[id number]]  (Normal) Collected: 02/17/24 1106    Lab Status: Final result Specimen: Blood from Arm, Right  Updated: 02/17/24 1146     LACTIC ACID 0.8 mmol/L     Narrative:      Result may be elevated if tourniquet was used during collection.    FLU/RSV/COVID - if FLU/RSV clinically relevant [[id number]]  (Normal) Collected: 02/17/24 0958    Lab Status: Final result Specimen: Nares from Nose Updated: 02/17/24 1141     SARS-CoV-2 Negative     INFLUENZA A PCR Negative     INFLUENZA B PCR Negative     RSV PCR Negative    Narrative:      FOR PEDIATRIC PATIENTS - copy/paste COVID Guidelines URL to browser: [email or web address]    SARS-CoV-2 assay is a Nucleic Acid Amplification assay intended for the  qualitative detection of nucleic acid from SARS-CoV-2 in nasopharyngeal  swabs. Results are for the presumptive identification of SARS-CoV-2 RNA.    Positive results are indicative of infection with SARS-CoV-2, the virus  causing COVID-19, but do not rule out bacterial infection or co-infection  with other viruses. Laboratories within the [place] and its  territories are required to report all positive results to the appropriate  public health authorities. Negative results do not preclude SARS-CoV-2  infection and should not be used as the sole basis for treatment or other  patient management decisions. Negative results must be combined with  clinical observations, patient history, and epidemiological information.  This test has not been FDA cleared or approved.    This test has been authorized by FDA under an Emergency Use Authorization  (EUA). This test is only authorized for the duration of time the  declaration that circumstances exist justifying the authorization of the  emergency use of an in vitro diagnostic tests for detection of SARS-CoV-2  virus and/or diagnosis of COVID-19 infection under section 564(b)(1) of  the Act, 21 U.S.C. 360bbb-3(b)(1), unless the authorization is terminated  or revoked sooner. The test has been validated but independent review by  FDA  and CLIA is pending.    Test performed using [employer] GeneXpert: This RT-PCR assay targets N2,  a region unique to SARS-CoV-2. A conserved region in the E-gene was chosen  for pan-Sarbecovirus detection which includes SARS-CoV-2.    According to CMS-2020-01-R, this platform meets the definition of high-throughput technology.    HS Troponin 0hr (reflex protocol) [[id number]]  (Normal) Collected: 02/17/24 0958    Lab Status: Final result Specimen: Blood from Arm, Right Updated: 02/17/24 1033     hs TnI 0hr 7 ng/L     Comprehensive metabolic panel [[id number]]  (Abnormal) Collected: 02/17/24 0958    Lab Status: Final result Specimen: Blood from Arm, Right Updated: 02/17/24 1027     Sodium 127 mmol/L      Potassium 4.1 mmol/L      Chloride 92 mmol/L      CO2 26 mmol/L      ANION GAP 9 mmol/L      BUN 11 mg/dL      Creatinine 0.73 mg/dL      Glucose 106 mg/dL      Calcium 8.8 mg/dL      Corrected Calcium 9.3 mg/dL      AST 14 U/L      ALT 8 U/L      Alkaline Phosphatase 55 U/L      Total Protein 6.3 g/dL      Albumin 3.4 g/dL      Total Bilirubin 0.26 mg/dL      eGFR 80 ml/min/1.73sq m     Narrative:      National Kidney Disease Foundation guidelines for Chronic Kidney Disease (CKD):     Stage 1 with normal or high GFR (GFR > 90 mL/min/1.73 square meters)    Stage 2 Mild CKD (GFR = 60-89 mL/min/1.73 square meters)    Stage 3A Moderate CKD (GFR = 45-59 mL/min/1.73 square meters)    Stage 3B Moderate CKD (GFR = 30-44 mL/min/1.73 square meters)    Stage 4 Severe CKD (GFR = 15-29 mL/min/1.73 square meters)    Stage 5 End Stage CKD (GFR <15 mL/min/1.73 square meters)  Note: GFR calculation is accurate only with a steady state creatinine    CBC and differential [[id number]]  (Abnormal) Collected: 02/17/24 0958    Lab Status: Final result Specimen: Blood from Arm, Right Updated: 02/17/24 1008     WBC 12.65 Thousand/uL      RBC 3.66 Million/uL      Hemoglobin 11.3 g/dL      Hematocrit 33.1 %      MCV 90 fL      MCH 30.9 pg       MCHC 34.1 g/dL      RDW 14.1 %      MPV 10.3 fL      Platelets 215 Thousands/uL      nRBC 0 /100 WBCs      Neutrophils Relative 75 %      Immat GRANS % 0 %      Lymphocytes Relative 11 %      Monocytes Relative 14 %      Eosinophils Relative 0 %      Basophils Relative 0 %      Neutrophils Absolute 9.35 Thousands/µL      Immature Grans Absolute 0.05 Thousand/uL      Lymphocytes Absolute 1.40 Thousands/µL      Monocytes Absolute 1.80 Thousand/µL      Eosinophils Absolute 0.02 Thousand/µL      Basophils Absolute 0.03 Thousands/µL                    XR chest 2 views   ED Interpretation by Lakhwinder Heml MD (02/17 1048)   Left lower lobe infiltrate.      Final Result by Tracy Pierce MD (02/17 1955)      Left lower lobe pneumonia.            Workstation performed: [id number]               Procedures  Procedures      ED Course  ED Course as of 02/18/24 1036   Sat Feb 17, 2024   1032 Sodium(!): 127  Last was 123 (2 months ago)                             SBIRT 20yo+      Flowsheet Row Most Recent Value   Initial Alcohol Screen: US AUDIT-C     1. How often do you have a drink containing alcohol? 0 Filed at: 02/17/2024 0917   2. How many drinks containing alcohol do you have on a typical day you are drinking?  0 Filed at: 02/17/2024 0917   3a. Male UNDER 65: How often do you have five or more drinks on one occasion? 0 Filed at: 02/17/2024 0917   3b. FEMALE Any Age, or MALE 65+: How often do you have 4 or more drinks on one occassion? 0 Filed at: 02/17/2024 0917   Audit-C Score 0 Filed at: 02/17/2024 0917   SAUL: How many times in the past year have you...    Used an illegal drug or used a prescription medication for non-medical reasons? Never Filed at: 02/17/2024 0917                  Medical Decision Making  73-year-old female presenting to emergency department due to symptoms concerning for viral syndrome versus pneumonia versus new interstitial lung disease.  Will obtain labs, x-ray to evaluate.  Patient does  require supplemental oxygen to maintain saturations greater than 90% while laying in bed.  Workup concerning for a focal pneumonia.  Patient noting multiple allergies to antibiotics.  Per chart review his not received ceftriaxone.  Discussed options including trying this medication with close monitoring for treatment of any allergic reaction which patient is agreeable with.  Given a dose of IV antibiotics.  Admitted to the hospital for further management.  Patient agreeable with this plan.    Amount and/or Complexity of Data Reviewed  Labs: ordered. Decision-making details documented in ED Course.  Radiology: ordered and independent interpretation performed.    Risk  OTC drugs.  Prescription drug management.  Decision regarding hospitalization.          Disposition  Final diagnoses:   Pneumonia   Hypoxia   Cough     Time reflects when diagnosis was documented in both MDM as applicable and the Disposition within this note       Time User Action Codes Description Comment    2/17/2024 11:08 AM Yokubaitis, Jasen Add [J18.9] Pneumonia     2/17/2024 11:08 AM Yokubaitis, Jasen Add [R09.02] Hypoxia     2/17/2024 11:08 AM Yokubaitis, Jasen Add [R05.9] Cough           ED Disposition       ED Disposition   Admit    Condition   Stable    Date/Time   Sat Feb 17, 2024 1108    Comment   Case was discussed with JESSICA and the patient's admission status was agreed to be Admission Status: inpatient status to the service of Dr. Gtz .               Follow-up Information    None         Current Discharge Medication List        CONTINUE these medications which have NOT CHANGED    Details   albuterol (Ventolin HFA) 90 mcg/act inhaler Inhale 2 puffs every 6 (six) hours as needed for wheezing  Qty: 18 g, Refills: 3    Comments: Substitution to a formulary equivalent within the same pharmaceutical class is authorized.  Associated Diagnoses: Bronchitis      aspirin (ECOTRIN) 325 mg EC tablet Take 1 tablet (325 mg total) by mouth daily  Qty: 30  tablet, Refills: 0    Associated Diagnoses: Fibromuscular dysplasia of cervicocranial artery (HCC)      citalopram (CeleXA) 20 mg tablet TAKE 1 TABLET DAILY  Qty: 90 tablet, Refills: 3    Associated Diagnoses: Anxiety      cyclobenzaprine (FLEXERIL) 10 mg tablet Take 1 tablet (10 mg total) by mouth daily at bedtime  Qty: 90 tablet, Refills: 3    Associated Diagnoses: Cervicalgia      divalproex sodium (DEPAKOTE ER) 250 mg 24 hr tablet Take 1 tablet (250 mg total) by mouth 2 (two) times a day  Qty: 180 tablet, Refills: 3    Associated Diagnoses: Chronic migraine without aura without status migrainosus, not intractable      Fluticasone-Salmeterol (Advair) 100-50 mcg/dose inhaler Inhale 1 puff 2 (two) times a day Rinse mouth after use.  Qty: 180 blister, Refills: 3    Comments: Substitution to a formulary equivalent within the same pharmaceutical class is authorized.  Associated Diagnoses: Simple chronic bronchitis (HCC)      naproxen (NAPROSYN) 500 mg tablet Take 1 tablet (500 mg total) by mouth 2 (two) times a day as needed for headaches  Qty: 90 tablet, Refills: 4    Comments: 30 tabs per 90 day  Associated Diagnoses: Migraine without aura and without status migrainosus, not intractable      polyethylene glycol (GLYCOLAX) 17 GM/SCOOP powder Take 17 g by mouth daily , increase to twice daily if needed  Qty: 850 g, Refills: 3    Associated Diagnoses: Other constipation      simvastatin (ZOCOR) 40 mg tablet TAKE 1 TABLET DAILY  Qty: 90 tablet, Refills: 3    Associated Diagnoses: Pure hypercholesterolemia      SUMAtriptan (IMITREX) 100 mg tablet TAKE 1 TABLET AT ONSET OF MIGRAINE HEADACHE. MAY REPEAT IN 2 HOURS IF NEEDED, NO MORE THAN 2 IN 24 HOURS AND NO MORE THAN 3 IN A WEEK  Qty: 27 tablet, Refills: 0    Associated Diagnoses: Chronic migraine without aura without status migrainosus, not intractable      zolpidem (AMBIEN) 5 mg tablet One pill at bedtime as needed to help with sleep  Qty: 90 tablet, Refills: 1     Associated Diagnoses: Primary insomnia      Lidocaine Viscous HCl (XYLOCAINE) 2 % mucosal solution       Magnesium 500 MG CAPS Take by mouth daily      ondansetron (ZOFRAN) 4 mg tablet Take 1 tablet (4 mg total) by mouth every 8 (eight) hours as needed for nausea or vomiting  Qty: 20 tablet, Refills: 4    Associated Diagnoses: Nausea      promethazine-codeine (PHENERGAN WITH CODEINE) 6.25-10 mg/5 mL syrup Take 5 mL by mouth every 6 (six) hours as needed for cough  Qty: 100 mL, Refills: 0    Associated Diagnoses: COVID           No discharge procedures on file.    PDMP Review         Value Time User    PDMP Reviewed  Yes 12/19/2023  2:56 PM Edin Choi DO             ED Provider  Attending physically available and evaluated Rosemary Starkey. I managed the patient along with the ED Attending.    Electronically Signed by           Jasen España MD  02/18/24 8782

## 2024-02-17 NOTE — ASSESSMENT & PLAN NOTE
Presenting with 4 days of fever , chills, cough and headache reporting felt warm but temperature remained below 100 F    Went to the urgent care SpO2 reportedly was 80% on room air, sent to the ED now well saturated on 2 L    History remarkable for smoking 13 cigarettes a day, X many years, emphysema on x-ray, on Advair and as needed albuterol at home has been using it last few days but usually does not use it that often, no prior official PFT study before, intermittent cough, dry, wheezing on exam during admission with mild leukocytosis, but no fever denies any GI or urinary symptoms, denies any sore throat    Impression  Suspected bronchitis and sinusitis with acute COPD exacerbation    Plan  X-ray chest done pending official read  Ceftriaxone given in the ED, continue for now reevaluate  Blood culture in process  Respiratory protocol albuterol/ipratropium nebs every 6 hours   Starting prednisone 40 mg daily X 5 days  Continue to monitor and reevaluate

## 2024-02-17 NOTE — PLAN OF CARE
Problem: PAIN - ADULT  Goal: Verbalizes/displays adequate comfort level or baseline comfort level  Description: Interventions:  - Encourage patient to monitor pain and request assistance  - Assess pain using appropriate pain scale  - Administer analgesics based on type and severity of pain and evaluate response  - Implement non-pharmacological measures as appropriate and evaluate response  - Consider cultural and social influences on pain and pain management  - Notify physician/advanced practitioner if interventions unsuccessful or patient reports new pain  Outcome: Progressing     Problem: INFECTION - ADULT  Goal: Absence or prevention of progression during hospitalization  Description: INTERVENTIONS:  - Assess and monitor for signs and symptoms of infection  - Monitor lab/diagnostic results  - Monitor all insertion sites, i.e. indwelling lines, tubes, and drains  - Monitor endotracheal if appropriate and nasal secretions for changes in amount and color  - Alto appropriate cooling/warming therapies per order  - Administer medications as ordered  - Instruct and encourage patient and family to use good hand hygiene technique  - Identify and instruct in appropriate isolation precautions for identified infection/condition  Outcome: Progressing  Goal: Absence of fever/infection during neutropenic period  Description: INTERVENTIONS:  - Monitor WBC    Outcome: Progressing     Problem: SAFETY ADULT  Goal: Patient will remain free of falls  Description: INTERVENTIONS:  - Educate patient/family on patient safety including physical limitations  - Instruct patient to call for assistance with activity   - Consult OT/PT to assist with strengthening/mobility   - Keep Call bell within reach  - Keep bed low and locked with side rails adjusted as appropriate  - Keep care items and personal belongings within reach  - Initiate and maintain comfort rounds  - Make Fall Risk Sign visible to staff  - Offer Toileting every 2 Hours,  in advance of need  - Initiate/Maintain no alarm  - Obtain necessary fall risk management equipment:   - Apply yellow socks and bracelet for high fall risk patients  - Consider moving patient to room near nurses station  Outcome: Progressing  Goal: Maintain or return to baseline ADL function  Description: INTERVENTIONS:  -  Assess patient's ability to carry out ADLs; assess patient's baseline for ADL function and identify physical deficits which impact ability to perform ADLs (bathing, care of mouth/teeth, toileting, grooming, dressing, etc.)  - Assess/evaluate cause of self-care deficits   - Assess range of motion  - Assess patient's mobility; develop plan if impaired  - Assess patient's need for assistive devices and provide as appropriate  - Encourage maximum independence but intervene and supervise when necessary  - Involve family in performance of ADLs  - Assess for home care needs following discharge   - Consider OT consult to assist with ADL evaluation and planning for discharge  - Provide patient education as appropriate  Outcome: Progressing  Goal: Maintains/Returns to pre admission functional level  Description: INTERVENTIONS:  - Perform AM-PAC 6 Click Basic Mobility/ Daily Activity assessment daily.  - Set and communicate daily mobility goal to care team and patient/family/caregiver.   - Collaborate with rehabilitation services on mobility goals if consulted  - Perform Range of Motion  times a day.  - Reposition patient every  hours.  - Dangle patient  times a day  - Stand patient  times a day  - Ambulate patient  times a day  - Out of bed to chair  times a day   - Out of bed for meals times a day  - Out of bed for toileting  - Record patient progress and toleration of activity level   Outcome: Progressing     Problem: DISCHARGE PLANNING  Goal: Discharge to home or other facility with appropriate resources  Description: INTERVENTIONS:  - Identify barriers to discharge w/patient and caregiver  - Arrange  for needed discharge resources and transportation as appropriate  - Identify discharge learning needs (meds, wound care, etc.)  - Arrange for interpretive services to assist at discharge as needed  - Refer to Case Management Department for coordinating discharge planning if the patient needs post-hospital services based on physician/advanced practitioner order or complex needs related to functional status, cognitive ability, or social support system  Outcome: Progressing     Problem: Knowledge Deficit  Goal: Patient/family/caregiver demonstrates understanding of disease process, treatment plan, medications, and discharge instructions  Description: Complete learning assessment and assess knowledge base.  Interventions:  - Provide teaching at level of understanding  - Provide teaching via preferred learning methods  Outcome: Progressing

## 2024-02-17 NOTE — H&P
[facility]  H&P  Name: Rosemary Starkey 73 y.o. female I MRN: [id number]  Unit/Bed#: [facility] I Date of Admission: 2/17/2024   Date of Service: 2/17/2024 I Hospital Day: 0      Assessment/Plan   * Bronchitis  Assessment & Plan  Presenting with 4 days of fever , chills, cough and headache reporting felt warm but temperature remained below 100 F    Went to the urgent care SpO2 reportedly was 80% on room air, sent to the ED now well saturated on 2 L    History remarkable for smoking 13 cigarettes a day, X many years, emphysema on x-ray, on Advair and as needed albuterol at home has been using it last few days but usually does not use it that often, no prior official PFT study before, intermittent cough, dry, wheezing on exam during admission with mild leukocytosis, but no fever denies any GI or urinary symptoms, denies any sore throat    Impression  Suspected bronchitis and sinusitis with acute COPD exacerbation    Plan  X-ray chest done pending official read  Ceftriaxone given in the ED, continue for now reevaluate  Blood culture in process  Respiratory protocol albuterol/ipratropium nebs every 6 hours   Starting prednisone 40 mg daily X 5 days  Continue to monitor and reevaluate    Chronic obstructive pulmonary disease with acute exacerbation (HCC)  Assessment & Plan  Clinically ; barrel lungs on imaging ; long hx of smoking , wheezing this presentation ; home med's on Advair and PRN Albuterol ; No prior PFT found - see bronchitis A&P     Hypoxemia  Assessment & Plan  See progress assessment and plan    Migraine without aura and without status migrainosus, not intractable  Assessment & Plan  Continue home citalopram, Depakote, Imitrex and naproxen as needed, also as needed acetaminophen  Suspected exacerbation agreement with the current infection/sinusitis, see above  Denies any neurological deficit and neurological exam unremarkable on admission    Cervical spondylosis  Assessment &  Plan  Continue home Flexeril and naproxen    Bilateral carotid artery stenosis  Assessment & Plan  Continue home aspirin 325 mg daily and high intensity statin     Smoking  Assessment & Plan  In precontemplation, smokes 13 cigarettes a day, has emphysema on x-ray  Counseled on smoking cessation and offered but patient refused nicotine replacement while inpatient  Recommend lung screening per guidelines as outpatient when follow-up with PCP           VTE Pharmacologic Prophylaxis:   Moderate Risk (Score 3-4) - Pharmacological DVT Prophylaxis Ordered: enoxaparin (Lovenox).  Code Status: Level 3 - DNAR and DNI   Discussion with family: Updated  () at bedside.    Anticipated Length of Stay: Patient will be admitted on an inpatient basis with an anticipated length of stay of greater than 2 midnights secondary to COPD exacerbation/hypoxemia.    Total Time Spent on Date of Encounter in care of patient: 50 mins. This time was spent on one or more of the following: performing physical exam; counseling and coordination of care; obtaining or reviewing history; documenting in the medical record; reviewing/ordering tests, medications or procedures; communicating with other healthcare professionals and discussing with patient's family/caregivers.    Chief Complaint: Hypoxemia/COPD exacerbation and URI    History of Present Illness:  Rosemary Starkey is a 73 y.o. female with a PMH of migraine, cervical spondylitis, carotid stenosis on aspirin and statin, currently everyday smoker, presenting to the ED after going to the urgent care due to headache, chills, feeling warm but no fever, dry cough and wheezing X 3 to 4 days first noted while she was at work and she was sent home but did not improve, was taking as needed Tylenol in addition to her above medications, and was found in the urgent care earlier, reportedly, was SpO2 80% on room air.     Seen and examined while in the ED, confirmed above, provided ROS as  listed, above assessment and plan explained in details, all questions answered patient verbalized understanding and agreement  .    review of Systems:  Review of Systems   Constitutional:  Positive for chills and fatigue. Negative for activity change, appetite change, diaphoresis, fever and unexpected weight change.        Reported feeling warm but temperature remained below 100 f   HENT:  Positive for congestion. Negative for ear pain, hearing loss, nosebleeds, postnasal drip, rhinorrhea, sinus pain and trouble swallowing.    Eyes:  Negative for photophobia and pain.   Respiratory:  Positive for cough and wheezing. Negative for choking, chest tightness and shortness of breath.    Cardiovascular:  Negative for chest pain, palpitations and leg swelling.   Gastrointestinal:  Positive for constipation. Negative for abdominal distention, abdominal pain, blood in stool, diarrhea, nausea and vomiting.        Chronic constipation for which she takes daily MiraLAX   Genitourinary:  Negative for decreased urine volume, difficulty urinating, dysuria, hematuria, pelvic pain and urgency.   Musculoskeletal:  Positive for neck pain. Negative for back pain and neck stiffness.        Cervical spine pain, chronic, unchanged   Skin:  Negative for rash and wound.   Neurological:  Positive for headaches. Negative for dizziness, tremors, syncope and weakness.        Current migraine headache, similar to before but was limited resolution after taking her as needed medications started past 3 to 4 days along with her upper respiratory symptoms   Hematological:  Negative for adenopathy. Does not bruise/bleed easily.       Past Medical and Surgical History:   Past Medical History:   Diagnosis Date    Hyperlipidemia     Migraine        Past Surgical History:   Procedure Laterality Date    BREAST EXCISIONAL BIOPSY Right 1986    HARTMANS PROCEDURE N/A 3/8/2020    Procedure: Laparoscopic drainage of intra peritoneal abscess, sigmoid rescetion,;   Surgeon: NOEL Ogden MD;  Location: [facility];  Service: Colorectal    HYSTERECTOMY  1978    age 28    NASAL SEPTUM SURGERY      deviation repair    MT LAPS ABD PRTM&OMENTUM DX W/WO SPEC BR/WA SPX N/A 3/8/2020    Procedure: LAPAROSCOPY DIAGNOSTIC;  Surgeon: NOEL Ogden MD;  Location: [facility];  Service: Colorectal    MT LAPS REPAIR HERNIA EXCEPT INCAL/INGUN REDUCIBLE N/A 1/26/2021    Procedure: REPAIR HERNIA VENTRAL LAPAROSCOPIC;  Surgeon: NOEL Ogden MD;  Location: [facility];  Service: Colorectal    MT SIGMOIDOSCOPY FLX DX W/COLLJ SPEC BR/WA IF PFRMD N/A 3/8/2020    Procedure: SIGMOIDOSCOPY FLEXIBLE;  Surgeon: NOEL Ogden MD;  Location: [facility];  Service: Colorectal    SHOULDER SURGERY         Meds/Allergies:  Prior to Admission medications    Medication Sig Start Date End Date Taking? Authorizing Provider   albuterol (Ventolin HFA) 90 mcg/act inhaler Inhale 2 puffs every 6 (six) hours as needed for wheezing 6/20/23  Yes Edin Choi,    aspirin (ECOTRIN) 325 mg EC tablet Take 1 tablet (325 mg total) by mouth daily 2/26/20  Yes Gissel Arizmendi PA-C   citalopram (CeleXA) 20 mg tablet TAKE 1 TABLET DAILY 2/13/24  Yes Edin Choi DO   cyclobenzaprine (FLEXERIL) 10 mg tablet Take 1 tablet (10 mg total) by mouth daily at bedtime 11/8/23  Yes Gissel Arizmendi PA-C   divalproex sodium (DEPAKOTE ER) 250 mg 24 hr tablet Take 1 tablet (250 mg total) by mouth 2 (two) times a day 3/27/23  Yes Gissel Arizmendi PA-C   Fluticasone-Salmeterol (Advair) 100-50 mcg/dose inhaler Inhale 1 puff 2 (two) times a day Rinse mouth after use. 6/20/23 6/14/24 Yes Edin Choi DO   naproxen (NAPROSYN) 500 mg tablet Take 1 tablet (500 mg total) by mouth 2 (two) times a day as needed for headaches 12/26/23  Yes Gissel Arizmendi PA-C   polyethylene glycol (GLYCOLAX) 17 GM/SCOOP powder Take 17 g by mouth daily , increase to twice daily if needed 1/11/22  Yes Sena Tuttle PA-C    simvastatin (ZOCOR) 40 mg tablet TAKE 1 TABLET DAILY 12/25/23  Yes Edin Choi DO   SUMAtriptan (IMITREX) 100 mg tablet TAKE 1 TABLET AT ONSET OF MIGRAINE HEADACHE. MAY REPEAT IN 2 HOURS IF NEEDED, NO MORE THAN 2 IN 24 HOURS AND NO MORE THAN 3 IN A WEEK 12/26/23  Yes Gissel Arizmendi PA-C   zolpidem (AMBIEN) 5 mg tablet One pill at bedtime as needed to help with sleep 12/19/23  Yes Edin Choi DO   Lidocaine Viscous HCl (XYLOCAINE) 2 % mucosal solution  6/24/21   Historical Provider, MD   Magnesium 500 MG CAPS Take by mouth daily  Patient not taking: Reported on 11/8/2023    Historical Provider, MD   ondansetron (ZOFRAN) 4 mg tablet Take 1 tablet (4 mg total) by mouth every 8 (eight) hours as needed for nausea or vomiting 8/5/22   Edin Choi DO   promethazine-codeine (PHENERGAN WITH CODEINE) 6.25-10 mg/5 mL syrup Take 5 mL by mouth every 6 (six) hours as needed for cough  Patient not taking: Reported on 11/8/2023 9/20/22   Edin Choi DO   Cholecalciferol (VITAMIN D3 PO) Take by mouth daily  Patient not taking: Reported on 2/17/2024 2/17/24  Historical Provider, MD   cyanocobalamin (VITAMIN B-12) 1000 MCG tablet Take 1,000 mcg by mouth daily  Patient not taking: Reported on 2/17/2024 2/17/24  Historical Provider, MD   docusate sodium (COLACE) 100 mg capsule Take 1 capsule (100 mg total) by mouth 2 (two) times a day  Patient not taking: Reported on 1/8/2024 1/11/22 2/17/24  Sena Tuttle PA-C   famotidine (PEPCID) 40 MG tablet Every night before bed  Patient not taking: Reported on 11/8/2023 1/11/22 2/17/24  Sena Tuttle PA-C   predniSONE 10 mg tablet Take 1 tablet (10 mg total) by mouth daily Two pills twice a day with food for 3 days then 1 pill twice a day with food for 3 days then 1 pill daily till finished 8/15/22 2/17/24  Edin Choi DO   pyridoxine (B-6) 100 MG tablet Take 100 mg by mouth daily  Patient not taking: Reported on 2/17/2024 2/17/24  Historical Provider, MD    Riboflavin (B2) 100 MG TABS Take by mouth daily  2/17/24  Historical Provider, MD     I have reviewed home medications with patient personally.    Allergies:   Allergies   Allergen Reactions    Penicillins Anaphylaxis    Azithromycin Hives    Nisoldipine      Reaction Date: 14Apr2011;     Sulfa Antibiotics Hives       Social History:  Marital Status: /Civil Union   Occupation:   Patient Pre-hospital Living Situation: Home  Patient Pre-hospital Level of Mobility: walks  Patient Pre-hospital Diet Restrictions: NONE  Substance Use History:   Social History     Substance and Sexual Activity   Alcohol Use Yes    Comment: Rarely     Social History     Tobacco Use   Smoking Status Every Day    Current packs/day: 0.50    Average packs/day: 0.5 packs/day for 59.1 years (29.6 ttl pk-yrs)    Types: Cigarettes    Start date: 1965   Smokeless Tobacco Never     Social History     Substance and Sexual Activity   Drug Use No       Family History:  Family History   Problem Relation Age of Onset    Breast cancer Mother 50    Heart disease Father     Diabetes Sister     Leukemia Sister     No Known Problems Maternal Grandmother     No Known Problems Maternal Grandfather     No Known Problems Paternal Grandmother     No Known Problems Paternal Grandfather     No Known Problems Sister     Breast cancer Maternal Aunt 50    No Known Problems Maternal Aunt     No Known Problems Paternal Aunt     Colon cancer Maternal Uncle     No Known Problems Son     No Known Problems Son     Lung cancer Other 47       Physical Exam:     Vitals:   Blood Pressure: (!) 209/110 (02/17/24 1300)  Pulse: 104 (02/17/24 1300)  Temperature: 98.4 °F (36.9 °C) (02/17/24 0915)  Temp Source: Oral (02/17/24 0915)  Respirations: 22 (02/17/24 1300)  SpO2: 90 % (02/17/24 1300)    Physical Exam   - GEN: Appears well, alert and oriented x 3, pleasant and cooperative, in no acute distress  - HEENT: Anicteric, mucous membranes moist, PERRL and EOMI   - NECK: No  lymphadenopathy, JVD or carotid bruits   - HEART: RRR, normal S1 and S2, no murmurs, clicks, gallops or rubs   - LUNGS: Expiratory wheezing present more in the upper lobes, rhonchi present, no Rales, on 2 L nasal cannula  - ABDOMEN: Normal bowel sounds, soft, no tenderness, no distention, no organomegaly or masses felt on exam.   - EXTREMITIES: Peripheral pulses normal; no clubbing, cyanosis, or edema  - NEURO: No focal findings, CN II-XII are grossly intact.   - Musculoskeletal: 5/5 strength, normal ROM, no swollen or erythematous joints.   - SKIN: Normal without suspicious lesions on exposed skin      Additional Data:     Lab Results:  Results from last 7 days   Lab Units 02/17/24  0958   WBC Thousand/uL 12.65*   HEMOGLOBIN g/dL 11.3*   HEMATOCRIT % 33.1*   PLATELETS Thousands/uL 215   NEUTROS PCT % 75   LYMPHS PCT % 11*   MONOS PCT % 14*   EOS PCT % 0     Results from last 7 days   Lab Units 02/17/24  0958   SODIUM mmol/L 127*   POTASSIUM mmol/L 4.1   CHLORIDE mmol/L 92*   CO2 mmol/L 26   BUN mg/dL 11   CREATININE mg/dL 0.73   ANION GAP mmol/L 9   CALCIUM mg/dL 8.8   ALBUMIN g/dL 3.4*   TOTAL BILIRUBIN mg/dL 0.26   ALK PHOS U/L 55   ALT U/L 8   AST U/L 14   GLUCOSE RANDOM mg/dL 106     Results from last 7 days   Lab Units 02/17/24  1106   INR  1.16             Results from last 7 days   Lab Units 02/17/24  1106   LACTIC ACID mmol/L 0.8   PROCALCITONIN ng/ml 0.22       Lines/Drains:  Invasive Devices       Peripheral Intravenous Line  Duration             Peripheral IV 06/28/21 Left Antecubital 964 days    Peripheral IV 02/17/24 Right Antecubital <1 day                        Imaging: Reviewed radiology reports from this admission including: chest xray  XR chest 2 views   ED Interpretation by Lakhwinder Helm MD (02/17 1048)   Left lower lobe infiltrate.          EKG and Other Studies Reviewed on Admission:   EKG: NSR. HR 96.    ** Please Note: This note has been constructed using a voice recognition system.  **

## 2024-02-17 NOTE — PROGRESS NOTES
"  [facility]        NAME: Rosemary Starkey is a 73 y.o. female  : 1950    MRN: [id number]  DATE: 2024  TIME: 8:50 AM    Assessment and Plan   Hypoxia [R09.02]  1. Hypoxia  Transfer to other facility      2. Viral URI with cough  Poct Covid 19 Rapid Antigen Test        Patient noted to be hypoxic at 89% in exam room.  She was placed on 2 L of oxygen and saturations increased to 92%.  Rapid COVID is negative in office today.  Given that patient denies a history of hypoxia or use of home O2, will send to the ER for further workup and evaluation for new onset hypoxia.  Declining emergency transport and states that her  will drive her to the hospital via private vehicle.  Transfer order placed in epic.    Patient Instructions     Please go to [facility] for further evaluation of current symptoms.     Chief Complaint     Chief Complaint   Patient presents with    Cold Like Symptoms     Patient states that she started to feel sick Wednesday with severe head and chest congestion. She notes productive cough without SOB. She is also having fevers and body aches. She recently had covid end of December. She took a home covid Wednesday and it was negative.          History of Present Illness       73-year-old female presenting for evaluation of viral symptoms.  Patient states that over the past 3 days she has been experiencing fatigue, chills, cough, body aches and headache.  She states that the cough feels mucousy, but denies any production to her cough.  She describes her headache as a \"freight train\".  She notes that she has a history of migraines and normally takes naproxen and sumatriptan, but this is providing no relief of her headache.  She is also been taking Tylenol and Tessalon for her symptoms with no relief.  Patient denies any measured fevers, chest pain, shortness of breath, nausea, vomiting or diarrhea.        Review of Systems   Review of Systems   Constitutional:  Positive for chills and " fatigue. Negative for fever.   HENT:  Negative for congestion and sore throat.    Respiratory:  Positive for cough. Negative for chest tightness and shortness of breath.    Cardiovascular:  Negative for chest pain.   Gastrointestinal:  Negative for diarrhea, nausea and vomiting.   Musculoskeletal:  Positive for myalgias.   Neurological:  Positive for headaches.   All other systems reviewed and are negative.        Current Medications       Current Outpatient Medications:     albuterol (Ventolin HFA) 90 mcg/act inhaler, Inhale 2 puffs every 6 (six) hours as needed for wheezing, Disp: 18 g, Rfl: 3    aspirin (ECOTRIN) 325 mg EC tablet, Take 1 tablet (325 mg total) by mouth daily, Disp: 30 tablet, Rfl: 0    Cholecalciferol (VITAMIN D3 PO), Take by mouth daily, Disp: , Rfl:     citalopram (CeleXA) 20 mg tablet, TAKE 1 TABLET DAILY, Disp: 90 tablet, Rfl: 3    cyanocobalamin (VITAMIN B-12) 1000 MCG tablet, Take 1,000 mcg by mouth daily, Disp: , Rfl:     cyclobenzaprine (FLEXERIL) 10 mg tablet, Take 1 tablet (10 mg total) by mouth daily at bedtime, Disp: 90 tablet, Rfl: 3    divalproex sodium (DEPAKOTE ER) 250 mg 24 hr tablet, Take 1 tablet (250 mg total) by mouth 2 (two) times a day, Disp: 180 tablet, Rfl: 3    docusate sodium (COLACE) 100 mg capsule, Take 1 capsule (100 mg total) by mouth 2 (two) times a day (Patient not taking: Reported on 1/8/2024), Disp: 180 capsule, Rfl: 2    famotidine (PEPCID) 40 MG tablet, Every night before bed (Patient not taking: Reported on 11/8/2023), Disp: 90 tablet, Rfl: 2    Fluticasone-Salmeterol (Advair) 100-50 mcg/dose inhaler, Inhale 1 puff 2 (two) times a day Rinse mouth after use., Disp: 180 blister, Rfl: 3    Lidocaine Viscous HCl (XYLOCAINE) 2 % mucosal solution, , Disp: , Rfl:     Magnesium 500 MG CAPS, Take by mouth daily (Patient not taking: Reported on 11/8/2023), Disp: , Rfl:     naproxen (NAPROSYN) 500 mg tablet, Take 1 tablet (500 mg total) by mouth 2 (two) times a day as  needed for headaches, Disp: 90 tablet, Rfl: 4    ondansetron (ZOFRAN) 4 mg tablet, Take 1 tablet (4 mg total) by mouth every 8 (eight) hours as needed for nausea or vomiting, Disp: 20 tablet, Rfl: 4    polyethylene glycol (GLYCOLAX) 17 GM/SCOOP powder, Take 17 g by mouth daily , increase to twice daily if needed, Disp: 850 g, Rfl: 3    predniSONE 10 mg tablet, Take 1 tablet (10 mg total) by mouth daily Two pills twice a day with food for 3 days then 1 pill twice a day with food for 3 days then 1 pill daily till finished, Disp: 21 tablet, Rfl: 0    promethazine-codeine (PHENERGAN WITH CODEINE) 6.25-10 mg/5 mL syrup, Take 5 mL by mouth every 6 (six) hours as needed for cough (Patient not taking: Reported on 11/8/2023), Disp: 100 mL, Rfl: 0    pyridoxine (B-6) 100 MG tablet, Take 100 mg by mouth daily, Disp: , Rfl:     Riboflavin (B2) 100 MG TABS, Take by mouth daily, Disp: , Rfl:     simvastatin (ZOCOR) 40 mg tablet, TAKE 1 TABLET DAILY, Disp: 90 tablet, Rfl: 3    SUMAtriptan (IMITREX) 100 mg tablet, TAKE 1 TABLET AT ONSET OF MIGRAINE HEADACHE. MAY REPEAT IN 2 HOURS IF NEEDED, NO MORE THAN 2 IN 24 HOURS AND NO MORE THAN 3 IN A WEEK, Disp: 27 tablet, Rfl: 0    zolpidem (AMBIEN) 5 mg tablet, One pill at bedtime as needed to help with sleep, Disp: 90 tablet, Rfl: 1    Current Allergies     Allergies as of 02/17/2024 - Reviewed 02/17/2024   Allergen Reaction Noted    Penicillins Anaphylaxis 10/16/2013    Azithromycin Hives 08/12/2019    Nisoldipine  04/22/2012    Sulfa antibiotics Hives 08/12/2019            The following portions of the patient's history were reviewed and updated as appropriate: allergies, current medications, past family history, past medical history, past social history, past surgical history and problem list.     Past Medical History:   Diagnosis Date    Hyperlipidemia     Migraine        Past Surgical History:   Procedure Laterality Date    BREAST EXCISIONAL BIOPSY Right 1986    HARTMANS PROCEDURE  N/A 3/8/2020    Procedure: Laparoscopic drainage of intra peritoneal abscess, sigmoid rescetion,;  Surgeon: NOEL Ogden MD;  Location: [facility];  Service: Colorectal    HYSTERECTOMY  1978    age 28    NASAL SEPTUM SURGERY      deviation repair    DE LAPS ABD PRTM&OMENTUM DX W/WO SPEC BR/WA SPX N/A 3/8/2020    Procedure: LAPAROSCOPY DIAGNOSTIC;  Surgeon: NOEL Ogden MD;  Location[facility];  Service: Colorectal    DE LAPS REPAIR HERNIA EXCEPT INCAL/INGUN REDUCIBLE N/A 1/26/2021    Procedure: REPAIR HERNIA VENTRAL LAPAROSCOPIC;  Surgeon: NOEL Ogden MD;  Location[facility];  Service: Colorectal    DE SIGMOIDOSCOPY FLX DX W/COLLJ SPEC BR/WA IF PFRMD N/A 3/8/2020    Procedure: SIGMOIDOSCOPY FLEXIBLE;  Surgeon: NOEL Ogden MD;  Location[facility];  Service: Colorectal    SHOULDER SURGERY         Family History   Problem Relation Age of Onset    Breast cancer Mother 50    Heart disease Father     Diabetes Sister     Leukemia Sister     No Known Problems Maternal Grandmother     No Known Problems Maternal Grandfather     No Known Problems Paternal Grandmother     No Known Problems Paternal Grandfather     No Known Problems Sister     Breast cancer Maternal Aunt 50    No Known Problems Maternal Aunt     No Known Problems Paternal Aunt     Colon cancer Maternal Uncle     No Known Problems Son     No Known Problems Son     Lung cancer Other 47         Medications have been verified.        Objective   /54   Pulse 104   Temp 98.3 °F (36.8 °C)   Resp 20   SpO2 (!) 89%        Physical Exam     Physical Exam  Vitals and nursing note reviewed.   Constitutional:       General: She is not in acute distress.     Appearance: Normal appearance. She is normal weight. She is not ill-appearing, toxic-appearing or diaphoretic.   HENT:      Head: Normocephalic and atraumatic.      Right Ear: Tympanic membrane normal.      Left Ear: Tympanic membrane normal.      Nose: Nose normal. No congestion or  rhinorrhea.      Mouth/Throat:      Mouth: Mucous membranes are moist.   Eyes:      General:         Right eye: No discharge.         Left eye: No discharge.   Cardiovascular:      Rate and Rhythm: Normal rate and regular rhythm.      Pulses: Normal pulses.      Heart sounds: Normal heart sounds. No murmur heard.     No friction rub. No gallop.   Pulmonary:      Effort: Pulmonary effort is normal. No respiratory distress.      Breath sounds: No stridor. Decreased breath sounds present. No wheezing, rhonchi or rales.   Chest:      Chest wall: No tenderness.   Abdominal:      General: Bowel sounds are normal.      Palpations: Abdomen is soft.      Tenderness: There is no abdominal tenderness.   Skin:     General: Skin is warm and dry.   Neurological:      Mental Status: She is alert.   Psychiatric:         Mood and Affect: Mood normal.         Behavior: Behavior normal.

## 2024-02-18 PROBLEM — J96.01 ACUTE HYPOXIC RESPIRATORY FAILURE (HCC): Status: ACTIVE | Noted: 2024-02-17

## 2024-02-18 PROBLEM — J18.9 LEFT LOWER LOBE PNEUMONIA: Status: ACTIVE | Noted: 2018-12-21

## 2024-02-18 LAB
ALBUMIN SERPL BCP-MCNC: 3.2 G/DL (ref 3.5–5)
ALP SERPL-CCNC: 54 U/L (ref 34–104)
ALT SERPL W P-5'-P-CCNC: 10 U/L (ref 7–52)
ANION GAP SERPL CALCULATED.3IONS-SCNC: 7 MMOL/L
AST SERPL W P-5'-P-CCNC: 16 U/L (ref 13–39)
ATRIAL RATE: 96 BPM
BASOPHILS # BLD AUTO: 0.02 THOUSANDS/ÂΜL (ref 0–0.1)
BASOPHILS NFR BLD AUTO: 0 % (ref 0–1)
BILIRUB SERPL-MCNC: 0.2 MG/DL (ref 0.2–1)
BUN SERPL-MCNC: 12 MG/DL (ref 5–25)
CALCIUM ALBUM COR SERPL-MCNC: 9 MG/DL (ref 8.3–10.1)
CALCIUM SERPL-MCNC: 8.4 MG/DL (ref 8.4–10.2)
CHLORIDE SERPL-SCNC: 93 MMOL/L (ref 96–108)
CO2 SERPL-SCNC: 28 MMOL/L (ref 21–32)
CREAT SERPL-MCNC: 0.62 MG/DL (ref 0.6–1.3)
EOSINOPHIL # BLD AUTO: 0 THOUSAND/ÂΜL (ref 0–0.61)
EOSINOPHIL NFR BLD AUTO: 0 % (ref 0–6)
ERYTHROCYTE [DISTWIDTH] IN BLOOD BY AUTOMATED COUNT: 14.1 % (ref 11.6–15.1)
GFR SERPL CREATININE-BSD FRML MDRD: 88 ML/MIN/1.73SQ M
GLUCOSE SERPL-MCNC: 110 MG/DL (ref 65–140)
HCT VFR BLD AUTO: 32.6 % (ref 34.8–46.1)
HGB BLD-MCNC: 10.7 G/DL (ref 11.5–15.4)
IMM GRANULOCYTES # BLD AUTO: 0.08 THOUSAND/UL (ref 0–0.2)
IMM GRANULOCYTES NFR BLD AUTO: 1 % (ref 0–2)
L PNEUMO1 AG UR QL IA.RAPID: NEGATIVE
LYMPHOCYTES # BLD AUTO: 1.1 THOUSANDS/ÂΜL (ref 0.6–4.47)
LYMPHOCYTES NFR BLD AUTO: 10 % (ref 14–44)
MCH RBC QN AUTO: 31.2 PG (ref 26.8–34.3)
MCHC RBC AUTO-ENTMCNC: 32.8 G/DL (ref 31.4–37.4)
MCV RBC AUTO: 95 FL (ref 82–98)
MONOCYTES # BLD AUTO: 0.81 THOUSAND/ÂΜL (ref 0.17–1.22)
MONOCYTES NFR BLD AUTO: 7 % (ref 4–12)
NEUTROPHILS # BLD AUTO: 9.03 THOUSANDS/ÂΜL (ref 1.85–7.62)
NEUTS SEG NFR BLD AUTO: 82 % (ref 43–75)
NRBC BLD AUTO-RTO: 0 /100 WBCS
P AXIS: 70 DEGREES
PLATELET # BLD AUTO: 260 THOUSANDS/UL (ref 149–390)
PMV BLD AUTO: 9.5 FL (ref 8.9–12.7)
POTASSIUM SERPL-SCNC: 4.4 MMOL/L (ref 3.5–5.3)
PR INTERVAL: 154 MS
PROT SERPL-MCNC: 5.8 G/DL (ref 6.4–8.4)
QRS AXIS: 3 DEGREES
QRSD INTERVAL: 82 MS
QT INTERVAL: 354 MS
QTC INTERVAL: 447 MS
RBC # BLD AUTO: 3.43 MILLION/UL (ref 3.81–5.12)
S PNEUM AG UR QL: NEGATIVE
SODIUM SERPL-SCNC: 128 MMOL/L (ref 135–147)
T WAVE AXIS: 59 DEGREES
VENTRICULAR RATE: 96 BPM
WBC # BLD AUTO: 11.04 THOUSAND/UL (ref 4.31–10.16)

## 2024-02-18 PROCEDURE — 94640 AIRWAY INHALATION TREATMENT: CPT

## 2024-02-18 PROCEDURE — 93010 ELECTROCARDIOGRAM REPORT: CPT | Performed by: INTERNAL MEDICINE

## 2024-02-18 PROCEDURE — 87449 NOS EACH ORGANISM AG IA: CPT | Performed by: STUDENT IN AN ORGANIZED HEALTH CARE EDUCATION/TRAINING PROGRAM

## 2024-02-18 PROCEDURE — 94760 N-INVAS EAR/PLS OXIMETRY 1: CPT

## 2024-02-18 PROCEDURE — 85025 COMPLETE CBC W/AUTO DIFF WBC: CPT | Performed by: STUDENT IN AN ORGANIZED HEALTH CARE EDUCATION/TRAINING PROGRAM

## 2024-02-18 PROCEDURE — 80053 COMPREHEN METABOLIC PANEL: CPT | Performed by: STUDENT IN AN ORGANIZED HEALTH CARE EDUCATION/TRAINING PROGRAM

## 2024-02-18 PROCEDURE — 99232 SBSQ HOSP IP/OBS MODERATE 35: CPT | Performed by: STUDENT IN AN ORGANIZED HEALTH CARE EDUCATION/TRAINING PROGRAM

## 2024-02-18 PROCEDURE — 94664 DEMO&/EVAL PT USE INHALER: CPT

## 2024-02-18 RX ORDER — NYSTATIN 100000 U/G
CREAM TOPICAL 2 TIMES DAILY
Status: DISCONTINUED | OUTPATIENT
Start: 2024-02-18 | End: 2024-02-23 | Stop reason: HOSPADM

## 2024-02-18 RX ORDER — GUAIFENESIN/DEXTROMETHORPHAN 100-10MG/5
10 SYRUP ORAL EVERY 4 HOURS PRN
Status: DISCONTINUED | OUTPATIENT
Start: 2024-02-18 | End: 2024-02-23 | Stop reason: HOSPADM

## 2024-02-18 RX ADMIN — PREDNISONE 40 MG: 20 TABLET ORAL at 08:11

## 2024-02-18 RX ADMIN — GUAIFENESIN AND DEXTROMETHORPHAN 10 ML: 100; 10 SYRUP ORAL at 13:57

## 2024-02-18 RX ADMIN — IPRATROPIUM BROMIDE 0.5 MG: 0.5 SOLUTION RESPIRATORY (INHALATION) at 19:44

## 2024-02-18 RX ADMIN — CYCLOBENZAPRINE HYDROCHLORIDE 10 MG: 10 TABLET, FILM COATED ORAL at 21:01

## 2024-02-18 RX ADMIN — CITALOPRAM HYDROBROMIDE 20 MG: 20 TABLET ORAL at 08:11

## 2024-02-18 RX ADMIN — NYSTATIN: 100000 CREAM TOPICAL at 22:05

## 2024-02-18 RX ADMIN — GUAIFENESIN 600 MG: 600 TABLET, EXTENDED RELEASE ORAL at 21:01

## 2024-02-18 RX ADMIN — LEVALBUTEROL HYDROCHLORIDE 1.25 MG: 1.25 SOLUTION RESPIRATORY (INHALATION) at 14:15

## 2024-02-18 RX ADMIN — ZOLPIDEM TARTRATE 5 MG: 5 TABLET ORAL at 21:01

## 2024-02-18 RX ADMIN — PRAVASTATIN SODIUM 80 MG: 80 TABLET ORAL at 17:06

## 2024-02-18 RX ADMIN — IPRATROPIUM BROMIDE 0.5 MG: 0.5 SOLUTION RESPIRATORY (INHALATION) at 08:39

## 2024-02-18 RX ADMIN — LEVALBUTEROL HYDROCHLORIDE 1.25 MG: 1.25 SOLUTION RESPIRATORY (INHALATION) at 08:40

## 2024-02-18 RX ADMIN — ENOXAPARIN SODIUM 40 MG: 40 INJECTION SUBCUTANEOUS at 08:10

## 2024-02-18 RX ADMIN — DIVALPROEX SODIUM 250 MG: 250 TABLET, EXTENDED RELEASE ORAL at 17:06

## 2024-02-18 RX ADMIN — POLYETHYLENE GLYCOL 3350 17 G: 17 POWDER, FOR SOLUTION ORAL at 21:01

## 2024-02-18 RX ADMIN — GUAIFENESIN 600 MG: 600 TABLET, EXTENDED RELEASE ORAL at 08:11

## 2024-02-18 RX ADMIN — DIVALPROEX SODIUM 250 MG: 250 TABLET, EXTENDED RELEASE ORAL at 08:11

## 2024-02-18 RX ADMIN — LEVALBUTEROL HYDROCHLORIDE 1.25 MG: 1.25 SOLUTION RESPIRATORY (INHALATION) at 19:44

## 2024-02-18 RX ADMIN — IPRATROPIUM BROMIDE 0.5 MG: 0.5 SOLUTION RESPIRATORY (INHALATION) at 14:15

## 2024-02-18 RX ADMIN — FLUTICASONE FUROATE AND VILANTEROL TRIFENATATE 1 PUFF: 200; 25 POWDER RESPIRATORY (INHALATION) at 09:25

## 2024-02-18 RX ADMIN — CEFTRIAXONE SODIUM 1000 MG: 10 INJECTION, POWDER, FOR SOLUTION INTRAVENOUS at 11:32

## 2024-02-18 RX ADMIN — ASPIRIN 325 MG: 325 TABLET, COATED ORAL at 08:11

## 2024-02-18 NOTE — CASE MANAGEMENT
Case Management Assessment & Discharge Planning Note    Patient name Rosemary Starkey  Location [facility] 925/[facility] 925-01 MRN [id number]  : 1950 Date 2024       Current Admission Date: 2024  Current Admission Diagnosis:Left lower lobe pneumonia   Patient Active Problem List    Diagnosis Date Noted    Hypoxemia 2024    Chronic obstructive pulmonary disease with acute exacerbation (HCC) 2024    Migraine without aura and without status migrainosus, not intractable 2023    Chronic cough 2022    Hyperglycemia 2022    COVID 2022    Cervical spondylosis 2021    Chronic fatigue 05/10/2021    Nausea 05/10/2021    Cervicalgia 2021    Ventral hernia without obstruction or gangrene 2020    Pyuria 2020    Acquired hypothyroidism 2020    Overweight 2020    Acute left-sided low back pain with sciatica 2020    Perforated diverticulum 2020    LLQ abdominal pain 2020    Macular degeneration of right eye 2019    Herpes simplex 10/14/2019    Fibromuscular dysplasia of cervicocranial artery (HCC) 2019    Simple chronic bronchitis (HCC) 2019    Bilateral carotid artery stenosis 2019    Fecal occult blood test positive 2019    Primary insomnia 2019    Bruit of right carotid artery 2019    Left lower lobe pneumonia 2018    Healthcare maintenance 2018    Chronic migraine without aura without status migrainosus, not intractable 03/15/2018    Smoking 2015    Chronic migraine without aura 2014    Hyperlipidemia 2012    Depression 2012      LOS (days): 1  Geometric Mean LOS (GMLOS) (days):   Days to GMLOS:     OBJECTIVE:    Risk of Unplanned Readmission Score: 8.98         Current admission status: Inpatient       Preferred Pharmacy:   [facility] - [address] [address]  Phone:  [phone] Fax: [phone]    [id number] - [place], [place] - [address]  [address]  [place] [address]  Phone: [phone] Fax: [phone]    Primary Care Provider: Edin Choi DO    Primary Insurance: [employer]  Secondary Insurance:     ASSESSMENT:  Active Health Care Proxies    There are no active Health Care Proxies on file.          Readmission Root Cause  30 Day Readmission: No    Patient Information  Admitted from:: Home (Urgent Care)  Mental Status: Alert  During Assessment patient was accompanied by: Not accompanied during assessment  Assessment information provided by:: Patient  Primary Caregiver: Self  Support Systems: Spouse/significant other, Family members  County of Residence: [place]  What city do you live in?: [place]  Home entry access options. Select all that apply.: No steps to enter home  Type of Current Residence: Travel Trailer/ Mobile Home  Living Arrangements: Lives w/ Spouse/significant other  Is patient a ?: No    Activities of Daily Living Prior to Admission  Functional Status: Independent  Completes ADLs independently?: Yes  Ambulates independently?: Yes  Does patient use assisted devices?: No  Does patient currently own DME?: No  Does patient have a history of Outpatient Therapy (PT/OT)?: No  Does the patient have a history of Short-Term Rehab?: No  Does patient have a history of HHC?: No  Does patient currently have HHC?: No         Patient Information Continued  Income Source: Employed ([employer])  Does patient have prescription coverage?: Yes  Does patient receive dialysis treatments?: No  Does patient have a history of substance abuse?: No  Does patient have a history of Mental Health Diagnosis?: No         Means of Transportation  Means of Transport to Appts:: Drives Self      Social Determinants of Health (SDOH)      Flowsheet Row Most Recent Value   Housing Stability    In the last 12 months, was there a time when  you were not able to pay the mortgage or rent on time? N   In the last 12 months, how many places have you lived? 1   In the last 12 months, was there a time when you did not have a steady place to sleep or slept in a shelter (including now)? N   Transportation Needs    In the past 12 months, has lack of transportation kept you from medical appointments or from getting medications? no   In the past 12 months, has lack of transportation kept you from meetings, work, or from getting things needed for daily living? No   Food Insecurity    Within the past 12 months, you worried that your food would run out before you got the money to buy more. Never true   Within the past 12 months, the food you bought just didn't last and you didn't have money to get more. Never true   Utilities    In the past 12 months has the electric, gas, oil, or water company threatened to shut off services in your home? No            DISCHARGE DETAILS:    Discharge planning discussed with:: Patient  Freedom of Choice: Yes     CM contacted family/caregiver?: No- see comments (Pt alert and oriented.)         Additional Comments: CM met with pt at bedside to complete open assessment. CM introduced self and explained role. Pt reported she is independent with ADLs, does not use any DME. CM to follow for discharge planning needs.

## 2024-02-18 NOTE — PROGRESS NOTES
[facility]  Progress Note  Name: Rosemary Starkey I  MRN: [id number]  Unit/Bed#: [facility] 925-01 I Date of Admission: 2/17/2024   Date of Service: 2/18/2024 I Hospital Day: 1    Assessment/Plan   * Left lower lobe pneumonia  Assessment & Plan  Presenting with 4 days of fever, chills, cough and headache. Seen at urgent care, SpO2 reportedly was 80% on room air and was sent to the ED  Longstanding history of smoking, emphysema on x-ray.  No official COPD diagnosis.  CXR with LLL pneumonia.   Currently afebrile, nontoxic.  WBCs downtrending.  Continue ceftriaxone.  Continue prednisone for suspected COPD exacerbation in the setting of pneumonia.  Check urine Legionella and strep antigens.  Follow blood cultures  Respiratory protocol albuterol/ipratropium nebs every 6 hours   Mucinex as needed for cough.  Repeat chest x-ray in 6 weeks as outpatient to ensure resolution    Acute hypoxic respiratory failure (HCC)  Assessment & Plan  In the setting of left lower lobe pneumonia and possible COPD exacerbation.  Continue to monitor oxygen, wean as tolerated.  Treatment of pneumonia as above.    Chronic obstructive pulmonary disease with acute exacerbation (HCC)  Assessment & Plan  Clinically; no no prior PFTs.   Long standing hx of smoking, wheezing this presentation.  Continue albuterol, Advair and prednisone burst.  Refer to pulmonology at discharge for follow-up.    Migraine without aura and without status migrainosus, not intractable  Assessment & Plan  Continue home citalopram, Depakote, Imitrex and naproxen    Cervical spondylosis  Assessment & Plan  Continue home Flexeril and naproxen    Bilateral carotid artery stenosis  Assessment & Plan  Continue home aspirin 325 mg daily and high intensity statin     Smoking  Assessment & Plan  Smokes 13 cigarettes a day, has emphysema on x-ray  Counseled on smoking cessation and offered but patient refused nicotine replacement while  inpatient  Recommend lung screening per guidelines as outpatient when follow-up with PCP           VTE Pharmacologic Prophylaxis:   Moderate Risk (Score 3-4) - Pharmacological DVT Prophylaxis Ordered: enoxaparin (Lovenox).    Mobility:   Basic Mobility Inpatient Raw Score: 23  JH-HLM Goal: 7: Walk 25 feet or more  JH-HLM Achieved: 7: Walk 25 feet or more  HLM Goal achieved. Continue to encourage appropriate mobility.    Patient Centered Rounds: I performed bedside rounds with nursing staff today.   Discussions with Specialists or Other Care Team Provider:     Education and Discussions with Family / Patient: Patient declined call to .     Total Time Spent on Date of Encounter in care of patient:  mins. This time was spent on one or more of the following: performing physical exam; counseling and coordination of care; obtaining or reviewing history; documenting in the medical record; reviewing/ordering tests, medications or procedures; communicating with other healthcare professionals and discussing with patient's family/caregivers.    Current Length of Stay: 1 day(s)  Current Patient Status: Inpatient   Certification Statement: The patient will continue to require additional inpatient hospital stay due to as above  Discharge Plan: Anticipate discharge in 24-48 hrs to home.    Code Status: Level 3 - DNAR and DNI    Subjective:   Patient was seen and examined at bedside.  States she feels better.  Has dry cough.  No fevers or chills.    Objective:     Vitals:   Temp (24hrs), Av.1 °F (36.7 °C), Min:97.7 °F (36.5 °C), Max:98.9 °F (37.2 °C)    Temp:  [97.7 °F (36.5 °C)-98.9 °F (37.2 °C)] 97.7 °F (36.5 °C)  HR:  [] 80  Resp:  [16-22] 16  BP: (117-209)/() 144/74  SpO2:  [88 %-95 %] 95 %  There is no height or weight on file to calculate BMI.     Input and Output Summary (last 24 hours):     Intake/Output Summary (Last 24 hours) at 2024 1215  Last data filed at 2024 1132  Gross per 24  hour   Intake 940 ml   Output 200 ml   Net 740 ml       Physical Exam:   Physical Exam  Vitals and nursing note reviewed.   Constitutional:       Appearance: Normal appearance.   Cardiovascular:      Rate and Rhythm: Normal rate.      Heart sounds: Normal heart sounds.   Pulmonary:      Breath sounds: Wheezing present.      Comments: Decreased breath sounds, faint scattered wheezes  Abdominal:      Palpations: Abdomen is soft.      Tenderness: There is no abdominal tenderness.   Skin:     General: Skin is warm.   Neurological:      Mental Status: She is alert.          Additional Data:     Labs:  Results from last 7 days   Lab Units 02/18/24  0452   WBC Thousand/uL 11.04*   HEMOGLOBIN g/dL 10.7*   HEMATOCRIT % 32.6*   PLATELETS Thousands/uL 260   NEUTROS PCT % 82*   LYMPHS PCT % 10*   MONOS PCT % 7   EOS PCT % 0     Results from last 7 days   Lab Units 02/18/24  0452   SODIUM mmol/L 128*   POTASSIUM mmol/L 4.4   CHLORIDE mmol/L 93*   CO2 mmol/L 28   BUN mg/dL 12   CREATININE mg/dL 0.62   ANION GAP mmol/L 7   CALCIUM mg/dL 8.4   ALBUMIN g/dL 3.2*   TOTAL BILIRUBIN mg/dL 0.20   ALK PHOS U/L 54   ALT U/L 10   AST U/L 16   GLUCOSE RANDOM mg/dL 110     Results from last 7 days   Lab Units 02/17/24  1106   INR  1.16             Results from last 7 days   Lab Units 02/17/24  1106   LACTIC ACID mmol/L 0.8   PROCALCITONIN ng/ml 0.22       Lines/Drains:  Invasive Devices       Peripheral Intravenous Line  Duration             Peripheral IV 06/28/21 Left Antecubital 965 days    Peripheral IV 02/17/24 Right Antecubital 1 day                          Imaging: Reviewed radiology reports from this admission including: chest xray    Recent Cultures (last 7 days):   Results from last 7 days   Lab Units 02/17/24  1106   BLOOD CULTURE  Received in Microbiology Lab. Culture in Progress.  Received in Microbiology Lab. Culture in Progress.       Last 24 Hours Medication List:   Current Facility-Administered Medications   Medication Dose  Route Frequency Provider Last Rate    acetaminophen  650 mg Oral Q6H PRN Edmond Aiad, DO      albuterol  2 puff Inhalation Q6H PRN Edmond Aiad, DO      aspirin  325 mg Oral Daily Edmond Aiad, DO      cefTRIAXone  1,000 mg Intravenous Q24H Edmond Aiad, DO 1,000 mg (02/18/24 1132)    citalopram  20 mg Oral Daily Edmond Aiad, DO      cyclobenzaprine  10 mg Oral HS Edmond Aiad, DO      dextromethorphan-guaiFENesin  10 mL Oral Q4H PRN Vince Mccormick MD      divalproex sodium  250 mg Oral BID Edmond Aiad, DO      enoxaparin  40 mg Subcutaneous Daily Edmond Aiad, DO      fluticasone-vilanterol  1 puff Inhalation Daily Edmond Aiad, DO      guaiFENesin  600 mg Oral Q12H YON Edmond Aiad, DO      ipratropium  0.5 mg Nebulization TID Edmond Aiad, DO      levalbuterol  1.25 mg Nebulization TID Edmond Aiad, DO      naproxen  500 mg Oral BID PRN Edmond Aiad, DO      ondansetron  4 mg Oral Q8H PRN Edmond Aiad, DO      polyethylene glycol  17 g Oral Daily Edmond Aiad, DO      pravastatin  80 mg Oral Daily With Dinner Edmond Aiad, DO      predniSONE  40 mg Oral Daily Edmond Aiad, DO      zolpidem  5 mg Oral HS Edmond Aiad, DO          Today, Patient Was Seen By: Vince Mccormick MD    **Please Note: This note may have been constructed using a voice recognition system.**

## 2024-02-18 NOTE — PLAN OF CARE
Problem: PAIN - ADULT  Goal: Verbalizes/displays adequate comfort level or baseline comfort level  Description: Interventions:  - Encourage patient to monitor pain and request assistance  - Assess pain using appropriate pain scale  - Administer analgesics based on type and severity of pain and evaluate response  - Implement non-pharmacological measures as appropriate and evaluate response  - Consider cultural and social influences on pain and pain management  - Notify physician/advanced practitioner if interventions unsuccessful or patient reports new pain  Outcome: Progressing     Problem: INFECTION - ADULT  Goal: Absence or prevention of progression during hospitalization  Description: INTERVENTIONS:  - Assess and monitor for signs and symptoms of infection  - Monitor lab/diagnostic results  - Monitor all insertion sites, i.e. indwelling lines, tubes, and drains  - Monitor endotracheal if appropriate and nasal secretions for changes in amount and color  - [place] appropriate cooling/warming therapies per order  - Administer medications as ordered  - Instruct and encourage patient and family to use good hand hygiene technique  - Identify and instruct in appropriate isolation precautions for identified infection/condition  Outcome: Progressing

## 2024-02-18 NOTE — PLAN OF CARE
Problem: PAIN - ADULT  Goal: Verbalizes/displays adequate comfort level or baseline comfort level  Description: Interventions:  - Encourage patient to monitor pain and request assistance  - Assess pain using appropriate pain scale  - Administer analgesics based on type and severity of pain and evaluate response  - Implement non-pharmacological measures as appropriate and evaluate response  - Consider cultural and social influences on pain and pain management  - Notify physician/advanced practitioner if interventions unsuccessful or patient reports new pain  Outcome: Progressing     Problem: INFECTION - ADULT  Goal: Absence or prevention of progression during hospitalization  Description: INTERVENTIONS:  - Assess and monitor for signs and symptoms of infection  - Monitor lab/diagnostic results  - Monitor all insertion sites, i.e. indwelling lines, tubes, and drains  - Monitor endotracheal if appropriate and nasal secretions for changes in amount and color  - [place] appropriate cooling/warming therapies per order  - Administer medications as ordered  - Instruct and encourage patient and family to use good hand hygiene technique  - Identify and instruct in appropriate isolation precautions for identified infection/condition  Outcome: Progressing  Goal: Absence of fever/infection during neutropenic period  Description: INTERVENTIONS:  - Monitor WBC    Outcome: Progressing     Problem: SAFETY ADULT  Goal: Patient will remain free of falls  Description: INTERVENTIONS:  - Educate patient/family on patient safety including physical limitations  - Instruct patient to call for assistance with activity   - Consult OT/PT to assist with strengthening/mobility   - Keep Call bell within reach  - Keep bed low and locked with side rails adjusted as appropriate  - Keep care items and personal belongings within reach  - Initiate and maintain comfort rounds  - Make Fall Risk Sign visible to staff  - Offer Toileting every  Hours,  in advance of need  - Initiate/Maintain no alarm  - Obtain necessary fall risk management equipment:   - Apply yellow socks and bracelet for high fall risk patients  - Consider moving patient to room near nurses station  Outcome: Progressing  Goal: Maintain or return to baseline ADL function  Description: INTERVENTIONS:  -  Assess patient's ability to carry out ADLs; assess patient's baseline for ADL function and identify physical deficits which impact ability to perform ADLs (bathing, care of mouth/teeth, toileting, grooming, dressing, etc.)  - Assess/evaluate cause of self-care deficits   - Assess range of motion  - Assess patient's mobility; develop plan if impaired  - Assess patient's need for assistive devices and provide as appropriate  - Encourage maximum independence but intervene and supervise when necessary  - Involve family in performance of ADLs  - Assess for home care needs following discharge   - Consider OT consult to assist with ADL evaluation and planning for discharge  - Provide patient education as appropriate  Outcome: Progressing  Goal: Maintains/Returns to pre admission functional level  Description: INTERVENTIONS:  - Perform AM-PAC 6 Click Basic Mobility/ Daily Activity assessment daily.  - Set and communicate daily mobility goal to care team and patient/family/caregiver.   - Collaborate with rehabilitation services on mobility goals if consulted  - Perform Range of Motion  times a day.  - Reposition patient every  hours.  - Dangle patient  times a day  - Stand patient  times a day  - Ambulate patient  times a day  - Out of bed to chair  times a day   - Out of bed for meals times a day  - Out of bed for toileting  - Record patient progress and toleration of activity level   Outcome: Progressing     Problem: DISCHARGE PLANNING  Goal: Discharge to home or other facility with appropriate resources  Description: INTERVENTIONS:  - Identify barriers to discharge w/patient and caregiver  - Arrange  for needed discharge resources and transportation as appropriate  - Identify discharge learning needs (meds, wound care, etc.)  - Arrange for interpretive services to assist at discharge as needed  - Refer to Case Management Department for coordinating discharge planning if the patient needs post-hospital services based on physician/advanced practitioner order or complex needs related to functional status, cognitive ability, or social support system  Outcome: Progressing     Problem: Knowledge Deficit  Goal: Patient/family/caregiver demonstrates understanding of disease process, treatment plan, medications, and discharge instructions  Description: Complete learning assessment and assess knowledge base.  Interventions:  - Provide teaching at level of understanding  - Provide teaching via preferred learning methods  Outcome: Progressing

## 2024-02-19 ENCOUNTER — APPOINTMENT (INPATIENT)
Dept: RADIOLOGY | Facility: HOSPITAL | Age: 74
DRG: 193 | End: 2024-02-19
Payer: COMMERCIAL

## 2024-02-19 PROBLEM — E87.1 HYPONATREMIA: Status: ACTIVE | Noted: 2024-02-19

## 2024-02-19 LAB
ANION GAP SERPL CALCULATED.3IONS-SCNC: 11 MMOL/L
ANION GAP SERPL CALCULATED.3IONS-SCNC: 9 MMOL/L
ANION GAP SERPL CALCULATED.3IONS-SCNC: 9 MMOL/L
BASOPHILS # BLD AUTO: 0.02 THOUSANDS/ÂΜL (ref 0–0.1)
BASOPHILS NFR BLD AUTO: 0 % (ref 0–1)
BUN SERPL-MCNC: 13 MG/DL (ref 5–25)
BUN SERPL-MCNC: 14 MG/DL (ref 5–25)
BUN SERPL-MCNC: 14 MG/DL (ref 5–25)
CALCIUM SERPL-MCNC: 8.6 MG/DL (ref 8.4–10.2)
CALCIUM SERPL-MCNC: 8.7 MG/DL (ref 8.4–10.2)
CALCIUM SERPL-MCNC: 9.1 MG/DL (ref 8.4–10.2)
CHLORIDE SERPL-SCNC: 91 MMOL/L (ref 96–108)
CHLORIDE SERPL-SCNC: 93 MMOL/L (ref 96–108)
CHLORIDE SERPL-SCNC: 93 MMOL/L (ref 96–108)
CO2 SERPL-SCNC: 25 MMOL/L (ref 21–32)
CO2 SERPL-SCNC: 26 MMOL/L (ref 21–32)
CO2 SERPL-SCNC: 28 MMOL/L (ref 21–32)
CREAT SERPL-MCNC: 0.65 MG/DL (ref 0.6–1.3)
CREAT SERPL-MCNC: 0.83 MG/DL (ref 0.6–1.3)
CREAT SERPL-MCNC: 0.86 MG/DL (ref 0.6–1.3)
EOSINOPHIL # BLD AUTO: 0 THOUSAND/ÂΜL (ref 0–0.61)
EOSINOPHIL NFR BLD AUTO: 0 % (ref 0–6)
ERYTHROCYTE [DISTWIDTH] IN BLOOD BY AUTOMATED COUNT: 13.5 % (ref 11.6–15.1)
GFR SERPL CREATININE-BSD FRML MDRD: 66 ML/MIN/1.73SQ M
GFR SERPL CREATININE-BSD FRML MDRD: 69 ML/MIN/1.73SQ M
GFR SERPL CREATININE-BSD FRML MDRD: 87 ML/MIN/1.73SQ M
GLUCOSE SERPL-MCNC: 122 MG/DL (ref 65–140)
GLUCOSE SERPL-MCNC: 147 MG/DL (ref 65–140)
GLUCOSE SERPL-MCNC: 95 MG/DL (ref 65–140)
HCT VFR BLD AUTO: 32.8 % (ref 34.8–46.1)
HGB BLD-MCNC: 10.9 G/DL (ref 11.5–15.4)
IMM GRANULOCYTES # BLD AUTO: 0.07 THOUSAND/UL (ref 0–0.2)
IMM GRANULOCYTES NFR BLD AUTO: 1 % (ref 0–2)
LYMPHOCYTES # BLD AUTO: 1.81 THOUSANDS/ÂΜL (ref 0.6–4.47)
LYMPHOCYTES NFR BLD AUTO: 18 % (ref 14–44)
MCH RBC QN AUTO: 30.4 PG (ref 26.8–34.3)
MCHC RBC AUTO-ENTMCNC: 33.2 G/DL (ref 31.4–37.4)
MCV RBC AUTO: 92 FL (ref 82–98)
MONOCYTES # BLD AUTO: 1.1 THOUSAND/ÂΜL (ref 0.17–1.22)
MONOCYTES NFR BLD AUTO: 11 % (ref 4–12)
NEUTROPHILS # BLD AUTO: 7.24 THOUSANDS/ÂΜL (ref 1.85–7.62)
NEUTS SEG NFR BLD AUTO: 70 % (ref 43–75)
NRBC BLD AUTO-RTO: 0 /100 WBCS
PLATELET # BLD AUTO: 287 THOUSANDS/UL (ref 149–390)
PMV BLD AUTO: 9.1 FL (ref 8.9–12.7)
POTASSIUM SERPL-SCNC: 4.2 MMOL/L (ref 3.5–5.3)
POTASSIUM SERPL-SCNC: 4.4 MMOL/L (ref 3.5–5.3)
POTASSIUM SERPL-SCNC: 4.6 MMOL/L (ref 3.5–5.3)
RBC # BLD AUTO: 3.58 MILLION/UL (ref 3.81–5.12)
SODIUM SERPL-SCNC: 126 MMOL/L (ref 135–147)
SODIUM SERPL-SCNC: 129 MMOL/L (ref 135–147)
SODIUM SERPL-SCNC: 130 MMOL/L (ref 135–147)
TSH SERPL DL<=0.05 MIU/L-ACNC: 1.34 UIU/ML (ref 0.45–4.5)
WBC # BLD AUTO: 10.24 THOUSAND/UL (ref 4.31–10.16)

## 2024-02-19 PROCEDURE — 94640 AIRWAY INHALATION TREATMENT: CPT

## 2024-02-19 PROCEDURE — 71250 CT THORAX DX C-: CPT

## 2024-02-19 PROCEDURE — 84443 ASSAY THYROID STIM HORMONE: CPT | Performed by: STUDENT IN AN ORGANIZED HEALTH CARE EDUCATION/TRAINING PROGRAM

## 2024-02-19 PROCEDURE — 80048 BASIC METABOLIC PNL TOTAL CA: CPT | Performed by: INTERNAL MEDICINE

## 2024-02-19 PROCEDURE — G1004 CDSM NDSC: HCPCS

## 2024-02-19 PROCEDURE — 85025 COMPLETE CBC W/AUTO DIFF WBC: CPT | Performed by: STUDENT IN AN ORGANIZED HEALTH CARE EDUCATION/TRAINING PROGRAM

## 2024-02-19 PROCEDURE — 94664 DEMO&/EVAL PT USE INHALER: CPT

## 2024-02-19 PROCEDURE — 80048 BASIC METABOLIC PNL TOTAL CA: CPT | Performed by: STUDENT IN AN ORGANIZED HEALTH CARE EDUCATION/TRAINING PROGRAM

## 2024-02-19 PROCEDURE — 94760 N-INVAS EAR/PLS OXIMETRY 1: CPT

## 2024-02-19 PROCEDURE — 99232 SBSQ HOSP IP/OBS MODERATE 35: CPT | Performed by: STUDENT IN AN ORGANIZED HEALTH CARE EDUCATION/TRAINING PROGRAM

## 2024-02-19 PROCEDURE — 99222 1ST HOSP IP/OBS MODERATE 55: CPT | Performed by: INTERNAL MEDICINE

## 2024-02-19 RX ORDER — SODIUM CHLORIDE 1 G/1
2 TABLET ORAL ONCE
Status: COMPLETED | OUTPATIENT
Start: 2024-02-19 | End: 2024-02-19

## 2024-02-19 RX ADMIN — ENOXAPARIN SODIUM 40 MG: 40 INJECTION SUBCUTANEOUS at 09:09

## 2024-02-19 RX ADMIN — IPRATROPIUM BROMIDE 0.5 MG: 0.5 SOLUTION RESPIRATORY (INHALATION) at 07:53

## 2024-02-19 RX ADMIN — POLYETHYLENE GLYCOL 3350 17 G: 17 POWDER, FOR SOLUTION ORAL at 21:54

## 2024-02-19 RX ADMIN — ASPIRIN 325 MG: 325 TABLET, COATED ORAL at 09:09

## 2024-02-19 RX ADMIN — GUAIFENESIN 600 MG: 600 TABLET, EXTENDED RELEASE ORAL at 21:54

## 2024-02-19 RX ADMIN — FLUTICASONE FUROATE AND VILANTEROL TRIFENATATE 1 PUFF: 200; 25 POWDER RESPIRATORY (INHALATION) at 09:09

## 2024-02-19 RX ADMIN — ZOLPIDEM TARTRATE 5 MG: 5 TABLET ORAL at 21:54

## 2024-02-19 RX ADMIN — LEVALBUTEROL HYDROCHLORIDE 1.25 MG: 1.25 SOLUTION RESPIRATORY (INHALATION) at 12:44

## 2024-02-19 RX ADMIN — CEFTRIAXONE SODIUM 1000 MG: 10 INJECTION, POWDER, FOR SOLUTION INTRAVENOUS at 11:32

## 2024-02-19 RX ADMIN — LEVALBUTEROL HYDROCHLORIDE 1.25 MG: 1.25 SOLUTION RESPIRATORY (INHALATION) at 07:53

## 2024-02-19 RX ADMIN — GUAIFENESIN 600 MG: 600 TABLET, EXTENDED RELEASE ORAL at 09:09

## 2024-02-19 RX ADMIN — PRAVASTATIN SODIUM 80 MG: 80 TABLET ORAL at 17:21

## 2024-02-19 RX ADMIN — DIVALPROEX SODIUM 250 MG: 250 TABLET, EXTENDED RELEASE ORAL at 09:09

## 2024-02-19 RX ADMIN — NYSTATIN: 100000 CREAM TOPICAL at 17:21

## 2024-02-19 RX ADMIN — CITALOPRAM HYDROBROMIDE 20 MG: 20 TABLET ORAL at 09:09

## 2024-02-19 RX ADMIN — CYCLOBENZAPRINE HYDROCHLORIDE 10 MG: 10 TABLET, FILM COATED ORAL at 21:54

## 2024-02-19 RX ADMIN — IPRATROPIUM BROMIDE 0.5 MG: 0.5 SOLUTION RESPIRATORY (INHALATION) at 12:44

## 2024-02-19 RX ADMIN — PREDNISONE 40 MG: 20 TABLET ORAL at 09:09

## 2024-02-19 RX ADMIN — IPRATROPIUM BROMIDE 0.5 MG: 0.5 SOLUTION RESPIRATORY (INHALATION) at 20:25

## 2024-02-19 RX ADMIN — NYSTATIN: 100000 CREAM TOPICAL at 09:09

## 2024-02-19 RX ADMIN — LEVALBUTEROL HYDROCHLORIDE 1.25 MG: 1.25 SOLUTION RESPIRATORY (INHALATION) at 20:25

## 2024-02-19 RX ADMIN — DIVALPROEX SODIUM 250 MG: 250 TABLET, EXTENDED RELEASE ORAL at 17:21

## 2024-02-19 RX ADMIN — SODIUM CHLORIDE 2 G: 1 TABLET ORAL at 11:32

## 2024-02-19 NOTE — QUICK NOTE
Serum sodium improved to 129 after administration of sodium chloride tablet 2 g.  Check BMP at 8 PM.  We can repeat sodium chloride dose 2 g if serum sodium less than 129 on repeat lab check.  Goal serum sodium by tomorrow morning 132.

## 2024-02-19 NOTE — ASSESSMENT & PLAN NOTE
Baseline around 128-135.  Sodium today is 126.  Check urine sodium, urine osmolarity, serum osmolarity and TSH level.  Nephrology consulted, suspecting SIADH in the setting of pneumonia/COPD, medications including Celexa and divalproex.  1200 cc fluid restriction.  Given sodium chloride 2 g x 1 dose.  Every 8 BMPs.  If serum sodium continues to drop, will start 1.8% saline.  Goal serum sodium by tomorrow morning around 132.  Check CT chest to rule out underlying malignancy.

## 2024-02-19 NOTE — ASSESSMENT & PLAN NOTE
Smokes 13 cigarettes a day, has emphysema on x-ray  Counseled on smoking cessation and offered but patient refused nicotine replacement while inpatient  Now with possible SIADH, will obtain CT chest to rule out underlying malignancy.

## 2024-02-19 NOTE — PROGRESS NOTES
[facility]  Progress Note  Name: Rosemary Starkey I  MRN: [id number]  Unit/Bed#: [facility] 925-01 I Date of Admission: 2/17/2024   Date of Service: 2/19/2024 I Hospital Day: 2    Assessment/Plan   * Left lower lobe pneumonia  Assessment & Plan  Presenting with 4 days of fever, chills, cough and headache. Seen at urgent care, SpO2 reportedly was 80% on room air and was sent to the ED  Longstanding history of smoking, emphysema on x-ray.  No official COPD diagnosis.  CXR with LLL pneumonia.   Currently afebrile, nontoxic.  WBCs downtrending.  Continue ceftriaxone.  Continue prednisone for suspected COPD exacerbation in the setting of pneumonia.  Urine Legionella and strep antigen negative.  Blood cultures negative to date.  Respiratory protocol albuterol/ipratropium nebs every 6 hours   Mucinex as needed for cough.  Will obtain CT chest to rule out underlying malignancy in the setting of SIADH and smoking history.    Acute hypoxic respiratory failure (HCC)  Assessment & Plan  In the setting of left lower lobe pneumonia and possible COPD exacerbation.  Continue to monitor oxygen, wean as tolerated.  Treatment of pneumonia as above.    Hyponatremia  Assessment & Plan  Baseline around 128-135.  Sodium today is 126.  Check urine sodium, urine osmolarity, serum osmolarity and TSH level.  Nephrology consulted, suspecting SIADH in the setting of pneumonia/COPD, medications including Celexa and divalproex.  1200 cc fluid restriction.  Given sodium chloride 2 g x 1 dose.  Every 8 BMPs.  If serum sodium continues to drop, will start 1.8% saline.  Goal serum sodium by tomorrow morning around 132.  Check CT chest to rule out underlying malignancy.    Chronic obstructive pulmonary disease with acute exacerbation (HCC)  Assessment & Plan  Clinically; no no prior PFTs.   Long standing hx of smoking, wheezing this presentation.  Continue albuterol, Advair and prednisone burst.  Refer to pulmonology at  discharge for follow-up.    Migraine without aura and without status migrainosus, not intractable  Assessment & Plan  Continue home citalopram, Depakote, Imitrex and naproxen    Cervical spondylosis  Assessment & Plan  Continue home Flexeril and naproxen    Bilateral carotid artery stenosis  Assessment & Plan  Continue home aspirin 325 mg daily and high intensity statin     Smoking  Assessment & Plan  Smokes 13 cigarettes a day, has emphysema on x-ray  Counseled on smoking cessation and offered but patient refused nicotine replacement while inpatient  Now with possible SIADH, will obtain CT chest to rule out underlying malignancy.        VTE Pharmacologic Prophylaxis:   Moderate Risk (Score 3-4) - Pharmacological DVT Prophylaxis Ordered: heparin.    Mobility:   Basic Mobility Inpatient Raw Score: 23  JH-HLM Goal: 7: Walk 25 feet or more  JH-HLM Achieved: 7: Walk 25 feet or more  HLM Goal achieved. Continue to encourage appropriate mobility.    Patient Centered Rounds: I performed bedside rounds with nursing staff today.   Discussions with Specialists or Other Care Team Provider: Nephro, CM    Education and Discussions with Family / Patient: Patient declined call to .     Total Time Spent on Date of Encounter in care of patient:  mins. This time was spent on one or more of the following: performing physical exam; counseling and coordination of care; obtaining or reviewing history; documenting in the medical record; reviewing/ordering tests, medications or procedures; communicating with other healthcare professionals and discussing with patient's family/caregivers.    Current Length of Stay: 2 day(s)  Current Patient Status: Inpatient   Certification Statement: The patient will continue to require additional inpatient hospital stay due to as above  Discharge Plan: Anticipate discharge in 48 hrs to home.    Code Status: Level 3 - DNAR and DNI    Subjective:   Patient was seen and examined at bedside.  She  is doing overall better.  Has cough, dry.  No other complaints    Objective:     Vitals:   Temp (24hrs), Av.7 °F (37.1 °C), Min:98.5 °F (36.9 °C), Max:99.2 °F (37.3 °C)    Temp:  [98.5 °F (36.9 °C)-99.2 °F (37.3 °C)] 98.5 °F (36.9 °C)  HR:  [81-96] 89  Resp:  [16] 16  BP: (127-150)/(70-76) 150/76  SpO2:  [88 %-94 %] 91 %  There is no height or weight on file to calculate BMI.     Input and Output Summary (last 24 hours):     Intake/Output Summary (Last 24 hours) at 2024 1730  Last data filed at 2024 2110  Gross per 24 hour   Intake 480 ml   Output --   Net 480 ml       Physical Exam:   Physical Exam  Vitals and nursing note reviewed.   Constitutional:       Appearance: Normal appearance.   HENT:      Head: Normocephalic and atraumatic.   Cardiovascular:      Rate and Rhythm: Normal rate.      Heart sounds: Normal heart sounds. No murmur heard.  Pulmonary:      Breath sounds: Normal breath sounds. No wheezing.   Abdominal:      Palpations: Abdomen is soft.      Tenderness: There is no abdominal tenderness.   Skin:     General: Skin is warm.   Neurological:      Mental Status: She is alert.          Additional Data:     Labs:  Results from last 7 days   Lab Units 24  0555   WBC Thousand/uL 10.24*   HEMOGLOBIN g/dL 10.9*   HEMATOCRIT % 32.8*   PLATELETS Thousands/uL 287   NEUTROS PCT % 70   LYMPHS PCT % 18   MONOS PCT % 11   EOS PCT % 0     Results from last 7 days   Lab Units 24  1444 24  0555 24  0452   SODIUM mmol/L 129*   < > 128*   POTASSIUM mmol/L 4.2   < > 4.4   CHLORIDE mmol/L 93*   < > 93*   CO2 mmol/L 25   < > 28   BUN mg/dL 14   < > 12   CREATININE mg/dL 0.86   < > 0.62   ANION GAP mmol/L 11   < > 7   CALCIUM mg/dL 8.6   < > 8.4   ALBUMIN g/dL  --   --  3.2*   TOTAL BILIRUBIN mg/dL  --   --  0.20   ALK PHOS U/L  --   --  54   ALT U/L  --   --  10   AST U/L  --   --  16   GLUCOSE RANDOM mg/dL 122   < > 110    < > = values in this interval not displayed.     Results from  last 7 days   Lab Units 02/17/24  1106   INR  1.16             Results from last 7 days   Lab Units 02/17/24  1106   LACTIC ACID mmol/L 0.8   PROCALCITONIN ng/ml 0.22       Lines/Drains:  Invasive Devices       Peripheral Intravenous Line  Duration             Peripheral IV 02/17/24 Right Antecubital 2 days                          Imaging: Reviewed radiology reports from this admission including: chest CT scan    Recent Cultures (last 7 days):   Results from last 7 days   Lab Units 02/18/24  1018 02/17/24  1106   BLOOD CULTURE   --  No Growth at 48 hrs.  No Growth at 48 hrs.   LEGIONELLA URINARY ANTIGEN  Negative  --        Last 24 Hours Medication List:   Current Facility-Administered Medications   Medication Dose Route Frequency Provider Last Rate    acetaminophen  650 mg Oral Q6H PRN Edmond Aiad, DO      albuterol  2 puff Inhalation Q6H PRN Edmond Aiad, DO      aspirin  325 mg Oral Daily Edmond Aiad, DO      cefTRIAXone  1,000 mg Intravenous Q24H Edmond Aiad, DO 1,000 mg (02/19/24 1132)    citalopram  20 mg Oral Daily Edmond Aiad, DO      cyclobenzaprine  10 mg Oral HS Edmond Aiad, DO      dextromethorphan-guaiFENesin  10 mL Oral Q4H PRN Vince Mccormick MD      divalproex sodium  250 mg Oral BID Edmond Aiad, DO      enoxaparin  40 mg Subcutaneous Daily Edmond Aiad, DO      fluticasone-vilanterol  1 puff Inhalation Daily Edmond Aiad, DO      guaiFENesin  600 mg Oral Q12H YON Edmond Aiad, DO      ipratropium  0.5 mg Nebulization TID Edmond Aiad, DO      levalbuterol  1.25 mg Nebulization TID Edmond Aiad, DO      naproxen  500 mg Oral BID PRN Edmond Aiad, DO      nystatin   Topical BID Rena Bean PA-C      ondansetron  4 mg Oral Q8H PRN Edmond Aiad, DO      polyethylene glycol  17 g Oral Daily Edmond Aiad, DO      pravastatin  80 mg Oral Daily With Dinner Edmond Aiad, DO      predniSONE  40 mg Oral Daily Edmond Aiad, DO      zolpidem  5 mg Oral HS Edmond Aiad, DO          Today, Patient Was Seen By: Vince Mccormick MD    **Please Note: This note may  have been constructed using a voice recognition system.**

## 2024-02-19 NOTE — ASSESSMENT & PLAN NOTE
Presenting with 4 days of fever, chills, cough and headache. Seen at urgent care, SpO2 reportedly was 80% on room air and was sent to the ED  Longstanding history of smoking, emphysema on x-ray.  No official COPD diagnosis.  CXR with LLL pneumonia.   Currently afebrile, nontoxic.  WBCs downtrending.  Continue ceftriaxone.  Continue prednisone for suspected COPD exacerbation in the setting of pneumonia.  Urine Legionella and strep antigen negative.  Blood cultures negative to date.  Respiratory protocol albuterol/ipratropium nebs every 6 hours   Mucinex as needed for cough.  Will obtain CT chest to rule out underlying malignancy in the setting of SIADH and smoking history.

## 2024-02-19 NOTE — PLAN OF CARE
Problem: PAIN - ADULT  Goal: Verbalizes/displays adequate comfort level or baseline comfort level  Description: Interventions:  - Encourage patient to monitor pain and request assistance  - Assess pain using appropriate pain scale  - Administer analgesics based on type and severity of pain and evaluate response  - Implement non-pharmacological measures as appropriate and evaluate response  - Consider cultural and social influences on pain and pain management  - Notify physician/advanced practitioner if interventions unsuccessful or patient reports new pain  Outcome: Progressing     Problem: INFECTION - ADULT  Goal: Absence or prevention of progression during hospitalization  Description: INTERVENTIONS:  - Assess and monitor for signs and symptoms of infection  - Monitor lab/diagnostic results  - Monitor all insertion sites, i.e. indwelling lines, tubes, and drains  - Monitor endotracheal if appropriate and nasal secretions for changes in amount and color  - [place] appropriate cooling/warming therapies per order  - Administer medications as ordered  - Instruct and encourage patient and family to use good hand hygiene technique  - Identify and instruct in appropriate isolation precautions for identified infection/condition  Outcome: Progressing  Goal: Absence of fever/infection during neutropenic period  Description: INTERVENTIONS:  - Monitor WBC    Outcome: Progressing     Problem: SAFETY ADULT  Goal: Patient will remain free of falls  Description: INTERVENTIONS:  - Educate patient/family on patient safety including physical limitations  - Instruct patient to call for assistance with activity   - Consult OT/PT to assist with strengthening/mobility   - Keep Call bell within reach  - Keep bed low and locked with side rails adjusted as appropriate  - Keep care items and personal belongings within reach  - Initiate and maintain comfort rounds  - Make Fall Risk Sign visible to staff  - Apply yellow socks and bracelet  for high fall risk patients  - Consider moving patient to room near nurses station  Outcome: Progressing  Goal: Maintain or return to baseline ADL function  Description: INTERVENTIONS:  -  Assess patient's ability to carry out ADLs; assess patient's baseline for ADL function and identify physical deficits which impact ability to perform ADLs (bathing, care of mouth/teeth, toileting, grooming, dressing, etc.)  - Assess/evaluate cause of self-care deficits   - Assess range of motion  - Assess patient's mobility; develop plan if impaired  - Assess patient's need for assistive devices and provide as appropriate  - Encourage maximum independence but intervene and supervise when necessary  - Involve family in performance of ADLs  - Assess for home care needs following discharge   - Consider OT consult to assist with ADL evaluation and planning for discharge  - Provide patient education as appropriate  Outcome: Progressing  Goal: Maintains/Returns to pre admission functional level  Description: INTERVENTIONS:  - Perform AM-PAC 6 Click Basic Mobility/ Daily Activity assessment daily.  - Set and communicate daily mobility goal to care team and patient/family/caregiver.   - Collaborate with rehabilitation services on mobility goals if consulted  - Record patient progress and toleration of activity level   Outcome: Progressing     Problem: DISCHARGE PLANNING  Goal: Discharge to home or other facility with appropriate resources  Description: INTERVENTIONS:  - Identify barriers to discharge w/patient and caregiver  - Arrange for needed discharge resources and transportation as appropriate  - Identify discharge learning needs (meds, wound care, etc.)  - Arrange for interpretive services to assist at discharge as needed  - Refer to Case Management Department for coordinating discharge planning if the patient needs post-hospital services based on physician/advanced practitioner order or complex needs related to functional status,  cognitive ability, or social support system  Outcome: Progressing     Problem: Knowledge Deficit  Goal: Patient/family/caregiver demonstrates understanding of disease process, treatment plan, medications, and discharge instructions  Description: Complete learning assessment and assess knowledge base.  Interventions:  - Provide teaching at level of understanding  - Provide teaching via preferred learning methods  Outcome: Progressing

## 2024-02-19 NOTE — UTILIZATION REVIEW
Initial Clinical Review    Admission: Date/Time/Statement:   Admission Orders (From admission, onward)       Ordered        02/17/24 1108  INPATIENT ADMISSION  Once                          Orders Placed This Encounter   Procedures    INPATIENT ADMISSION     Standing Status:   Standing     Number of Occurrences:   1     Order Specific Question:   Level of Care     Answer:   Med Surg [16]     Order Specific Question:   Estimated length of stay     Answer:   More than 2 Midnights     Order Specific Question:   Certification     Answer:   I certify that inpatient services are medically necessary for this patient for a duration of greater than two midnights. See H&P and MD Progress Notes for additional information about the patient's course of treatment.     ED Arrival Information       Expected   2/17/2024 00:00    Arrival   2/17/2024 09:13    Acuity   Emergent              Means of arrival   -    Escorted by   -    Service   Hospitalist    Admission type   Emergency              Arrival complaint   Hypoxia             Chief Complaint   Patient presents with    Flu Symptoms     Cough, congestion, fever, for about a week.  Denies N/V/D       Initial Presentation: 73 y.o. female with PMHx of migraine, cervical spondylitis, carotid stenosis on aspirin and statin, COPD, currently everyday smoker, presents to ED from urgent care center where he presented with c/o headache, chills, feeling warm but no fever, dry cough and wheezing x 3-4 days. Low O2 Sat reportedly 80% on RA and referred to the ED. On presentation expiratory wheezing present more in the upper lobes, + rhonchi. On 2L nc with O2 sat 91%. BP elevated. WBC 12.65, Hgb 11.3, Hct 33.1. Na 127, CL 92. CXR with LLL infiltrate. IV abx started in ED.  Admitted Inpatient to [facility] unit with LLL PNA with acute COPD exacerbation   Continue IV ceftriaxone. Follow blood cx. Albuterol/ipratropium nebs q6h. Prednisone 40 mg daily x 5 days. Monitor O2 sats and wean O2 as able.  Continue pta po meds. SCDs.      Date: 2/18   Day 2: pt still with dry cough. + wheezing, decreased breath sounds, faint scattered wheezes. Remains on 2L NC. Continue ceftriaxone, po prednisone, nebs  Check urine Legionella and strep antigens. Follow pending blood ccs. Mucinex prn. Reg diet. SCDs.    Date: 2.19    Day 3: Has surpassed a 2nd midnight with active treatments and services, which include continued tx of LLL PNA with IV abx, steroids and O2 monitoring.      ED Triage Vitals   Temperature Pulse Respirations Blood Pressure SpO2   02/17/24 0915 02/17/24 0915 02/17/24 0915 02/17/24 0915 02/17/24 0915   98.4 °F (36.9 °C) 101 20 143/96 91 %      Temp Source Heart Rate Source Patient Position - Orthostatic VS BP Location FiO2 (%)   02/17/24 0915 02/17/24 1000 02/17/24 1000 02/17/24 1000 --   Oral Monitor Lying Left arm       Pain Score       02/17/24 0915       No Pain          Wt Readings from Last 1 Encounters:   01/08/24 71.7 kg (158 lb)     Additional Vital Signs:   Date/Time Temp Pulse Resp BP MAP (mmHg) SpO2 Calculated FIO2 (%) - Nasal Cannula Nasal Cannula O2 Flow Rate (L/min) O2 Device Patient Position - Orthostatic VS   02/19/24 1244 -- -- -- -- -- 90 % -- -- -- --   02/19/24 09:12:39 99.2 °F (37.3 °C) 96 -- 127/70 89 94 % 28 2 L/min Nasal cannula --   02/18/24 21:57:53 98.5 °F (36.9 °C) 81 16 137/70 92 89 % Abnormal  -- -- -- Lying   02/18/24 2108 -- -- -- -- -- -- 28 2 L/min Nasal cannula --   02/18/24 1946 -- -- -- -- -- 93 % -- -- -- --   02/18/24 15:13:24 98 °F (36.7 °C) -- 16 137/68 91 -- -- -- -- --   02/18/24 1416 -- -- -- -- -- 93 % -- -- None (Room air) --   02/18/24 0850 -- -- -- -- -- -- 28 2 L/min Nasal cannula --   02/18/24 0840 -- -- -- -- -- 95 % 28 2 L/min Nasal cannula --   02/18/24 07:55:10 97.7 °F (36.5 °C) 80 16 144/74 97 94 % -- -- -- --   02/17/24 22:06:36 97.8 °F (36.6 °C) 97 16 117/73 88 91 % -- -- -- Lying   02/17/24 2057 -- -- -- -- -- -- 28 2 L/min Nasal cannula --   02/17/24  1946 -- -- -- -- -- 94 % -- -- -- --   02/17/24 17:05:36 98.9 °F (37.2 °C) 89 17 117/72 87 88 % Abnormal  -- -- -- --   02/17/24 1700 -- -- -- -- -- -- 28 2 L/min Nasal cannula --   02/17/24 1500 -- 91 22 124/73 86 91 % 28 2 L/min Nasal cannula Lying   02/17/24 1400 -- 96 22 139/63 90 94 % 28 2 L/min Nasal cannula Lying   02/17/24 1300 -- 104 22 209/110 Abnormal  143 90 % 28 2 L/min Nasal cannula Lying   02/17/24 1200 -- 87 22 140/70 99 94 % 28 2 L/min Nasal cannula Lying   02/17/24 1130 -- 91 22 132/92 104 95 % 28 2 L/min Nasal cannula Lying   02/17/24 1000 -- 93 22 121/59 84 94 % 28 2 L/min Nasal cannula Lying   02/17/24 0944 -- -- -- -- -- -- -- -- Nasal cannula --   02/17/24 0915 98.4 °F (36.9 °C) 101 20 143/96 -- 91 % -- -- None (Room air) --     Pertinent Labs/Diagnostic Test Results:   XR chest 2 views   ED Interpretation by Lakhwinder Helm MD (02/17 1048)   Left lower lobe infiltrate.      Final Result by Tracy Pierce MD (02/17 1955)      Left lower lobe pneumonia.         CT chest wo contrast    (Results Pending)     Results from last 7 days   Lab Units 02/17/24  0958   SARS-COV-2  Negative     Results from last 7 days   Lab Units 02/19/24  0555 02/18/24  0452 02/17/24  0958   WBC Thousand/uL 10.24* 11.04* 12.65*   HEMOGLOBIN g/dL 10.9* 10.7* 11.3*   HEMATOCRIT % 32.8* 32.6* 33.1*   PLATELETS Thousands/uL 287 260 215   NEUTROS ABS Thousands/µL 7.24 9.03* 9.35*     Results from last 7 days   Lab Units 02/19/24  0555 02/18/24  0452 02/17/24  0958   SODIUM mmol/L 126* 128* 127*   POTASSIUM mmol/L 4.4 4.4 4.1   CHLORIDE mmol/L 91* 93* 92*   CO2 mmol/L 26 28 26   ANION GAP mmol/L 9 7 9   BUN mg/dL 13 12 11   CREATININE mg/dL 0.65 0.62 0.73   EGFR ml/min/1.73sq m 87 88 80   CALCIUM mg/dL 8.7 8.4 8.8     Results from last 7 days   Lab Units 02/18/24  0452 02/17/24  0958   AST U/L 16 14   ALT U/L 10 8   ALK PHOS U/L 54 55   TOTAL PROTEIN g/dL 5.8* 6.3*   ALBUMIN g/dL 3.2* 3.4*   TOTAL BILIRUBIN mg/dL  0.20 0.26     Results from last 7 days   Lab Units 02/19/24  0555 02/18/24  0452 02/17/24  0958   GLUCOSE RANDOM mg/dL 95 110 106     Results from last 7 days   Lab Units 02/17/24  0958   HS TNI 0HR ng/L 7     Results from last 7 days   Lab Units 02/17/24  1106   PROTIME seconds 14.7*   INR  1.16   PTT seconds 37     Results from last 7 days   Lab Units 02/17/24  1106   PROCALCITONIN ng/ml 0.22     Results from last 7 days   Lab Units 02/17/24  1106   LACTIC ACID mmol/L 0.8     Results from last 7 days   Lab Units 02/18/24  1018 02/17/24  0958   STREP PNEUMONIAE ANTIGEN, URINE  Negative  --    LEGIONELLA URINARY ANTIGEN  Negative  --    INFLUENZA A PCR   --  Negative   INFLUENZA B PCR   --  Negative   RSV PCR   --  Negative     Results from last 7 days   Lab Units 02/17/24  1106   BLOOD CULTURE  No Growth at 24 hrs.  No Growth at 24 hrs.     ED Treatment:   Medication Administration from 02/17/2024 0852 to 02/17/2024 1701         Date/Time Order Dose Route Action     02/17/2024 1109 EST sodium chloride 0.9 % bolus 1,000 mL 1,000 mL Intravenous New Bag     02/17/2024 1110 EST ceftriaxone (ROCEPHIN) 2 g/50 mL in dextrose IVPB 2,000 mg Intravenous New Bag     02/17/2024 1112 EST acetaminophen (TYLENOL) tablet 975 mg 975 mg Oral Given     02/17/2024 1111 EST ketorolac (TORADOL) injection 15 mg 15 mg Intravenous Given     02/17/2024 1229 EST aspirin (ECOTRIN) EC tablet 325 mg 325 mg Oral Given     02/17/2024 1229 EST citalopram (CeleXA) tablet 20 mg 20 mg Oral Given     02/17/2024 1228 EST divalproex sodium (DEPAKOTE ER) 24 hr tablet 250 mg 250 mg Oral Given     02/17/2024 1551 EST naproxen (NAPROSYN) tablet 500 mg 500 mg Oral Given     02/17/2024 1551 EST pravastatin (PRAVACHOL) tablet 80 mg 80 mg Oral Given     02/17/2024 1551 EST SUMAtriptan (IMITREX) tablet 100 mg 100 mg Oral Given     02/17/2024 1212 EST guaiFENesin (MUCINEX) 12 hr tablet 600 mg 600 mg Oral Given     02/17/2024 1212 EST predniSONE tablet 40 mg 40  mg Oral Given     02/17/2024 1330 EST ipratropium-albuterol (DUO-NEB) 0.5-2.5 mg/3 mL inhalation solution 3 mL 3 mL Nebulization Given     02/17/2024 1229 EST enoxaparin (LOVENOX) subcutaneous injection 40 mg 40 mg Subcutaneous Given       Past Medical History:   Diagnosis Date    Hyperlipidemia     Migraine      Present on Admission:   Bilateral carotid artery stenosis   Left lower lobe pneumonia   Migraine without aura and without status migrainosus, not intractable   Smoking   Cervical spondylosis      Admitting Diagnosis: Cough [R05.9]  Pneumonia [J18.9]  Hypoxia [R09.02]  Age/Sex: 73 y.o. female  Admission Orders:  Scheduled Medications:  aspirin, 325 mg, Oral, Daily  cefTRIAXone, 1,000 mg, Intravenous, Q24H  citalopram, 20 mg, Oral, Daily  cyclobenzaprine, 10 mg, Oral, HS  divalproex sodium, 250 mg, Oral, BID  enoxaparin, 40 mg, Subcutaneous, Daily  fluticasone-vilanterol, 1 puff, Inhalation, Daily  guaiFENesin, 600 mg, Oral, Q12H YON  ipratropium, 0.5 mg, Nebulization, TID  levalbuterol, 1.25 mg, Nebulization, TID  nystatin, , Topical, BID  polyethylene glycol, 17 g, Oral, Daily  pravastatin, 80 mg, Oral, Daily With Dinner  predniSONE, 40 mg, Oral, Daily  zolpidem, 5 mg, Oral, HS    Continuous IV Infusions: none     PRN Meds:  acetaminophen, 650 mg, Oral, Q6H PRN  albuterol, 2 puff, Inhalation, Q6H PRN  dextromethorphan-guaiFENesin, 10 mL, Oral, Q4H PRN 2/18 x1  naproxen, 500 mg, Oral, BID PRN  ondansetron, 4 mg, Oral, Q8H PRN            Network Utilization Review Department  ATTENTION: Please call with any questions or concerns to [phone] and carefully listen to the prompts so that you are directed to the right person. All voicemails are confidential.   For Discharge needs, contact Care Management DC Support Team at [phone] opt. 2  Send all requests for admission clinical reviews, approved or denied determinations and any other requests to dedicated fax number below belonging to the campus where  the patient is receiving treatment. List of dedicated fax numbers for the Facilities:  FACILITY NAME UR FAX NUMBER   ADMISSION DENIALS (Administrative/Medical Necessity) [phone]   DISCHARGE SUPPORT TEAM (NETWORK) [phone]   PARENT CHILD HEALTH (Maternity/NICU/Pediatrics) [phone]   [facility] [phone]   [facility] [phone]   [facility] [phone]   [facility] [phone]   [facility] [phone]   [facility] [phone]   [facility] [phone]   [facility] [phone]   [facility] [phone]   [facility] [phone]   [facility] [phone]   [facility] [phone]

## 2024-02-19 NOTE — INCIDENTAL FINDINGS
The following findings require follow up:  Radiographic finding   Findin mm groundglass opacity centrally located in the right upper lobe. Per 2017 Fleischner Society guidelines, follow-up with a chest CT is recommended at 6 to 12 months to confirm persistence and then every 2 years until 5 years of stability is   established.  Bronchial wall thickening in the lower lungs with tree-in-bud nodularity in the inferior lingula and both lower lobes and consolidation in the dependent left lower lobe compatible with bronchitis/bronchiolitis and left lower lobe pneumonia.     Follow up should be done within 6 month(s)    Please notify the following clinician to assist with the follow up:   PCP Dr. Choi

## 2024-02-19 NOTE — CONSULTS
NEPHROLOGY HOSPITAL CONSULTATION   Rosemary Starkey 73 y.o. female MRN: [id number]  Unit/Bed#: [facility] 925-01 Encounter: [id number]    ASSESSMENT and PLAN:    Hyponatremia.  Baseline serum sodium 128-135.  No previous urine studies available to determine etiology.  Serum sodium on admission 127 on 02/17.  Serum sodium today 126.  Agree with checking urine osmolality and urine sodium.  Agree with checking serum osmolality.  Agree with checking TSH.  Checking cortisol would not help as patient is currently on steroids.  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  Also on Celexa and divalproex which can cause SIADH.  Fluid restrict 1200 cc per 24 hours.  Give sodium chloride 2 g x 1.  Check BMP every 8 hours.  If serum sodium continues to drop, can start 1.8% saline.  Goal serum sodium by tomorrow morning around 132.  Consider checking CT chest to rule out underlying malignancy.    2.  Left lower lobe pneumonia.  Currently on IV antibiotics.    3.  Acute hypoxic respiratory failure.  Most likely in setting of pneumonia and COPD exacerbation.  Management per primary team.    Discussed with internal medicine team.  After discussion, we agreed that most likely diagnosis is SIADH and to start with sodium chloride 2 g x 1 and possibility of using 1.8% saline if no improvement by this evening.    HISTORY OF PRESENT ILLNESS:  Requesting Physician: Vince Mccormick MD  Reason for Consult: Hyponatremia    Rosemary Starkey is a 73 y.o. female who was admitted to [facility] after presenting with headache, chills, cough, wheezing. A renal consultation is requested today for assistance in the management of hyponatremia.  She denies any history of hypertension.  She denies any history of heart disease.  She tells me that she drinks plenty of fluids at home.  She has good protein intake at baseline.    PAST MEDICAL HISTORY:  Past Medical History:   Diagnosis Date    Hyperlipidemia     Migraine        PAST  SURGICAL HISTORY:  Past Surgical History:   Procedure Laterality Date    BREAST EXCISIONAL BIOPSY Right 1986    HARTMANS PROCEDURE N/A 3/8/2020    Procedure: Laparoscopic drainage of intra peritoneal abscess, sigmoid rescetion,;  Surgeon: NOEL Ogden MD;  Location: [facility];  Service: Colorectal    HYSTERECTOMY  1978    age 28    NASAL SEPTUM SURGERY      deviation repair    KY LAPS ABD PRTM&OMENTUM DX W/WO SPEC BR/WA SPX N/A 3/8/2020    Procedure: LAPAROSCOPY DIAGNOSTIC;  Surgeon: NOEL Ogden MD;  Location: [facility];  Service: Colorectal    KY LAPS REPAIR HERNIA EXCEPT INCAL/INGUN REDUCIBLE N/A 1/26/2021    Procedure: REPAIR HERNIA VENTRAL LAPAROSCOPIC;  Surgeon: NOEL Ogden MD;  Location: [facility];  Service: Colorectal    KY SIGMOIDOSCOPY FLX DX W/COLLJ SPEC BR/WA IF PFRMD N/A 3/8/2020    Procedure: SIGMOIDOSCOPY FLEXIBLE;  Surgeon: NOEL Ogden MD;  Location: [facility];  Service: Colorectal    SHOULDER SURGERY         ALLERGIES:  Allergies   Allergen Reactions    Penicillins Anaphylaxis    Azithromycin Hives    Nisoldipine      Reaction Date: 14Apr2011;     Sulfa Antibiotics Hives       SOCIAL HISTORY:  Social History     Substance and Sexual Activity   Alcohol Use Yes    Comment: Rarely     Social History     Substance and Sexual Activity   Drug Use No     Social History     Tobacco Use   Smoking Status Every Day    Current packs/day: 0.50    Average packs/day: 0.5 packs/day for 59.1 years (29.6 ttl pk-yrs)    Types: Cigarettes    Start date: 1965   Smokeless Tobacco Never       FAMILY HISTORY:  Family History   Problem Relation Age of Onset    Breast cancer Mother 50    Heart disease Father     Diabetes Sister     Leukemia Sister     No Known Problems Maternal Grandmother     No Known Problems Maternal Grandfather     No Known Problems Paternal Grandmother     No Known Problems Paternal Grandfather     No Known Problems Sister     Breast cancer Maternal Aunt 50    No Known Problems  Maternal Aunt     No Known Problems Paternal Aunt     Colon cancer Maternal Uncle     No Known Problems Son     No Known Problems Son     Lung cancer Other 47       MEDICATIONS:    Current Facility-Administered Medications:     acetaminophen (TYLENOL) tablet 650 mg, 650 mg, Oral, Q6H PRN, Edmond Aiad, DO    albuterol (PROVENTIL HFA,VENTOLIN HFA) inhaler 2 puff, 2 puff, Inhalation, Q6H PRN, Edmond Aiad, DO    aspirin (ECOTRIN) EC tablet 325 mg, 325 mg, Oral, Daily, Edmond Aiad, DO, 325 mg at 02/19/24 0909    ceftriaxone (ROCEPHIN) 1 g/50 mL in dextrose IVPB, 1,000 mg, Intravenous, Q24H, Edmond Aiad, DO, Last Rate: 100 mL/hr at 02/18/24 1132, 1,000 mg at 02/18/24 1132    citalopram (CeleXA) tablet 20 mg, 20 mg, Oral, Daily, Edmond Aiad, DO, 20 mg at 02/19/24 0909    cyclobenzaprine (FLEXERIL) tablet 10 mg, 10 mg, Oral, HS, Edmond Aiad, DO, 10 mg at 02/18/24 2101    dextromethorphan-guaiFENesin (ROBITUSSIN DM) oral syrup 10 mL, 10 mL, Oral, Q4H PRN, Vince Mccormick MD, 10 mL at 02/18/24 1357    divalproex sodium (DEPAKOTE ER) 24 hr tablet 250 mg, 250 mg, Oral, BID, Edmond Aiad, DO, 250 mg at 02/19/24 0909    enoxaparin (LOVENOX) subcutaneous injection 40 mg, 40 mg, Subcutaneous, Daily, Edmond Aiad, DO, 40 mg at 02/19/24 0909    fluticasone-vilanterol 200-25 mcg/actuation 1 puff, 1 puff, Inhalation, Daily, Edmond Aiad, DO, 1 puff at 02/19/24 0909    guaiFENesin (MUCINEX) 12 hr tablet 600 mg, 600 mg, Oral, Q12H YON, Edmond Aiad, DO, 600 mg at 02/19/24 0909    ipratropium (ATROVENT) 0.02 % inhalation solution 0.5 mg, 0.5 mg, Nebulization, TID, Edmond Aiad, DO, 0.5 mg at 02/19/24 0753    levalbuterol (XOPENEX) inhalation solution 1.25 mg, 1.25 mg, Nebulization, TID, Edmond Aiad, DO, 1.25 mg at 02/19/24 0753    naproxen (NAPROSYN) tablet 500 mg, 500 mg, Oral, BID PRN, Edmond Aiad, DO, 500 mg at 02/17/24 1551    nystatin (MYCOSTATIN) cream, , Topical, BID, Rena Bean PA-C, Given at 02/19/24 0909    ondansetron (ZOFRAN-ODT) dispersible tablet 4 mg,  4 mg, Oral, Q8H PRN, Edmond Gtz,     polyethylene glycol (MIRALAX) packet 17 g, 17 g, Oral, Daily, Edmond Gtz DO, 17 g at 02/18/24 2101    pravastatin (PRAVACHOL) tablet 80 mg, 80 mg, Oral, Daily With Dinner, Edmond Gtz, DO, 80 mg at 02/18/24 1706    predniSONE tablet 40 mg, 40 mg, Oral, Daily, Edmond Domingoad, DO, 40 mg at 02/19/24 0909    zolpidem (AMBIEN) tablet 5 mg, 5 mg, Oral, HS, Edmond Aiad, DO, 5 mg at 02/18/24 2101    REVIEW OF SYSTEMS:  Review of Systems   Constitutional:  Negative for chills and fever.   HENT:  Negative for ear pain and sore throat.    Eyes:  Negative for pain and visual disturbance.   Respiratory:  Negative for cough and shortness of breath.    Cardiovascular:  Negative for chest pain and palpitations.   Gastrointestinal:  Negative for abdominal pain and vomiting.   Genitourinary:  Negative for dysuria and hematuria.   Musculoskeletal:  Negative for arthralgias and back pain.   Skin:  Negative for color change and rash.   Neurological:  Negative for seizures and syncope.   All other systems reviewed and are negative.      PHYSICAL EXAM:  Current Weight:    First Weight:    Vitals:    02/18/24 1513 02/18/24 1946 02/18/24 2157 02/19/24 0912   BP: 137/68  137/70 127/70   BP Location:   Left arm    Pulse:   81 96   Resp: 16  16    Temp: 98 °F (36.7 °C)  98.5 °F (36.9 °C) 99.2 °F (37.3 °C)   TempSrc:   Oral    SpO2:  93% (!) 89% 94%       Intake/Output Summary (Last 24 hours) at 2/19/2024 1036  Last data filed at 2/18/2024 2110  Gross per 24 hour   Intake 720 ml   Output --   Net 720 ml     Physical Exam  Constitutional:       Appearance: Normal appearance.   HENT:      Head: Normocephalic and atraumatic.      Mouth/Throat:      Mouth: Mucous membranes are moist.      Pharynx: Oropharynx is clear.   Cardiovascular:      Rate and Rhythm: Normal rate and regular rhythm.      Pulses: Normal pulses.      Heart sounds: Normal heart sounds.   Pulmonary:      Effort: Pulmonary effort is normal.      Breath  "sounds: Normal breath sounds.   Abdominal:      General: Bowel sounds are normal.      Palpations: Abdomen is soft.   Musculoskeletal:         General: Normal range of motion.      Right lower leg: No edema.      Left lower leg: No edema.   Skin:     General: Skin is warm and dry.   Neurological:      General: No focal deficit present.      Mental Status: She is alert and oriented to person, place, and time. Mental status is at baseline.   Psychiatric:         Mood and Affect: Mood normal.         Behavior: Behavior normal.         Thought Content: Thought content normal.         Judgment: Judgment normal.       Lab Results:   Results from last 7 days   Lab Units 02/19/24  0555 02/18/24  0452 02/17/24  0958   WBC Thousand/uL 10.24* 11.04* 12.65*   HEMOGLOBIN g/dL 10.9* 10.7* 11.3*   HEMATOCRIT % 32.8* 32.6* 33.1*   PLATELETS Thousands/uL 287 260 215   POTASSIUM mmol/L 4.4 4.4 4.1   CHLORIDE mmol/L 91* 93* 92*   CO2 mmol/L 26 28 26   BUN mg/dL 13 12 11   CREATININE mg/dL 0.65 0.62 0.73   CALCIUM mg/dL 8.7 8.4 8.8   ALK PHOS U/L  --  54 55   ALT U/L  --  10 8   AST U/L  --  16 14     Portions of the record may have been created with voice recognition software. Occasional wrong word or \"sound a like\" substitutions may have occurred due to the inherent limitations of voice recognition software. Read the chart carefully and recognize, using context, where substitutions have occurred. If you have any questions, please contact the dictating provider.    "

## 2024-02-20 LAB
ANION GAP SERPL CALCULATED.3IONS-SCNC: 8 MMOL/L
BASOPHILS # BLD AUTO: 0.04 THOUSANDS/ÂΜL (ref 0–0.1)
BASOPHILS NFR BLD AUTO: 0 % (ref 0–1)
BUN SERPL-MCNC: 14 MG/DL (ref 5–25)
CALCIUM SERPL-MCNC: 8.6 MG/DL (ref 8.4–10.2)
CHLORIDE SERPL-SCNC: 95 MMOL/L (ref 96–108)
CO2 SERPL-SCNC: 28 MMOL/L (ref 21–32)
CREAT SERPL-MCNC: 0.67 MG/DL (ref 0.6–1.3)
EOSINOPHIL # BLD AUTO: 0 THOUSAND/ÂΜL (ref 0–0.61)
EOSINOPHIL NFR BLD AUTO: 0 % (ref 0–6)
ERYTHROCYTE [DISTWIDTH] IN BLOOD BY AUTOMATED COUNT: 14.1 % (ref 11.6–15.1)
GFR SERPL CREATININE-BSD FRML MDRD: 86 ML/MIN/1.73SQ M
GLUCOSE SERPL-MCNC: 95 MG/DL (ref 65–140)
HCT VFR BLD AUTO: 33.3 % (ref 34.8–46.1)
HGB BLD-MCNC: 11.3 G/DL (ref 11.5–15.4)
IMM GRANULOCYTES # BLD AUTO: 0.14 THOUSAND/UL (ref 0–0.2)
IMM GRANULOCYTES NFR BLD AUTO: 2 % (ref 0–2)
LYMPHOCYTES # BLD AUTO: 1.66 THOUSANDS/ÂΜL (ref 0.6–4.47)
LYMPHOCYTES NFR BLD AUTO: 18 % (ref 14–44)
MCH RBC QN AUTO: 30.6 PG (ref 26.8–34.3)
MCHC RBC AUTO-ENTMCNC: 33.9 G/DL (ref 31.4–37.4)
MCV RBC AUTO: 90 FL (ref 82–98)
MONOCYTES # BLD AUTO: 1.24 THOUSAND/ÂΜL (ref 0.17–1.22)
MONOCYTES NFR BLD AUTO: 14 % (ref 4–12)
NEUTROPHILS # BLD AUTO: 6.02 THOUSANDS/ÂΜL (ref 1.85–7.62)
NEUTS SEG NFR BLD AUTO: 66 % (ref 43–75)
NRBC BLD AUTO-RTO: 0 /100 WBCS
OSMOLALITY UR/SERPL-RTO: 280 MMOL/KG (ref 282–298)
OSMOLALITY UR: 204 MMOL/KG
PLATELET # BLD AUTO: 340 THOUSANDS/UL (ref 149–390)
PMV BLD AUTO: 9.1 FL (ref 8.9–12.7)
POTASSIUM SERPL-SCNC: 4.5 MMOL/L (ref 3.5–5.3)
RBC # BLD AUTO: 3.69 MILLION/UL (ref 3.81–5.12)
SODIUM 24H UR-SCNC: 72 MOL/L
SODIUM SERPL-SCNC: 131 MMOL/L (ref 135–147)
WBC # BLD AUTO: 9.1 THOUSAND/UL (ref 4.31–10.16)

## 2024-02-20 PROCEDURE — 99232 SBSQ HOSP IP/OBS MODERATE 35: CPT | Performed by: FAMILY MEDICINE

## 2024-02-20 PROCEDURE — 83935 ASSAY OF URINE OSMOLALITY: CPT | Performed by: INTERNAL MEDICINE

## 2024-02-20 PROCEDURE — 80048 BASIC METABOLIC PNL TOTAL CA: CPT | Performed by: INTERNAL MEDICINE

## 2024-02-20 PROCEDURE — 94760 N-INVAS EAR/PLS OXIMETRY 1: CPT

## 2024-02-20 PROCEDURE — 83930 ASSAY OF BLOOD OSMOLALITY: CPT | Performed by: STUDENT IN AN ORGANIZED HEALTH CARE EDUCATION/TRAINING PROGRAM

## 2024-02-20 PROCEDURE — 84300 ASSAY OF URINE SODIUM: CPT | Performed by: INTERNAL MEDICINE

## 2024-02-20 PROCEDURE — 94640 AIRWAY INHALATION TREATMENT: CPT

## 2024-02-20 PROCEDURE — 85025 COMPLETE CBC W/AUTO DIFF WBC: CPT | Performed by: STUDENT IN AN ORGANIZED HEALTH CARE EDUCATION/TRAINING PROGRAM

## 2024-02-20 PROCEDURE — 94664 DEMO&/EVAL PT USE INHALER: CPT

## 2024-02-20 PROCEDURE — 99232 SBSQ HOSP IP/OBS MODERATE 35: CPT | Performed by: INTERNAL MEDICINE

## 2024-02-20 PROCEDURE — 83935 ASSAY OF URINE OSMOLALITY: CPT | Performed by: STUDENT IN AN ORGANIZED HEALTH CARE EDUCATION/TRAINING PROGRAM

## 2024-02-20 RX ORDER — SODIUM CHLORIDE 1 G/1
1 TABLET ORAL
Status: DISCONTINUED | OUTPATIENT
Start: 2024-02-20 | End: 2024-02-21

## 2024-02-20 RX ORDER — TORSEMIDE 10 MG/1
10 TABLET ORAL DAILY
Status: DISCONTINUED | OUTPATIENT
Start: 2024-02-20 | End: 2024-02-23 | Stop reason: HOSPADM

## 2024-02-20 RX ORDER — SUMATRIPTAN 50 MG/1
100 TABLET, FILM COATED ORAL ONCE
Status: COMPLETED | OUTPATIENT
Start: 2024-02-20 | End: 2024-02-20

## 2024-02-20 RX ADMIN — GUAIFENESIN 600 MG: 600 TABLET, EXTENDED RELEASE ORAL at 21:11

## 2024-02-20 RX ADMIN — LEVALBUTEROL HYDROCHLORIDE 1.25 MG: 1.25 SOLUTION RESPIRATORY (INHALATION) at 13:37

## 2024-02-20 RX ADMIN — NYSTATIN: 100000 CREAM TOPICAL at 08:32

## 2024-02-20 RX ADMIN — PRAVASTATIN SODIUM 80 MG: 80 TABLET ORAL at 18:00

## 2024-02-20 RX ADMIN — ZOLPIDEM TARTRATE 5 MG: 5 TABLET ORAL at 21:11

## 2024-02-20 RX ADMIN — DIVALPROEX SODIUM 250 MG: 250 TABLET, EXTENDED RELEASE ORAL at 18:00

## 2024-02-20 RX ADMIN — LEVALBUTEROL HYDROCHLORIDE 1.25 MG: 1.25 SOLUTION RESPIRATORY (INHALATION) at 07:19

## 2024-02-20 RX ADMIN — CYCLOBENZAPRINE HYDROCHLORIDE 10 MG: 10 TABLET, FILM COATED ORAL at 21:11

## 2024-02-20 RX ADMIN — SODIUM CHLORIDE 1 G: 1 TABLET ORAL at 08:33

## 2024-02-20 RX ADMIN — DIVALPROEX SODIUM 250 MG: 250 TABLET, EXTENDED RELEASE ORAL at 08:33

## 2024-02-20 RX ADMIN — SODIUM CHLORIDE 1 G: 1 TABLET ORAL at 18:00

## 2024-02-20 RX ADMIN — POLYETHYLENE GLYCOL 3350 17 G: 17 POWDER, FOR SOLUTION ORAL at 21:11

## 2024-02-20 RX ADMIN — ASPIRIN 325 MG: 325 TABLET, COATED ORAL at 08:32

## 2024-02-20 RX ADMIN — IPRATROPIUM BROMIDE 0.5 MG: 0.5 SOLUTION RESPIRATORY (INHALATION) at 07:19

## 2024-02-20 RX ADMIN — NYSTATIN: 100000 CREAM TOPICAL at 18:02

## 2024-02-20 RX ADMIN — CEFTRIAXONE SODIUM 1000 MG: 10 INJECTION, POWDER, FOR SOLUTION INTRAVENOUS at 12:51

## 2024-02-20 RX ADMIN — LEVALBUTEROL HYDROCHLORIDE 1.25 MG: 1.25 SOLUTION RESPIRATORY (INHALATION) at 19:31

## 2024-02-20 RX ADMIN — SUMATRIPTAN SUCCINATE 100 MG: 50 TABLET ORAL at 08:32

## 2024-02-20 RX ADMIN — TORSEMIDE 10 MG: 10 TABLET ORAL at 08:33

## 2024-02-20 RX ADMIN — SODIUM CHLORIDE 1 G: 1 TABLET ORAL at 13:00

## 2024-02-20 RX ADMIN — ENOXAPARIN SODIUM 40 MG: 40 INJECTION SUBCUTANEOUS at 08:32

## 2024-02-20 RX ADMIN — IPRATROPIUM BROMIDE 0.5 MG: 0.5 SOLUTION RESPIRATORY (INHALATION) at 13:37

## 2024-02-20 RX ADMIN — IPRATROPIUM BROMIDE 0.5 MG: 0.5 SOLUTION RESPIRATORY (INHALATION) at 19:31

## 2024-02-20 RX ADMIN — PREDNISONE 40 MG: 20 TABLET ORAL at 08:33

## 2024-02-20 RX ADMIN — CITALOPRAM HYDROBROMIDE 20 MG: 20 TABLET ORAL at 08:32

## 2024-02-20 RX ADMIN — FLUTICASONE FUROATE AND VILANTEROL TRIFENATATE 1 PUFF: 200; 25 POWDER RESPIRATORY (INHALATION) at 08:32

## 2024-02-20 RX ADMIN — NAPROXEN 500 MG: 500 TABLET ORAL at 08:32

## 2024-02-20 RX ADMIN — GUAIFENESIN 600 MG: 600 TABLET, EXTENDED RELEASE ORAL at 08:33

## 2024-02-20 NOTE — ASSESSMENT & PLAN NOTE
Smokes 13 cigarettes a day, has emphysema on x-ray  Counseled on smoking cessation and offered but patient refused nicotine replacement while inpatient.  Patient reported that she is not interested in quitting  Now with possible SIADH, will obtain CT chest to rule out underlying malignancy-CT reviewed-7 mm groundglass opacity centrally located in the right upper lobe. Per 2017 Fleischner Society guidelines, follow-up with a chest CT is recommended at 6 to 12 months to confirm persistence and then every 2 years until 5 years of stability is established.

## 2024-02-20 NOTE — PLAN OF CARE
Problem: PAIN - ADULT  Goal: Verbalizes/displays adequate comfort level or baseline comfort level  Description: Interventions:  - Encourage patient to monitor pain and request assistance  - Assess pain using appropriate pain scale  - Administer analgesics based on type and severity of pain and evaluate response  - Implement non-pharmacological measures as appropriate and evaluate response  - Consider cultural and social influences on pain and pain management  - Notify physician/advanced practitioner if interventions unsuccessful or patient reports new pain  Outcome: Progressing     Problem: INFECTION - ADULT  Goal: Absence or prevention of progression during hospitalization  Description: INTERVENTIONS:  - Assess and monitor for signs and symptoms of infection  - Monitor lab/diagnostic results  - Monitor all insertion sites, i.e. indwelling lines, tubes, and drains  - Monitor endotracheal if appropriate and nasal secretions for changes in amount and color  - [place] appropriate cooling/warming therapies per order  - Administer medications as ordered  - Instruct and encourage patient and family to use good hand hygiene technique  - Identify and instruct in appropriate isolation precautions for identified infection/condition  Outcome: Progressing  Goal: Absence of fever/infection during neutropenic period  Description: INTERVENTIONS:  - Monitor WBC    Outcome: Progressing     Problem: SAFETY ADULT  Goal: Patient will remain free of falls  Description: INTERVENTIONS:  - Educate patient/family on patient safety including physical limitations  - Instruct patient to call for assistance with activity   - Consult OT/PT to assist with strengthening/mobility   - Keep Call bell within reach  - Keep bed low and locked with side rails adjusted as appropriate  - Keep care items and personal belongings within reach  - Initiate and maintain comfort rounds  - Make Fall Risk Sign visible to staff  - Apply yellow socks and bracelet  for high fall risk patients  - Consider moving patient to room near nurses station  Outcome: Progressing  Goal: Maintain or return to baseline ADL function  Description: INTERVENTIONS:  -  Assess patient's ability to carry out ADLs; assess patient's baseline for ADL function and identify physical deficits which impact ability to perform ADLs (bathing, care of mouth/teeth, toileting, grooming, dressing, etc.)  - Assess/evaluate cause of self-care deficits   - Assess range of motion  - Assess patient's mobility; develop plan if impaired  - Assess patient's need for assistive devices and provide as appropriate  - Encourage maximum independence but intervene and supervise when necessary  - Involve family in performance of ADLs  - Assess for home care needs following discharge   - Consider OT consult to assist with ADL evaluation and planning for discharge  - Provide patient education as appropriate  Outcome: Progressing  Goal: Maintains/Returns to pre admission functional level  Description: INTERVENTIONS:  - Perform AM-PAC 6 Click Basic Mobility/ Daily Activity assessment daily.  - Set and communicate daily mobility goal to care team and patient/family/caregiver.   - Collaborate with rehabilitation services on mobility goals if consulted  - Out of bed for toileting  - Record patient progress and toleration of activity level   Outcome: Progressing     Problem: DISCHARGE PLANNING  Goal: Discharge to home or other facility with appropriate resources  Description: INTERVENTIONS:  - Identify barriers to discharge w/patient and caregiver  - Arrange for needed discharge resources and transportation as appropriate  - Identify discharge learning needs (meds, wound care, etc.)  - Arrange for interpretive services to assist at discharge as needed  - Refer to Case Management Department for coordinating discharge planning if the patient needs post-hospital services based on physician/advanced practitioner order or complex needs  related to functional status, cognitive ability, or social support system  Outcome: Progressing     Problem: Knowledge Deficit  Goal: Patient/family/caregiver demonstrates understanding of disease process, treatment plan, medications, and discharge instructions  Description: Complete learning assessment and assess knowledge base.  Interventions:  - Provide teaching at level of understanding  - Provide teaching via preferred learning methods  Outcome: Progressing

## 2024-02-20 NOTE — ASSESSMENT & PLAN NOTE
Baseline around 128-135.  Sodium was down to 126.  Today sodium level is131  Nephrology consulted, suspecting SIADH in the setting of pneumonia/COPD, medications including Celexa and divalproex.  1200 cc fluid restriction-by repeat the history it appears that patient drinks close to 3 L of fluid a day  Salt tablets  CT chest reviewed

## 2024-02-20 NOTE — CASE MANAGEMENT
Case Management Discharge Planning Note    Patient name Rosemary Starkey  Location [facility] 925/[facility] 925-01 MRN [id number]  : 1950 Date 2024       Current Admission Date: 2024  Current Admission Diagnosis:Left lower lobe pneumonia   Patient Active Problem List    Diagnosis Date Noted    Hyponatremia 2024    Acute hypoxic respiratory failure (HCC) 2024    Chronic obstructive pulmonary disease with acute exacerbation (HCC) 2024    Migraine without aura and without status migrainosus, not intractable 2023    Chronic cough 2022    Hyperglycemia 2022    COVID 2022    Cervical spondylosis 2021    Chronic fatigue 05/10/2021    Nausea 05/10/2021    Cervicalgia 2021    Ventral hernia without obstruction or gangrene 2020    Pyuria 2020    Acquired hypothyroidism 2020    Overweight 2020    Acute left-sided low back pain with sciatica 2020    Perforated diverticulum 2020    LLQ abdominal pain 2020    Macular degeneration of right eye 2019    Herpes simplex 10/14/2019    Fibromuscular dysplasia of cervicocranial artery (HCC) 2019    Simple chronic bronchitis (HCC) 2019    Bilateral carotid artery stenosis 2019    Fecal occult blood test positive 2019    Primary insomnia 2019    Bruit of right carotid artery 2019    Left lower lobe pneumonia 2018    Healthcare maintenance 2018    Chronic migraine without aura without status migrainosus, not intractable 03/15/2018    Smoking 2015    Chronic migraine without aura 2014    Hyperlipidemia 2012    Depression 2012      LOS (days): 3  Geometric Mean LOS (GMLOS) (days):   Days to GMLOS:     OBJECTIVE:  Risk of Unplanned Readmission Score: 10.6         Current admission status: Inpatient   Preferred Pharmacy:   [facility] - [address]  [facility] [address]  Phone: [phone] Fax: [phone]    [facility]/pharmacy #[id number] - [place], [place] - [address]  [address]  [place] [address]  Phone: [phone] Fax: [phone]    Primary Care Provider: Edin Choi DO    Primary Insurance: [facility]  Secondary Insurance:     DISCHARGE DETAILS:         Additional Comments: Per provider in provider/CM rounds, pt to remain inpatient. No CM needs identified at this time. CM to continue to follow.

## 2024-02-20 NOTE — PROGRESS NOTES
NEPHROLOGY HOSPITAL PROGRESS NOTE   Rosemary Starkey 73 y.o. female MRN: [id number]  Unit/Bed#: [facility] 925-01 Encounter: [id number]  Reason for Consult: Hyponatremia    ASSESSMENT and PLAN:    1.  Hyponatremia.  Baseline serum sodium 128-135.  No previous urine studies available to determine etiology.  Serum sodium on admission 127 on 02/17.  Serum sodium improved today to 131 after administration of sodium chloride tablet 2 g x 1 on 02/19.  Check urine osmolality and urine sodium.  Check serum osmolality.  TSH within normal limits.  Checking cortisol would not help as patient is currently on steroids.  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  Also on Celexa and divalproex which can cause SIADH.  Continue fluid restriction of 1200 cc per 24 hours.  Start sodium chloride tablet 1 g 3 times daily.  Start torsemide 10 mg daily to decrease responsiveness of renal tubules to ADH.  CT chest showed 7 mm groundglass opacity centrally located in right upper lobe, repeat CT in 6 to 12 months was recommended, bronchial wall thickening in lower lungs with tree-in-bud nodularity in the inferior lingula and both lower lobes and consolidation in dependent left lower lobe compatible with bronchitis/bronchiolitis and left lower lobe pneumonia.    2.  Abnormal CT chest.  Management per primary team.  She will need follow-up with pulmonology.    3.  Acute hypoxic respiratory failure.  Most likely in setting of pneumonia and COPD exacerbation.  Management per primary team.    Discussed with internal medicine team.  After discussion, we agreed that most likely etiology of hyponatremia is SIADH and to start sodium chloride tablets and torsemide as above.    SUBJECTIVE / 24H INTERVAL HISTORY:  She is feeling well.  She denies dyspnea.  She denies leg swelling.    OBJECTIVE:  Current Weight: Weight - Scale: 71.7 kg (158 lb 1.1 oz)  Vitals:    02/19/24 1517 02/19/24 2156 02/20/24 0803 02/20/24 0900   BP: 150/76 146/76  "144/94    Pulse: 89 98 87    Resp:  19 16    Temp: 98.5 °F (36.9 °C) 97.9 °F (36.6 °C) 98.7 °F (37.1 °C)    TempSrc:       SpO2: 91% 93% 92%    Weight:    71.7 kg (158 lb 1.1 oz)   Height:    5' 6\" (1.676 m)     No intake or output data in the 24 hours ending 02/20/24 0930  Review of Systems   Constitutional:  Negative for chills and fever.   HENT:  Negative for ear pain and sore throat.    Eyes:  Negative for pain and visual disturbance.   Respiratory:  Negative for cough and shortness of breath.    Cardiovascular:  Negative for chest pain and palpitations.   Gastrointestinal:  Negative for abdominal pain and vomiting.   Genitourinary:  Negative for dysuria and hematuria.   Musculoskeletal:  Negative for arthralgias and back pain.   Skin:  Negative for color change and rash.   Neurological:  Negative for seizures and syncope.   All other systems reviewed and are negative.    Physical Exam  Vitals and nursing note reviewed.   Constitutional:       General: She is not in acute distress.     Appearance: She is well-developed.   HENT:      Head: Normocephalic and atraumatic.   Eyes:      Conjunctiva/sclera: Conjunctivae normal.   Cardiovascular:      Rate and Rhythm: Normal rate and regular rhythm.      Pulses: Normal pulses.      Heart sounds: Normal heart sounds. No murmur heard.  Pulmonary:      Effort: Pulmonary effort is normal. No respiratory distress.      Breath sounds: Normal breath sounds.   Abdominal:      Palpations: Abdomen is soft.      Tenderness: There is no abdominal tenderness.   Musculoskeletal:         General: No swelling.      Cervical back: Neck supple.      Right lower leg: No edema.      Left lower leg: No edema.   Skin:     General: Skin is warm and dry.      Capillary Refill: Capillary refill takes less than 2 seconds.   Neurological:      Mental Status: She is alert.   Psychiatric:         Mood and Affect: Mood normal.       Medications:    Current Facility-Administered Medications:     " acetaminophen (TYLENOL) tablet 650 mg, 650 mg, Oral, Q6H PRN, Edmond Aiad, DO    albuterol (PROVENTIL HFA,VENTOLIN HFA) inhaler 2 puff, 2 puff, Inhalation, Q6H PRN, Edmond Aiad, DO    aspirin (ECOTRIN) EC tablet 325 mg, 325 mg, Oral, Daily, Edmond Aiad, DO, 325 mg at 02/20/24 0832    ceftriaxone (ROCEPHIN) 1 g/50 mL in dextrose IVPB, 1,000 mg, Intravenous, Q24H, Edmond Aiad, DO, Last Rate: 100 mL/hr at 02/19/24 1132, 1,000 mg at 02/19/24 1132    citalopram (CeleXA) tablet 20 mg, 20 mg, Oral, Daily, Edmond Aiad, DO, 20 mg at 02/20/24 0832    cyclobenzaprine (FLEXERIL) tablet 10 mg, 10 mg, Oral, HS, Edmond Aiad, DO, 10 mg at 02/19/24 2154    dextromethorphan-guaiFENesin (ROBITUSSIN DM) oral syrup 10 mL, 10 mL, Oral, Q4H PRN, Vince Mccormick MD, 10 mL at 02/18/24 1357    divalproex sodium (DEPAKOTE ER) 24 hr tablet 250 mg, 250 mg, Oral, BID, Edmond Aiad, DO, 250 mg at 02/20/24 0833    enoxaparin (LOVENOX) subcutaneous injection 40 mg, 40 mg, Subcutaneous, Daily, Edmond Aiad, DO, 40 mg at 02/20/24 0832    fluticasone-vilanterol 200-25 mcg/actuation 1 puff, 1 puff, Inhalation, Daily, Edmond Aiad, DO, 1 puff at 02/20/24 0832    guaiFENesin (MUCINEX) 12 hr tablet 600 mg, 600 mg, Oral, Q12H YON, Edmond Aiad, DO, 600 mg at 02/20/24 0833    ipratropium (ATROVENT) 0.02 % inhalation solution 0.5 mg, 0.5 mg, Nebulization, TID, Edmond Aiad, DO, 0.5 mg at 02/20/24 0719    levalbuterol (XOPENEX) inhalation solution 1.25 mg, 1.25 mg, Nebulization, TID, Edmond Chayoad, DO, 1.25 mg at 02/20/24 0719    naproxen (NAPROSYN) tablet 500 mg, 500 mg, Oral, BID PRN, Edmond Aiad, DO, 500 mg at 02/20/24 0832    nystatin (MYCOSTATIN) cream, , Topical, BID, Rena Bean PA-C, Given at 02/20/24 0832    ondansetron (ZOFRAN-ODT) dispersible tablet 4 mg, 4 mg, Oral, Q8H PRN, Edmond Aiad, DO    polyethylene glycol (MIRALAX) packet 17 g, 17 g, Oral, Daily, Edmond Domingoad, DO, 17 g at 02/19/24 2154    pravastatin (PRAVACHOL) tablet 80 mg, 80 mg, Oral, Daily With Dinner, Edmond Gtz, DO, 80  "mg at 02/19/24 1721    predniSONE tablet 40 mg, 40 mg, Oral, Daily, Edmond Domingoad, DO, 40 mg at 02/20/24 0833    sodium chloride tablet 1 g, 1 g, Oral, TID With Meals, Uri Fernando MD, 1 g at 02/20/24 0833    torsemide (DEMADEX) tablet 10 mg, 10 mg, Oral, Daily, Uri Fernando MD, 10 mg at 02/20/24 0833    zolpidem (AMBIEN) tablet 5 mg, 5 mg, Oral, HS, Edmond Gtz, DO, 5 mg at 02/19/24 2154    Laboratory Results:  Results from last 7 days   Lab Units 02/20/24  0543 02/19/24  1959 02/19/24  1444 02/19/24  0555 02/18/24  0452 02/17/24  0958   WBC Thousand/uL 9.10  --   --  10.24* 11.04* 12.65*   HEMOGLOBIN g/dL 11.3*  --   --  10.9* 10.7* 11.3*   HEMATOCRIT % 33.3*  --   --  32.8* 32.6* 33.1*   PLATELETS Thousands/uL 340  --   --  287 260 215   POTASSIUM mmol/L 4.5 4.6 4.2 4.4 4.4 4.1   CHLORIDE mmol/L 95* 93* 93* 91* 93* 92*   CO2 mmol/L 28 28 25 26 28 26   BUN mg/dL 14 14 14 13 12 11   CREATININE mg/dL 0.67 0.83 0.86 0.65 0.62 0.73   CALCIUM mg/dL 8.6 9.1 8.6 8.7 8.4 8.8       Portions of the record may have been created with voice recognition software. Occasional wrong word or \"sound a like\" substitutions may have occurred due to the inherent limitations of voice recognition software. Read the chart carefully and recognize, using context, where substitutions have occurred. If you have any questions, please contact the dictating provider.    "

## 2024-02-20 NOTE — PROGRESS NOTES
[facility]  Progress Note  Name: Rosemary Starkey I  MRN: [id number]  Unit/Bed#: [facility] 925-01 I Date of Admission: 2/17/2024   Date of Service: 2/20/2024 I Hospital Day: 3    Assessment/Plan   * Left lower lobe pneumonia  Assessment & Plan  Presenting with 4 days of fever, chills, cough and headache. Seen at urgent care, SpO2 reportedly was 80% on room air and was sent to the ED  Longstanding history of smoking, emphysema on x-ray.  No official COPD diagnosis.  CXR with LLL pneumonia.   Currently afebrile, nontoxic.  WBCs downtrending.  Continue ceftriaxone.  Continue prednisone for suspected COPD exacerbation in the setting of pneumonia.  Urine Legionella and strep antigen negative.  Blood cultures negative to date.  Respiratory protocol albuterol/ipratropium nebs every 6 hours   Mucinex as needed for cough.  Will obtain CT chest to rule out underlying malignancy in the setting of SIADH and smoking history-CT reviewed.  Monitor sodium.    Hyponatremia  Assessment & Plan  Baseline around 128-135.  Sodium was down to 126.  Today sodium level is131  Nephrology consulted, suspecting SIADH in the setting of pneumonia/COPD, medications including Celexa and divalproex.  1200 cc fluid restriction-by repeat the history it appears that patient drinks close to 3 L of fluid a day  Salt tablets  CT chest reviewed    Chronic obstructive pulmonary disease with acute exacerbation (HCC)  Assessment & Plan  Clinically; no no prior PFTs.   Long standing hx of smoking, wheezing this presentation.  Continue albuterol, Advair and prednisone burst.  Refer to pulmonology at discharge for follow-up.    Acute hypoxic respiratory failure (HCC)  Assessment & Plan  In the setting of left lower lobe pneumonia and possible COPD exacerbation.  Continue to monitor oxygen, wean as tolerated.      Migraine without aura and without status migrainosus, not intractable  Assessment & Plan  Continue home citalopram,  Depakote, Imitrex and naproxen    Cervical spondylosis  Assessment & Plan  Continue home Flexeril and naproxen    Bilateral carotid artery stenosis  Assessment & Plan  Continue home aspirin 325 mg daily and high intensity statin     Tobacco abuse  Assessment & Plan  Smokes 13 cigarettes a day, has emphysema on x-ray  Counseled on smoking cessation and offered but patient refused nicotine replacement while inpatient.  Patient reported that she is not interested in quitting  Now with possible SIADH, will obtain CT chest to rule out underlying malignancy-CT reviewed-7 mm groundglass opacity centrally located in the right upper lobe. Per 2017 Fleischner Society guidelines, follow-up with a chest CT is recommended at 6 to 12 months to confirm persistence and then every 2 years until 5 years of stability is established.               VTE Pharmacologic Prophylaxis:   Moderate Risk (Score 3-4) - Pharmacological DVT Prophylaxis Ordered: enoxaparin (Lovenox).    Mobility:   Basic Mobility Inpatient Raw Score: 23  JH-HLM Goal: 7: Walk 25 feet or more  JH-HLM Achieved: 7: Walk 25 feet or more  HLM Goal achieved. Continue to encourage appropriate mobility.    Patient Centered Rounds: I performed bedside rounds with nursing staff today.   Discussions with Specialists or Other Care Team Provider: nephrology  Education and Discussions with Family / Patient: Patient declined call to .     Total Time Spent on Date of Encounter in care of patient: 35 mins. This time was spent on one or more of the following: performing physical exam; counseling and coordination of care; obtaining or reviewing history; documenting in the medical record; reviewing/ordering tests, medications or procedures; communicating with other healthcare professionals and discussing with patient's family/caregivers.    Current Length of Stay: 3 day(s)  Current Patient Status: Inpatient   Certification Statement: The patient will continue to require  additional inpatient hospital stay due to hyponatremia  Discharge Plan: Anticipate discharge in 24-48 hrs to home with home services.    Code Status: Level 3 - DNAR and DNI    Subjective:   Patient seen and asked meant.  Discussed with nephrology-sodium is 131.  Plan is to continue with salt tablets and recheck BMP patient is asking about is there going to be fluid restriction as outpatient.  Appears that patient drinks fairly good amount of iced tea    Objective:     Vitals:   Temp (24hrs), Av.2 °F (36.8 °C), Min:97.9 °F (36.6 °C), Max:98.7 °F (37.1 °C)    Temp:  [97.9 °F (36.6 °C)-98.7 °F (37.1 °C)] 98 °F (36.7 °C)  HR:  [87-98] 98  Resp:  [16-20] 20  BP: (118-146)/(49-94) 118/49  SpO2:  [89 %-93 %] 89 %  Body mass index is 25.51 kg/m².     Input and Output Summary (last 24 hours):   No intake or output data in the 24 hours ending 24 0566    Physical Exam:   Physical Exam  Constitutional:       General: She is not in acute distress.     Appearance: She is normal weight.   HENT:      Head: Normocephalic.      Nose: Nose normal.   Eyes:      General: No scleral icterus.  Cardiovascular:      Rate and Rhythm: Normal rate and regular rhythm.      Heart sounds: No murmur heard.  Pulmonary:      Effort: Pulmonary effort is normal.      Comments: Decreased  breath sounds bilateral  Abdominal:      General: Bowel sounds are normal. There is no distension.      Tenderness: There is no abdominal tenderness.   Musculoskeletal:         General: No swelling. Normal range of motion.   Skin:     General: Skin is warm.      Coloration: Skin is not jaundiced.   Neurological:      Mental Status: Mental status is at baseline.          Additional Data:     Labs:  Results from last 7 days   Lab Units 24  0543   WBC Thousand/uL 9.10   HEMOGLOBIN g/dL 11.3*   HEMATOCRIT % 33.3*   PLATELETS Thousands/uL 340   NEUTROS PCT % 66   LYMPHS PCT % 18   MONOS PCT % 14*   EOS PCT % 0     Results from last 7 days   Lab Units  02/20/24  0543 02/19/24  0555 02/18/24  0452   SODIUM mmol/L 131*   < > 128*   POTASSIUM mmol/L 4.5   < > 4.4   CHLORIDE mmol/L 95*   < > 93*   CO2 mmol/L 28   < > 28   BUN mg/dL 14   < > 12   CREATININE mg/dL 0.67   < > 0.62   ANION GAP mmol/L 8   < > 7   CALCIUM mg/dL 8.6   < > 8.4   ALBUMIN g/dL  --   --  3.2*   TOTAL BILIRUBIN mg/dL  --   --  0.20   ALK PHOS U/L  --   --  54   ALT U/L  --   --  10   AST U/L  --   --  16   GLUCOSE RANDOM mg/dL 95   < > 110    < > = values in this interval not displayed.     Results from last 7 days   Lab Units 02/17/24  1106   INR  1.16             Results from last 7 days   Lab Units 02/17/24  1106   LACTIC ACID mmol/L 0.8   PROCALCITONIN ng/ml 0.22       Lines/Drains:  Invasive Devices       Peripheral Intravenous Line  Duration             Peripheral IV 02/17/24 Right Antecubital 3 days                          Imaging: No pertinent imaging reviewed.    Recent Cultures (last 7 days):   Results from last 7 days   Lab Units 02/18/24  1018 02/17/24  1106   BLOOD CULTURE   --  No Growth at 72 hrs.  No Growth at 72 hrs.   LEGIONELLA URINARY ANTIGEN  Negative  --        Last 24 Hours Medication List:   Current Facility-Administered Medications   Medication Dose Route Frequency Provider Last Rate    acetaminophen  650 mg Oral Q6H PRN Edmond Aiad, DO      albuterol  2 puff Inhalation Q6H PRN Edmond Aiad, DO      aspirin  325 mg Oral Daily Edmond Aiad, DO      cefTRIAXone  1,000 mg Intravenous Q24H Edmond Aiad, DO 1,000 mg (02/20/24 1251)    citalopram  20 mg Oral Daily Edmond Aiad, DO      cyclobenzaprine  10 mg Oral HS Edmond Aiad, DO      dextromethorphan-guaiFENesin  10 mL Oral Q4H PRN Vince Mccormick MD      divalproex sodium  250 mg Oral BID Edmond Aiad, DO      enoxaparin  40 mg Subcutaneous Daily Edmond Aiad, DO      fluticasone-vilanterol  1 puff Inhalation Daily Edmond Aiad, DO      guaiFENesin  600 mg Oral Q12H [facility] Edmond Aiad, DO      ipratropium  0.5 mg Nebulization TID Edmond Aiad, DO       levalbuterol  1.25 mg Nebulization TID Edmond Aiad, DO      naproxen  500 mg Oral BID PRN Edmond Aiad, DO      nystatin   Topical BID Rena Bean PA-C      ondansetron  4 mg Oral Q8H PRN Edmond Aiad, DO      polyethylene glycol  17 g Oral Daily Edmond Aiad, DO      pravastatin  80 mg Oral Daily With Dinner Edmond Aiad, DO      predniSONE  40 mg Oral Daily Edmond Aiad, DO      sodium chloride  1 g Oral TID With Meals Uri Fernando MD      torsemide  10 mg Oral Daily Uri Fernando MD      zolpidem  5 mg Oral HS Edmond Aiad, DO          Today, Patient Was Seen By: Nenita Chawla MD    **Please Note: This note may have been constructed using a voice recognition system.**

## 2024-02-20 NOTE — ASSESSMENT & PLAN NOTE
In the setting of left lower lobe pneumonia and possible COPD exacerbation.  Continue to monitor oxygen, wean as tolerated.

## 2024-02-20 NOTE — ASSESSMENT & PLAN NOTE
Presenting with 4 days of fever, chills, cough and headache. Seen at urgent care, SpO2 reportedly was 80% on room air and was sent to the ED  Longstanding history of smoking, emphysema on x-ray.  No official COPD diagnosis.  CXR with LLL pneumonia.   Currently afebrile, nontoxic.  WBCs downtrending.  Continue ceftriaxone.  Continue prednisone for suspected COPD exacerbation in the setting of pneumonia.  Urine Legionella and strep antigen negative.  Blood cultures negative to date.  Respiratory protocol albuterol/ipratropium nebs every 6 hours   Mucinex as needed for cough.  Will obtain CT chest to rule out underlying malignancy in the setting of SIADH and smoking history-CT reviewed.  Monitor sodium.

## 2024-02-20 NOTE — PLAN OF CARE
Problem: PAIN - ADULT  Goal: Verbalizes/displays adequate comfort level or baseline comfort level  Description: Interventions:  - Encourage patient to monitor pain and request assistance  - Assess pain using appropriate pain scale  - Administer analgesics based on type and severity of pain and evaluate response  - Implement non-pharmacological measures as appropriate and evaluate response  - Consider cultural and social influences on pain and pain management  - Notify physician/advanced practitioner if interventions unsuccessful or patient reports new pain  Outcome: Progressing     Problem: INFECTION - ADULT  Goal: Absence or prevention of progression during hospitalization  Description: INTERVENTIONS:  - Assess and monitor for signs and symptoms of infection  - Monitor lab/diagnostic results  - Monitor all insertion sites, i.e. indwelling lines, tubes, and drains  - Monitor endotracheal if appropriate and nasal secretions for changes in amount and color  - [place] appropriate cooling/warming therapies per order  - Administer medications as ordered  - Instruct and encourage patient and family to use good hand hygiene technique  - Identify and instruct in appropriate isolation precautions for identified infection/condition  Outcome: Progressing  Goal: Absence of fever/infection during neutropenic period  Description: INTERVENTIONS:  - Monitor WBC    Outcome: Progressing     Problem: SAFETY ADULT  Goal: Patient will remain free of falls  Description: INTERVENTIONS:  - Educate patient/family on patient safety including physical limitations  - Instruct patient to call for assistance with activity   - Consult OT/PT to assist with strengthening/mobility   - Keep Call bell within reach  - Keep bed low and locked with side rails adjusted as appropriate  - Keep care items and personal belongings within reach  - Initiate and maintain comfort rounds  - Make Fall Risk Sign visible to staff  - Apply yellow socks and bracelet  for high fall risk patients  - Consider moving patient to room near nurses station  Outcome: Progressing  Goal: Maintain or return to baseline ADL function  Description: INTERVENTIONS:  -  Assess patient's ability to carry out ADLs; assess patient's baseline for ADL function and identify physical deficits which impact ability to perform ADLs (bathing, care of mouth/teeth, toileting, grooming, dressing, etc.)  - Assess/evaluate cause of self-care deficits   - Assess range of motion  - Assess patient's mobility; develop plan if impaired  - Assess patient's need for assistive devices and provide as appropriate  - Encourage maximum independence but intervene and supervise when necessary  - Involve family in performance of ADLs  - Assess for home care needs following discharge   - Consider OT consult to assist with ADL evaluation and planning for discharge  - Provide patient education as appropriate  Outcome: Progressing  Goal: Maintains/Returns to pre admission functional level  Description: INTERVENTIONS:  - Perform AM-PAC 6 Click Basic Mobility/ Daily Activity assessment daily.  - Set and communicate daily mobility goal to care team and patient/family/caregiver.   - Collaborate with rehabilitation services on mobility goals if consulted  - Out of bed for toileting  - Record patient progress and toleration of activity level   Outcome: Progressing     Problem: DISCHARGE PLANNING  Goal: Discharge to home or other facility with appropriate resources  Description: INTERVENTIONS:  - Identify barriers to discharge w/patient and caregiver  - Arrange for needed discharge resources and transportation as appropriate  - Identify discharge learning needs (meds, wound care, etc.)  - Arrange for interpretive services to assist at discharge as needed  - Refer to Case Management Department for coordinating discharge planning if the patient needs post-hospital services based on physician/advanced practitioner order or complex needs  related to functional status, cognitive ability, or social support system  Outcome: Progressing     Problem: Knowledge Deficit  Goal: Patient/family/caregiver demonstrates understanding of disease process, treatment plan, medications, and discharge instructions  Description: Complete learning assessment and assess knowledge base.  Interventions:  - Provide teaching at level of understanding  - Provide teaching via preferred learning methods  Outcome: Progressing

## 2024-02-21 PROBLEM — Z00.00 HEALTHCARE MAINTENANCE: Status: RESOLVED | Noted: 2018-05-17 | Resolved: 2024-02-21

## 2024-02-21 PROBLEM — J18.9 LEFT LOWER LOBE PNEUMONIA: Status: RESOLVED | Noted: 2018-12-21 | Resolved: 2024-02-21

## 2024-02-21 LAB
ANION GAP SERPL CALCULATED.3IONS-SCNC: 6 MMOL/L
BUN SERPL-MCNC: 16 MG/DL (ref 5–25)
CALCIUM SERPL-MCNC: 8.4 MG/DL (ref 8.4–10.2)
CHLORIDE SERPL-SCNC: 95 MMOL/L (ref 96–108)
CO2 SERPL-SCNC: 29 MMOL/L (ref 21–32)
CREAT SERPL-MCNC: 0.66 MG/DL (ref 0.6–1.3)
GFR SERPL CREATININE-BSD FRML MDRD: 86 ML/MIN/1.73SQ M
GLUCOSE SERPL-MCNC: 86 MG/DL (ref 65–140)
OSMOLALITY UR: 207 MMOL/KG
POTASSIUM SERPL-SCNC: 4.2 MMOL/L (ref 3.5–5.3)
SODIUM SERPL-SCNC: 130 MMOL/L (ref 135–147)

## 2024-02-21 PROCEDURE — 94664 DEMO&/EVAL PT USE INHALER: CPT

## 2024-02-21 PROCEDURE — 99232 SBSQ HOSP IP/OBS MODERATE 35: CPT | Performed by: INTERNAL MEDICINE

## 2024-02-21 PROCEDURE — 80048 BASIC METABOLIC PNL TOTAL CA: CPT | Performed by: INTERNAL MEDICINE

## 2024-02-21 PROCEDURE — 94760 N-INVAS EAR/PLS OXIMETRY 1: CPT

## 2024-02-21 PROCEDURE — 94640 AIRWAY INHALATION TREATMENT: CPT

## 2024-02-21 PROCEDURE — 99232 SBSQ HOSP IP/OBS MODERATE 35: CPT | Performed by: FAMILY MEDICINE

## 2024-02-21 RX ORDER — TOLVAPTAN 15 MG/1
15 TABLET ORAL ONCE
Status: DISCONTINUED | OUTPATIENT
Start: 2024-02-21 | End: 2024-02-21

## 2024-02-21 RX ORDER — GUAIFENESIN 600 MG/1
1200 TABLET, EXTENDED RELEASE ORAL EVERY 12 HOURS SCHEDULED
Status: DISCONTINUED | OUTPATIENT
Start: 2024-02-21 | End: 2024-02-23 | Stop reason: HOSPADM

## 2024-02-21 RX ORDER — BENZONATATE 100 MG/1
100 CAPSULE ORAL 3 TIMES DAILY PRN
Status: DISCONTINUED | OUTPATIENT
Start: 2024-02-21 | End: 2024-02-23 | Stop reason: HOSPADM

## 2024-02-21 RX ORDER — SODIUM CHLORIDE 1 G/1
2 TABLET ORAL
Status: DISCONTINUED | OUTPATIENT
Start: 2024-02-21 | End: 2024-02-22

## 2024-02-21 RX ADMIN — DIVALPROEX SODIUM 250 MG: 250 TABLET, EXTENDED RELEASE ORAL at 17:24

## 2024-02-21 RX ADMIN — LEVALBUTEROL HYDROCHLORIDE 1.25 MG: 1.25 SOLUTION RESPIRATORY (INHALATION) at 07:44

## 2024-02-21 RX ADMIN — ENOXAPARIN SODIUM 40 MG: 40 INJECTION SUBCUTANEOUS at 08:53

## 2024-02-21 RX ADMIN — CEFTRIAXONE SODIUM 1000 MG: 10 INJECTION, POWDER, FOR SOLUTION INTRAVENOUS at 11:24

## 2024-02-21 RX ADMIN — GUAIFENESIN 600 MG: 600 TABLET, EXTENDED RELEASE ORAL at 08:50

## 2024-02-21 RX ADMIN — PRAVASTATIN SODIUM 80 MG: 80 TABLET ORAL at 17:24

## 2024-02-21 RX ADMIN — NYSTATIN: 100000 CREAM TOPICAL at 17:25

## 2024-02-21 RX ADMIN — DIVALPROEX SODIUM 250 MG: 250 TABLET, EXTENDED RELEASE ORAL at 08:50

## 2024-02-21 RX ADMIN — POLYETHYLENE GLYCOL 3350 17 G: 17 POWDER, FOR SOLUTION ORAL at 20:19

## 2024-02-21 RX ADMIN — CYCLOBENZAPRINE HYDROCHLORIDE 10 MG: 10 TABLET, FILM COATED ORAL at 21:40

## 2024-02-21 RX ADMIN — NYSTATIN: 100000 CREAM TOPICAL at 08:53

## 2024-02-21 RX ADMIN — LEVALBUTEROL HYDROCHLORIDE 1.25 MG: 1.25 SOLUTION RESPIRATORY (INHALATION) at 20:37

## 2024-02-21 RX ADMIN — TORSEMIDE 10 MG: 10 TABLET ORAL at 08:50

## 2024-02-21 RX ADMIN — IPRATROPIUM BROMIDE 0.5 MG: 0.5 SOLUTION RESPIRATORY (INHALATION) at 20:36

## 2024-02-21 RX ADMIN — IPRATROPIUM BROMIDE 0.5 MG: 0.5 SOLUTION RESPIRATORY (INHALATION) at 14:59

## 2024-02-21 RX ADMIN — GUAIFENESIN AND DEXTROMETHORPHAN 10 ML: 100; 10 SYRUP ORAL at 17:30

## 2024-02-21 RX ADMIN — ZOLPIDEM TARTRATE 5 MG: 5 TABLET ORAL at 21:40

## 2024-02-21 RX ADMIN — PREDNISONE 40 MG: 20 TABLET ORAL at 08:50

## 2024-02-21 RX ADMIN — CITALOPRAM HYDROBROMIDE 20 MG: 20 TABLET ORAL at 08:50

## 2024-02-21 RX ADMIN — ASPIRIN 325 MG: 325 TABLET, COATED ORAL at 08:50

## 2024-02-21 RX ADMIN — GUAIFENESIN 1200 MG: 600 TABLET, EXTENDED RELEASE ORAL at 21:40

## 2024-02-21 RX ADMIN — SODIUM CHLORIDE 1 G: 1 TABLET ORAL at 08:53

## 2024-02-21 RX ADMIN — SODIUM CHLORIDE 2 G: 1 TABLET ORAL at 17:25

## 2024-02-21 RX ADMIN — FLUTICASONE FUROATE AND VILANTEROL TRIFENATATE 1 PUFF: 200; 25 POWDER RESPIRATORY (INHALATION) at 08:51

## 2024-02-21 RX ADMIN — IPRATROPIUM BROMIDE 0.5 MG: 0.5 SOLUTION RESPIRATORY (INHALATION) at 07:47

## 2024-02-21 RX ADMIN — LEVALBUTEROL HYDROCHLORIDE 1.25 MG: 1.25 SOLUTION RESPIRATORY (INHALATION) at 14:58

## 2024-02-21 RX ADMIN — SODIUM CHLORIDE 2 G: 1 TABLET ORAL at 11:44

## 2024-02-21 NOTE — PROGRESS NOTES
NEPHROLOGY HOSPITAL PROGRESS NOTE   Rosemary Starkey 73 y.o. female MRN: [id number]  Unit/Bed#: [facility] 925-01 Encounter: [id number]  Reason for Consult: Hyponatremia    ASSESSMENT and PLAN:    1.  Hyponatremia.  Baseline serum sodium 128-135.  Serum sodium on admission 127 on 02/17.  Serum sodium stalled at 130 today.  Urine osmolality 247 consistent with ADH dependent hyponatremia.  Urine sodium 72 consistent with inactive RAAS system.  Serum osmolality 280 consistent with hypotonic hyponatremia.  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  Also on Celexa and divalproex which can cause SIADH.  Continue fluid restriction of 1200 cc per 24 hours.  Increase sodium chloride tablet to 2 g 3 times daily.  Continue torsemide 10 mg daily to decrease responsiveness of renal tubules to ADH.  CT chest showed 7 mm groundglass opacity centrally located in right upper lobe, repeat CT in 6 to 12 months was recommended, bronchial wall thickening in lower lungs with tree-in-bud nodularity in the inferior lingula and both lower lobes and consolidation in dependent left lower lobe compatible with bronchitis/bronchiolitis and left lower lobe pneumonia.    2.  Abnormal CT chest.  Management per primary team.  She will need follow-up with pulmonology.    3.  Acute hypoxic respiratory failure.  This likely in setting of pneumonia and COPD exacerbation.  This is currently improved.  Management per primary team.    Discussed with internal medicine team.  Per discussion, we agreed to increase sodium chloride tablet to 2 g 3 times daily and to continue torsemide 10 mg daily for ongoing management of hyponatremia.    SUBJECTIVE / 24H INTERVAL HISTORY:  She is feeling well.  She denies dyspnea.  She denies leg swelling.    OBJECTIVE:  Current Weight: Weight - Scale: 71.7 kg (158 lb 1.1 oz)  Vitals:    02/20/24 0900 02/20/24 1557 02/20/24 2237 02/21/24 0721   BP:  (!) 118/49 162/75 140/75   Pulse:  98 90 76   Resp:  20 18 16  "  Temp:  98 °F (36.7 °C) 98.3 °F (36.8 °C) 98.1 °F (36.7 °C)   TempSrc:       SpO2:  (!) 89% (!) 88% 95%   Weight: 71.7 kg (158 lb 1.1 oz)      Height: 5' 6\" (1.676 m)          Intake/Output Summary (Last 24 hours) at 2/21/2024 1001  Last data filed at 2/21/2024 0747  Gross per 24 hour   Intake 120 ml   Output 600 ml   Net -480 ml     Review of Systems   Constitutional:  Negative for chills and fever.   HENT:  Negative for ear pain and sore throat.    Eyes:  Negative for pain and visual disturbance.   Respiratory:  Negative for cough and shortness of breath.    Cardiovascular:  Negative for chest pain and palpitations.   Gastrointestinal:  Negative for abdominal pain and vomiting.   Genitourinary:  Negative for dysuria and hematuria.   Musculoskeletal:  Negative for arthralgias and back pain.   Skin:  Negative for color change and rash.   Neurological:  Negative for seizures and syncope.   All other systems reviewed and are negative.    Physical Exam  Vitals and nursing note reviewed.   Constitutional:       General: She is not in acute distress.     Appearance: She is well-developed.   HENT:      Head: Normocephalic and atraumatic.   Eyes:      Conjunctiva/sclera: Conjunctivae normal.   Cardiovascular:      Rate and Rhythm: Normal rate and regular rhythm.      Pulses: Normal pulses.      Heart sounds: Normal heart sounds. No murmur heard.  Pulmonary:      Effort: Pulmonary effort is normal. No respiratory distress.      Breath sounds: Normal breath sounds.   Abdominal:      Palpations: Abdomen is soft.      Tenderness: There is no abdominal tenderness.   Musculoskeletal:         General: No swelling.      Cervical back: Neck supple.      Right lower leg: No edema.      Left lower leg: No edema.   Skin:     General: Skin is warm and dry.      Capillary Refill: Capillary refill takes less than 2 seconds.   Neurological:      Mental Status: She is alert.   Psychiatric:         Mood and Affect: Mood normal. "       Medications:    Current Facility-Administered Medications:     acetaminophen (TYLENOL) tablet 650 mg, 650 mg, Oral, Q6H PRN, Edmond Aiad, DO    albuterol (PROVENTIL HFA,VENTOLIN HFA) inhaler 2 puff, 2 puff, Inhalation, Q6H PRN, Edmond Aiad, DO    aspirin (ECOTRIN) EC tablet 325 mg, 325 mg, Oral, Daily, Edmond Aiad, DO, 325 mg at 02/21/24 0850    ceftriaxone (ROCEPHIN) 1 g/50 mL in dextrose IVPB, 1,000 mg, Intravenous, Q24H, Edmond Aiad, DO, Last Rate: 100 mL/hr at 02/20/24 1251, 1,000 mg at 02/20/24 1251    citalopram (CeleXA) tablet 20 mg, 20 mg, Oral, Daily, Edmond Aiad, DO, 20 mg at 02/21/24 0850    cyclobenzaprine (FLEXERIL) tablet 10 mg, 10 mg, Oral, HS, Edmond Aiad, DO, 10 mg at 02/20/24 2111    dextromethorphan-guaiFENesin (ROBITUSSIN DM) oral syrup 10 mL, 10 mL, Oral, Q4H PRN, Vince Mccormick MD, 10 mL at 02/18/24 1357    divalproex sodium (DEPAKOTE ER) 24 hr tablet 250 mg, 250 mg, Oral, BID, Edmond Aiad, DO, 250 mg at 02/21/24 0850    enoxaparin (LOVENOX) subcutaneous injection 40 mg, 40 mg, Subcutaneous, Daily, Edmond Aiad, DO, 40 mg at 02/21/24 0853    fluticasone-vilanterol 200-25 mcg/actuation 1 puff, 1 puff, Inhalation, Daily, Edmond Aiad, DO, 1 puff at 02/21/24 0851    guaiFENesin (MUCINEX) 12 hr tablet 600 mg, 600 mg, Oral, Q12H YON, Edmond Aiad, DO, 600 mg at 02/21/24 0850    ipratropium (ATROVENT) 0.02 % inhalation solution 0.5 mg, 0.5 mg, Nebulization, TID, Edmond Aiad, DO, 0.5 mg at 02/21/24 0747    levalbuterol (XOPENEX) inhalation solution 1.25 mg, 1.25 mg, Nebulization, TID, Edmond Gtz DO, 1.25 mg at 02/21/24 0744    naproxen (NAPROSYN) tablet 500 mg, 500 mg, Oral, BID PRN, Edmond Gtz DO, 500 mg at 02/20/24 0832    nystatin (MYCOSTATIN) cream, , Topical, BID, Rena Bean PA-C, Given at 02/21/24 0853    ondansetron (ZOFRAN-ODT) dispersible tablet 4 mg, 4 mg, Oral, Q8H PRN, Edmond Gtz DO    polyethylene glycol (MIRALAX) packet 17 g, 17 g, Oral, Daily, Edmond Gtz DO, 17 g at 02/20/24 2111    pravastatin  "(PRAVACHOL) tablet 80 mg, 80 mg, Oral, Daily With Dinner, Edmond Gtz DO, 80 mg at 02/20/24 1800    sodium chloride tablet 2 g, 2 g, Oral, TID With Meals, Uri Fernando MD    torsemide (DEMADEX) tablet 10 mg, 10 mg, Oral, Daily, Uri Fernando MD, 10 mg at 02/21/24 0850    zolpidem (AMBIEN) tablet 5 mg, 5 mg, Oral, HS, Edmond Gtz DO, 5 mg at 02/20/24 2111    Laboratory Results:  Results from last 7 days   Lab Units 02/21/24  0537 02/20/24  0543 02/19/24  1959 02/19/24  1444 02/19/24  0555 02/18/24  0452 02/17/24  0958   WBC Thousand/uL  --  9.10  --   --  10.24* 11.04* 12.65*   HEMOGLOBIN g/dL  --  11.3*  --   --  10.9* 10.7* 11.3*   HEMATOCRIT %  --  33.3*  --   --  32.8* 32.6* 33.1*   PLATELETS Thousands/uL  --  340  --   --  287 260 215   POTASSIUM mmol/L 4.2 4.5 4.6 4.2 4.4 4.4 4.1   CHLORIDE mmol/L 95* 95* 93* 93* 91* 93* 92*   CO2 mmol/L 29 28 28 25 26 28 26   BUN mg/dL 16 14 14 14 13 12 11   CREATININE mg/dL 0.66 0.67 0.83 0.86 0.65 0.62 0.73   CALCIUM mg/dL 8.4 8.6 9.1 8.6 8.7 8.4 8.8       Portions of the record may have been created with voice recognition software. Occasional wrong word or \"sound a like\" substitutions may have occurred due to the inherent limitations of voice recognition software. Read the chart carefully and recognize, using context, where substitutions have occurred. If you have any questions, please contact the dictating provider.    "

## 2024-02-21 NOTE — CASE MANAGEMENT
Case Management Discharge Planning Note    Patient name Rosemary Starkey  Location [facility] 925/[facility] 925-01 MRN [id number]  : 1950 Date 2024       Current Admission Date: 2024  Current Admission Diagnosis:Left lower lobe pneumonia   Patient Active Problem List    Diagnosis Date Noted    Hyponatremia 2024    Acute hypoxic respiratory failure (HCC) 2024    Chronic obstructive pulmonary disease with acute exacerbation (HCC) 2024    Migraine without aura and without status migrainosus, not intractable 2023    Chronic cough 2022    Hyperglycemia 2022    COVID 2022    Cervical spondylosis 2021    Chronic fatigue 05/10/2021    Nausea 05/10/2021    Cervicalgia 2021    Ventral hernia without obstruction or gangrene 2020    Pyuria 2020    Acquired hypothyroidism 2020    Overweight 2020    Acute left-sided low back pain with sciatica 2020    Perforated diverticulum 2020    LLQ abdominal pain 2020    Macular degeneration of right eye 2019    Herpes simplex 10/14/2019    Fibromuscular dysplasia of cervicocranial artery (HCC) 2019    Simple chronic bronchitis (HCC) 2019    Bilateral carotid artery stenosis 2019    Fecal occult blood test positive 2019    Primary insomnia 2019    Bruit of right carotid artery 2019    Left lower lobe pneumonia 2018    Healthcare maintenance 2018    Chronic migraine without aura without status migrainosus, not intractable 03/15/2018    Tobacco abuse 2015    Chronic migraine without aura 2014    Hyperlipidemia 2012    Depression 2012      LOS (days): 4  Geometric Mean LOS (GMLOS) (days): 4.1  Days to GMLOS:0.1     OBJECTIVE:  Risk of Unplanned Readmission Score: 10.65         Current admission status: Inpatient   Preferred Pharmacy:   [facility] - [address]  [facility] [address]  Phone: [phone] Fax: [phone]    [facility]/pharmacy #[id number] - [place], [place] - [address]  [address]  [place] [address]  Phone: [phone] Fax: [phone]    Primary Care Provider: Edin Choi DO    Primary Insurance: [facility]  Secondary Insurance:     DISCHARGE DETAILS:    Requested Home Health Care         Is the patient interested in HHC at discharge?: No    DME Referral Provided  Referral made for DME?: No    Other Referral/Resources/Interventions Provided:  Interventions: None Indicated         Treatment Team Recommendation: Home  Discharge Destination Plan:: Home  Transport at Discharge : Family         Additional Comments: Per provider in provider/CM rounds, pt to continue to remain inpatient at this time due to sodium levels. No CM needs anticipated at this time, CM to continue to follow.

## 2024-02-21 NOTE — PLAN OF CARE
Problem: PAIN - ADULT  Goal: Verbalizes/displays adequate comfort level or baseline comfort level  Description: Interventions:  - Encourage patient to monitor pain and request assistance  - Assess pain using appropriate pain scale  - Administer analgesics based on type and severity of pain and evaluate response  - Implement non-pharmacological measures as appropriate and evaluate response  - Consider cultural and social influences on pain and pain management  - Notify physician/advanced practitioner if interventions unsuccessful or patient reports new pain  Outcome: Progressing     Problem: INFECTION - ADULT  Goal: Absence or prevention of progression during hospitalization  Description: INTERVENTIONS:  - Assess and monitor for signs and symptoms of infection  - Monitor lab/diagnostic results  - Monitor all insertion sites, i.e. indwelling lines, tubes, and drains  - Monitor endotracheal if appropriate and nasal secretions for changes in amount and color  - [place] appropriate cooling/warming therapies per order  - Administer medications as ordered  - Instruct and encourage patient and family to use good hand hygiene technique  - Identify and instruct in appropriate isolation precautions for identified infection/condition  Outcome: Progressing

## 2024-02-22 LAB
ANION GAP SERPL CALCULATED.3IONS-SCNC: 8 MMOL/L
BACTERIA BLD CULT: NORMAL
BACTERIA BLD CULT: NORMAL
BUN SERPL-MCNC: 23 MG/DL (ref 5–25)
CALCIUM SERPL-MCNC: 8.9 MG/DL (ref 8.4–10.2)
CHLORIDE SERPL-SCNC: 101 MMOL/L (ref 96–108)
CO2 SERPL-SCNC: 28 MMOL/L (ref 21–32)
CREAT SERPL-MCNC: 0.69 MG/DL (ref 0.6–1.3)
GFR SERPL CREATININE-BSD FRML MDRD: 85 ML/MIN/1.73SQ M
GLUCOSE SERPL-MCNC: 116 MG/DL (ref 65–140)
POTASSIUM SERPL-SCNC: 3.5 MMOL/L (ref 3.5–5.3)
SODIUM SERPL-SCNC: 137 MMOL/L (ref 135–147)

## 2024-02-22 PROCEDURE — 99232 SBSQ HOSP IP/OBS MODERATE 35: CPT | Performed by: INTERNAL MEDICINE

## 2024-02-22 PROCEDURE — 80048 BASIC METABOLIC PNL TOTAL CA: CPT | Performed by: INTERNAL MEDICINE

## 2024-02-22 PROCEDURE — 94760 N-INVAS EAR/PLS OXIMETRY 1: CPT

## 2024-02-22 PROCEDURE — 99232 SBSQ HOSP IP/OBS MODERATE 35: CPT | Performed by: FAMILY MEDICINE

## 2024-02-22 PROCEDURE — 94664 DEMO&/EVAL PT USE INHALER: CPT

## 2024-02-22 PROCEDURE — 94640 AIRWAY INHALATION TREATMENT: CPT

## 2024-02-22 RX ORDER — SODIUM CHLORIDE 1 G/1
2 TABLET ORAL 2 TIMES DAILY WITH MEALS
Status: DISCONTINUED | OUTPATIENT
Start: 2024-02-22 | End: 2024-02-23 | Stop reason: HOSPADM

## 2024-02-22 RX ADMIN — GUAIFENESIN 1200 MG: 600 TABLET, EXTENDED RELEASE ORAL at 08:33

## 2024-02-22 RX ADMIN — GUAIFENESIN 1200 MG: 600 TABLET, EXTENDED RELEASE ORAL at 20:11

## 2024-02-22 RX ADMIN — TORSEMIDE 10 MG: 10 TABLET ORAL at 08:33

## 2024-02-22 RX ADMIN — ZOLPIDEM TARTRATE 5 MG: 5 TABLET ORAL at 21:15

## 2024-02-22 RX ADMIN — DIVALPROEX SODIUM 250 MG: 250 TABLET, EXTENDED RELEASE ORAL at 17:18

## 2024-02-22 RX ADMIN — CITALOPRAM HYDROBROMIDE 20 MG: 20 TABLET ORAL at 08:32

## 2024-02-22 RX ADMIN — PRAVASTATIN SODIUM 80 MG: 80 TABLET ORAL at 17:18

## 2024-02-22 RX ADMIN — CEFTRIAXONE SODIUM 1000 MG: 10 INJECTION, POWDER, FOR SOLUTION INTRAVENOUS at 10:57

## 2024-02-22 RX ADMIN — NYSTATIN: 100000 CREAM TOPICAL at 08:33

## 2024-02-22 RX ADMIN — FLUTICASONE FUROATE AND VILANTEROL TRIFENATATE 1 PUFF: 200; 25 POWDER RESPIRATORY (INHALATION) at 08:32

## 2024-02-22 RX ADMIN — LEVALBUTEROL HYDROCHLORIDE 1.25 MG: 1.25 SOLUTION RESPIRATORY (INHALATION) at 07:19

## 2024-02-22 RX ADMIN — SODIUM CHLORIDE 2 G: 1 TABLET ORAL at 17:18

## 2024-02-22 RX ADMIN — DIVALPROEX SODIUM 250 MG: 250 TABLET, EXTENDED RELEASE ORAL at 08:33

## 2024-02-22 RX ADMIN — NYSTATIN: 100000 CREAM TOPICAL at 17:18

## 2024-02-22 RX ADMIN — SODIUM CHLORIDE 2 G: 1 TABLET ORAL at 08:32

## 2024-02-22 RX ADMIN — ENOXAPARIN SODIUM 40 MG: 40 INJECTION SUBCUTANEOUS at 08:33

## 2024-02-22 RX ADMIN — IPRATROPIUM BROMIDE 0.5 MG: 0.5 SOLUTION RESPIRATORY (INHALATION) at 07:19

## 2024-02-22 RX ADMIN — POLYETHYLENE GLYCOL 3350 17 G: 17 POWDER, FOR SOLUTION ORAL at 20:11

## 2024-02-22 RX ADMIN — ASPIRIN 325 MG: 325 TABLET, COATED ORAL at 08:33

## 2024-02-22 RX ADMIN — CYCLOBENZAPRINE HYDROCHLORIDE 10 MG: 10 TABLET, FILM COATED ORAL at 21:15

## 2024-02-22 NOTE — PROGRESS NOTES
NEPHROLOGY HOSPITAL PROGRESS NOTE   Rosemary Starkey 73 y.o. female MRN: [id number]  Unit/Bed#: [facility] 925-01 Encounter: [id number]  Reason for Consult: Hyponatremia    ASSESSMENT and PLAN:    1.  Hyponatremia.  Baseline serum sodium 128-135.  Serum sodium on admission 127 on 02/17.  Serum sodium normalized today to 137.  Urine osmolality 247 consistent with ADH dependent hyponatremia.  Urine sodium 72 consistent with inactive RAAS system.  Serum osmolality 280 consistent with hypotonic hyponatremia.  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  Also on Celexa and divalproex which can cause SIADH.  CT chest showed 7 mm groundglass opacity centrally located in right upper lobe, repeat CT in 6 to 12 months was recommended, bronchial wall thickening in lower lungs with tree-in-bud nodularity inferior lingula and both lower lobes and consolidation in dependent left lower lobe compatible with bronchitis/bronchiolitis and left lower lobe pneumonia.  She will need to follow-up with pulmonology as outpatient.  Continue fluid restriction at 1200 cc per 24 hours.  Decrease sodium chloride tablets to 2 g twice daily.  Continue torsemide 10 mg daily.  To discharge from nephrology perspective with repeat BMP in 1 week.    2.  Abnormal CT chest.  Management per primary team.  She will need follow-up with pulmonology.    3.  Acute hypoxic respiratory failure.  This currently resolved.  Status posttreatment for pneumonia and COPD exacerbation.    Discussed with internal medicine team.  After discussion, we agreed that serum sodium level is normalized and to discharge on decreased dose of sodium chloride tablet of 2 g twice daily and torsemide 10 mg daily with repeat BMP in 1 week.    SUBJECTIVE / 24H INTERVAL HISTORY:  Denies dyspnea.  Denies leg swelling.  Denies abdominal pain.    OBJECTIVE:  Current Weight: Weight - Scale: 71.7 kg (158 lb 1.1 oz)  Vitals:    02/21/24 2037 02/21/24 2207 02/22/24 0722 02/22/24 0740    BP:  136/67  (!) 122/103   Pulse:  94  78   Resp:       Temp:  99 °F (37.2 °C)  98.5 °F (36.9 °C)   TempSrc:       SpO2: 96% (!) 89% 94% 97%   Weight:       Height:           Intake/Output Summary (Last 24 hours) at 2/22/2024 1013  Last data filed at 2/22/2024 0907  Gross per 24 hour   Intake 480 ml   Output 2050 ml   Net -1570 ml     Review of Systems   Constitutional:  Negative for chills and fever.   HENT:  Negative for ear pain and sore throat.    Eyes:  Negative for pain and visual disturbance.   Respiratory:  Negative for cough and shortness of breath.    Cardiovascular:  Negative for chest pain and palpitations.   Gastrointestinal:  Negative for abdominal pain and vomiting.   Genitourinary:  Negative for dysuria and hematuria.   Musculoskeletal:  Negative for arthralgias and back pain.   Skin:  Negative for color change and rash.   Neurological:  Negative for seizures and syncope.   All other systems reviewed and are negative.    Physical Exam  Vitals and nursing note reviewed.   Constitutional:       General: She is not in acute distress.     Appearance: She is well-developed.   HENT:      Head: Normocephalic and atraumatic.   Eyes:      Conjunctiva/sclera: Conjunctivae normal.   Cardiovascular:      Rate and Rhythm: Normal rate and regular rhythm.      Pulses: Normal pulses.      Heart sounds: Normal heart sounds. No murmur heard.  Pulmonary:      Effort: Pulmonary effort is normal. No respiratory distress.      Breath sounds: Normal breath sounds.   Abdominal:      Palpations: Abdomen is soft.      Tenderness: There is no abdominal tenderness.   Musculoskeletal:         General: No swelling.      Cervical back: Neck supple.      Right lower leg: No edema.      Left lower leg: No edema.   Skin:     General: Skin is warm and dry.      Capillary Refill: Capillary refill takes less than 2 seconds.   Neurological:      Mental Status: She is alert.   Psychiatric:         Mood and Affect: Mood normal.        Medications:    Current Facility-Administered Medications:     acetaminophen (TYLENOL) tablet 650 mg, 650 mg, Oral, Q6H PRN, Edmond Aiad, DO    albuterol (PROVENTIL HFA,VENTOLIN HFA) inhaler 2 puff, 2 puff, Inhalation, Q6H PRN, Edmond Aiad, DO    aspirin (ECOTRIN) EC tablet 325 mg, 325 mg, Oral, Daily, Edmond Aiad, DO, 325 mg at 02/22/24 0833    benzonatate (TESSALON PERLES) capsule 100 mg, 100 mg, Oral, TID PRN, Nenita Chawla MD    ceftriaxone (ROCEPHIN) 1 g/50 mL in dextrose IVPB, 1,000 mg, Intravenous, Q24H, Edmond Aiad, DO, Stopped at 02/21/24 1414    citalopram (CeleXA) tablet 20 mg, 20 mg, Oral, Daily, Edmond Aiad, DO, 20 mg at 02/22/24 0832    cyclobenzaprine (FLEXERIL) tablet 10 mg, 10 mg, Oral, HS, Edmond Aiad, DO, 10 mg at 02/21/24 2140    dextromethorphan-guaiFENesin (ROBITUSSIN DM) oral syrup 10 mL, 10 mL, Oral, Q4H PRN, Vince Mccormick MD, 10 mL at 02/21/24 1730    divalproex sodium (DEPAKOTE ER) 24 hr tablet 250 mg, 250 mg, Oral, BID, Edmond Aiad, DO, 250 mg at 02/22/24 0833    enoxaparin (LOVENOX) subcutaneous injection 40 mg, 40 mg, Subcutaneous, Daily, Edmond Aiad, DO, 40 mg at 02/22/24 0833    fluticasone-vilanterol 200-25 mcg/actuation 1 puff, 1 puff, Inhalation, Daily, Edmond Aiad, DO, 1 puff at 02/22/24 0832    guaiFENesin (MUCINEX) 12 hr tablet 1,200 mg, 1,200 mg, Oral, Q12H YON, Nenita Chawla MD, 1,200 mg at 02/22/24 0833    ipratropium (ATROVENT) 0.02 % inhalation solution 0.5 mg, 0.5 mg, Nebulization, TID, Edmond Aiad, DO, 0.5 mg at 02/22/24 0719    levalbuterol (XOPENEX) inhalation solution 1.25 mg, 1.25 mg, Nebulization, TID, Edmond Aiad, DO, 1.25 mg at 02/22/24 0719    naproxen (NAPROSYN) tablet 500 mg, 500 mg, Oral, BID PRN, Edmond Domingoad, DO, 500 mg at 02/20/24 0832    nystatin (MYCOSTATIN) cream, , Topical, BID, Rena Bean PA-C, Given at 02/22/24 0833    ondansetron (ZOFRAN-ODT) dispersible tablet 4 mg, 4 mg, Oral, Q8H PRN, Edmond Gtz DO    polyethylene glycol (MIRALAX) packet 17 g, 17 g, Oral,  "Daily, Edmond Gtz DO, 17 g at 02/21/24 2019    pravastatin (PRAVACHOL) tablet 80 mg, 80 mg, Oral, Daily With Dinner, Edmond Ulisses , 80 mg at 02/21/24 1724    sodium chloride tablet 2 g, 2 g, Oral, BID With Meals, Uri Fernando MD    torsemide (DEMADEX) tablet 10 mg, 10 mg, Oral, Daily, Uri Fernando MD, 10 mg at 02/22/24 0833    zolpidem (AMBIEN) tablet 5 mg, 5 mg, Oral, HS, Edmond DomingoDO josse, 5 mg at 02/21/24 2140    Laboratory Results:  Results from last 7 days   Lab Units 02/22/24  0904 02/21/24  0537 02/20/24  0543 02/19/24  1959 02/19/24  1444 02/19/24  0555 02/18/24  0452 02/17/24  0958   WBC Thousand/uL  --   --  9.10  --   --  10.24* 11.04* 12.65*   HEMOGLOBIN g/dL  --   --  11.3*  --   --  10.9* 10.7* 11.3*   HEMATOCRIT %  --   --  33.3*  --   --  32.8* 32.6* 33.1*   PLATELETS Thousands/uL  --   --  340  --   --  287 260 215   POTASSIUM mmol/L 3.5 4.2 4.5 4.6 4.2 4.4 4.4 4.1   CHLORIDE mmol/L 101 95* 95* 93* 93* 91* 93* 92*   CO2 mmol/L 28 29 28 28 25 26 28 26   BUN mg/dL 23 16 14 14 14 13 12 11   CREATININE mg/dL 0.69 0.66 0.67 0.83 0.86 0.65 0.62 0.73   CALCIUM mg/dL 8.9 8.4 8.6 9.1 8.6 8.7 8.4 8.8       Portions of the record may have been created with voice recognition software. Occasional wrong word or \"sound a like\" substitutions may have occurred due to the inherent limitations of voice recognition software. Read the chart carefully and recognize, using context, where substitutions have occurred. If you have any questions, please contact the dictating provider.    "

## 2024-02-22 NOTE — ASSESSMENT & PLAN NOTE
Baseline around 128-135.  Sodium was down to 126.  Today sodium level is137  Nephrology consulted, suspecting SIADH in the setting of pneumonia/COPD, medications including Celexa and divalproex.  1200 cc fluid restriction-by repeat the history it appears that patient drinks close to 3 L of fluid a day  Salt tablets-increased the dose to 2 g 3 times daily  CT chest reviewed

## 2024-02-22 NOTE — ASSESSMENT & PLAN NOTE
Baseline around 128-135.  Sodium was down to 126.  Today sodium level is130  Nephrology consulted, suspecting SIADH in the setting of pneumonia/COPD, medications including Celexa and divalproex.  1200 cc fluid restriction-by repeat the history it appears that patient drinks close to 3 L of fluid a day  Salt tablets-increased the dose to 2 g 3 times daily  CT chest reviewed

## 2024-02-22 NOTE — ASSESSMENT & PLAN NOTE
In the setting of left lower lobe pneumonia and possible COPD exacerbation.  Continue to monitor oxygen, wean as tolerated.-Patient still wears oxygen.  Discussed with the patient about the possibility of going home with oxygen and she refuses.  Will reevaluate tomorrow

## 2024-02-22 NOTE — RESPIRATORY THERAPY NOTE
Resp Therapy   02/22/24 1015   Respiratory Assessment   Resp Comments Pt states that her breathing feels fine and she does not feel SOB. Asked pt if she felt like she needed scheduled treatments, pt said no. Pt still has prn mdi, will dc scheduled neb treatments.

## 2024-02-22 NOTE — PROGRESS NOTES
[facility]  Progress Note  Name: Rosemary Starkey I  MRN: [id number]  Unit/Bed#: [facility]PHP 925-01 I Date of Admission: 2/17/2024   Date of Service: 2/21/2024 I Hospital Day: 4    Assessment/Plan   * Left lower lobe pneumonia  Assessment & Plan  resenting with 4 days of fever, chills, cough and headache. Seen at urgent care, SpO2 reportedly was 80% on room air and was sent to the ED  Longstanding history of smoking, emphysema on x-ray.  No official COPD diagnosis.  CXR with LLL pneumonia.   Currently afebrile, nontoxic.  WBCs downtrending.  Continue ceftriaxone.  Continue prednisone for suspected COPD exacerbation in the setting of pneumonia.  Urine Legionella and strep antigen negative.  Blood cultures negative to date.  Respiratory protocol albuterol/ipratropium nebs every 6 hours   Mucinex as needed for cough.  Monitor sodium.  CT chest reviewed    Hyponatremia  Assessment & Plan  Baseline around 128-135.  Sodium was down to 126.  Today sodium level is130  Nephrology consulted, suspecting SIADH in the setting of pneumonia/COPD, medications including Celexa and divalproex.  1200 cc fluid restriction-by repeat the history it appears that patient drinks close to 3 L of fluid a day  Salt tablets-increased the dose to 2 g 3 times daily  CT chest reviewed    Chronic obstructive pulmonary disease with acute exacerbation (HCC)  Assessment & Plan  Clinically; no no prior PFTs.   Long standing hx of smoking, wheezing this presentation.  Continue albuterol, Advair and prednisone burst.  Refer to pulmonology at discharge for follow-up.    Acute hypoxic respiratory failure (HCC)  Assessment & Plan  In the setting of left lower lobe pneumonia and possible COPD exacerbation.  Continue to monitor oxygen, wean as tolerated.    Migraine without aura and without status migrainosus, not intractable  Assessment & Plan  Continue home citalopram, Depakote, Imitrex and naproxen    Cervical  spondylosis  Assessment & Plan  Continue home Flexeril and naproxen    Bilateral carotid artery stenosis  Assessment & Plan  Continue home aspirin 325 mg daily and high intensity statin     Tobacco abuse  Assessment & Plan  Smokes 13 cigarettes a day, has emphysema on x-ray  Counseled on smoking cessation and offered but patient refused nicotine replacement while inpatient.  Patient reported that she is not interested in quitting  Now with possible SIADH, will obtain CT chest to rule out underlying malignancy-CT reviewed-7 mm groundglass opacity centrally located in the right upper lobe. Per 2017 Fleischner Society guidelines, follow-up with a chest CT is recommended at 6 to 12 months to confirm persistence and then every 2 years until 5 years of stability is established.               VTE Pharmacologic Prophylaxis:   Moderate Risk (Score 3-4) - Pharmacological DVT Prophylaxis Ordered: enoxaparin (Lovenox).    Mobility:   Basic Mobility Inpatient Raw Score: 24  JH-HLM Goal: 8: Walk 250 feet or more  JH-HLM Achieved: 8: Walk 250 feet ot more  HLM Goal achieved. Continue to encourage appropriate mobility.    Patient Centered Rounds: I performed bedside rounds with nursing staff today.   Discussions with Specialists or Other Care Team Provider: nephrology    Education and Discussions with Family / Patient: Patient declined call to .     Total Time Spent on Date of Encounter in care of patient: 35 mins. This time was spent on one or more of the following: performing physical exam; counseling and coordination of care; obtaining or reviewing history; documenting in the medical record; reviewing/ordering tests, medications or procedures; communicating with other healthcare professionals and discussing with patient's family/caregivers.    Current Length of Stay: 4 day(s)  Current Patient Status: Inpatient   Certification Statement: Need continued inpatient stay due to hyponatremia  Discharge Plan: Anticipate  discharge in 24-48 hrs to home with home services.    Code Status: Level 3 - DNAR and DNI    Subjective:   Patient seen and examined.  Still with expectoration.  Patient reported that she feels like she is not able to bring up the phlegm    Objective:     Vitals:   Temp (24hrs), Av.4 °F (36.9 °C), Min:98.1 °F (36.7 °C), Max:98.8 °F (37.1 °C)    Temp:  [98.1 °F (36.7 °C)-98.8 °F (37.1 °C)] 98.8 °F (37.1 °C)  HR:  [76-93] 93  Resp:  [16-18] 16  BP: (140-162)/(66-75) 140/66  SpO2:  [88 %-95 %] 90 %  Body mass index is 25.51 kg/m².     Input and Output Summary (last 24 hours):     Intake/Output Summary (Last 24 hours) at 2024 1931  Last data filed at 2024 1846  Gross per 24 hour   Intake 360 ml   Output 2450 ml   Net -2090 ml       Physical Exam:   Physical Exam  Constitutional:       General: She is not in acute distress.     Appearance: She is not ill-appearing.   HENT:      Head: Normocephalic.      Nose: Nose normal.   Eyes:      General: No scleral icterus.  Cardiovascular:      Rate and Rhythm: Normal rate and regular rhythm.      Heart sounds: Normal heart sounds.   Pulmonary:      Comments: Decreased breath sounds bilateral  Abdominal:      General: Abdomen is flat. There is no distension.   Musculoskeletal:         General: No swelling or tenderness. Normal range of motion.   Skin:     General: Skin is warm.   Neurological:      Mental Status: She is alert. Mental status is at baseline.          Additional Data:     Labs:  Results from last 7 days   Lab Units 24  0543   WBC Thousand/uL 9.10   HEMOGLOBIN g/dL 11.3*   HEMATOCRIT % 33.3*   PLATELETS Thousands/uL 340   NEUTROS PCT % 66   LYMPHS PCT % 18   MONOS PCT % 14*   EOS PCT % 0     Results from last 7 days   Lab Units 24  0537 24  0555 24  0452   SODIUM mmol/L 130*   < > 128*   POTASSIUM mmol/L 4.2   < > 4.4   CHLORIDE mmol/L 95*   < > 93*   CO2 mmol/L 29   < > 28   BUN mg/dL 16   < > 12   CREATININE mg/dL 0.66   < >  0.62   ANION GAP mmol/L 6   < > 7   CALCIUM mg/dL 8.4   < > 8.4   ALBUMIN g/dL  --   --  3.2*   TOTAL BILIRUBIN mg/dL  --   --  0.20   ALK PHOS U/L  --   --  54   ALT U/L  --   --  10   AST U/L  --   --  16   GLUCOSE RANDOM mg/dL 86   < > 110    < > = values in this interval not displayed.     Results from last 7 days   Lab Units 02/17/24  1106   INR  1.16             Results from last 7 days   Lab Units 02/17/24  1106   LACTIC ACID mmol/L 0.8   PROCALCITONIN ng/ml 0.22       Lines/Drains:  Invasive Devices       Peripheral Intravenous Line  Duration             Peripheral IV 02/21/24 Distal;Dorsal (posterior);Left Forearm <1 day                          Imaging: No pertinent imaging reviewed.    Recent Cultures (last 7 days):   Results from last 7 days   Lab Units 02/18/24  1018 02/17/24  1106   BLOOD CULTURE   --  No Growth After 4 Days.  No Growth After 4 Days.   LEGIONELLA URINARY ANTIGEN  Negative  --        Last 24 Hours Medication List:   Current Facility-Administered Medications   Medication Dose Route Frequency Provider Last Rate    acetaminophen  650 mg Oral Q6H PRN Edmond Aiad, DO      albuterol  2 puff Inhalation Q6H PRN Edmond Aiad, DO      aspirin  325 mg Oral Daily Edmond Aiad, DO      benzonatate  100 mg Oral TID PRN Nenita Chawla MD      cefTRIAXone  1,000 mg Intravenous Q24H Edmond Aiad, DO Stopped (02/21/24 1414)    citalopram  20 mg Oral Daily Edmond Aiad, DO      cyclobenzaprine  10 mg Oral HS Edmond Aiad, DO      dextromethorphan-guaiFENesin  10 mL Oral Q4H PRN Vince Mccormick MD      divalproex sodium  250 mg Oral BID Edmond Aiad, DO      enoxaparin  40 mg Subcutaneous Daily Edmond Aiad, DO      fluticasone-vilanterol  1 puff Inhalation Daily Edmond Aiad, DO      guaiFENesin  1,200 mg Oral Q12H [facility] Nenita Chawla MD      ipratropium  0.5 mg Nebulization TID Edmond Aiad, DO      levalbuterol  1.25 mg Nebulization TID Edmond Aiad, DO      naproxen  500 mg Oral BID PRN Edmond Aiad, DO      nystatin   Topical BID Rena A  JOSÉ MIGUEL Bean      ondansetron  4 mg Oral Q8H PRN Edmond Aiad, DO      polyethylene glycol  17 g Oral Daily Edmond Aiad, DO      pravastatin  80 mg Oral Daily With Dinner Edmond Aiad, DO      sodium chloride  2 g Oral TID With Meals Uri Fernando MD      torsemide  10 mg Oral Daily Uri Fernando MD      zolpidem  5 mg Oral HS Edmond Aiad, DO          Today, Patient Was Seen By: Nenita Chawla MD    **Please Note: This note may have been constructed using a voice recognition system.**

## 2024-02-22 NOTE — RESPIRATORY THERAPY NOTE
Home Oxygen Qualifying Test     Patient name: Rosemary Starkey        : 1950   Date of Test:  2024  Diagnosis:    Home Oxygen Test:    **Medicare Guidelines require item(s) 1-5 on all ambulatory patients or 1 and 2 on non-ambulatory patients.    1. Baseline SPO2 on Room Air at rest 90 %   If <= 88% on Room Air add O2 via NC to obtain SpO2 >=88%. If LPM needed, document LPM  needed to reach =>88%    SPO2 during exertion on Room Air 86  %  During exertion monitor SPO2. If SPO2 increases >=89%, do not add supplemental oxygen    SPO2 on Oxygen at Rest 96 % at 1 LPM    SPO2 during exertion on Oxygen 91 % at 2 LPM    Test performed during exertion activity.      [x]  Supplemental Home Oxygen is indicated.    []  Client does not qualify for home oxygen.    Respiratory Additional Notes- Pt ambulated for 6 mins, pt required 2L of O2 while ambulating to maintain an Spo2 greater than 88.    Yimi Hernandez, RT

## 2024-02-22 NOTE — PROGRESS NOTES
[facility]  Progress Note  Name: Rosemary Starkey I  MRN: [id number]  Unit/Bed#: PPHP 925-01 I Date of Admission: 2/17/2024   Date of Service: 2/22/2024 I Hospital Day: 5    Assessment/Plan   * Left lower lobe pneumonia  Assessment & Plan  resenting with 4 days of fever, chills, cough and headache. Seen at urgent care, SpO2 reportedly was 80% on room air and was sent to the ED  Longstanding history of smoking, emphysema on x-ray.  No official COPD diagnosis.  CXR with LLL pneumonia.   Currently afebrile, nontoxic.  WBCs downtrending.  Continue ceftriaxone.  Continue prednisone for suspected COPD exacerbation in the setting of pneumonia.  Urine Legionella and strep antigen negative.  Blood cultures negative to date.  Respiratory protocol albuterol/ipratropium nebs every 6 hours   Mucinex as needed for cough.  Monitor sodium.  CT chest reviewed    Hyponatremia  Assessment & Plan  Baseline around 128-135.  Sodium was down to 126.  Today sodium level is137  Nephrology consulted, suspecting SIADH in the setting of pneumonia/COPD, medications including Celexa and divalproex.  1200 cc fluid restriction-by repeat the history it appears that patient drinks close to 3 L of fluid a day  Salt tablets-increased the dose to 2 g 3 times daily  CT chest reviewed    Chronic obstructive pulmonary disease with acute exacerbation (HCC)  Assessment & Plan  Clinically; no no prior PFTs.   Long standing hx of smoking, wheezing this presentation.  Continue albuterol, Advair and prednisone burst.  Refer to pulmonology at discharge for follow-up.    Acute hypoxic respiratory failure (HCC)  Assessment & Plan  In the setting of left lower lobe pneumonia and possible COPD exacerbation.  Continue to monitor oxygen, wean as tolerated.-Patient still wears oxygen.  Discussed with the patient about the possibility of going home with oxygen and she refuses.  Will reevaluate tomorrow    Migraine without aura and  without status migrainosus, not intractable  Assessment & Plan  Continue home citalopram, Depakote, Imitrex and naproxen    Cervical spondylosis  Assessment & Plan  Continue home Flexeril and naproxen    Bilateral carotid artery stenosis  Assessment & Plan  Continue home aspirin 325 mg daily and high intensity statin     Tobacco abuse  Assessment & Plan  Smokes 13 cigarettes a day, has emphysema on x-ray  Counseled on smoking cessation and offered but patient refused nicotine replacement while inpatient.  Patient reported that she is not interested in quitting  Now with possible SIADH, will obtain CT chest to rule out underlying malignancy-CT reviewed-7 mm groundglass opacity centrally located in the right upper lobe. Per 2017 Fleischner Society guidelines, follow-up with a chest CT is recommended at 6 to 12 months to confirm persistence and then every 2 years until 5 years of stability is established.               VTE Pharmacologic Prophylaxis:   Moderate Risk (Score 3-4) - Pharmacological DVT Prophylaxis Ordered: enoxaparin (Lovenox).    Mobility:   Basic Mobility Inpatient Raw Score: 24  JH-HLM Goal: 8: Walk 250 feet or more  JH-HLM Achieved: 8: Walk 250 feet ot more  HLM Goal achieved. Continue to encourage appropriate mobility.    Patient Centered Rounds: I performed bedside rounds with nursing staff today.   Discussions with Specialists or Other Care Team Provider:     Education and Discussions with Family / Patient: Patient declined call to .     Total Time Spent on Date of Encounter in care of patient: 35 mins. This time was spent on one or more of the following: performing physical exam; counseling and coordination of care; obtaining or reviewing history; documenting in the medical record; reviewing/ordering tests, medications or procedures; communicating with other healthcare professionals and discussing with patient's family/caregivers.    Current Length of Stay: 5 day(s)  Current Patient  Status: Inpatient   Certification Statement: The patient will continue to require additional inpatient hospital stay due to hypoxemia   Discharge Plan: Anticipate discharge tomorrow to home.    Code Status: Level 3 - DNAR and DNI    Subjective:   Patient seen and examined.  Nurses are trying to wean off of the oxygen.  Still requiring oxygen    Objective:     Vitals:   Temp (24hrs), Av.8 °F (37.1 °C), Min:98.5 °F (36.9 °C), Max:99 °F (37.2 °C)    Temp:  [98.5 °F (36.9 °C)-99 °F (37.2 °C)] 99 °F (37.2 °C)  HR:  [78-94] 85  Resp:  [16] 16  BP: (118-136)/() 118/59  SpO2:  [89 %-97 %] 91 %  Body mass index is 25.51 kg/m².     Input and Output Summary (last 24 hours):     Intake/Output Summary (Last 24 hours) at 2024 1833  Last data filed at 2024 1443  Gross per 24 hour   Intake 820 ml   Output 1000 ml   Net -180 ml       Physical Exam:   Physical Exam  Constitutional:       General: She is not in acute distress.     Appearance: She is not ill-appearing.   HENT:      Head: Normocephalic.      Nose: Nose normal.   Eyes:      General: No scleral icterus.  Cardiovascular:      Rate and Rhythm: Normal rate and regular rhythm.      Pulses: Normal pulses.      Heart sounds: No murmur heard.  Pulmonary:      Effort: Pulmonary effort is normal. No respiratory distress.      Breath sounds: Wheezing present.   Abdominal:      General: There is no distension.   Musculoskeletal:         General: No swelling. Normal range of motion.   Skin:     General: Skin is warm.      Coloration: Skin is not jaundiced.   Neurological:      Mental Status: She is alert. Mental status is at baseline.          Additional Data:     Labs:  Results from last 7 days   Lab Units 24  0543   WBC Thousand/uL 9.10   HEMOGLOBIN g/dL 11.3*   HEMATOCRIT % 33.3*   PLATELETS Thousands/uL 340   NEUTROS PCT % 66   LYMPHS PCT % 18   MONOS PCT % 14*   EOS PCT % 0     Results from last 7 days   Lab Units 24  0904 24  0555  02/18/24  0452   SODIUM mmol/L 137   < > 128*   POTASSIUM mmol/L 3.5   < > 4.4   CHLORIDE mmol/L 101   < > 93*   CO2 mmol/L 28   < > 28   BUN mg/dL 23   < > 12   CREATININE mg/dL 0.69   < > 0.62   ANION GAP mmol/L 8   < > 7   CALCIUM mg/dL 8.9   < > 8.4   ALBUMIN g/dL  --   --  3.2*   TOTAL BILIRUBIN mg/dL  --   --  0.20   ALK PHOS U/L  --   --  54   ALT U/L  --   --  10   AST U/L  --   --  16   GLUCOSE RANDOM mg/dL 116   < > 110    < > = values in this interval not displayed.     Results from last 7 days   Lab Units 02/17/24  1106   INR  1.16             Results from last 7 days   Lab Units 02/17/24  1106   LACTIC ACID mmol/L 0.8   PROCALCITONIN ng/ml 0.22       Lines/Drains:  Invasive Devices       Peripheral Intravenous Line  Duration             Peripheral IV 02/21/24 Distal;Dorsal (posterior);Left Forearm 1 day                          Imaging: No pertinent imaging reviewed.    Recent Cultures (last 7 days):   Results from last 7 days   Lab Units 02/18/24  1018 02/17/24  1106   BLOOD CULTURE   --  No Growth After 5 Days.  No Growth After 5 Days.   LEGIONELLA URINARY ANTIGEN  Negative  --        Last 24 Hours Medication List:   Current Facility-Administered Medications   Medication Dose Route Frequency Provider Last Rate    acetaminophen  650 mg Oral Q6H PRN Edmond Aiad, DO      albuterol  2 puff Inhalation Q6H PRN Edmond Aiad, DO      aspirin  325 mg Oral Daily Edmond Aiad, DO      benzonatate  100 mg Oral TID PRN Nenita Chawla MD      cefTRIAXone  1,000 mg Intravenous Q24H Edmond Aiad, DO 1,000 mg (02/22/24 1057)    citalopram  20 mg Oral Daily Edmond Aiad, DO      cyclobenzaprine  10 mg Oral HS Edmond Aiad, DO      dextromethorphan-guaiFENesin  10 mL Oral Q4H PRN Vince Mccormick MD      divalproex sodium  250 mg Oral BID Edmond Aiad, DO      enoxaparin  40 mg Subcutaneous Daily Edmond Aiad, DO      fluticasone-vilanterol  1 puff Inhalation Daily Edmond Aiad, DO      guaiFENesin  1,200 mg Oral Q12H YON Chawla MD       naproxen  500 mg Oral BID PRN Edmond Aiad, DO      nystatin   Topical BID Rena Bean PA-C      ondansetron  4 mg Oral Q8H PRN Edmond Aiad, DO      polyethylene glycol  17 g Oral Daily Edmond Aiad, DO      pravastatin  80 mg Oral Daily With Dinner Edmond Aiad, DO      sodium chloride  2 g Oral BID With Meals Uri Fernando MD      torsemide  10 mg Oral Daily Uri Fernando MD      zolpidem  5 mg Oral HS Edmond Aiad, DO          Today, Patient Was Seen By: Nenita Chawla MD    **Please Note: This note may have been constructed using a voice recognition system.**

## 2024-02-22 NOTE — PLAN OF CARE
Problem: PAIN - ADULT  Goal: Verbalizes/displays adequate comfort level or baseline comfort level  Description: Interventions:  - Encourage patient to monitor pain and request assistance  - Assess pain using appropriate pain scale  - Administer analgesics based on type and severity of pain and evaluate response  - Implement non-pharmacological measures as appropriate and evaluate response  - Consider cultural and social influences on pain and pain management  - Notify physician/advanced practitioner if interventions unsuccessful or patient reports new pain  Outcome: Progressing     Problem: INFECTION - ADULT  Goal: Absence or prevention of progression during hospitalization  Description: INTERVENTIONS:  - Assess and monitor for signs and symptoms of infection  - Monitor lab/diagnostic results  - Monitor all insertion sites, i.e. indwelling lines, tubes, and drains  - Monitor endotracheal if appropriate and nasal secretions for changes in amount and color  - [place] appropriate cooling/warming therapies per order  - Administer medications as ordered  - Instruct and encourage patient and family to use good hand hygiene technique  - Identify and instruct in appropriate isolation precautions for identified infection/condition  Outcome: Progressing     Problem: SAFETY ADULT  Goal: Patient will remain free of falls  Description: INTERVENTIONS:  - Educate patient/family on patient safety including physical limitations  - Instruct patient to call for assistance with activity   - Consult OT/PT to assist with strengthening/mobility   - Keep Call bell within reach  - Keep bed low and locked with side rails adjusted as appropriate  - Keep care items and personal belongings within reach  - Initiate and maintain comfort rounds  - Make Fall Risk Sign visible to staff  - Offer Toileting every 2 Hours, in advance of need  - Initiate/Maintain 24/7 alarm  - Obtain necessary fall risk management equipment: rw  - Apply yellow socks  and bracelet for high fall risk patients  - Consider moving patient to room near nurses station  Outcome: Progressing     Problem: PAIN - ADULT  Goal: Verbalizes/displays adequate comfort level or baseline comfort level  Description: Interventions:  - Encourage patient to monitor pain and request assistance  - Assess pain using appropriate pain scale  - Administer analgesics based on type and severity of pain and evaluate response  - Implement non-pharmacological measures as appropriate and evaluate response  - Consider cultural and social influences on pain and pain management  - Notify physician/advanced practitioner if interventions unsuccessful or patient reports new pain  Outcome: Progressing

## 2024-02-22 NOTE — PLAN OF CARE
Problem: PAIN - ADULT  Goal: Verbalizes/displays adequate comfort level or baseline comfort level  Description: Interventions:  - Encourage patient to monitor pain and request assistance  - Assess pain using appropriate pain scale  - Administer analgesics based on type and severity of pain and evaluate response  - Implement non-pharmacological measures as appropriate and evaluate response  - Consider cultural and social influences on pain and pain management  - Notify physician/advanced practitioner if interventions unsuccessful or patient reports new pain  2/22/2024 1757 by Lolita Ruby RN  Outcome: Progressing  2/22/2024 1756 by Lolita Ruby RN  Outcome: Progressing     Problem: INFECTION - ADULT  Goal: Absence or prevention of progression during hospitalization  Description: INTERVENTIONS:  - Assess and monitor for signs and symptoms of infection  - Monitor lab/diagnostic results  - Monitor all insertion sites, i.e. indwelling lines, tubes, and drains  - Monitor endotracheal if appropriate and nasal secretions for changes in amount and color  - [place] appropriate cooling/warming therapies per order  - Administer medications as ordered  - Instruct and encourage patient and family to use good hand hygiene technique  - Identify and instruct in appropriate isolation precautions for identified infection/condition  2/22/2024 1757 by Lolita Ruby RN  Outcome: Progressing  2/22/2024 1756 by Lolita Ruby RN  Outcome: Progressing  Goal: Absence of fever/infection during neutropenic period  Description: INTERVENTIONS:  - Monitor WBC    2/22/2024 1757 by Lolita Ruby RN  Outcome: Progressing  2/22/2024 1756 by Lolita Ruby RN  Outcome: Progressing     Problem: SAFETY ADULT  Goal: Patient will remain free of falls  Description: INTERVENTIONS:  - Educate patient/family on patient safety including physical limitations  - Instruct patient to call for assistance with activity   - Consult  OT/PT to assist with strengthening/mobility   - Keep Call bell within reach  - Keep bed low and locked with side rails adjusted as appropriate  - Keep care items and personal belongings within reach  - Initiate and maintain comfort rounds  - Make Fall Risk Sign visible to staff  - Offer Toileting every 2 Hours, in advance of need  - Initiate/Maintain alarm  - Obtain necessary fall risk management equipment:   - Apply yellow socks and bracelet for high fall risk patients  - Consider moving patient to room near nurses station  2/22/2024 1757 by Lolita Ruby RN  Outcome: Progressing  2/22/2024 1756 by Lolita Ruby RN  Outcome: Progressing  Goal: Maintain or return to baseline ADL function  Description: INTERVENTIONS:  -  Assess patient's ability to carry out ADLs; assess patient's baseline for ADL function and identify physical deficits which impact ability to perform ADLs (bathing, care of mouth/teeth, toileting, grooming, dressing, etc.)  - Assess/evaluate cause of self-care deficits   - Assess range of motion  - Assess patient's mobility; develop plan if impaired  - Assess patient's need for assistive devices and provide as appropriate  - Encourage maximum independence but intervene and supervise when necessary  - Involve family in performance of ADLs  - Assess for home care needs following discharge   - Consider OT consult to assist with ADL evaluation and planning for discharge  - Provide patient education as appropriate  2/22/2024 1757 by Lolita Ruby RN  Outcome: Progressing  2/22/2024 1756 by Lolita Ruby RN  Outcome: Progressing  Goal: Maintains/Returns to pre admission functional level  Description: INTERVENTIONS:  - Perform AM-PAC 6 Click Basic Mobility/ Daily Activity assessment daily.  - Set and communicate daily mobility goal to care team and patient/family/caregiver.   - Collaborate with rehabilitation services on mobility goals if consulted  2/22/2024 1757 by Lolita Ruby  RN  Outcome: Progressing  2/22/2024 1756 by Lolita Ruby RN  Outcome: Progressing     Problem: DISCHARGE PLANNING  Goal: Discharge to home or other facility with appropriate resources  Description: INTERVENTIONS:  - Identify barriers to discharge w/patient and caregiver  - Arrange for needed discharge resources and transportation as appropriate  - Identify discharge learning needs (meds, wound care, etc.)  - Arrange for interpretive services to assist at discharge as needed  - Refer to Case Management Department for coordinating discharge planning if the patient needs post-hospital services based on physician/advanced practitioner order or complex needs related to functional status, cognitive ability, or social support system  2/22/2024 1757 by Lolita Ruby RN  Outcome: Progressing  2/22/2024 1756 by Lolita Ruby RN  Outcome: Progressing     Problem: Knowledge Deficit  Goal: Patient/family/caregiver demonstrates understanding of disease process, treatment plan, medications, and discharge instructions  Description: Complete learning assessment and assess knowledge base.  Interventions:  - Provide teaching at level of understanding  - Provide teaching via preferred learning methods  2/22/2024 1757 by Lolita Ruby RN  Outcome: Progressing  2/22/2024 1756 by Lolita Ruby RN  Outcome: Progressing

## 2024-02-22 NOTE — ASSESSMENT & PLAN NOTE
resenting with 4 days of fever, chills, cough and headache. Seen at urgent care, SpO2 reportedly was 80% on room air and was sent to the ED  Longstanding history of smoking, emphysema on x-ray.  No official COPD diagnosis.  CXR with LLL pneumonia.   Currently afebrile, nontoxic.  WBCs downtrending.  Continue ceftriaxone.  Continue prednisone for suspected COPD exacerbation in the setting of pneumonia.  Urine Legionella and strep antigen negative.  Blood cultures negative to date.  Respiratory protocol albuterol/ipratropium nebs every 6 hours   Mucinex as needed for cough.  Monitor sodium.  CT chest reviewed

## 2024-02-23 ENCOUNTER — TELEPHONE (OUTPATIENT)
Age: 74
End: 2024-02-23

## 2024-02-23 ENCOUNTER — TELEPHONE (OUTPATIENT)
Dept: NEPHROLOGY | Facility: CLINIC | Age: 74
End: 2024-02-23

## 2024-02-23 VITALS
BODY MASS INDEX: 25.4 KG/M2 | DIASTOLIC BLOOD PRESSURE: 69 MMHG | SYSTOLIC BLOOD PRESSURE: 113 MMHG | TEMPERATURE: 98 F | HEIGHT: 66 IN | WEIGHT: 158.07 LBS | OXYGEN SATURATION: 92 % | HEART RATE: 89 BPM | RESPIRATION RATE: 18 BRPM

## 2024-02-23 DIAGNOSIS — E87.1 HYPONATREMIA: Primary | ICD-10-CM

## 2024-02-23 LAB
ANION GAP SERPL CALCULATED.3IONS-SCNC: 8 MMOL/L
BUN SERPL-MCNC: 26 MG/DL (ref 5–25)
CALCIUM SERPL-MCNC: 9 MG/DL (ref 8.4–10.2)
CHLORIDE SERPL-SCNC: 97 MMOL/L (ref 96–108)
CO2 SERPL-SCNC: 31 MMOL/L (ref 21–32)
CREAT SERPL-MCNC: 0.81 MG/DL (ref 0.6–1.3)
DME PARACHUTE DELIVERY DATE EXPECTED: NORMAL
DME PARACHUTE DELIVERY DATE REQUESTED: NORMAL
DME PARACHUTE ITEM DESCRIPTION: NORMAL
DME PARACHUTE ORDER STATUS: NORMAL
DME PARACHUTE SUPPLIER NAME: NORMAL
DME PARACHUTE SUPPLIER PHONE: NORMAL
ERYTHROCYTE [DISTWIDTH] IN BLOOD BY AUTOMATED COUNT: 14.6 % (ref 11.6–15.1)
GFR SERPL CREATININE-BSD FRML MDRD: 71 ML/MIN/1.73SQ M
GLUCOSE SERPL-MCNC: 85 MG/DL (ref 65–140)
HCT VFR BLD AUTO: 39.5 % (ref 34.8–46.1)
HGB BLD-MCNC: 13.1 G/DL (ref 11.5–15.4)
MCH RBC QN AUTO: 31 PG (ref 26.8–34.3)
MCHC RBC AUTO-ENTMCNC: 33.2 G/DL (ref 31.4–37.4)
MCV RBC AUTO: 94 FL (ref 82–98)
PLATELET # BLD AUTO: 482 THOUSANDS/UL (ref 149–390)
PMV BLD AUTO: 8.5 FL (ref 8.9–12.7)
POTASSIUM SERPL-SCNC: 4.2 MMOL/L (ref 3.5–5.3)
RBC # BLD AUTO: 4.22 MILLION/UL (ref 3.81–5.12)
SODIUM SERPL-SCNC: 136 MMOL/L (ref 135–147)
WBC # BLD AUTO: 10.54 THOUSAND/UL (ref 4.31–10.16)

## 2024-02-23 PROCEDURE — 99232 SBSQ HOSP IP/OBS MODERATE 35: CPT | Performed by: STUDENT IN AN ORGANIZED HEALTH CARE EDUCATION/TRAINING PROGRAM

## 2024-02-23 PROCEDURE — 85027 COMPLETE CBC AUTOMATED: CPT | Performed by: FAMILY MEDICINE

## 2024-02-23 PROCEDURE — 99239 HOSP IP/OBS DSCHRG MGMT >30: CPT | Performed by: FAMILY MEDICINE

## 2024-02-23 PROCEDURE — 80048 BASIC METABOLIC PNL TOTAL CA: CPT | Performed by: INTERNAL MEDICINE

## 2024-02-23 RX ORDER — TORSEMIDE 10 MG/1
10 TABLET ORAL DAILY
Qty: 30 TABLET | Refills: 0 | Status: SHIPPED | OUTPATIENT
Start: 2024-02-24

## 2024-02-23 RX ORDER — GUAIFENESIN 1200 MG/1
1200 TABLET, EXTENDED RELEASE ORAL EVERY 12 HOURS SCHEDULED
Qty: 20 TABLET | Refills: 0 | Status: SHIPPED | OUTPATIENT
Start: 2024-02-23

## 2024-02-23 RX ORDER — BENZONATATE 100 MG/1
100 CAPSULE ORAL 3 TIMES DAILY PRN
Qty: 20 CAPSULE | Refills: 0 | Status: SHIPPED | OUTPATIENT
Start: 2024-02-23

## 2024-02-23 RX ORDER — SODIUM CHLORIDE 1 G/1
2 TABLET ORAL 2 TIMES DAILY WITH MEALS
Qty: 120 TABLET | Refills: 0 | Status: SHIPPED | OUTPATIENT
Start: 2024-02-23

## 2024-02-23 RX ORDER — NYSTATIN 100000 U/G
CREAM TOPICAL 2 TIMES DAILY
Qty: 15 G | Refills: 0 | Status: SHIPPED | OUTPATIENT
Start: 2024-02-23 | End: 2024-03-04

## 2024-02-23 RX ADMIN — NYSTATIN: 100000 CREAM TOPICAL at 09:01

## 2024-02-23 RX ADMIN — ASPIRIN 325 MG: 325 TABLET, COATED ORAL at 09:01

## 2024-02-23 RX ADMIN — DIVALPROEX SODIUM 250 MG: 250 TABLET, EXTENDED RELEASE ORAL at 09:01

## 2024-02-23 RX ADMIN — CEFTRIAXONE SODIUM 1000 MG: 10 INJECTION, POWDER, FOR SOLUTION INTRAVENOUS at 11:20

## 2024-02-23 RX ADMIN — ENOXAPARIN SODIUM 40 MG: 40 INJECTION SUBCUTANEOUS at 09:01

## 2024-02-23 RX ADMIN — CITALOPRAM HYDROBROMIDE 20 MG: 20 TABLET ORAL at 09:01

## 2024-02-23 RX ADMIN — GUAIFENESIN 1200 MG: 600 TABLET, EXTENDED RELEASE ORAL at 09:01

## 2024-02-23 RX ADMIN — NAPROXEN 500 MG: 500 TABLET ORAL at 11:20

## 2024-02-23 RX ADMIN — SODIUM CHLORIDE 2 G: 1 TABLET ORAL at 09:01

## 2024-02-23 RX ADMIN — TORSEMIDE 10 MG: 10 TABLET ORAL at 09:01

## 2024-02-23 RX ADMIN — FLUTICASONE FUROATE AND VILANTEROL TRIFENATATE 1 PUFF: 200; 25 POWDER RESPIRATORY (INHALATION) at 11:20

## 2024-02-23 NOTE — PLAN OF CARE
Problem: PAIN - ADULT  Goal: Verbalizes/displays adequate comfort level or baseline comfort level  Description: Interventions:  - Encourage patient to monitor pain and request assistance  - Assess pain using appropriate pain scale  - Administer analgesics based on type and severity of pain and evaluate response  - Implement non-pharmacological measures as appropriate and evaluate response  - Consider cultural and social influences on pain and pain management  - Notify physician/advanced practitioner if interventions unsuccessful or patient reports new pain  Outcome: Progressing     Problem: INFECTION - ADULT  Goal: Absence or prevention of progression during hospitalization  Description: INTERVENTIONS:  - Assess and monitor for signs and symptoms of infection  - Monitor lab/diagnostic results  - Monitor all insertion sites, i.e. indwelling lines, tubes, and drains  - Monitor endotracheal if appropriate and nasal secretions for changes in amount and color  - [place] appropriate cooling/warming therapies per order  - Administer medications as ordered  - Instruct and encourage patient and family to use good hand hygiene technique  - Identify and instruct in appropriate isolation precautions for identified infection/condition  Outcome: Progressing  Goal: Absence of fever/infection during neutropenic period  Description: INTERVENTIONS:  - Monitor WBC    Outcome: Progressing     Problem: SAFETY ADULT  Goal: Patient will remain free of falls  Description: INTERVENTIONS:  - Educate patient/family on patient safety including physical limitations  - Instruct patient to call for assistance with activity   - Consult OT/PT to assist with strengthening/mobility   - Keep Call bell within reach  - Keep bed low and locked with side rails adjusted as appropriate  - Keep care items and personal belongings within reach  - Initiate and maintain comfort rounds  - Make Fall Risk Sign visible to staff  - Offer Toileting every 2 Hours,  in advance of need  - Initiate/Maintain alarm  - Obtain necessary fall risk management equipment:   - Apply yellow socks and bracelet for high fall risk patients  - Consider moving patient to room near nurses station  Outcome: Progressing  Goal: Maintain or return to baseline ADL function  Description: INTERVENTIONS:  -  Assess patient's ability to carry out ADLs; assess patient's baseline for ADL function and identify physical deficits which impact ability to perform ADLs (bathing, care of mouth/teeth, toileting, grooming, dressing, etc.)  - Assess/evaluate cause of self-care deficits   - Assess range of motion  - Assess patient's mobility; develop plan if impaired  - Assess patient's need for assistive devices and provide as appropriate  - Encourage maximum independence but intervene and supervise when necessary  - Involve family in performance of ADLs  - Assess for home care needs following discharge   - Consider OT consult to assist with ADL evaluation and planning for discharge  - Provide patient education as appropriate  Outcome: Progressing  Goal: Maintains/Returns to pre admission functional level  Description: INTERVENTIONS:  - Perform AM-PAC 6 Click Basic Mobility/ Daily Activity assessment daily.  - Set and communicate daily mobility goal to care team and patient/family/caregiver.   - Collaborate with rehabilitation services on mobility goals if consulted  Outcome: Progressing     Problem: DISCHARGE PLANNING  Goal: Discharge to home or other facility with appropriate resources  Description: INTERVENTIONS:  - Identify barriers to discharge w/patient and caregiver  - Arrange for needed discharge resources and transportation as appropriate  - Identify discharge learning needs (meds, wound care, etc.)  - Arrange for interpretive services to assist at discharge as needed  - Refer to Case Management Department for coordinating discharge planning if the patient needs post-hospital services based on  physician/advanced practitioner order or complex needs related to functional status, cognitive ability, or social support system  Outcome: Progressing     Problem: Knowledge Deficit  Goal: Patient/family/caregiver demonstrates understanding of disease process, treatment plan, medications, and discharge instructions  Description: Complete learning assessment and assess knowledge base.  Interventions:  - Provide teaching at level of understanding  - Provide teaching via preferred learning methods  Outcome: Progressing

## 2024-02-23 NOTE — TELEPHONE ENCOUNTER
----- Message from Joselyn Reyes Bahamonde, MD sent at 2/23/2024  1:12 PM EST -----  Hel,    Patient will be discharged from [facility] today and will require a follow-up appointment for hyponatremia in the next 3 to 4 weeks.  With BMP      Thanks     LEON

## 2024-02-23 NOTE — PROGRESS NOTES
NEPHROLOGY PROGRESS NOTE   Rosemary Starkey 73 y.o. female MRN: [id number]  Unit/Bed#: [facility] 925-01 Encounter: [id number]  Reason for Consult: Hyponatremia    ASSESSMENT AND PLAN:  74 yo woman with PMH of COPD, migraine, carotid stenosis, cervical spondylitis p/w headaches, chills.  Patient was found to be hyponatremic and nephrology is consulted for management of hyponatremia    PLAN      #Hyposomolar Hyponatremia   Sodium on admission: 127  Serum [id number]  Urine osm: 207   Urine Sodium: 72  Current sodium 136 mg/L, alcohol  Etiology: 90 secondary to SIADH   Plan:  Sodium at goal  Continue sodium chloride tablets 2 g 3 times daily  Torsemide 10 mg daily  Advised patient to continue fluid restriction 1.5 to 1.8 L at home, alternate drinks  Advised to avoid only water or tea  Patient renal patient seen by PCP on Monday  Advised to get a BMP on Monday after his visit to adjust sodium      #Volume status/hypertension:  Volume: Euvolemic on exam  Blood pressure: Normotensive, /74, goal less than 140/90  Recommend:  Torsemide 10 mg as above      #COPD  Longstanding history of smoking  Denies shortness of breath    #Pneumonia  On ceftriaxone  Antibiotics as per primary team    Discussed with primary team.  Sodium is stable with salt tablets and loop diuretics.  We agree to follow-up  hyponatremia in the office.  Patient    SUBJECTIVE:  Patient seen and examined at bedside. No chest pain, shortness of breath, nausea, vomiting, abdominal pain or diarrhea.   OBJECTIVE:  Current Weight: Weight - Scale: 71.7 kg (158 lb 1.1 oz)  Vitals:    02/23/24 0725   BP:    Pulse:    Resp: 15   Temp:    SpO2:        Intake/Output Summary (Last 24 hours) at 2/23/2024 1218  Last data filed at 2/23/2024 1100  Gross per 24 hour   Intake 940 ml   Output 1450 ml   Net -510 ml     Wt Readings from Last 3 Encounters:   02/20/24 71.7 kg (158 lb 1.1 oz)   01/08/24 71.7 kg (158 lb)   12/26/23 72 kg (158 lb 12.8 oz)     Temp Readings from Last 3  Encounters:   02/23/24 99 °F (37.2 °C)   02/17/24 98.3 °F (36.8 °C)   01/08/24 98.5 °F (36.9 °C)     BP Readings from Last 3 Encounters:   02/23/24 134/73   02/17/24 111/54   12/26/23 140/88     Pulse Readings from Last 3 Encounters:   02/23/24 83   02/17/24 104   01/08/24 93      General:  no acute distress at this time  Skin:  No acute rash  Eyes:  No scleral icterus and noninjected  ENT:  mucous membranes moist  Neck:  no carotid bruits  Chest:  Clear to auscultation percussion, good respiratory effort, no use of accessory respiratory muscles  CVS:  Regular rate and rhythm without rub   Abdomen:  soft and nontender   Extremities: no significant lower extremity edema  Neuro:  No gross focality  Psych:  Alert , cooperative       Medications:    Current Facility-Administered Medications:     acetaminophen (TYLENOL) tablet 650 mg, 650 mg, Oral, Q6H PRN, Edmond Aiad, DO    albuterol (PROVENTIL HFA,VENTOLIN HFA) inhaler 2 puff, 2 puff, Inhalation, Q6H PRN, Edmond Aiad, DO    aspirin (ECOTRIN) EC tablet 325 mg, 325 mg, Oral, Daily, Edmond Aiad, DO, 325 mg at 02/23/24 0901    benzonatate (TESSALON PERLES) capsule 100 mg, 100 mg, Oral, TID PRN, Nenita Chawla MD    ceftriaxone (ROCEPHIN) 1 g/50 mL in dextrose IVPB, 1,000 mg, Intravenous, Q24H, Edmond Aiad, DO, Last Rate: 100 mL/hr at 02/23/24 1120, 1,000 mg at 02/23/24 1120    citalopram (CeleXA) tablet 20 mg, 20 mg, Oral, Daily, Edmond Aiad, DO, 20 mg at 02/23/24 0901    cyclobenzaprine (FLEXERIL) tablet 10 mg, 10 mg, Oral, HS, Edmond Aiad, DO, 10 mg at 02/22/24 2115    dextromethorphan-guaiFENesin (ROBITUSSIN DM) oral syrup 10 mL, 10 mL, Oral, Q4H PRN, Vince Mccormick MD, 10 mL at 02/21/24 1730    divalproex sodium (DEPAKOTE ER) 24 hr tablet 250 mg, 250 mg, Oral, BID, Edmond Gtz DO, 250 mg at 02/23/24 0901    enoxaparin (LOVENOX) subcutaneous injection 40 mg, 40 mg, Subcutaneous, Daily, Edmond Gtz DO, 40 mg at 02/23/24 0901    fluticasone-vilanterol 200-25 mcg/actuation 1 puff, 1 puff,  Inhalation, Daily, Edmond Gtz DO, 1 puff at 02/23/24 1120    guaiFENesin (MUCINEX) 12 hr tablet 1,200 mg, 1,200 mg, Oral, Q12H YON, Nenita Chawla MD, 1,200 mg at 02/23/24 0901    naproxen (NAPROSYN) tablet 500 mg, 500 mg, Oral, BID PRN, Edmond Gtz, DO, 500 mg at 02/23/24 1120    nystatin (MYCOSTATIN) cream, , Topical, BID, Rena Bean PA-C, Given at 02/23/24 0901    ondansetron (ZOFRAN-ODT) dispersible tablet 4 mg, 4 mg, Oral, Q8H PRN, Edmond Gtz DO    polyethylene glycol (MIRALAX) packet 17 g, 17 g, Oral, Daily, Edmond Gtz, DO, 17 g at 02/22/24 2011    pravastatin (PRAVACHOL) tablet 80 mg, 80 mg, Oral, Daily With Dinner, Edmond Gtz DO, 80 mg at 02/22/24 1718    sodium chloride tablet 2 g, 2 g, Oral, BID With Meals, Uri Fernando MD, 2 g at 02/23/24 0901    torsemide (DEMADEX) tablet 10 mg, 10 mg, Oral, Daily, Uri Fernando MD, 10 mg at 02/23/24 0901    zolpidem (AMBIEN) tablet 5 mg, 5 mg, Oral, HS, Edmond Gtz DO, 5 mg at 02/22/24 2115    Laboratory Results:  Results from last 7 days   Lab Units 02/23/24  0454 02/22/24  0904 02/21/24  0537 02/20/24  0543 02/19/24  1959 02/19/24  1444 02/19/24  0555 02/18/24  0452 02/17/24  0958   WBC Thousand/uL 10.54*  --   --  9.10  --   --  10.24* 11.04* 12.65*   HEMOGLOBIN g/dL 13.1  --   --  11.3*  --   --  10.9* 10.7* 11.3*   HEMATOCRIT % 39.5  --   --  33.3*  --   --  32.8* 32.6* 33.1*   PLATELETS Thousands/uL 482*  --   --  340  --   --  287 260 215   SODIUM mmol/L 136 137 130* 131* 130* 129* 126* 128* 127*   POTASSIUM mmol/L 4.2 3.5 4.2 4.5 4.6 4.2 4.4 4.4 4.1   CHLORIDE mmol/L 97 101 95* 95* 93* 93* 91* 93* 92*   CO2 mmol/L 31 28 29 28 28 25 26 28 26   BUN mg/dL 26* 23 16 14 14 14 13 12 11   CREATININE mg/dL 0.81 0.69 0.66 0.67 0.83 0.86 0.65 0.62 0.73   CALCIUM mg/dL 9.0 8.9 8.4 8.6 9.1 8.6 8.7 8.4 8.8       CT chest wo contrast   Final Result by Tracy Pierce MD (02/19 6768)      7 mm groundglass opacity centrally located in the right upper lobe. Per  "2017 Fleischner Society guidelines, follow-up with a chest CT is recommended at 6 to 12 months to confirm persistence and then every 2 years until 5 years of stability is    established.      Bronchial wall thickening in the lower lungs with tree-in-bud nodularity in the inferior lingula and both lower lobes and consolidation in the dependent left lower lobe compatible with bronchitis/bronchiolitis and left lower lobe pneumonia.      This study was marked in Epic for significant notification and follow up.         Workstation performed: [id number]         XR chest 2 views   ED Interpretation by Lakhwinder Helm MD (02/17 1048)   Left lower lobe infiltrate.      Final Result by Tracy Pierce MD (02/17 1955)      Left lower lobe pneumonia.            Workstation performed: [id number]             Portions of the record may have been created with voice recognition software. Occasional wrong word or \"sound a like\" substitutions may have occurred due to the inherent limitations of voice recognition software. Read the chart carefully and recognize, using context, where substitutions have occurred.    "

## 2024-02-23 NOTE — CASE MANAGEMENT
Case Management Discharge Planning Note    Patient name Rosemary Starkey  Location [facility] 925/[facility] 925-01 MRN [id number]  : 1950 Date 2024       Current Admission Date: 2024  Current Admission Diagnosis:Left lower lobe pneumonia   Patient Active Problem List    Diagnosis Date Noted    Hyponatremia 2024    Acute hypoxic respiratory failure (HCC) 2024    Chronic obstructive pulmonary disease with acute exacerbation (HCC) 2024    Migraine without aura and without status migrainosus, not intractable 2023    Chronic cough 2022    Hyperglycemia 2022    COVID 2022    Cervical spondylosis 2021    Chronic fatigue 05/10/2021    Nausea 05/10/2021    Cervicalgia 2021    Ventral hernia without obstruction or gangrene 2020    Pyuria 2020    Acquired hypothyroidism 2020    Overweight 2020    Acute left-sided low back pain with sciatica 2020    Perforated diverticulum 2020    LLQ abdominal pain 2020    Macular degeneration of right eye 2019    Herpes simplex 10/14/2019    Fibromuscular dysplasia of cervicocranial artery (HCC) 2019    Simple chronic bronchitis (HCC) 2019    Bilateral carotid artery stenosis 2019    Fecal occult blood test positive 2019    Primary insomnia 2019    Bruit of right carotid artery 2019    Left lower lobe pneumonia 2018    Chronic migraine without aura without status migrainosus, not intractable 03/15/2018    Tobacco abuse 2015    Chronic migraine without aura 2014    Hyperlipidemia 2012    Depression 2012      LOS (days): 6  Geometric Mean LOS (GMLOS) (days): 4.1  Days to GMLOS:-1.8     OBJECTIVE:  Risk of Unplanned Readmission Score: 10.21         Current admission status: Inpatient   Preferred Pharmacy:   [facility] - [place], [place] - [address] [place] [address]  [id number]  Phone: [phone] Fax: [phone]    [facility] #[id number] - [place], [place] - [address]  [address]  [place] [address]  Phone: [phone] Fax: [phone]    Primary Care Provider: Edin Choi DO    Primary Insurance: [facility]  Secondary Insurance:     DISCHARGE DETAILS:    Discharge planning discussed with:: patient  Freedom of Choice: Yes     CM contacted family/caregiver?: No- see comments  Were Treatment Team discharge recommendations reviewed with patient/caregiver?: Yes  Did patient/caregiver verbalize understanding of patient care needs?: Yes  Were patient/caregiver advised of the risks associated with not following Treatment Team discharge recommendations?: Yes              DME Referral Provided  Referral made for DME?: Yes  DME referral completed for the following items:: Home Oxygen concentrator, Portable Oxygen tanks  DME Supplier Name:: [employer]    Other Referral/Resources/Interventions Provided:  Referral Comments: S/w [facility] & informed pt will need home o2. JESSIE s//w pt about home o2 & agreeable to parachute request.       5085 update: [place] message from [employer]. They s/w pt & confirmed delivery of home o2 with pt for Monday. S/w Yong at [facility] & he will deliver Nas to pt today. Updated DELVIN LOPEZ & pt.          2941 Yong from [employer] delivered Nas to pt in room.

## 2024-02-26 ENCOUNTER — CONSULT (OUTPATIENT)
Dept: PULMONOLOGY | Facility: CLINIC | Age: 74
End: 2024-02-26
Payer: COMMERCIAL

## 2024-02-26 ENCOUNTER — TRANSITIONAL CARE MANAGEMENT (OUTPATIENT)
Dept: INTERNAL MEDICINE CLINIC | Facility: CLINIC | Age: 74
End: 2024-02-26

## 2024-02-26 VITALS
WEIGHT: 148 LBS | TEMPERATURE: 97.8 F | BODY MASS INDEX: 23.89 KG/M2 | HEART RATE: 107 BPM | DIASTOLIC BLOOD PRESSURE: 66 MMHG | SYSTOLIC BLOOD PRESSURE: 136 MMHG | OXYGEN SATURATION: 94 %

## 2024-02-26 DIAGNOSIS — F17.210 NICOTINE DEPENDENCE, CIGARETTES, UNCOMPLICATED: ICD-10-CM

## 2024-02-26 DIAGNOSIS — R91.1 LUNG NODULE: Primary | ICD-10-CM

## 2024-02-26 DIAGNOSIS — J18.9 PNEUMONIA: ICD-10-CM

## 2024-02-26 LAB

## 2024-02-26 PROCEDURE — 94618 PULMONARY STRESS TESTING: CPT | Performed by: INTERNAL MEDICINE

## 2024-02-26 PROCEDURE — 99204 OFFICE O/P NEW MOD 45 MIN: CPT | Performed by: INTERNAL MEDICINE

## 2024-02-26 NOTE — UTILIZATION REVIEW
NOTIFICATION OF ADMISSION DISCHARGE   This is a Notification of Discharge from [facility]. Please be advised that this patient has been discharge from our facility. Below you will find the admission and discharge date and time including the patient’s disposition.   UTILIZATION REVIEW CONTACT:  Joel Lucero  Utilization   Network Utilization Review Department  Phone: [phone] x carefully listen to the prompts. All voicemails are confidential.  Email: [email or web address]     ADMISSION INFORMATION  PRESENTATION DATE: 2/17/2024  9:29 AM  OBERVATION ADMISSION DATE:   INPATIENT ADMISSION DATE: 2/17/24 11:08 AM   DISCHARGE DATE: 2/23/2024  3:47 PM   DISPOSITION:Home/Self Care    Network Utilization Review Department  ATTENTION: Please call with any questions or concerns to [phone] and carefully listen to the prompts so that you are directed to the right person. All voicemails are confidential.   For Discharge needs, contact Care Management DC Support Team at [phone] opt. 2  Send all requests for admission clinical reviews, approved or denied determinations and any other requests to dedicated fax number below belonging to the campus where the patient is receiving treatment. List of dedicated fax numbers for the Facilities:  FACILITY NAME UR FAX NUMBER   ADMISSION DENIALS (Administrative/Medical Necessity) [phone]   DISCHARGE SUPPORT TEAM ([facility]) [phone]   PARENT CHILD HEALTH (Maternity/NICU/Pediatrics) [phone]   [facility] [phone]   [facility] [phone]   [facility] [phone]   [facility] [phone]   [facility] [phone]   [facility] [phone]   [facility] [phone]   [facility] [phone]   [facility]  [facility] [phone]   [facility] [phone]   [facility] [phone]   [facility] [phone]

## 2024-02-26 NOTE — DISCHARGE SUMMARY
Discharge Summary - [facility]    Patient Information: Rosemary Starkey 73 y.o. female MRN: [id number]  Unit/Bed#: [facility] 925-01 Encounter: [id number]    Discharging Physician / Practitioner: Nenita Chawla MD  PCP: Edin Choi DO  Admission Date: 2/17/2024  Discharge Date: 02/23/2024    Disposition:     Home    Reason for Admission: Pneumonia    Discharge Diagnoses:     Principal Problem:    Left lower lobe pneumonia  Active Problems:    Tobacco abuse    Bilateral carotid artery stenosis    Cervical spondylosis    Migraine without aura and without status migrainosus, not intractable    Acute hypoxic respiratory failure (HCC)    Chronic obstructive pulmonary disease with acute exacerbation (HCC)    Hyponatremia  Resolved Problems:    * No resolved hospital problems. *     Assessment and plan      essment/Plan   * Left lower lobe pneumonia  Assessment & Plan  presenting with 4 days of fever, chills, cough and headache. Seen at urgent care, SpO2 reportedly was 80% on room air and was sent to the ED  Longstanding history of smoking, emphysema on x-ray.  No official COPD diagnosis.  CXR with LLL pneumonia.   Currently afebrile, nontoxic.  WBCs downtrending.  Continue ceftriaxone.  Continue prednisone for suspected COPD exacerbation in the setting of pneumonia.  Urine Legionella and strep antigen negative.  Blood cultures negative to date.  Finished abx course with ceftriaxoen for 7 days      Hyponatremia  Assessment & Plan  Baseline around 128-135.  Sodium was down to 126. Sdoium level is within normal limits   Nephrology consulted, suspecting SIADH in the setting of pneumonia/COPD, medications including Celexa and divalproex.  Salt tablets- and torsemide , fluid restriction  CT chest reviewed    Bmp in a week and outpatient follow up with nephrology      Chronic obstructive pulmonary disease with acute exacerbation (HCC)  Assessment & Plan  Clinically; no no prior PFTs.   Long standing hx of smoking,  wheezing this presentation.  Continue albuterol, Advair and prednisone burst.  Refer to pulmonology at discharge for follow-up.     Acute hypoxic respiratory failure (HCC)  Assessment & Plan  In the setting of left lower lobe pneumonia and possible COPD exacerbation.  Continue to monitor oxygen-   Arrange for home oxygen as outpatient      Migraine without aura and without status migrainosus, not intractable  Assessment & Plan  Continue home citalopram, Depakote, Imitrex and naproxen     Cervical spondylosis  Assessment & Plan  Continue home Flexeril and naproxen     Bilateral carotid artery stenosis  Assessment & Plan  Continue home aspirin 325 mg daily and high intensity statin      Tobacco abuse  Assessment & Plan  Smokes 13 cigarettes a day, has emphysema on x-ray  Counseled on smoking cessation and offered but patient refused nicotine replacement while inpatient.  Patient reported that she is not interested in quitting  Strongly encourage cessation  .                Consultations During Hospital Stay:  Nephrology    Procedures Performed:         Significant Findings / Test Results:     CT chest-7 mm groundglass as opacity centrally located in the right upper lobe.  Follow-up with a chest CT recommended at 6 to 12 months to confirm persistence and then every 2 years until 5 years of stability is established.  Bronchial wall thickening in the lower lungs with tree-in-bud nodular rete in the inferior lingula and both lower lobes and consolidation in the dependent left lower lobe compatible with bronchitis bronchiolitis and left lower lobe pneumonia  Chest x-ray-left lower lobe pneumonia    Incidental Findings:        Test Results Pending at Discharge (will require follow up):        Outpatient Tests Requested:   BMP in a week  Complications:   none    Hospital Course:     Rosemary Starkey is a 73 y.o. female patient who originally presented to the hospital on 2/17/2024 due to hypoxemia.  This is a 73-year-old female  with past medical history significant for migraine, cervical spondylitis, carotid stenosis on aspirin and standing currently everyday smoker presented to the emergency room from the urgent care center due to hypoxemia.  Patient gives history of headache chills and dry cough and wheezing for 3 to 4 days prior to coming to the hospital.  Patient had a chest x-ray which was concerning for left lower lobe pneumonia.  Patient was started on antibiotics.  Patient finished the course of ceftriaxone while in the hospital.  Patient with history of COPD with chronic smoking.  Patient received prednisone for 5 days.  Symptomatically improved.  Patient noted to have persistent hypoxemia and will be requiring home oxygen.  Patient also noted to have hyponatremia.  Workup was concerning for SIADH.  As part of the workup patient had a CT chest which was concerning for 7 mm groundglass opacity in the right upper lobe.  Repeat CT is recommended in 6 to 12 months.  Given her abnormal CT finding and COPD will refer to pulmonology as outpatient.  Patient remained hemodynamically stable and afebrile.   Patient was on fluid restriction and also with salt tablets.  Sodium Improved.  On the day of discharge salt level is within normal limits.  By reviewing the record it appears that patient had low sodium prior to coming to the hospital.  Patient drinks a lot of fluids as outpatient which may be contributing to the hyponatremia to.  Patient will be on fluid restriction.  Plan is to get a BMP in a week and patient need outpatient follow-up with nephrology for further management   Encourage smoking cessation but patient is not receptive for smoking cessation yet.  Patient remained afebrile and hemodynamically stable.  For details refer to the chart.  Patient    Condition at Discharge: Stable  Discharge Day Visit / Exam:     Subjective: Patient seen and examined.  Patient is agreeable for home oxygen.  Will be discharged home with oxygen with  "outpatient follow-up  Vitals: Blood Pressure: 113/69 (02/23/24 1504)  Pulse: 89 (02/23/24 1504)  Temperature: 98 °F (36.7 °C) (02/23/24 1504)  Temp Source: Oral (02/18/24 2157)  Respirations: 18 (02/23/24 1504)  Height: 5' 6\" (167.6 cm) (02/23/24 1235)  Weight - Scale: 71.7 kg (158 lb 1.1 oz) (02/20/24 0900)  SpO2: 92 % (02/23/24 1504)  Exam:   Physical Exam  Constitutional:       General: She is not in acute distress.     Appearance: She is not ill-appearing.   HENT:      Head: Normocephalic.      Nose: Nose normal.   Eyes:      General: No scleral icterus.  Cardiovascular:      Rate and Rhythm: Normal rate and regular rhythm.      Pulses: Normal pulses.      Heart sounds: Normal heart sounds. No murmur heard.  Pulmonary:      Effort: Pulmonary effort is normal. No respiratory distress.      Comments: Decreased breath sounds bilateral  Abdominal:      General: Abdomen is flat. There is no distension.      Tenderness: There is no abdominal tenderness.   Musculoskeletal:         General: No swelling. Normal range of motion.   Skin:     General: Skin is warm.      Coloration: Skin is not jaundiced.   Neurological:      Mental Status: She is alert. Mental status is at baseline.         Discussion with Family: Offered to call family patient declined    Discharge instructions/Information to patient and family:   See after visit summary for information provided to patient and family.      Provisions for Follow-Up Care:  See after visit summary for information related to follow-up care and any pertinent home health orders.      Planned Readmission: none     Discharge Statement:  I spent 45 minutes discharging the patient. This time was spent on the day of discharge. I had direct contact with the patient on the day of discharge. Greater than 50% of the total time was spent examining patient, answering all patient questions, arranging and discussing plan of care with patient as well as directly providing post-discharge " instructions.  Additional time then spent on discharge activities.    Discharge Medications:  See after visit summary for reconciled discharge medications provided to patient and family.      ** Please Note: This note has been constructed using a voice recognition system **

## 2024-02-26 NOTE — PROGRESS NOTES
Pulmonary Consultation   Rosemary Starkey 73 y.o. female MRN: [id number]    Encounter: [id number]      Reason for consultation:   Abnormal CT scan of the chest    Requesting physician:  Edin Choi DO      Impressions:   Lung nodule.  Recent pneumonia with a recent hospitalization.  Acute hypoxemic respiratory failure.  Ongoing tobacco use.      Recommendations:  6-minute walk test.  Discontinue home oxygen.  Smoking cessation.  CT scanning of the chest without IV contrast in February 2025.  Follow-up in 1 year.      Discussion:  The patient's CT findings of the groundglass right upper lobe nodular opacity is most likely inflammatory.  However given the patient's high risk for lung cancer I have ordered a CT scan of the chest without contrast to be done in 1 year.  The acute hypoxemic respiratory failure which is caused by the pneumonia has resolved.  She did not desaturate on the 6-minute walk test.  I have discontinued the home oxygen.  I had a long discussion with her for and her  regarding her smoking habit.  The patient is still struggling with the urge to smoke however I am hoping that she will be able to quit completely.  I will see her in 1 year after the CT of the chest is done.              History of Present Illness   HPI:  Rosemary Starkey is a 73 y.o. female who is here for evaluation of abnormal CT scan of the chest.  The patient was having cough and shortness of breath.  She presented to the urgent center and she was found to be hypoxic.  She was referred to [facility] and was admitted to the hospital.  She was diagnosed with pneumonia and treated with antibiotics.  She had a CT scan of the chest when she was in the hospital and that was abnormal.  On discharge she was hypoxic and was discharged on home oxygen.  Today she feels a lot better.  Her cough has subsided.  She denies fever or chills.    Review of systems:  12 point review of systems was completed and was otherwise  negative except as listed in HPI.      Historical Information   Past Medical History:   Diagnosis Date    Hyperlipidemia     Migraine      Past Surgical History:   Procedure Laterality Date    BREAST EXCISIONAL BIOPSY Right 1986    HARTMANS PROCEDURE N/A 3/8/2020    Procedure: Laparoscopic drainage of intra peritoneal abscess, sigmoid rescetion,;  Surgeon: NOEL Ogden MD;  Location: [facility];  Service: Colorectal    HYSTERECTOMY  1978    age 28    NASAL SEPTUM SURGERY      deviation repair    GA LAPS ABD PRTM&OMENTUM DX W/WO SPEC BR/WA SPX N/A 3/8/2020    Procedure: LAPAROSCOPY DIAGNOSTIC;  Surgeon: NOEL Ogden MD;  Location: [facility];  Service: Colorectal    GA LAPS REPAIR HERNIA EXCEPT INCAL/INGUN REDUCIBLE N/A 1/26/2021    Procedure: REPAIR HERNIA VENTRAL LAPAROSCOPIC;  Surgeon: NOEL Ogden MD;  Location: [facility];  Service: Colorectal    GA SIGMOIDOSCOPY FLX DX W/COLLJ SPEC BR/WA IF PFRMD N/A 3/8/2020    Procedure: SIGMOIDOSCOPY FLEXIBLE;  Surgeon: NOEL Ogden MD;  Location: [facility];  Service: Colorectal    SHOULDER SURGERY       Family History   Problem Relation Age of Onset    Breast cancer Mother 50    Heart disease Father     Diabetes Sister     Leukemia Sister     No Known Problems Maternal Grandmother     No Known Problems Maternal Grandfather     No Known Problems Paternal Grandmother     No Known Problems Paternal Grandfather     No Known Problems Sister     Breast cancer Maternal Aunt 50    No Known Problems Maternal Aunt     No Known Problems Paternal Aunt     Colon cancer Maternal Uncle     No Known Problems Son     No Known Problems Son     Lung cancer Other 47       Family History:  Emphysema in both parents.  Both were smokers.     Social History:  The patient is  and lives with her .  She has about 50-pack-year smoking history and continues to smoke three quarters of a pack a day until her admission to the hospital.  She has a cat and 2  dogs.      Meds/Allergies   No current facility-administered medications for this visit.     (Not in a hospital admission)    Allergies   Allergen Reactions    Penicillins Anaphylaxis    Azithromycin Hives    Nisoldipine      Reaction Date: 14Apr2011;     Sulfa Antibiotics Hives       Vitals: Blood pressure 136/66, pulse (!) 107, temperature 97.8 °F (36.6 °C), temperature source Tympanic, weight 67.1 kg (148 lb), SpO2 94%, not currently breastfeeding.,      Physical exam:        Head/eyes:    Normocephalic, without obvious abnormality, atraumatic,         PERRL, extraocular muscles intact, no scleral icterus    Nose:   Nares normal, septum midline, mucosa normal, no drainage    or sinus tenderness   Throat:   Moist mucous membranes, no thrush   Neck:   Supple, trachea midline, no adenopathy; no carotid    bruit or JVD   Lungs:   Clear breath sounds.  No wheezing or rhonchi.        Heart:    Regular rate and rhythm, S1 and S2 normal, no murmur, rub   or gallop   Abdomen:     Soft, non-tender, bowel sounds active all four quadrants,     no masses, no organomegaly   Extremities:   Extremities normal, atraumatic, no cyanosis or edema   Skin:   Warm, dry, turgor normal, no rashes or lesions   Neurologic:   CNII-XII intact, normal strength, non-focal             Imaging and other studies: I have personally reviewed pertinent films in PACS CT of the chest is reviewed on the PACS system.  It showed about 7 mm groundglass opacity nodule in the right upper lobe.  Inflammatory changes involving the lower lobes more so in the left lower lobe.    Lab Results   Component Value Date    WBC 10.54 (H) 02/23/2024    HGB 13.1 02/23/2024    HCT 39.5 02/23/2024    MCV 94 02/23/2024     (H) 02/23/2024     Lab Results   Component Value Date    SODIUM 136 02/23/2024    K 4.2 02/23/2024    CL 97 02/23/2024    CO2 31 02/23/2024    BUN 26 (H) 02/23/2024    CREATININE 0.81 02/23/2024    GLUC 85 02/23/2024    CALCIUM 9.0 02/23/2024      6-minute walk test was done today in the office.  The patient started with a heart rate of 100 bpm and O2 saturation 94%.  At the end of the test the heart rate was 111 bpm and O2 saturation 93%.  She walked 270 m.        Adriel Zamarripa MD

## 2024-02-27 ENCOUNTER — OFFICE VISIT (OUTPATIENT)
Dept: INTERNAL MEDICINE CLINIC | Facility: CLINIC | Age: 74
End: 2024-02-27
Payer: COMMERCIAL

## 2024-02-27 VITALS
WEIGHT: 151 LBS | BODY MASS INDEX: 24.27 KG/M2 | HEIGHT: 66 IN | HEART RATE: 89 BPM | OXYGEN SATURATION: 97 % | TEMPERATURE: 97.6 F | SYSTOLIC BLOOD PRESSURE: 112 MMHG | DIASTOLIC BLOOD PRESSURE: 68 MMHG

## 2024-02-27 DIAGNOSIS — M47.812 CERVICAL SPONDYLOSIS: ICD-10-CM

## 2024-02-27 DIAGNOSIS — E87.1 HYPONATREMIA: ICD-10-CM

## 2024-02-27 DIAGNOSIS — J44.1 CHRONIC OBSTRUCTIVE PULMONARY DISEASE WITH ACUTE EXACERBATION (HCC): ICD-10-CM

## 2024-02-27 DIAGNOSIS — G43.709 CHRONIC MIGRAINE WITHOUT AURA WITHOUT STATUS MIGRAINOSUS, NOT INTRACTABLE: ICD-10-CM

## 2024-02-27 DIAGNOSIS — I65.23 BILATERAL CAROTID ARTERY STENOSIS: ICD-10-CM

## 2024-02-27 DIAGNOSIS — J18.9 PNEUMONIA OF LEFT LOWER LOBE DUE TO INFECTIOUS ORGANISM: Primary | ICD-10-CM

## 2024-02-27 DIAGNOSIS — Z72.0 TOBACCO ABUSE: ICD-10-CM

## 2024-02-27 DIAGNOSIS — J96.01 ACUTE HYPOXIC RESPIRATORY FAILURE (HCC): ICD-10-CM

## 2024-02-27 DIAGNOSIS — R91.8 GROUND GLASS OPACITY PRESENT ON IMAGING OF LUNG: ICD-10-CM

## 2024-02-27 PROCEDURE — 99496 TRANSJ CARE MGMT HIGH F2F 7D: CPT | Performed by: STUDENT IN AN ORGANIZED HEALTH CARE EDUCATION/TRAINING PROGRAM

## 2024-02-27 RX ORDER — DIVALPROEX SODIUM 250 MG/1
250 TABLET, EXTENDED RELEASE ORAL 2 TIMES DAILY
Qty: 180 TABLET | Refills: 3 | Status: SHIPPED | OUTPATIENT
Start: 2024-02-27

## 2024-02-27 NOTE — PROGRESS NOTES
Assessment & Plan     1. Pneumonia of left lower lobe due to infectious organism  Assessment & Plan:  Improving, patient with hypoxemia at 80% on arrival with improvement of SpO2 with standard respiratory protocol.   Patient current smoker with extensive smoking hx.   CXR and CT chest reviewed.  Patient completed abx and oral steroid course.   Following with pulmonology. Pt weaned off home O2 on 2/26/24. Vitals stable. Will continue to monitor.       2. Chronic obstructive pulmonary disease with acute exacerbation (HCC)  Assessment & Plan:  Clinical dx as no PFT on file  Managed on Advair and Albuterol  Will continue to minitor      3. Hyponatremia  Assessment & Plan:  Resolved prior to discharge, repeat BMP in one week.  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  Advised continued fluid restriction (1200 cc) until follow up with nephrology.  Patient advised to book follow up with nephrology. She is agreeable.       4. Acute hypoxic respiratory failure (HCC)  Assessment & Plan:  In the setting of COPD and pneumonia  Resolved, vitals continue to be stable  Will continue to monitor       5. Chronic migraine without aura without status migrainosus, not intractable  Assessment & Plan:  Managed on citalopram, Depakote, Imitrex, and Naproxen  C/w current regimen      6. Cervical spondylosis  Assessment & Plan:  Managed on Flexeril and Naproxen  C/w current regimen      7. Bilateral carotid artery stenosis  Assessment & Plan:  Managed on  and Zocor 40  C/w current regimen      8. Tobacco abuse  Assessment & Plan:  Currently smoking 13 cigarettes per day  Patient in pre-contemplative stage of quitting      9. Ground glass opacity present on imaging of lung  Assessment & Plan:  Findings from CT 2/19/24. Repeat CT in 6-12 months.  F/u with pulm.           Subjective     Transitional Care Management Review:   Rosemary Starkey is a 73 y.o. female here for TCM  follow up. Patient admitted to hospital for respiratory distress. Per chart review, subsequent CXR significant for left lower lobe pneumonia. Patient treated appropriately with improvement of symptoms. Discharged on 2L home O2 and weaned off by pulmonology on 2/26/24.    Patient reports continued improvement since hospital discharge on 2/23/24. Patient currently denies worsening dyspnea, chest pain, fever, or cough.     During the TCM phone call patient stated:  TCM Call       Date and time call was made  2/26/2024 10:56 AM    Hospital care reviewed  Records reviewed    Patient was hospitial[facility] at  [facility]    Date of Admission  02/17/24    Date of discharge  02/23/24    Diagnosis  pneumonia    Disposition  Home    Were the patients medications reviewed and updated  Yes    Current Symptoms  None          TCM Call       Should patient be enrolled in anticoag monitoring?  No    Scheduled for follow up?  Yes    Did you obtain your prescribed medications  Yes    Do you need help managing your prescriptions or medications  No    Is transportation to your appointment needed  No    I have advised the patient to call PCP with any new or worsening symptoms  Jennifer Arambula MA    Counseling  Patient    Comments  Patient is scheduled for a virtual visit 3/24/20          Review of Systems   Constitutional:  Negative for chills and fever.   HENT:  Negative for ear pain and sore throat.    Eyes:  Negative for pain and visual disturbance.   Respiratory:  Positive for shortness of breath (exertional, at baseline). Negative for cough.    Cardiovascular:  Negative for chest pain and palpitations.   Gastrointestinal:  Negative for abdominal pain and vomiting.   Genitourinary:  Negative for dysuria and hematuria.   Musculoskeletal:  Negative for arthralgias and back pain.   Skin:  Negative for color change and rash.   Neurological:  Negative for seizures and syncope.   All other systems reviewed and are  "negative.      Objective     /68 (BP Location: Left arm, Patient Position: Sitting, Cuff Size: Standard)   Pulse 89   Temp 97.6 °F (36.4 °C)   Ht 5' 6\" (1.676 m)   Wt 68.5 kg (151 lb)   SpO2 97%   BMI 24.37 kg/m²      Physical Exam  Vitals and nursing note reviewed.   Constitutional:       General: She is not in acute distress.     Appearance: She is well-developed.   HENT:      Head: Normocephalic and atraumatic.   Eyes:      Conjunctiva/sclera: Conjunctivae normal.   Cardiovascular:      Rate and Rhythm: Normal rate and regular rhythm.      Heart sounds: Normal heart sounds. No murmur heard.  Pulmonary:      Effort: Pulmonary effort is normal. No respiratory distress.      Breath sounds: Normal breath sounds.   Abdominal:      Palpations: Abdomen is soft.      Tenderness: There is no abdominal tenderness.   Musculoskeletal:         General: No swelling.      Cervical back: Neck supple.   Skin:     General: Skin is warm and dry.      Capillary Refill: Capillary refill takes less than 2 seconds.   Neurological:      Mental Status: She is alert.   Psychiatric:         Mood and Affect: Mood normal.       Medications have been reviewed by provider in current encounter    Harmony Hinton MD         "

## 2024-02-27 NOTE — LETTER
February 28, 2024     Patient: Rosemary Starkey  YOB: 1950  Date of Visit: 2/27/2024      To Whom it May Concern:    Rosemary Starkey is under my professional care. Rosemary was seen in my office on 2/27/2024. Rosemary may return to work on 3/13/24 . She is excused from 2/17/24 -3/13/24.    If you have any questions or concerns, please don't hesitate to call.         Sincerely,          Harmony Hinton MD        CC: No Recipients

## 2024-02-28 PROBLEM — R91.8 GROUND GLASS OPACITY PRESENT ON IMAGING OF LUNG: Status: ACTIVE | Noted: 2024-02-28

## 2024-02-28 NOTE — TELEPHONE ENCOUNTER
Called pt and scheduled HFU appt on 4/1/24 in the [facility] with Sruthi Richardson. Advised for her to complete BMP a week prior. Pt understood with no further questions.

## 2024-02-28 NOTE — ASSESSMENT & PLAN NOTE
Improving, patient with hypoxemia at 80% on arrival with improvement of SpO2 with standard respiratory protocol.   Patient current smoker with extensive smoking hx.   CXR and CT chest reviewed.  Patient completed abx and oral steroid course.   Following with pulmonology. Pt weaned off home O2 on 2/26/24. Vitals stable. Will continue to monitor.

## 2024-02-28 NOTE — ASSESSMENT & PLAN NOTE
Resolved prior to discharge, repeat BMP in one week.  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  Advised continued fluid restriction (1200 cc) until follow up with nephrology.  Patient advised to book follow up with nephrology. She is agreeable.

## 2024-02-28 NOTE — ASSESSMENT & PLAN NOTE
In the setting of COPD and pneumonia  Resolved, vitals continue to be stable  Will continue to monitor

## 2024-03-19 ENCOUNTER — TELEPHONE (OUTPATIENT)
Dept: NEUROLOGY | Facility: CLINIC | Age: 74
End: 2024-03-19

## 2024-03-19 NOTE — TELEPHONE ENCOUNTER
Received fax with the following approval details:     Approved      Botox 200 UNITS     Qty. 1     Auth# [id number]     Valid: 3/18/2024-3/17/2025     Visits: 4     Please use stock

## 2024-03-21 ENCOUNTER — TELEPHONE (OUTPATIENT)
Dept: NEPHROLOGY | Facility: CLINIC | Age: 74
End: 2024-03-21

## 2024-03-21 NOTE — TELEPHONE ENCOUNTER
Called pt to reschedule HFU appt on 4/1/24 with Sruthi Richardson in the [facility], due to provider schedule changes. LVM asking pt to please call office to reschedule. Next available would be in May. I would advise her to still complete her BMP (in the chart) around the 1st week of April so the providers can review if pt needs to be seen any sooner. Schedule for next available with any [facility] provider or [facility].

## 2024-03-26 ENCOUNTER — PROCEDURE VISIT (OUTPATIENT)
Dept: NEUROLOGY | Facility: CLINIC | Age: 74
End: 2024-03-26
Payer: COMMERCIAL

## 2024-03-26 VITALS
HEART RATE: 80 BPM | TEMPERATURE: 97.9 F | OXYGEN SATURATION: 100 % | DIASTOLIC BLOOD PRESSURE: 71 MMHG | SYSTOLIC BLOOD PRESSURE: 161 MMHG | HEIGHT: 66 IN | WEIGHT: 156.1 LBS | BODY MASS INDEX: 25.09 KG/M2

## 2024-03-26 DIAGNOSIS — G43.709 CHRONIC MIGRAINE WITHOUT AURA WITHOUT STATUS MIGRAINOSUS, NOT INTRACTABLE: Primary | ICD-10-CM

## 2024-03-26 DIAGNOSIS — G43.009 MIGRAINE WITHOUT AURA AND WITHOUT STATUS MIGRAINOSUS, NOT INTRACTABLE: ICD-10-CM

## 2024-03-26 PROCEDURE — 64615 CHEMODENERV MUSC MIGRAINE: CPT | Performed by: PHYSICIAN ASSISTANT

## 2024-03-26 RX ORDER — NAPROXEN 500 MG/1
500 TABLET ORAL 2 TIMES DAILY PRN
Qty: 90 TABLET | Refills: 0 | Status: SHIPPED | OUTPATIENT
Start: 2024-03-26

## 2024-03-26 RX ORDER — SUMATRIPTAN 100 MG/1
TABLET, FILM COATED ORAL
Qty: 27 TABLET | Refills: 0 | Status: SHIPPED | OUTPATIENT
Start: 2024-03-26

## 2024-03-26 NOTE — PROGRESS NOTES
Chemodenervation     Date/Time  3/26/2024 2:30 PM     Performed by  Gissel Arizmendi PA-C   Authorized by  Gissel Arizmendi PA-C     Pre-procedure details      Prepped With: Alcohol     Anesthesia  (see MAR for exact dosages):     Anesthesia method:  None   Procedure details      Position:  Upright   Botox      Botox Type:  Type A    Brand:  Botox    mL's of Botulinum Toxin:  200    Final Concentration per CC:  200 units    Needle Gauge:  30 G 2.5 inch   Procedures      Botox Procedures: chronic headache      Indications: migraines     Injection Location      Head / Face:  L superior cervical paraspinal, R superior cervical paraspinal, L , R , L frontalis, R frontalis, L medial occipitalis, R medial occipitalis, procerus, L temporalis, R temporalis, L superior trapezius and R superior trapezius    L  injection amount:  5 unit(s)    R  injection amount:  5 unit(s)    L lateral frontalis:  5 unit(s)    R lateral frontalis:  5 unit(s)    L medial frontalis:  5 unit(s)    R medial frontalis:  5 unit(s)    L temporalis injection amount:  20 unit(s)    R temporalis injection amount:  20 unit(s)    Procerus injection amount:  5 unit(s)    L medial occipitalis injection amount:  15 unit(s)    R medial occipitalis injection amount:  15 unit(s)    L superior cervical paraspinal injection amount:  10 unit(s)    R superior cervical paraspinal injection amount:  10 unit(s)    L superior trapezius injection amount:  15 unit(s)    R superior trapezius injection amount:  15 unit(s)   Total Units      Total units used:  200    Total units discarded:  0   Post-procedure details      Chemodenervation:  Chronic migraine    Facial Nerve Location::  Bilateral facial nerve    Patient tolerance of procedure:  Tolerated well, no immediate complications   Comments       45U bilat occipitalis and paraspinal  All medically necessary

## 2024-03-27 ENCOUNTER — APPOINTMENT (OUTPATIENT)
Dept: LAB | Age: 74
End: 2024-03-27
Payer: COMMERCIAL

## 2024-03-27 DIAGNOSIS — E87.1 HYPONATREMIA: ICD-10-CM

## 2024-03-27 LAB
ANION GAP SERPL CALCULATED.3IONS-SCNC: 6 MMOL/L (ref 4–13)
BUN SERPL-MCNC: 19 MG/DL (ref 5–25)
CALCIUM SERPL-MCNC: 9.1 MG/DL (ref 8.4–10.2)
CHLORIDE SERPL-SCNC: 101 MMOL/L (ref 96–108)
CO2 SERPL-SCNC: 30 MMOL/L (ref 21–32)
CREAT SERPL-MCNC: 0.83 MG/DL (ref 0.6–1.3)
GFR SERPL CREATININE-BSD FRML MDRD: 69 ML/MIN/1.73SQ M
GLUCOSE P FAST SERPL-MCNC: 103 MG/DL (ref 65–99)
POTASSIUM SERPL-SCNC: 4.6 MMOL/L (ref 3.5–5.3)
SODIUM SERPL-SCNC: 137 MMOL/L (ref 135–147)

## 2024-03-27 PROCEDURE — 36415 COLL VENOUS BLD VENIPUNCTURE: CPT

## 2024-03-27 PROCEDURE — 80048 BASIC METABOLIC PNL TOTAL CA: CPT

## 2024-04-09 ENCOUNTER — OFFICE VISIT (OUTPATIENT)
Dept: NEPHROLOGY | Facility: CLINIC | Age: 74
End: 2024-04-09

## 2024-04-09 VITALS
BODY MASS INDEX: 25.07 KG/M2 | HEART RATE: 83 BPM | DIASTOLIC BLOOD PRESSURE: 87 MMHG | HEIGHT: 66 IN | SYSTOLIC BLOOD PRESSURE: 150 MMHG | WEIGHT: 156 LBS

## 2024-04-09 DIAGNOSIS — E87.1 HYPONATREMIA: ICD-10-CM

## 2024-04-09 DIAGNOSIS — R03.0 ELEVATED BLOOD PRESSURE READING WITHOUT DIAGNOSIS OF HYPERTENSION: Primary | ICD-10-CM

## 2024-04-09 RX ORDER — SODIUM CHLORIDE 1 G/1
1 TABLET ORAL 2 TIMES DAILY WITH MEALS
Qty: 180 TABLET | Refills: 3 | Status: SHIPPED | OUTPATIENT
Start: 2024-04-09

## 2024-04-09 NOTE — PROGRESS NOTES
OFFICE FOLLOW UP - Nephrology   Rosemary Houser 73 y.o. female MRN: [id number]    Encounter: [id number]        ASSESSMENT & PLAN    Problem List Items Addressed This Visit          Other    Hyponatremia     Sodium went to 123 while in the hospital with her pneumonia.  This likely contributed to the low sodium level  Her sodium level has now normalized  She likely has an element of SIADH from her Celexa and NSAID use  Would continue fluid restriction of 1.5 to 2 L  Decrease salt tablets by one 1 g 2 times daily  Will check BMPs every month if sodium remains stable we can de-escalate further  Her TSH was normal, cortisol was not checked at the time of her hospitalization, she had a CT of the chest that did not show any malignancies         Relevant Medications    sodium chloride 1 g tablet    Other Relevant Orders    Osmolality, urine    Sodium, urine, random    Osmolality-If this is regarding a toxic alcohol, STOP. Test is not routinely indicated. Please consult medical  on call for further guidance.    Basic metabolic panel    Elevated blood pressure reading without diagnosis of hypertension - Primary     Blood pressure is elevated today  Asked her to check her blood pressures at home we discussed the correct way to check blood pressure.  She is normally normotensive.  We discussed a low-salt diet and exercise.  If home ambulatory blood pressures are elevated we will consider starting medications.  She also may be salt sensitive so de-escalating salt tablets may help            DISCUSSION:    Her sodium levels have now stabilized to normal.  We will attempt to de-escalate her salt tablets but keep her on a fluid restriction.  Will check blood work monthly.  She was in agreement with this plan and had no further questions.  Will tentatively follow-up in 6 months    HPI: Rosemary Houser is a 73 y.o. female who is here for Follow-up and Hyponatremia    Rosemary is 73 years old and has a history of COPD, chronic  migraines, had a low oxygen and was found to have a pneumonia this was complicated by hyponatremia.  Patient had some severe confusion.  She was treated with fluid restriction and salt tablets, she was treated for pneumonia.  Her sodium is now stabilized.  She currently denies any acute chest pain or shortness of breath fevers or chills.  She is tolerating the fluid restriction and salt tablets without difficulty.  She has no chest pain or shortness of breath fevers or chills no nausea vomiting diarrhea constipation no foamy or bloody urine      ROS:   Review of Systems   Constitutional: Negative.    HENT: Negative.     Eyes: Negative.    Respiratory: Negative.     Cardiovascular: Negative.    Gastrointestinal: Negative.    Endocrine: Negative.    Genitourinary: Negative.    Musculoskeletal: Negative.    Allergic/Immunologic: Negative.    Neurological: Negative.    Hematological: Negative.    Psychiatric/Behavioral: Negative.         Allergies: Penicillins, Azithromycin, Nisoldipine, and Sulfa antibiotics    Medications:   Current Outpatient Medications:     albuterol (Ventolin HFA) 90 mcg/act inhaler, Inhale 2 puffs every 6 (six) hours as needed for wheezing, Disp: 18 g, Rfl: 3    aspirin (ECOTRIN) 325 mg EC tablet, Take 1 tablet (325 mg total) by mouth daily, Disp: 30 tablet, Rfl: 0    benzonatate (TESSALON PERLES) 100 mg capsule, Take 1 capsule (100 mg total) by mouth 3 (three) times a day as needed for cough, Disp: 20 capsule, Rfl: 0    citalopram (CeleXA) 20 mg tablet, TAKE 1 TABLET DAILY, Disp: 90 tablet, Rfl: 3    cyclobenzaprine (FLEXERIL) 10 mg tablet, Take 1 tablet (10 mg total) by mouth daily at bedtime, Disp: 90 tablet, Rfl: 3    divalproex sodium (DEPAKOTE ER) 250 mg 24 hr tablet, TAKE 1 TABLET TWICE A DAY, Disp: 180 tablet, Rfl: 3    Fluticasone-Salmeterol (Advair) 100-50 mcg/dose inhaler, Inhale 1 puff 2 (two) times a day Rinse mouth after use., Disp: 180 blister, Rfl: 3    guaiFENesin 1200 MG TB12,  Take 1 tablet (1,200 mg total) by mouth every 12 (twelve) hours, Disp: 20 tablet, Rfl: 0    naproxen (Naprosyn) 500 mg tablet, Take 1 tablet (500 mg total) by mouth 2 (two) times a day as needed for mild pain, Disp: 90 tablet, Rfl: 0    nystatin (MYCOSTATIN) cream, Apply topically 2 (two) times a day for 10 days, Disp: 15 g, Rfl: 0    polyethylene glycol (GLYCOLAX) 17 GM/SCOOP powder, Take 17 g by mouth daily , increase to twice daily if needed, Disp: 850 g, Rfl: 3    simvastatin (ZOCOR) 40 mg tablet, TAKE 1 TABLET DAILY, Disp: 90 tablet, Rfl: 3    sodium chloride 1 g tablet, Take 1 tablet (1 g total) by mouth 2 (two) times a day with meals, Disp: 180 tablet, Rfl: 3    SUMAtriptan (IMITREX) 100 mg tablet, TAKE 1 TABLET AT ONSET OF MIGRAINE HEADACHE. MAY REPEAT IN 2 HOURS IF NEEDED, NO MORE THAN 2 IN 24 HOURS AND NO MORE THAN 3 IN A WEEK, Disp: 27 tablet, Rfl: 0    zolpidem (AMBIEN) 5 mg tablet, One pill at bedtime as needed to help with sleep, Disp: 90 tablet, Rfl: 1    Lidocaine Viscous HCl (XYLOCAINE) 2 % mucosal solution, , Disp: , Rfl:     ondansetron (ZOFRAN) 4 mg tablet, Take 1 tablet (4 mg total) by mouth every 8 (eight) hours as needed for nausea or vomiting (Patient not taking: Reported on 4/9/2024), Disp: 20 tablet, Rfl: 4    torsemide (DEMADEX) 10 mg tablet, Take 1 tablet (10 mg total) by mouth daily (Patient not taking: Reported on 4/9/2024), Disp: 30 tablet, Rfl: 0    Past Medical History:   Diagnosis Date    Hyperlipidemia     Migraine      Past Surgical History:   Procedure Laterality Date    BREAST EXCISIONAL BIOPSY Right 1986    HARTMANS PROCEDURE N/A 3/8/2020    Procedure: Laparoscopic drainage of intra peritoneal abscess, sigmoid rescetion,;  Surgeon: NOEL Ogden MD;  Location: [facility];  Service: Colorectal    HYSTERECTOMY  1978    age 28    NASAL SEPTUM SURGERY      deviation repair    MN LAPS ABD PRTM&OMENTUM DX W/WO SPEC BR/WA SPX N/A 3/8/2020    Procedure: LAPAROSCOPY DIAGNOSTIC;   "Surgeon: NOEL Ogden MD;  Location: [facility];  Service: Colorectal    W[place] LAPS REPAIR HERNIA EXCEPT INCAL/INGUN REDUCIBLE N/A 1/26/2021    Procedure: REPAIR HERNIA VENTRAL LAPAROSCOPIC;  Surgeon: NOEL Ogden MD;  Location: [facility];  Service: Colorectal    WY SIGMOIDOSCOPY FLX DX W/COLLJ SPEC BR/WA IF PFRMD N/A 3/8/2020    Procedure: SIGMOIDOSCOPY FLEXIBLE;  Surgeon: NOEL Ogden MD;  Location: [facility];  Service: Colorectal    SHOULDER SURGERY       Family History   Problem Relation Age of Onset    Breast cancer Mother 50    Heart disease Father     Diabetes Sister     Leukemia Sister     No Known Problems Maternal Grandmother     No Known Problems Maternal Grandfather     No Known Problems Paternal Grandmother     No Known Problems Paternal Grandfather     No Known Problems Sister     Breast cancer Maternal Aunt 50    No Known Problems Maternal Aunt     No Known Problems Paternal Aunt     Colon cancer Maternal Uncle     No Known Problems Son     No Known Problems Son     Lung cancer Other 47      reports that she has quit smoking. Her smoking use included cigarettes. She started smoking about 59 years ago. She has a 29.6 pack-year smoking history. She has never used smokeless tobacco. She reports current alcohol use. She reports that she does not use drugs.      OBJECTIVE:    Vitals:    04/09/24 0925   BP: 150/87   BP Location: Left arm   Patient Position: Sitting   Cuff Size: Adult   Pulse: 83   Weight: 70.8 kg (156 lb)   Height: 5' 6\" (1.676 m)        Body mass index is 25.18 kg/m².    [unfilled]     Weight (last 2 days)       Date/Time Weight    04/09/24 0925 70.8 (156)               Physical exam:  Physical Exam  Vitals reviewed.   Constitutional:       Appearance: Normal appearance. She is normal weight.   HENT:      Head: Normocephalic and atraumatic.      Right Ear: External ear normal.      Left Ear: External ear normal.      Nose: Nose normal.      Mouth/Throat:      Mouth: " "Mucous membranes are dry.      Pharynx: Oropharynx is clear.   Eyes:      Extraocular Movements: Extraocular movements intact.      Pupils: Pupils are equal, round, and reactive to light.   Cardiovascular:      Rate and Rhythm: Normal rate.      Pulses: Normal pulses.      Heart sounds: Normal heart sounds.   Pulmonary:      Effort: Pulmonary effort is normal.      Breath sounds: Normal breath sounds.   Abdominal:      General: Abdomen is flat. Bowel sounds are normal.      Palpations: Abdomen is soft.   Musculoskeletal:         General: Normal range of motion.      Cervical back: Normal range of motion.   Skin:     General: Skin is warm and dry.   Neurological:      General: No focal deficit present.      Mental Status: She is oriented to person, place, and time.   Psychiatric:         Mood and Affect: Mood normal.         Behavior: Behavior normal.         Thought Content: Thought content normal.         Judgment: Judgment normal.         Data Review:    Results for orders placed or performed in visit on 03/27/24   Basic metabolic panel   Result Value Ref Range    Sodium 137 135 - 147 mmol/L    Potassium 4.6 3.5 - 5.3 mmol/L    Chloride 101 96 - 108 mmol/L    CO2 30 21 - 32 mmol/L    ANION GAP 6 4 - 13 mmol/L    BUN 19 5 - 25 mg/dL    Creatinine 0.83 0.60 - 1.30 mg/dL    Glucose, Fasting 103 (H) 65 - 99 mg/dL    Calcium 9.1 8.4 - 10.2 mg/dL    eGFR 69 ml/min/1.73sq m             Portions of the record may have been created with voice recognition software. Occasional wrong word or \"sound a like\" substitutions may have occurred due to the inherent limitations of voice recognition software. Read the chart carefully and recognize, using context, where substitutions have occurred.If you have any questions, please contact the dictating provider.  "

## 2024-04-09 NOTE — ASSESSMENT & PLAN NOTE
Blood pressure is elevated today  Asked her to check her blood pressures at home we discussed the correct way to check blood pressure.  She is normally normotensive.  We discussed a low-salt diet and exercise.  If home ambulatory blood pressures are elevated we will consider starting medications.  She also may be salt sensitive so de-escalating salt tablets may help

## 2024-04-09 NOTE — ASSESSMENT & PLAN NOTE
Sodium went to 123 while in the hospital with her pneumonia.  This likely contributed to the low sodium level  Her sodium level has now normalized  She likely has an element of SIADH from her Celexa and NSAID use  Would continue fluid restriction of 1.5 to 2 L  Decrease salt tablets by one 1 g 2 times daily  Will check BMPs every month if sodium remains stable we can de-escalate further  Her TSH was normal, cortisol was not checked at the time of her hospitalization, she had a CT of the chest that did not show any malignancies

## 2024-04-18 DIAGNOSIS — E87.1 HYPONATREMIA: ICD-10-CM

## 2024-04-18 NOTE — TELEPHONE ENCOUNTER
Patient  needs a refill for her  Sodium chloride 1g tablet  Please  send to [facility]  pharmacy   [address] [facility] [id number]

## 2024-04-19 RX ORDER — SODIUM CHLORIDE 1 G/1
1 TABLET ORAL 2 TIMES DAILY WITH MEALS
Qty: 180 TABLET | Refills: 1 | Status: SHIPPED | OUTPATIENT
Start: 2024-04-19

## 2024-04-21 PROBLEM — J18.9 LEFT LOWER LOBE PNEUMONIA: Status: RESOLVED | Noted: 2018-12-21 | Resolved: 2024-04-21

## 2024-04-29 ENCOUNTER — APPOINTMENT (OUTPATIENT)
Dept: LAB | Age: 74
End: 2024-04-29
Payer: COMMERCIAL

## 2024-04-29 DIAGNOSIS — E87.1 HYPONATREMIA: ICD-10-CM

## 2024-04-29 LAB
ANION GAP SERPL CALCULATED.3IONS-SCNC: 7 MMOL/L (ref 4–13)
BUN SERPL-MCNC: 22 MG/DL (ref 5–25)
CALCIUM SERPL-MCNC: 9.5 MG/DL (ref 8.4–10.2)
CHLORIDE SERPL-SCNC: 101 MMOL/L (ref 96–108)
CO2 SERPL-SCNC: 31 MMOL/L (ref 21–32)
CREAT SERPL-MCNC: 0.82 MG/DL (ref 0.6–1.3)
GFR SERPL CREATININE-BSD FRML MDRD: 69 ML/MIN/1.73SQ M
GLUCOSE P FAST SERPL-MCNC: 104 MG/DL (ref 65–99)
OSMOLALITY UR/SERPL-RTO: 298 MMOL/KG (ref 282–298)
OSMOLALITY UR: 529 MMOL/KG
POTASSIUM SERPL-SCNC: 4.5 MMOL/L (ref 3.5–5.3)
SODIUM 24H UR-SCNC: 68 MOL/L
SODIUM SERPL-SCNC: 139 MMOL/L (ref 135–147)

## 2024-04-29 PROCEDURE — 84300 ASSAY OF URINE SODIUM: CPT

## 2024-04-29 PROCEDURE — 36415 COLL VENOUS BLD VENIPUNCTURE: CPT

## 2024-04-29 PROCEDURE — 80048 BASIC METABOLIC PNL TOTAL CA: CPT

## 2024-04-29 PROCEDURE — 83930 ASSAY OF BLOOD OSMOLALITY: CPT

## 2024-04-29 PROCEDURE — 83935 ASSAY OF URINE OSMOLALITY: CPT

## 2024-04-30 ENCOUNTER — TELEPHONE (OUTPATIENT)
Dept: NEPHROLOGY | Facility: CLINIC | Age: 74
End: 2024-04-30

## 2024-04-30 DIAGNOSIS — E87.1 HYPONATREMIA: Primary | ICD-10-CM

## 2024-04-30 NOTE — TELEPHONE ENCOUNTER
Left message for Rosemary with detailed instructions below.        ----- Message from Armand Elam DO sent at 4/30/2024 10:33 AM EDT -----  Santhosh Riley,     I reviewed your most recent blood work and am happy to report that your sodium level remains stable.  You can stop your salt tablets completely and lets repeat a sodium levels with a bmp in 1 month.  If everything is stable then you can remain off the salt tablets. Also a friendly reminder to keep an eye on your blood pressures at home as discussed at your office visit.  If they are reading high above 120/80 please let myself or Dr. Choi know.      Take care!    Noah Eid [facility]

## 2024-05-08 ENCOUNTER — OFFICE VISIT (OUTPATIENT)
Dept: NEUROLOGY | Facility: CLINIC | Age: 74
End: 2024-05-08
Payer: COMMERCIAL

## 2024-05-08 ENCOUNTER — TELEPHONE (OUTPATIENT)
Dept: NEUROLOGY | Facility: CLINIC | Age: 74
End: 2024-05-08

## 2024-05-08 VITALS
BODY MASS INDEX: 25.78 KG/M2 | WEIGHT: 160.4 LBS | DIASTOLIC BLOOD PRESSURE: 76 MMHG | SYSTOLIC BLOOD PRESSURE: 154 MMHG | TEMPERATURE: 98.6 F | HEART RATE: 86 BPM | HEIGHT: 66 IN

## 2024-05-08 DIAGNOSIS — G43.709 CHRONIC MIGRAINE WITHOUT AURA WITHOUT STATUS MIGRAINOSUS, NOT INTRACTABLE: ICD-10-CM

## 2024-05-08 DIAGNOSIS — G43.009 MIGRAINE WITHOUT AURA AND WITHOUT STATUS MIGRAINOSUS, NOT INTRACTABLE: ICD-10-CM

## 2024-05-08 DIAGNOSIS — G43.709 CHRONIC MIGRAINE WITHOUT AURA WITHOUT STATUS MIGRAINOSUS, NOT INTRACTABLE: Primary | ICD-10-CM

## 2024-05-08 PROCEDURE — 99214 OFFICE O/P EST MOD 30 MIN: CPT | Performed by: PHYSICIAN ASSISTANT

## 2024-05-08 NOTE — PROGRESS NOTES
Patient ID: Rosemary Houser is a 74 y.o. female.    Assessment/Plan:    Chronic migraine without aura without status migrainosus, not intractable  Since starting Botox pt has had a reduction in migraine headaches by 7 days as correlated by a headache diary.  Pt has had decreased trips to the ER and decreased use of abortive medications.  She will continue Depakote as well.  I have spent a total time of 30 minutes on 05/08/24 in caring for this patient including Diagnostic results, Prognosis, Risks and benefits of tx options, Instructions for management, Patient and family education, Importance of tx compliance, Risk factor reductions, Impressions, Counseling / Coordination of care, Documenting in the medical record, Reviewing / ordering tests, medicine, procedures  , and Obtaining or reviewing history  .  She will follow-up in 6 months.    Migraine without aura and without status migrainosus, not intractable  She will continue sumatriptan or naproxen for acute migraines.       Diagnoses and all orders for this visit:    Chronic migraine without aura without status migrainosus, not intractable    Migraine without aura and without status migrainosus, not intractable           Subjective:    HPI    Rosemary Starkey is a 75yo female, with mood disorder and hyperlipidemia, who follows in the office due to migraine headaches. She reports that Botox has been helpful. She remains on Depakote as well. Since starting Botox pt has had a reduction in migraine headaches by 7 days as correlated by a headache diary.  Pt has had decreased trips to the ER and decreased use of abortive medications. She has up to a 3 migraines per week. Migraines typically start upon awakening. Her  states that she does not snore or have an apneic events. She does not feel overly fatigued during the day. She does take sumatriptan and naproxen to treat the migraines. She denies any side effects of the medications. Headaches are typically 8/10. They  "will start in the Lt occipital area and extend to the Lt eye. They are not lasting longer than a day since starting Botox.      PREVENTIVE: Citalopram, Topamax, depakote, Botox, tizanidine  ABORTIVE: Naratriptan, Sumavel, Dexamethasone, maxalt, imtrex     She was following with Vascular Surgery due to possible fibromuscular dysplasia. She is overdue for carotid ultrasound. It is ordered but needs to be scheduled.    The following portions of the patient's history were reviewed and updated as appropriate: allergies, current medications, past family history, past medical history, past social history, past surgical history, and problem list.         Objective:    Blood pressure 154/76, pulse 86, temperature 98.6 °F (37 °C), temperature source Temporal, height 5' 6\" (1.676 m), weight 72.8 kg (160 lb 6.4 oz), not currently breastfeeding.    Physical Exam  Vitals reviewed.   Eyes:      General: Lids are normal.      Extraocular Movements: Extraocular movements intact.      Pupils: Pupils are equal, round, and reactive to light.   Neurological:      Motor: Motor strength is normal.  Psychiatric:         Speech: Speech normal.         Neurological Exam  Mental Status   Oriented to person, place, time and situation. Speech is normal. Language is fluent with no aphasia. Attention and concentration are normal. Fund of knowledge is appropriate for level of education. Apraxia absent.    Cranial Nerves  CN II: Visual fields full to confrontation.  CN III, IV, VI: Extraocular movements intact bilaterally. Normal lids and orbits bilaterally. Pupils equal round and reactive to light bilaterally.  CN V: Facial sensation is normal.  CN VII: Full and symmetric facial movement.  CN XI: Shoulder shrug strength is normal.  CN XII: Tongue midline without atrophy or fasciculations.    Motor   Strength is 5/5 throughout all four extremities.    Reflexes  1/4 throughout.    Gait  Casual gait is normal including stance, stride, and arm " swing.        ROS:    Review of Systems   Constitutional:  Negative for appetite change, fatigue and fever.   HENT: Negative.  Negative for hearing loss, tinnitus, trouble swallowing and voice change.    Eyes: Negative.  Negative for photophobia, pain and visual disturbance.   Respiratory: Negative.  Negative for shortness of breath.    Cardiovascular: Negative.  Negative for palpitations.   Gastrointestinal: Negative.  Negative for nausea and vomiting.   Endocrine: Negative.  Negative for cold intolerance.   Genitourinary: Negative.  Negative for dysuria, frequency and urgency.   Musculoskeletal:  Negative for back pain, gait problem, myalgias, neck pain and neck stiffness.   Skin: Negative.  Negative for rash.   Allergic/Immunologic: Negative.    Neurological:  Positive for headaches (Migraines up to 3x per week.). Negative for dizziness, tremors, seizures, syncope, facial asymmetry, speech difficulty, weakness, light-headedness and numbness.   Hematological: Negative.  Does not bruise/bleed easily.   Psychiatric/Behavioral: Negative.  Negative for confusion, hallucinations and sleep disturbance.    All other systems reviewed and are negative.    Review of systems personally reviewed.

## 2024-05-08 NOTE — PATIENT INSTRUCTIONS
Chronic migraine without aura without status migrainosus, not intractable  Since starting Botox pt has had a reduction in migraine headaches by 7 days as correlated by a headache diary.  Pt has had decreased trips to the ER and decreased use of abortive medications.  She will continue Depakote as well.  I have spent a total time of 30 minutes on 05/08/24 in caring for this patient including Diagnostic results, Prognosis, Risks and benefits of tx options, Instructions for management, Patient and family education, Importance of tx compliance, Risk factor reductions, Impressions, Counseling / Coordination of care, Documenting in the medical record, Reviewing / ordering tests, medicine, procedures  , and Obtaining or reviewing history  .  She will follow-up in 6 months.    Migraine without aura and without status migrainosus, not intractable  She will continue sumatriptan or naproxen for acute migraines.

## 2024-05-08 NOTE — ASSESSMENT & PLAN NOTE
Since starting Botox pt has had a reduction in migraine headaches by 7 days as correlated by a headache diary.  Pt has had decreased trips to the ER and decreased use of abortive medications.  She will continue Depakote as well.  I have spent a total time of 30 minutes on 05/08/24 in caring for this patient including Diagnostic results, Prognosis, Risks and benefits of tx options, Instructions for management, Patient and family education, Importance of tx compliance, Risk factor reductions, Impressions, Counseling / Coordination of care, Documenting in the medical record, Reviewing / ordering tests, medicine, procedures  , and Obtaining or reviewing history  .  She will follow-up in 6 months.

## 2024-05-08 NOTE — TELEPHONE ENCOUNTER
----- Message from Gissel Arizmendi PA-C sent at 5/8/2024 11:17 AM EDT -----  Regarding: Imitrex  Hi,    The above pt is having issues with insurance coverage of sumatriptan. Can you see what is covered under her plan or what the issues is? Thanks.    Gissel

## 2024-05-09 RX ORDER — SUMATRIPTAN 100 MG/1
TABLET, FILM COATED ORAL
Qty: 27 TABLET | Refills: 1 | Status: SHIPPED | OUTPATIENT
Start: 2024-05-09

## 2024-05-09 NOTE — TELEPHONE ENCOUNTER
Called [facility]. There are no insurance issues regarding the sumatriptan. Pt did receive a delivery of #27 tabs on 4/1/24. There are no refills left. Pended refills in this encounter and routed to provider to review.

## 2024-05-23 ENCOUNTER — NURSE TRIAGE (OUTPATIENT)
Age: 74
End: 2024-05-23

## 2024-05-23 ENCOUNTER — TELEPHONE (OUTPATIENT)
Age: 74
End: 2024-05-23

## 2024-05-23 NOTE — TELEPHONE ENCOUNTER
Pt. Called around 4:50 stating gabriel she missed call from Dr. Choi. She still has a fever I tried to reach office but could not there were no messages on the chart for her. Please reach out to her. She still has fever.  Thank you!!

## 2024-05-23 NOTE — TELEPHONE ENCOUNTER
Regarding: fever, chills, coughing up green stuff  ----- Message from Yumiko MURRAY sent at 5/23/2024  8:58 AM EDT -----  Pt got sick at work last night 103 temp now 100 temp, fever, chills, coughing up green stuff.  Pt declined to make appt at this time since doesn't feel good.  Pt wants to speak with a Nurse to see what they think.  Maybe virtual...

## 2024-05-23 NOTE — TELEPHONE ENCOUNTER
"Patient sent [employer] message requesting appointment to be seen for new fever, and URI symptoms. Patient denies SOB, CP and states her SpO2 is at baseline. Offered same-day appointment, patient declined, states she can come in tomorrow. No appointments available tomorrow, scheduled for soonest opening 5/29 at 2:40 with PCP. Advised patient that protocol recommends she be seen today, and if she is unable to come to our office, she should go to UC/ED for further evaluation. Patient declined. Discussed home care advice and advised patient to call back if symptoms worsen or she were to develop shortness of breath.     Reason for Disposition   Fever > 101 F (38.3 C) and over 60 years of age    Answer Assessment - Initial Assessment Questions  1. ONSET: \"When did the cough begin?\"       Last night 5/22/24 - fast onset of symptoms  2. SEVERITY: \"How bad is the cough today?\"       Infrequent, productive  3. SPUTUM: \"Describe the color of your sputum\" (none, dry cough; clear, white, yellow, green)      Green  4. HEMOPTYSIS: \"Are you coughing up any blood?\" If so ask: \"How much?\" (flecks, streaks, tablespoons, etc.)      Denies  5. DIFFICULTY BREATHING: \"Are you having difficulty breathing?\" If Yes, ask: \"How bad is it?\" (e.g., mild, moderate, severe)     - MILD: No SOB at rest, mild SOB with walking, speaks normally in sentences, can lay down, no retractions, pulse < 100.     - MODERATE: SOB at rest, SOB with minimal exertion and prefers to sit, cannot lie down flat, speaks in phrases, mild retractions, audible wheezing, pulse 100-120.     - SEVERE: Very SOB at rest, speaks in single words, struggling to breathe, sitting hunched forward, retractions, pulse > 120       Denies, Home SPO2 monitor shows O2 sat near baseline  6. FEVER: \"Do you have a fever?\" If Yes, ask: \"What is your temperature, how was it measured, and when did it start?\"      100 - 103 last night (highest temp), oral  7. CARDIAC HISTORY: \"Do you have any " "history of heart disease?\" (e.g., heart attack, congestive heart failure)       Denies  8. LUNG HISTORY: \"Do you have any history of lung disease?\"  (e.g., pulmonary embolus, asthma, emphysema)      COPD, recent pneumonia  9. PE RISK FACTORS: \"Do you have a history of blood clots?\" (or: recent major surgery, recent prolonged travel, bedridden)      Denies  10. OTHER SYMPTOMS: \"Do you have any other symptoms?\" (e.g., runny nose, wheezing, chest pain)        Chills, body aches, dizziness  11. PREGNANCY: \"Is there any chance you are pregnant?\" \"When was your last menstrual period?\"        N/A  12. TRAVEL: \"Have you traveled out of the country in the last month?\" (e.g., travel history, exposures)        Denies    Protocols used: Cough-ADULT-[place]    "

## 2024-05-23 NOTE — TELEPHONE ENCOUNTER
Pt called with Fever 103-5/22, down to 101-5/23, dizzy, achey, nasal/head congestion. Unable to physically come to office.

## 2024-05-24 DIAGNOSIS — J40 BRONCHITIS: Primary | ICD-10-CM

## 2024-05-24 RX ORDER — DOXYCYCLINE HYCLATE 100 MG/1
100 CAPSULE ORAL EVERY 12 HOURS SCHEDULED
Qty: 20 CAPSULE | Refills: 0 | Status: SHIPPED | OUTPATIENT
Start: 2024-05-24 | End: 2024-06-03

## 2024-05-24 NOTE — TELEPHONE ENCOUNTER
I did call the patient 1 more time today.  Patient still has not picked up the phone.  Has a history of chronic obstructive pulmonary disease and is having an acute exacerbation.  With the weekend coming up in the holiday weekend we feel that it is important that we at least give the patient antibiotics to start.  She was told to call on the weekend if she is not feeling better or improving with the antibiotic treatment to continue to use her inhalers and to keep well-hydrated.  Take Tylenol in order to help with the fever.  Does have a history of pneumonias and was told if not getting better or worsening symptoms she needs to go to the emergency room

## 2024-05-24 NOTE — TELEPHONE ENCOUNTER
I did call the patient yesterday and again today at 12:30 PM.  No answer.  Left a message.  Will try to contact the patient again today later in the afternoon

## 2024-05-28 ENCOUNTER — APPOINTMENT (OUTPATIENT)
Dept: LAB | Age: 74
End: 2024-05-28
Payer: COMMERCIAL

## 2024-05-28 DIAGNOSIS — E87.1 HYPONATREMIA: ICD-10-CM

## 2024-05-28 LAB
ANION GAP SERPL CALCULATED.3IONS-SCNC: 10 MMOL/L (ref 4–13)
BUN SERPL-MCNC: 16 MG/DL (ref 5–25)
CALCIUM SERPL-MCNC: 9.3 MG/DL (ref 8.4–10.2)
CHLORIDE SERPL-SCNC: 99 MMOL/L (ref 96–108)
CO2 SERPL-SCNC: 28 MMOL/L (ref 21–32)
CREAT SERPL-MCNC: 0.71 MG/DL (ref 0.6–1.3)
GFR SERPL CREATININE-BSD FRML MDRD: 82 ML/MIN/1.73SQ M
GLUCOSE P FAST SERPL-MCNC: 98 MG/DL (ref 65–99)
POTASSIUM SERPL-SCNC: 4.7 MMOL/L (ref 3.5–5.3)
SODIUM SERPL-SCNC: 137 MMOL/L (ref 135–147)

## 2024-05-28 PROCEDURE — 80048 BASIC METABOLIC PNL TOTAL CA: CPT

## 2024-05-28 PROCEDURE — 36415 COLL VENOUS BLD VENIPUNCTURE: CPT

## 2024-05-29 ENCOUNTER — OFFICE VISIT (OUTPATIENT)
Dept: INTERNAL MEDICINE CLINIC | Facility: CLINIC | Age: 74
End: 2024-05-29
Payer: COMMERCIAL

## 2024-05-29 ENCOUNTER — TELEPHONE (OUTPATIENT)
Dept: NEPHROLOGY | Facility: HOSPITAL | Age: 74
End: 2024-05-29

## 2024-05-29 VITALS
OXYGEN SATURATION: 94 % | BODY MASS INDEX: 25.71 KG/M2 | DIASTOLIC BLOOD PRESSURE: 70 MMHG | HEIGHT: 66 IN | SYSTOLIC BLOOD PRESSURE: 132 MMHG | WEIGHT: 160 LBS | TEMPERATURE: 98.2 F | HEART RATE: 95 BPM

## 2024-05-29 DIAGNOSIS — E87.1 HYPONATREMIA: Primary | ICD-10-CM

## 2024-05-29 DIAGNOSIS — Z72.0 TOBACCO ABUSE: ICD-10-CM

## 2024-05-29 DIAGNOSIS — E87.1 HYPONATREMIA: ICD-10-CM

## 2024-05-29 DIAGNOSIS — J41.0 SIMPLE CHRONIC BRONCHITIS (HCC): ICD-10-CM

## 2024-05-29 DIAGNOSIS — R73.9 HYPERGLYCEMIA: ICD-10-CM

## 2024-05-29 DIAGNOSIS — K43.9 VENTRAL HERNIA WITHOUT OBSTRUCTION OR GANGRENE: Primary | ICD-10-CM

## 2024-05-29 PROCEDURE — 99214 OFFICE O/P EST MOD 30 MIN: CPT | Performed by: INTERNAL MEDICINE

## 2024-05-29 PROCEDURE — G2211 COMPLEX E/M VISIT ADD ON: HCPCS | Performed by: INTERNAL MEDICINE

## 2024-05-29 NOTE — ASSESSMENT & PLAN NOTE
Has stopped smoking cigarette is now back to vaping which hopefully will help her quit completely.

## 2024-05-29 NOTE — ASSESSMENT & PLAN NOTE
Patient does have a ventral hernia lower abdomen on the right-hand side.  This is easily reducible.  She is upset about this deformity and patient will be sent back to her colorectal specialist to see if he feels that this is operable but most likely not be a candidate

## 2024-05-29 NOTE — PROGRESS NOTES
Ambulatory Visit  Name: Rosemary Houser      : 1950      MRN: [id number]  Encounter Provider: Edin Choi DO  Encounter Date: 2024   Encounter department: [facility]    Assessment & Plan   1. Ventral hernia without obstruction or gangrene  Assessment & Plan:  Patient does have a ventral hernia lower abdomen on the right-hand side.  This is easily reducible.  She is upset about this deformity and patient will be sent back to her colorectal specialist to see if he feels that this is operable but most likely not be a candidate  Orders:  -     Ambulatory Referral to Colorectal Surgery; Future  2. Hyperglycemia  3. Simple chronic bronchitis (HCC)  Assessment & Plan:  Patient is being seen today for evaluation of upper respiratory tract infection.  We have attempted multiple occasions to talk to the patient over the telephone in order to help with her symptoms and finally was placed on antibiotics states that she is doing better still has a slight cough.  She does have an inhaler which is at work and she has been able to use for the past week or more which we feel would be appropriate.  Patient has some generalized erythema to nasal mucosa and posterior airway but less than previously.  Patient states she is not smoking cigarettes but is vaping.  She will continue to finish the antibiotics and once she is returned to work  her inhaler to use as directed.  To call if not improving.  With her last episode she did have the COVID virus and it ended up with a pneumonia and hospitalization.  She did check for the COVID virus and this was negative  4. Tobacco abuse  Assessment & Plan:  Has stopped smoking cigarette is now back to vaping which hopefully will help her quit completely.  5. Hyponatremia  Assessment & Plan:  With her last hospitalization patient was noted to have hyponatremia.  At discharge she was given a fluid restriction and she is continuing to have her sodium  level checked.  Recent studies show that the patient is stable and we have increased her restriction to 2000 mL daily.  Will continue to check on her sodium level.         History of Present Illness     Patient is a 74-year-old female history of multiple medical problems was here today for reevaluation accompanied by her .  Apparently patient suffering from upper respiratory tract infection.  Unable to contact patient over the telephone with having the wrong phone number we were unable to get in contact with her  also.  We did send in a prescription for antibiotics to her local pharmacy which she has been taking and they do help states that she no longer has discolored mucus but still has some cough.  No longer smoking but vaping    Review of Systems   Constitutional:  Positive for activity change. Negative for appetite change, chills, diaphoresis, fatigue, fever and unexpected weight change.        Slowly improvement in activity level going back to work today   HENT: Negative.     Eyes: Negative.    Respiratory:  Positive for cough. Negative for apnea, choking, chest tightness, shortness of breath, wheezing and stridor.         Residual cough and chest congestion.   Cardiovascular: Negative.    Gastrointestinal: Negative.    Endocrine: Negative.    Genitourinary: Negative.    Musculoskeletal: Negative.    Skin: Negative.    Allergic/Immunologic: Negative.    Neurological: Negative.    Hematological: Negative.      Past Medical History:   Diagnosis Date   • Hyperlipidemia    • Migraine      Past Surgical History:   Procedure Laterality Date   • BREAST EXCISIONAL BIOPSY Right 1986   • HARTMANS PROCEDURE N/A 3/8/2020    Procedure: Laparoscopic drainage of intra peritoneal abscess, sigmoid rescetion,;  Surgeon: NOEL Ogden MD;  Location: [facility];  Service: Colorectal   • HYSTERECTOMY  1978    age 28   • NASAL SEPTUM SURGERY      deviation repair   • NE LAPS ABD PRTM&OMENTUM DX W/WO SPEC BR/WA SPX  N/A 3/8/2020    Procedure: LAPAROSCOPY DIAGNOSTIC;  Surgeon: NOEL Ogden MD;  Location: [facility];  Service: Colorectal   • MT LAPS REPAIR HERNIA EXCEPT INCAL/INGUN REDUCIBLE N/A 1/26/2021    Procedure: REPAIR HERNIA VENTRAL LAPAROSCOPIC;  Surgeon: NOEL Ogden MD;  Location: [facility];  Service: Colorectal   • MT SIGMOIDOSCOPY FLX DX W/COLLJ SPEC BR/WA IF PFRMD N/A 3/8/2020    Procedure: SIGMOIDOSCOPY FLEXIBLE;  Surgeon: NOEL Ogden MD;  Location: [facility];  Service: Colorectal   • SHOULDER SURGERY       Family History   Problem Relation Age of Onset   • Breast cancer Mother 50   • Heart disease Father    • Diabetes Sister    • Leukemia Sister    • No Known Problems Maternal Grandmother    • No Known Problems Maternal Grandfather    • No Known Problems Paternal Grandmother    • No Known Problems Paternal Grandfather    • No Known Problems Sister    • Breast cancer Maternal Aunt 50   • No Known Problems Maternal Aunt    • No Known Problems Paternal Aunt    • Colon cancer Maternal Uncle    • No Known Problems Son    • No Known Problems Son    • Lung cancer Other 47     Social History     Tobacco Use   • Smoking status: Former     Average packs/day: 0.5 packs/day for 59.1 years (29.6 ttl pk-yrs)     Types: Cigarettes     Start date: 1965   • Smokeless tobacco: Never   Vaping Use   • Vaping status: Never Used   Substance and Sexual Activity   • Alcohol use: Yes     Comment: Rarely   • Drug use: No   • Sexual activity: Not on file     Current Outpatient Medications on File Prior to Visit   Medication Sig   • albuterol (Ventolin HFA) 90 mcg/act inhaler Inhale 2 puffs every 6 (six) hours as needed for wheezing   • aspirin (ECOTRIN) 325 mg EC tablet Take 1 tablet (325 mg total) by mouth daily   • citalopram (CeleXA) 20 mg tablet TAKE 1 TABLET DAILY   • cyclobenzaprine (FLEXERIL) 10 mg tablet Take 1 tablet (10 mg total) by mouth daily at bedtime   • divalproex sodium (DEPAKOTE ER) 250 mg 24 hr tablet  "TAKE 1 TABLET TWICE A DAY   • doxycycline hyclate (VIBRAMYCIN) 100 mg capsule Take 1 capsule (100 mg total) by mouth every 12 (twelve) hours for 10 days   • Fluticasone-Salmeterol (Advair) 100-50 mcg/dose inhaler Inhale 1 puff 2 (two) times a day Rinse mouth after use.   • naproxen (Naprosyn) 500 mg tablet Take 1 tablet (500 mg total) by mouth 2 (two) times a day as needed for mild pain   • simvastatin (ZOCOR) 40 mg tablet TAKE 1 TABLET DAILY   • SUMAtriptan (IMITREX) 100 mg tablet TAKE 1 TABLET AT ONSET OF MIGRAINE HEADACHE. MAY REPEAT IN 2 HOURS IF NEEDED, NO MORE THAN 2 IN 24 HOURS AND NO MORE THAN 3 IN A WEEK   • torsemide (DEMADEX) 10 mg tablet Take 1 tablet (10 mg total) by mouth daily   • zolpidem (AMBIEN) 5 mg tablet One pill at bedtime as needed to help with sleep   • sodium chloride 1 g tablet Take 1 tablet (1 g total) by mouth 2 (two) times a day with meals (Patient not taking: Reported on 5/8/2024)     Allergies   Allergen Reactions   • Penicillins Anaphylaxis   • Azithromycin Hives   • Nisoldipine      Reaction Date: 14Apr2011;    • Sulfa Antibiotics Hives     Immunization History   Administered Date(s) Administered   • COVID-19 PFIZER VACCINE 0.3 ML IM 12/22/2020, 01/12/2021, 10/23/2021   • H1N1, All Formulations 11/06/2009   • INFLUENZA 10/07/2019, 09/29/2020   • Pneumococcal Conjugate 13-Valent 03/31/2016   • Pneumococcal Polysaccharide PPV23 11/09/2020     Objective     /70   Pulse 95   Temp 98.2 °F (36.8 °C) (Tympanic Core)   Ht 5' 6\" (1.676 m)   Wt 72.6 kg (160 lb)   SpO2 94%   BMI 25.82 kg/m²     Physical Exam  Vitals and nursing note reviewed.   Constitutional:       General: She is not in acute distress.     Appearance: Normal appearance. She is not ill-appearing, toxic-appearing or diaphoretic.      Comments: Pleasant mildly congested sounding 74-year-old female who is awake alert accompanied by her .  Originally O2 sat was below 9089%.  Once the patient was allowed to sit " and relax it did come up to an acceptable level at 94%.   HENT:      Head: Normocephalic and atraumatic.      Right Ear: Tympanic membrane, ear canal and external ear normal. There is no impacted cerumen.      Left Ear: Tympanic membrane, ear canal and external ear normal. There is no impacted cerumen.      Nose: Nose normal. No congestion or rhinorrhea.      Mouth/Throat:      Mouth: Mucous membranes are moist.      Pharynx: Oropharynx is clear. No oropharyngeal exudate or posterior oropharyngeal erythema.   Eyes:      General: No scleral icterus.        Right eye: No discharge.         Left eye: No discharge.      Extraocular Movements: Extraocular movements intact.      Conjunctiva/sclera: Conjunctivae normal.      Pupils: Pupils are equal, round, and reactive to light.   Neck:      Vascular: No carotid bruit.   Cardiovascular:      Rate and Rhythm: Normal rate and regular rhythm.      Pulses: Normal pulses.      Heart sounds: Normal heart sounds. No murmur heard.     No friction rub. No gallop.   Pulmonary:      Effort: No respiratory distress.      Breath sounds: No stridor. Rhonchi present. No wheezing or rales.      Comments: Patient has decreased breath sounds anteriorly and posteriorly with some rhonchi heard throughout all lung fields both anterior and posteriorly which did improve but not completely resolved with a deep cough.  Chest:      Chest wall: No tenderness.   Abdominal:      General: Bowel sounds are normal. There is no distension.      Palpations: Abdomen is soft. There is no mass.      Tenderness: There is no abdominal tenderness. There is no right CVA tenderness, left CVA tenderness, guarding or rebound.      Hernia: A hernia is present.      Comments: Ventral hernia lower abdomen more than the right than the left.  Easily reducible nontender   Musculoskeletal:         General: No swelling, tenderness, deformity or signs of injury.      Cervical back: Normal range of motion and neck supple. No  rigidity or tenderness.      Right lower leg: No edema.      Left lower leg: No edema.   Lymphadenopathy:      Cervical: No cervical adenopathy.   Skin:     General: Skin is warm and dry.      Coloration: Skin is not jaundiced or pale.      Findings: No bruising, erythema, lesion or rash.   Neurological:      Mental Status: She is alert and oriented to person, place, and time. Mental status is at baseline.      Cranial Nerves: No cranial nerve deficit.      Sensory: No sensory deficit.      Motor: No weakness.      Coordination: Coordination normal.      Gait: Gait normal.      Deep Tendon Reflexes: Reflexes normal.   Psychiatric:         Mood and Affect: Mood normal.         Behavior: Behavior normal.         Thought Content: Thought content normal.         Judgment: Judgment normal.     Administrative Statements

## 2024-05-29 NOTE — ASSESSMENT & PLAN NOTE
Patient is being seen today for evaluation of upper respiratory tract infection.  We have attempted multiple occasions to talk to the patient over the telephone in order to help with her symptoms and finally was placed on antibiotics states that she is doing better still has a slight cough.  She does have an inhaler which is at work and she has been able to use for the past week or more which we feel would be appropriate.  Patient has some generalized erythema to nasal mucosa and posterior airway but less than previously.  Patient states she is not smoking cigarettes but is vaping.  She will continue to finish the antibiotics and once she is returned to work  her inhaler to use as directed.  To call if not improving.  With her last episode she did have the COVID virus and it ended up with a pneumonia and hospitalization.  She did check for the COVID virus and this was negative

## 2024-05-29 NOTE — LETTER
May 29, 2024     Patient: Rosemary Houser  YOB: 1950  Date of Visit: 5/29/2024      To Whom it May Concern:    Rosemary Houser is under my professional care. Rosemary was seen in my office on 5/29/2024. Rosemary may return to work on 5/29/2022 without restrictions. .    If you have any questions or concerns, please don't hesitate to call.         Sincerely,          Edin Choi,         CC: No Recipients

## 2024-05-29 NOTE — ASSESSMENT & PLAN NOTE
With her last hospitalization patient was noted to have hyponatremia.  At discharge she was given a fluid restriction and she is continuing to have her sodium level checked.  Recent studies show that the patient is stable and we have increased her restriction to 2000 mL daily.  Will continue to check on her sodium level.

## 2024-05-29 NOTE — TELEPHONE ENCOUNTER
----- Message from Armand Elam DO sent at 5/29/2024  1:21 PM EDT -----  Please let her know that her sodium levels remain stable at 137.  Her creatinine is stable at 0.7.  She should be off the salt tablets.  She can remain off the salt tablets we can hold off on checking BMPs every month.  Lets repeat a BMP in 6 months   with follow-up and if she is stable at that point we can follow-up as needed.

## 2024-05-30 NOTE — TELEPHONE ENCOUNTER
Ricardo for Rosemary , she can remain off salt tablets at this time. She will be placed omn November recall list and will be called to schedule once schedule has been released. BMP to be done again in November prior to follow up .

## 2024-06-12 ENCOUNTER — TELEPHONE (OUTPATIENT)
Dept: NEPHROLOGY | Facility: CLINIC | Age: 74
End: 2024-06-12

## 2024-06-12 DIAGNOSIS — F51.01 PRIMARY INSOMNIA: ICD-10-CM

## 2024-06-12 RX ORDER — ZOLPIDEM TARTRATE 5 MG/1
TABLET ORAL
Qty: 90 TABLET | Refills: 1 | Status: SHIPPED | OUTPATIENT
Start: 2024-06-12

## 2024-07-01 ENCOUNTER — RA CDI HCC (OUTPATIENT)
Dept: OTHER | Facility: HOSPITAL | Age: 74
End: 2024-07-01

## 2024-07-02 ENCOUNTER — PROCEDURE VISIT (OUTPATIENT)
Dept: NEUROLOGY | Facility: CLINIC | Age: 74
End: 2024-07-02
Payer: COMMERCIAL

## 2024-07-02 VITALS — HEART RATE: 75 BPM | TEMPERATURE: 97.5 F | SYSTOLIC BLOOD PRESSURE: 136 MMHG | DIASTOLIC BLOOD PRESSURE: 68 MMHG

## 2024-07-02 DIAGNOSIS — G43.709 CHRONIC MIGRAINE WITHOUT AURA WITHOUT STATUS MIGRAINOSUS, NOT INTRACTABLE: Primary | ICD-10-CM

## 2024-07-02 PROCEDURE — 64615 CHEMODENERV MUSC MIGRAINE: CPT | Performed by: PHYSICIAN ASSISTANT

## 2024-07-02 NOTE — PROGRESS NOTES
Chemodenervation     Date/Time  7/2/2024 1:00 PM     Performed by  Gissel Arizmendi PA-C   Authorized by  Gissel Arizmendi PA-C     Pre-procedure details      Prepped With: Alcohol     Anesthesia  (see MAR for exact dosages):     Anesthesia method:  None   Procedure details      Position:  Upright   Botox      Botox Type:  Type A    Brand:  Botox    mL's of Botulinum Toxin:  200    Final Concentration per CC:  200 units    Needle Gauge:  30 G 2.5 inch   Procedures      Botox Procedures: chronic headache      Indications: migraines     Injection Location      Head / Face:  L superior cervical paraspinal, R superior cervical paraspinal, L , R , L frontalis, R frontalis, L medial occipitalis, R medial occipitalis, procerus, L temporalis, R temporalis, L superior trapezius and R superior trapezius    L  injection amount:  5 unit(s)    R  injection amount:  5 unit(s)    L lateral frontalis:  5 unit(s)    R lateral frontalis:  5 unit(s)    L medial frontalis:  5 unit(s)    R medial frontalis:  5 unit(s)    L temporalis injection amount:  20 unit(s)    R temporalis injection amount:  20 unit(s)    Procerus injection amount:  5 unit(s)    L medial occipitalis injection amount:  15 unit(s)    R medial occipitalis injection amount:  15 unit(s)    L superior cervical paraspinal injection amount:  10 unit(s)    R superior cervical paraspinal injection amount:  10 unit(s)    L superior trapezius injection amount:  15 unit(s)    R superior trapezius injection amount:  15 unit(s)   Total Units      Total units used:  200    Total units discarded:  0   Post-procedure details      Chemodenervation:  Chronic migraine    Facial Nerve Location::  Bilateral facial nerve    Patient tolerance of procedure:  Tolerated well, no immediate complications   Comments       45U bilat occipitalis and paraspinal  All medically necessary

## 2024-07-08 ENCOUNTER — OFFICE VISIT (OUTPATIENT)
Dept: INTERNAL MEDICINE CLINIC | Facility: CLINIC | Age: 74
End: 2024-07-08
Payer: COMMERCIAL

## 2024-07-08 VITALS
OXYGEN SATURATION: 94 % | HEART RATE: 82 BPM | BODY MASS INDEX: 26.39 KG/M2 | WEIGHT: 164.2 LBS | HEIGHT: 66 IN | DIASTOLIC BLOOD PRESSURE: 74 MMHG | SYSTOLIC BLOOD PRESSURE: 140 MMHG | TEMPERATURE: 97.7 F

## 2024-07-08 DIAGNOSIS — R53.82 CHRONIC FATIGUE: ICD-10-CM

## 2024-07-08 DIAGNOSIS — E78.01 FAMILIAL HYPERCHOLESTEROLEMIA: ICD-10-CM

## 2024-07-08 DIAGNOSIS — Z72.0 TOBACCO ABUSE: ICD-10-CM

## 2024-07-08 DIAGNOSIS — G43.009 MIGRAINE WITHOUT AURA AND WITHOUT STATUS MIGRAINOSUS, NOT INTRACTABLE: ICD-10-CM

## 2024-07-08 DIAGNOSIS — Z12.31 SCREENING MAMMOGRAM FOR BREAST CANCER: ICD-10-CM

## 2024-07-08 DIAGNOSIS — E87.1 HYPONATREMIA: ICD-10-CM

## 2024-07-08 DIAGNOSIS — Z00.00 HEALTHCARE MAINTENANCE: Primary | ICD-10-CM

## 2024-07-08 DIAGNOSIS — E03.9 ACQUIRED HYPOTHYROIDISM: ICD-10-CM

## 2024-07-08 DIAGNOSIS — R73.9 HYPERGLYCEMIA: ICD-10-CM

## 2024-07-08 PROCEDURE — G2211 COMPLEX E/M VISIT ADD ON: HCPCS | Performed by: INTERNAL MEDICINE

## 2024-07-08 PROCEDURE — 99214 OFFICE O/P EST MOD 30 MIN: CPT | Performed by: INTERNAL MEDICINE

## 2024-07-08 NOTE — ASSESSMENT & PLAN NOTE
Patient's hemoglobin A1c is in the prediabetic range.  Again we discussed with the patient the importance of watching her intake of concentrated sweets and simple carbohydrates.  Patient is gaining some weight but we feel this is because she is no longer smoking cigarettes and this is not abnormal.

## 2024-07-08 NOTE — ASSESSMENT & PLAN NOTE
History of hypothyroidism.  Patient remains off of thyroid hormone.  Will check thyroid function and patient was given a slip to have this performed prior to her next visit

## 2024-07-08 NOTE — ASSESSMENT & PLAN NOTE
Patient relates that her energy level may have improved somewhat but still has some fatigue especially by the end of the day.  Will continue to monitor blood testing and pulmonary function

## 2024-07-08 NOTE — ASSESSMENT & PLAN NOTE
History of hyperlipidemia.  She remains on Zocor 40 mg daily.  We will check another lipid profile with her next visit.  With her not smoking cigarettes that should help reduce her risk for coronary artery disease and peripheral vascular disease but she continues to vape.  Check a lipid profile with her next visit and make adjustment medication if necessary.  Reminded the patient again of the importance of watching her intake of fats and cholesterol.

## 2024-07-08 NOTE — ASSESSMENT & PLAN NOTE
Continues to follow-up with neurology.  Receiving Botox injections and medication for acute headache which she states helps if she treats this immediately

## 2024-07-08 NOTE — PROGRESS NOTES
Ambulatory Visit  Name: Rosemary Houser      : 1950      MRN: [id number]  Encounter Provider: Edin Choi DO  Encounter Date: 2024   Encounter department: [facility] INTERNAL MEDICINE    Assessment & Plan   1. Healthcare maintenance  Assessment & Plan:  Will be due for routine physical when she returns to the office for her next visit.  She was given a slip for complete labs to be performed prior to the visit.  In the interim she was told if any new medical problems or concerns to please call.  Orders:  -     Comprehensive metabolic panel; Future; Expected date: 10/08/2024  -     CBC and differential; Future; Expected date: 10/08/2024  -     Lipid panel; Future; Expected date: 10/08/2024  -     Urinalysis with microscopic; Future  -     Hemoglobin A1C; Future  2. Screening mammogram for breast cancer  -     Mammo screening bilateral w 3d & cad; Future  3. Hyperglycemia  Assessment & Plan:  Patient's hemoglobin A1c is in the prediabetic range.  Again we discussed with the patient the importance of watching her intake of concentrated sweets and simple carbohydrates.  Patient is gaining some weight but we feel this is because she is no longer smoking cigarettes and this is not abnormal.  Orders:  -     Hemoglobin A1C; Future  4. Acquired hypothyroidism  Assessment & Plan:  History of hypothyroidism.  Patient remains off of thyroid hormone.  Will check thyroid function and patient was given a slip to have this performed prior to her next visit  5. Tobacco abuse  Assessment & Plan:  Patient is now stopped smoking and is vaping.  We did look at the label of her vaping product and apparently there is glycerin in this which we feel would be harmful to her lungs.  She does have a strong nicotine addiction and she thought that vaping would be a more healthy alternative.  We really feel the patient needs to quit completely would be safer possibly using nicotine replacement otherwise such as a patch or  her a gum.  Patient was reassured with her medical plan that these would be covered  6. Hyponatremia  Assessment & Plan:  With the patient smoking cigarettes she did have a lower sodium level.  We are hoping that with her quitting smoking cigarettes there should be some improvement and we will check this with her next visit  7. Familial hypercholesterolemia  Assessment & Plan:  History of hyperlipidemia.  She remains on Zocor 40 mg daily.  We will check another lipid profile with her next visit.  With her not smoking cigarettes that should help reduce her risk for coronary artery disease and peripheral vascular disease but she continues to vape.  Check a lipid profile with her next visit and make adjustment medication if necessary.  Reminded the patient again of the importance of watching her intake of fats and cholesterol.  8. Migraine without aura and without status migrainosus, not intractable  Assessment & Plan:  Continues to follow-up with neurology.  Receiving Botox injections and medication for acute headache which she states helps if she treats this immediately  9. Chronic fatigue  Assessment & Plan:  Patient relates that her energy level may have improved somewhat but still has some fatigue especially by the end of the day.  Will continue to monitor blood testing and pulmonary function         History of Present Illness     Patient is a 74-year-old female history of multiple medical problems outlined previously who is here today accompanied by her  for routine follow-up.  Patient relates in general doing relatively well with no specific complaints.  She is still not back to smoking cigarettes but continues to vape as an alternative which she states does not cause any respiratory issues and less expensive than cigarettes patient.  Patient is due for routine mammogram and slip was given.  Will be scheduled for a routine physical with her next visit      Review of Systems   Constitutional: Negative.     HENT: Negative.     Eyes: Negative.    Respiratory: Negative.     Cardiovascular: Negative.    Gastrointestinal: Negative.    Endocrine: Negative.    Genitourinary: Negative.    Musculoskeletal: Negative.    Skin: Negative.    Allergic/Immunologic: Negative.    Neurological: Negative.    Hematological: Negative.    Psychiatric/Behavioral: Negative.       Past Medical History:   Diagnosis Date    Hyperlipidemia     Migraine      Past Surgical History:   Procedure Laterality Date    BREAST EXCISIONAL BIOPSY Right 1986    HARTMANS PROCEDURE N/A 3/8/2020    Procedure: Laparoscopic drainage of intra peritoneal abscess, sigmoid rescetion,;  Surgeon: NOEL Ogden MD;  Location: [facility];  Service: Colorectal    HYSTERECTOMY  1978    age 28    NASAL SEPTUM SURGERY      deviation repair    HI LAPS ABD PRTM&OMENTUM DX W/WO SPEC BR/WA SPX N/A 3/8/2020    Procedure: LAPAROSCOPY DIAGNOSTIC;  Surgeon: NOEL Ogden MD;  Location: [facility];  Service: Colorectal    HI LAPS REPAIR HERNIA EXCEPT INCAL/INGUN REDUCIBLE N/A 1/26/2021    Procedure: REPAIR HERNIA VENTRAL LAPAROSCOPIC;  Surgeon: NOEL Ogden MD;  Location: [facility];  Service: Colorectal    HI SIGMOIDOSCOPY FLX DX W/COLLJ SPEC BR/WA IF PFRMD N/A 3/8/2020    Procedure: SIGMOIDOSCOPY FLEXIBLE;  Surgeon: NOEL Ogden MD;  Location: [facility];  Service: Colorectal    SHOULDER SURGERY       Family History   Problem Relation Age of Onset    Breast cancer Mother 50    Heart disease Father     Diabetes Sister     Leukemia Sister     No Known Problems Maternal Grandmother     No Known Problems Maternal Grandfather     No Known Problems Paternal Grandmother     No Known Problems Paternal Grandfather     No Known Problems Sister     Breast cancer Maternal Aunt 50    No Known Problems Maternal Aunt     No Known Problems Paternal Aunt     Colon cancer Maternal Uncle     No Known Problems Son     No Known Problems Son     Lung cancer Other 47     Social  History     Tobacco Use    Smoking status: Former     Average packs/day: 0.5 packs/day for 59.1 years (29.6 ttl pk-yrs)     Types: Cigarettes     Start date: 1965    Smokeless tobacco: Never   Vaping Use    Vaping status: Never Used   Substance and Sexual Activity    Alcohol use: Yes     Comment: Rarely    Drug use: No    Sexual activity: Not on file     Current Outpatient Medications on File Prior to Visit   Medication Sig    albuterol (Ventolin HFA) 90 mcg/act inhaler Inhale 2 puffs every 6 (six) hours as needed for wheezing    aspirin (ECOTRIN) 325 mg EC tablet Take 1 tablet (325 mg total) by mouth daily    citalopram (CeleXA) 20 mg tablet TAKE 1 TABLET DAILY    cyclobenzaprine (FLEXERIL) 10 mg tablet Take 1 tablet (10 mg total) by mouth daily at bedtime    divalproex sodium (DEPAKOTE ER) 250 mg 24 hr tablet TAKE 1 TABLET TWICE A DAY    naproxen (Naprosyn) 500 mg tablet Take 1 tablet (500 mg total) by mouth 2 (two) times a day as needed for mild pain    simvastatin (ZOCOR) 40 mg tablet TAKE 1 TABLET DAILY    SUMAtriptan (IMITREX) 100 mg tablet TAKE 1 TABLET AT ONSET OF MIGRAINE HEADACHE. MAY REPEAT IN 2 HOURS IF NEEDED, NO MORE THAN 2 IN 24 HOURS AND NO MORE THAN 3 IN A WEEK    torsemide (DEMADEX) 10 mg tablet Take 1 tablet (10 mg total) by mouth daily    zolpidem (AMBIEN) 5 mg tablet One pill at bedtime as needed to help with sleep    Fluticasone-Salmeterol (Advair) 100-50 mcg/dose inhaler Inhale 1 puff 2 (two) times a day Rinse mouth after use.    sodium chloride 1 g tablet Take 1 tablet (1 g total) by mouth 2 (two) times a day with meals (Patient not taking: Reported on 5/8/2024)     Allergies   Allergen Reactions    Penicillins Anaphylaxis    Azithromycin Hives    Nisoldipine      Reaction Date: 14Apr2011;     Sulfa Antibiotics Hives     Immunization History   Administered Date(s) Administered    COVID-19 PFIZER VACCINE 0.3 ML IM 12/22/2020, 01/12/2021, 10/23/2021    H1N1, All Formulations 11/06/2009     "INFLUENZA 10/07/2019, 09/29/2020    Pneumococcal Conjugate 13-Valent 03/31/2016    Pneumococcal Polysaccharide PPV23 11/09/2020     Objective     /74   Pulse 82   Temp 97.7 °F (36.5 °C)   Ht 5' 6\" (1.676 m)   Wt 74.5 kg (164 lb 3.2 oz)   SpO2 94%   BMI 26.50 kg/m²     Physical Exam  Vitals and nursing note reviewed.   Constitutional:       General: She is not in acute distress.     Appearance: Normal appearance. She is not ill-appearing, toxic-appearing or diaphoretic.      Comments: Overweight 74-year-old female who is awake alert in no acute distress and oriented x 3, accompanied by her .   HENT:      Head: Normocephalic and atraumatic.      Right Ear: Tympanic membrane, ear canal and external ear normal. There is no impacted cerumen.      Left Ear: Tympanic membrane, ear canal and external ear normal. There is no impacted cerumen.      Nose: Nose normal. No congestion or rhinorrhea.      Mouth/Throat:      Mouth: Mucous membranes are moist.      Pharynx: Oropharynx is clear. No oropharyngeal exudate or posterior oropharyngeal erythema.   Eyes:      General: No scleral icterus.        Right eye: No discharge.         Left eye: No discharge.      Extraocular Movements: Extraocular movements intact.      Conjunctiva/sclera: Conjunctivae normal.      Pupils: Pupils are equal, round, and reactive to light.   Neck:      Vascular: Carotid bruit present.   Cardiovascular:      Rate and Rhythm: Normal rate and regular rhythm.      Pulses: Normal pulses.      Heart sounds: Normal heart sounds. No murmur heard.     No friction rub. No gallop.   Pulmonary:      Effort: Pulmonary effort is normal. No respiratory distress.      Breath sounds: No stridor. No wheezing, rhonchi or rales.      Comments: Some decreased breath sounds anteriorly and posteriorly but no rales rhonchi or wheezes could be appreciated on exam  Chest:      Chest wall: No tenderness.   Abdominal:      General: Abdomen is flat. Bowel " sounds are normal. There is no distension.      Palpations: Abdomen is soft. There is no mass.      Tenderness: There is no abdominal tenderness. There is no right CVA tenderness, left CVA tenderness, guarding or rebound.      Hernia: No hernia is present.   Musculoskeletal:         General: No swelling, tenderness, deformity or signs of injury. Normal range of motion.      Cervical back: Normal range of motion and neck supple. No rigidity or tenderness.      Right lower leg: No edema.      Left lower leg: No edema.   Lymphadenopathy:      Cervical: No cervical adenopathy.   Skin:     General: Skin is warm and dry.      Coloration: Skin is not jaundiced or pale.      Findings: No bruising, erythema, lesion or rash.   Neurological:      General: No focal deficit present.      Mental Status: She is alert and oriented to person, place, and time. Mental status is at baseline.      Cranial Nerves: No cranial nerve deficit.      Sensory: No sensory deficit.      Motor: No weakness.      Coordination: Coordination normal.      Gait: Gait normal.      Deep Tendon Reflexes: Reflexes normal.   Psychiatric:         Mood and Affect: Mood normal.         Behavior: Behavior normal.         Thought Content: Thought content normal.         Judgment: Judgment normal.       Administrative Statements

## 2024-07-08 NOTE — ASSESSMENT & PLAN NOTE
Will be due for routine physical when she returns to the office for her next visit.  She was given a slip for complete labs to be performed prior to the visit.  In the interim she was told if any new medical problems or concerns to please call.

## 2024-07-08 NOTE — ASSESSMENT & PLAN NOTE
With the patient smoking cigarettes she did have a lower sodium level.  We are hoping that with her quitting smoking cigarettes there should be some improvement and we will check this with her next visit

## 2024-07-08 NOTE — ASSESSMENT & PLAN NOTE
Patient is now stopped smoking and is vaping.  We did look at the label of her vaping product and apparently there is glycerin in this which we feel would be harmful to her lungs.  She does have a strong nicotine addiction and she thought that vaping would be a more healthy alternative.  We really feel the patient needs to quit completely would be safer possibly using nicotine replacement otherwise such as a patch or her a gum.  Patient was reassured with her medical plan that these would be covered

## 2024-07-11 ENCOUNTER — TELEPHONE (OUTPATIENT)
Age: 74
End: 2024-07-11

## 2024-07-11 DIAGNOSIS — J44.1 CHRONIC OBSTRUCTIVE PULMONARY DISEASE WITH ACUTE EXACERBATION (HCC): Primary | ICD-10-CM

## 2024-07-11 DIAGNOSIS — K43.9 VENTRAL HERNIA WITHOUT OBSTRUCTION OR GANGRENE: ICD-10-CM

## 2024-07-11 RX ORDER — DOXYCYCLINE HYCLATE 100 MG/1
100 CAPSULE ORAL EVERY 12 HOURS SCHEDULED
Qty: 20 CAPSULE | Refills: 0 | Status: SHIPPED | OUTPATIENT
Start: 2024-07-11 | End: 2024-07-21

## 2024-07-11 NOTE — ASSESSMENT & PLAN NOTE
Patient is complaining of acute upper respiratory tract infection, bronchitis and asking for prescription for antibiotics to be sent to the pharmacy.  Prescriptions for doxycycline sent.

## 2024-07-11 NOTE — TELEPHONE ENCOUNTER
Pt stated she has bronchitis and need antibiotic sent to [facility] pharmacy. Pt also stated wanted referral for general surgery due to a hernia she would like to get removed. Dr Austin does not do this type of surgery anymore. Need new surgeon

## 2024-07-12 ENCOUNTER — TELEPHONE (OUTPATIENT)
Age: 74
End: 2024-07-12

## 2024-07-12 NOTE — TELEPHONE ENCOUNTER
Pt returning Lidia's vm. Per Lidia, a prescription is ready for patient and the surgeon will call to follow up.    Pt was agreeable.    Thanks

## 2024-07-30 ENCOUNTER — PREP FOR PROCEDURE (OUTPATIENT)
Dept: SURGERY | Facility: CLINIC | Age: 74
End: 2024-07-30

## 2024-07-30 ENCOUNTER — CONSULT (OUTPATIENT)
Dept: SURGERY | Facility: CLINIC | Age: 74
End: 2024-07-30
Payer: COMMERCIAL

## 2024-07-30 VITALS
DIASTOLIC BLOOD PRESSURE: 74 MMHG | HEART RATE: 90 BPM | WEIGHT: 162 LBS | HEIGHT: 66 IN | SYSTOLIC BLOOD PRESSURE: 131 MMHG | BODY MASS INDEX: 26.03 KG/M2

## 2024-07-30 DIAGNOSIS — K43.2 INCISIONAL HERNIA WITHOUT OBSTRUCTION OR GANGRENE: Primary | ICD-10-CM

## 2024-07-30 DIAGNOSIS — K43.2 INCISIONAL HERNIA: Primary | ICD-10-CM

## 2024-07-30 DIAGNOSIS — K43.9 VENTRAL HERNIA WITHOUT OBSTRUCTION OR GANGRENE: ICD-10-CM

## 2024-07-30 PROCEDURE — 1159F MED LIST DOCD IN RCRD: CPT | Performed by: SURGERY

## 2024-07-30 PROCEDURE — 99203 OFFICE O/P NEW LOW 30 MIN: CPT | Performed by: SURGERY

## 2024-07-30 PROCEDURE — 1160F RVW MEDS BY RX/DR IN RCRD: CPT | Performed by: SURGERY

## 2024-07-30 NOTE — PROGRESS NOTES
Assessment/Plan: Patient presents with an incisional hernia.  She has a history for laparoscopic assisted sigmoid colon resection in 2020.  She had a hernia 1 year later which was repaired laparoscopically.  She recently has noted some bulging of the right lower abdominal wall.  This is not limiting her activities.  She does have an occasional dull intermittent discomfort.    Examination does reveal incisional hernia.  This extends to the right lower quadrant.  The hernia is at least 8 cm in length.  It is reducible.    Operative repair is recommended.  Risks and benefits explained in detail.  She is agreeable.  All questions answered.    Diagnoses and all orders for this visit:    Ventral hernia without obstruction or gangrene  -     Ambulatory Referral to General Surgery        Subjective:      Patient ID: Rosemary Houser is a 74 y.o. female.    Patient presents for ventral hernia consult.  States she has had a bulge and intermittent dull pain on the right side of her lower abdomen for one year.  Does not limit her activities.        The following portions of the patient's history were reviewed and updated as appropriate:     She  has a past medical history of Hyperlipidemia and Migraine.  She  has a past surgical history that includes Nasal septum surgery; Shoulder surgery; Hysterectomy (1978); Breast excisional biopsy (Right, 1986); pr laps abd prtm&omentum dx w/wo spec br/wa spx (N/A, 3/8/2020); pr sigmoidoscopy flx dx w/collj spec br/wa if pfrmd (N/A, 3/8/2020); HARTMANS PROCEDURE (N/A, 3/8/2020); and pr laps repair hernia except incal/ingun reducible (N/A, 1/26/2021).  Her family history includes Breast cancer (age of onset: 50) in her maternal aunt and mother; Colon cancer in her maternal uncle; Diabetes in her sister; Heart disease in her father; Leukemia in her sister; Lung cancer (age of onset: 47) in her other; No Known Problems in her maternal aunt, maternal grandfather, maternal grandmother, paternal  aunt, paternal grandfather, paternal grandmother, sister, son, and son.  She  reports that she has quit smoking. Her smoking use included cigarettes. She started smoking about 59 years ago. She has a 29.6 pack-year smoking history. She has never used smokeless tobacco. She reports current alcohol use. She reports that she does not use drugs.  Current Outpatient Medications   Medication Sig Dispense Refill    albuterol (Ventolin HFA) 90 mcg/act inhaler Inhale 2 puffs every 6 (six) hours as needed for wheezing 18 g 3    aspirin (ECOTRIN) 325 mg EC tablet Take 1 tablet (325 mg total) by mouth daily 30 tablet 0    citalopram (CeleXA) 20 mg tablet TAKE 1 TABLET DAILY 90 tablet 3    cyclobenzaprine (FLEXERIL) 10 mg tablet Take 1 tablet (10 mg total) by mouth daily at bedtime 90 tablet 3    divalproex sodium (DEPAKOTE ER) 250 mg 24 hr tablet TAKE 1 TABLET TWICE A  tablet 3    Fluticasone-Salmeterol (Advair) 100-50 mcg/dose inhaler Inhale 1 puff 2 (two) times a day Rinse mouth after use. 180 blister 3    naproxen (Naprosyn) 500 mg tablet Take 1 tablet (500 mg total) by mouth 2 (two) times a day as needed for mild pain 90 tablet 0    simvastatin (ZOCOR) 40 mg tablet TAKE 1 TABLET DAILY 90 tablet 3    sodium chloride 1 g tablet Take 1 tablet (1 g total) by mouth 2 (two) times a day with meals (Patient not taking: Reported on 5/8/2024) 180 tablet 1    SUMAtriptan (IMITREX) 100 mg tablet TAKE 1 TABLET AT ONSET OF MIGRAINE HEADACHE. MAY REPEAT IN 2 HOURS IF NEEDED, NO MORE THAN 2 IN 24 HOURS AND NO MORE THAN 3 IN A WEEK 27 tablet 1    torsemide (DEMADEX) 10 mg tablet Take 1 tablet (10 mg total) by mouth daily 30 tablet 0    zolpidem (AMBIEN) 5 mg tablet One pill at bedtime as needed to help with sleep 90 tablet 1     No current facility-administered medications for this visit.     She is allergic to penicillins, azithromycin, nisoldipine, and sulfa antibiotics..    Review of Systems   Constitutional: Negative.  Negative  "for activity change.   HENT: Negative.     Eyes: Negative.    Respiratory: Negative.     Cardiovascular: Negative.    Gastrointestinal:  Positive for abdominal pain.   Endocrine: Negative.    Genitourinary: Negative.    Musculoskeletal: Negative.    Skin: Negative.    Allergic/Immunologic: Negative.    Neurological: Negative.    Psychiatric/Behavioral:  Negative for agitation, behavioral problems and confusion. The patient is not nervous/anxious.          Objective:      /74   Pulse 90   Ht 5' 6\" (1.676 m)   Wt 73.5 kg (162 lb)   BMI 26.15 kg/m²          Physical Exam  Constitutional:       Appearance: Normal appearance. She is well-developed.   HENT:      Head: Atraumatic.   Eyes:      Extraocular Movements: Extraocular movements intact.   Cardiovascular:      Rate and Rhythm: Normal rate and regular rhythm.      Heart sounds: Normal heart sounds.   Pulmonary:      Effort: Pulmonary effort is normal.      Breath sounds: Normal breath sounds.   Abdominal:      General: Abdomen is flat.      Hernia: A hernia (Incisional hernia is present.  This is reducible.) is present.   Musculoskeletal:      Right lower leg: No edema.      Left lower leg: No edema.   Skin:     General: Skin is warm and dry.   Neurological:      Mental Status: She is alert and oriented to person, place, and time.   Psychiatric:         Mood and Affect: Mood normal.         "

## 2024-08-07 PROBLEM — Z00.00 HEALTHCARE MAINTENANCE: Status: RESOLVED | Noted: 2018-05-17 | Resolved: 2024-08-07

## 2024-08-26 ENCOUNTER — TELEPHONE (OUTPATIENT)
Age: 74
End: 2024-08-26

## 2024-08-26 DIAGNOSIS — J40 BRONCHITIS: Primary | ICD-10-CM

## 2024-08-26 RX ORDER — DOXYCYCLINE 100 MG/1
100 CAPSULE ORAL EVERY 12 HOURS SCHEDULED
Qty: 20 CAPSULE | Refills: 0 | Status: SHIPPED | OUTPATIENT
Start: 2024-08-26 | End: 2024-09-05

## 2024-08-26 NOTE — ASSESSMENT & PLAN NOTE
Patient is calling for evaluation treatment of upper respiratory tract infection, bronchitis.  She does get these infections recurrently.  Her last infection she did have COVID and we suggest that she retest this to make sure this is not a factor but in the interim was placed on doxycycline.  We will inform her this was sent to the pharmacy and to call if not improving.

## 2024-08-26 NOTE — TELEPHONE ENCOUNTER
Patient has been coughing and bringing up yellow stuff for the last four days and would like Dr. Choi to call something in at [facility].  Please advise.

## 2024-09-17 ENCOUNTER — TELEPHONE (OUTPATIENT)
Dept: NEUROLOGY | Facility: CLINIC | Age: 74
End: 2024-09-17

## 2024-09-17 NOTE — TELEPHONE ENCOUNTER
Spoke to pt and confirmed their 200   appt on  Oct 2   w/ Gissel Ann. Reminded pt to arrive 15 mins prior to appt to check in with .

## 2024-09-18 DIAGNOSIS — J40 BRONCHITIS: Primary | ICD-10-CM

## 2024-09-18 RX ORDER — DOXYCYCLINE HYCLATE 100 MG
100 TABLET ORAL 2 TIMES DAILY
Qty: 14 TABLET | Refills: 0 | Status: SHIPPED | OUTPATIENT
Start: 2024-09-18 | End: 2024-09-25

## 2024-09-18 NOTE — ASSESSMENT & PLAN NOTE
Is complaining of upper respiratory symptoms, bronchitis.  Wishes antibiotics and prescription was sent to the pharmacy.

## 2024-10-01 ENCOUNTER — APPOINTMENT (OUTPATIENT)
Dept: LAB | Age: 74
End: 2024-10-01
Payer: COMMERCIAL

## 2024-10-01 DIAGNOSIS — K43.2 INCISIONAL HERNIA: ICD-10-CM

## 2024-10-01 LAB
ALBUMIN SERPL BCG-MCNC: 3.9 G/DL (ref 3.5–5)
ALP SERPL-CCNC: 47 U/L (ref 34–104)
ALT SERPL W P-5'-P-CCNC: 13 U/L (ref 7–52)
ANION GAP SERPL CALCULATED.3IONS-SCNC: 7 MMOL/L (ref 4–13)
AST SERPL W P-5'-P-CCNC: 19 U/L (ref 13–39)
BILIRUB SERPL-MCNC: 0.35 MG/DL (ref 0.2–1)
BUN SERPL-MCNC: 18 MG/DL (ref 5–25)
CALCIUM SERPL-MCNC: 9.1 MG/DL (ref 8.4–10.2)
CHLORIDE SERPL-SCNC: 100 MMOL/L (ref 96–108)
CO2 SERPL-SCNC: 29 MMOL/L (ref 21–32)
CREAT SERPL-MCNC: 0.72 MG/DL (ref 0.6–1.3)
ERYTHROCYTE [DISTWIDTH] IN BLOOD BY AUTOMATED COUNT: 14.3 % (ref 11.6–15.1)
GFR SERPL CREATININE-BSD FRML MDRD: 81 ML/MIN/1.73SQ M
GLUCOSE P FAST SERPL-MCNC: 97 MG/DL (ref 65–99)
HCT VFR BLD AUTO: 41.9 % (ref 34.8–46.1)
HGB BLD-MCNC: 13.2 G/DL (ref 11.5–15.4)
MCH RBC QN AUTO: 30.1 PG (ref 26.8–34.3)
MCHC RBC AUTO-ENTMCNC: 31.5 G/DL (ref 31.4–37.4)
MCV RBC AUTO: 96 FL (ref 82–98)
PLATELET # BLD AUTO: 272 THOUSANDS/UL (ref 149–390)
PMV BLD AUTO: 10.4 FL (ref 8.9–12.7)
POTASSIUM SERPL-SCNC: 5 MMOL/L (ref 3.5–5.3)
PROT SERPL-MCNC: 6.6 G/DL (ref 6.4–8.4)
RBC # BLD AUTO: 4.38 MILLION/UL (ref 3.81–5.12)
SODIUM SERPL-SCNC: 136 MMOL/L (ref 135–147)
WBC # BLD AUTO: 6.29 THOUSAND/UL (ref 4.31–10.16)

## 2024-10-01 PROCEDURE — 80053 COMPREHEN METABOLIC PANEL: CPT

## 2024-10-01 PROCEDURE — 85027 COMPLETE CBC AUTOMATED: CPT

## 2024-10-01 PROCEDURE — 36415 COLL VENOUS BLD VENIPUNCTURE: CPT

## 2024-10-02 ENCOUNTER — PROCEDURE VISIT (OUTPATIENT)
Dept: NEUROLOGY | Facility: CLINIC | Age: 74
End: 2024-10-02
Payer: COMMERCIAL

## 2024-10-02 VITALS
HEART RATE: 82 BPM | SYSTOLIC BLOOD PRESSURE: 146 MMHG | DIASTOLIC BLOOD PRESSURE: 78 MMHG | TEMPERATURE: 95 F | OXYGEN SATURATION: 91 %

## 2024-10-02 DIAGNOSIS — M54.2 CERVICALGIA: ICD-10-CM

## 2024-10-02 DIAGNOSIS — G43.709 CHRONIC MIGRAINE WITHOUT AURA WITHOUT STATUS MIGRAINOSUS, NOT INTRACTABLE: Primary | ICD-10-CM

## 2024-10-02 PROCEDURE — 64615 CHEMODENERV MUSC MIGRAINE: CPT | Performed by: PHYSICIAN ASSISTANT

## 2024-10-02 RX ORDER — CYCLOBENZAPRINE HCL 10 MG
10 TABLET ORAL
Qty: 90 TABLET | Refills: 3 | Status: SHIPPED | OUTPATIENT
Start: 2024-10-02

## 2024-10-02 RX ORDER — SUMATRIPTAN 100 MG/1
TABLET, FILM COATED ORAL
Qty: 27 TABLET | Refills: 1 | Status: SHIPPED | OUTPATIENT
Start: 2024-10-02

## 2024-10-02 NOTE — PROGRESS NOTES
Chemodenervation     Date/Time  10/2/2024 2:00 PM     Performed by  Gissel Arizmendi PA-C   Authorized by  Gissel Arizmendi PA-C     Pre-procedure details      Prepped With: Alcohol     Anesthesia  (see MAR for exact dosages):     Anesthesia method:  None   Procedure details      Position:  Upright   Botox      Botox Type:  Type A    Brand:  Botox    mL's of Botulinum Toxin:  200    Final Concentration per CC:  200 units    Needle Gauge:  30 G 2.5 inch   Procedures      Botox Procedures: chronic headache      Indications: migraines     Injection Location      Head / Face:  L superior cervical paraspinal, R superior cervical paraspinal, L , R , L frontalis, R frontalis, L medial occipitalis, R medial occipitalis, procerus, L temporalis, R temporalis, L superior trapezius and R superior trapezius    L  injection amount:  5 unit(s)    R  injection amount:  5 unit(s)    L lateral frontalis:  5 unit(s)    R lateral frontalis:  5 unit(s)    L medial frontalis:  5 unit(s)    R medial frontalis:  5 unit(s)    L temporalis injection amount:  20 unit(s)    R temporalis injection amount:  20 unit(s)    Procerus injection amount:  5 unit(s)    L medial occipitalis injection amount:  15 unit(s)    R medial occipitalis injection amount:  15 unit(s)    L superior cervical paraspinal injection amount:  10 unit(s)    R superior cervical paraspinal injection amount:  10 unit(s)    L superior trapezius injection amount:  15 unit(s)    R superior trapezius injection amount:  15 unit(s)   Total Units      Total units used:  200    Total units discarded:  0   Post-procedure details      Chemodenervation:  Chronic migraine    Facial Nerve Location::  Bilateral facial nerve    Patient tolerance of procedure:  Tolerated well, no immediate complications   Comments       45U bilat occipitalis and paraspinal  All medically necessary

## 2024-10-05 ENCOUNTER — OFFICE VISIT (OUTPATIENT)
Dept: LAB | Age: 74
End: 2024-10-05
Payer: COMMERCIAL

## 2024-10-05 DIAGNOSIS — K43.2 INCISIONAL HERNIA: ICD-10-CM

## 2024-10-05 PROCEDURE — 93005 ELECTROCARDIOGRAM TRACING: CPT

## 2024-10-07 ENCOUNTER — OFFICE VISIT (OUTPATIENT)
Dept: SURGERY | Facility: CLINIC | Age: 74
End: 2024-10-07
Payer: COMMERCIAL

## 2024-10-07 VITALS
BODY MASS INDEX: 25.23 KG/M2 | DIASTOLIC BLOOD PRESSURE: 74 MMHG | SYSTOLIC BLOOD PRESSURE: 134 MMHG | HEART RATE: 89 BPM | WEIGHT: 157 LBS | HEIGHT: 66 IN | OXYGEN SATURATION: 90 %

## 2024-10-07 DIAGNOSIS — K43.2 INCISIONAL HERNIA WITHOUT OBSTRUCTION OR GANGRENE: Primary | ICD-10-CM

## 2024-10-07 LAB
ATRIAL RATE: 73 BPM
P AXIS: 77 DEGREES
PR INTERVAL: 166 MS
QRS AXIS: -13 DEGREES
QRSD INTERVAL: 76 MS
QT INTERVAL: 366 MS
QTC INTERVAL: 403 MS
T WAVE AXIS: 57 DEGREES
VENTRICULAR RATE: 73 BPM

## 2024-10-07 PROCEDURE — 99212 OFFICE O/P EST SF 10 MIN: CPT | Performed by: SURGERY

## 2024-10-07 PROCEDURE — 93010 ELECTROCARDIOGRAM REPORT: CPT | Performed by: STUDENT IN AN ORGANIZED HEALTH CARE EDUCATION/TRAINING PROGRAM

## 2024-10-07 NOTE — H&P (VIEW-ONLY)
"Ambulatory Visit  Name: Rosemary Houser      : 1950      MRN: [id number]  Encounter Provider: Bhupinder King MD  Encounter Date: 10/7/2024   Encounter department: [facility] GENERAL SURGERY [facility]    Assessment & Plan  Incisional hernia without obstruction or gangrene  Recommend operative repair             History of Present Illness     Rosemary Houser is a 74 y.o. female who presents for pre op consult. Scheduled from incisional hernia repair 10/16/2024.  She was seen previously and returns for a preoperative evaluation.  She has been utilizing a binder for incisional hernia.    Examination reveals her abdomen is soft.  The hernia extends from the midline to the right lower quadrant.  He is at least 8 cm in length.  It is reducible.    Operative repair is discussed.  Risks and benefits explained.  All questions answered.      Review of Systems   Constitutional: Negative.  Negative for activity change.   HENT: Negative.     Eyes: Negative.    Respiratory: Negative.     Cardiovascular: Negative.    Gastrointestinal:  Positive for abdominal pain.   Endocrine: Negative.    Genitourinary: Negative.    Musculoskeletal: Negative.    Skin: Negative.    Allergic/Immunologic: Negative.    Neurological: Negative.    Psychiatric/Behavioral:  Negative for agitation, behavioral problems and confusion. The patient is not nervous/anxious.            Objective     /74   Pulse 89   Ht 5' 6\" (1.676 m)   Wt 71.2 kg (157 lb)   SpO2 90%   BMI 25.34 kg/m²     Physical Exam  Vitals and nursing note reviewed.   Constitutional:       General: She is not in acute distress.     Appearance: She is well-developed.   HENT:      Head: Normocephalic and atraumatic.   Eyes:      Conjunctiva/sclera: Conjunctivae normal.   Cardiovascular:      Rate and Rhythm: Normal rate and regular rhythm.      Heart sounds: No murmur heard.  Pulmonary:      Effort: Pulmonary effort is normal. No respiratory distress.      Breath sounds: " Normal breath sounds.   Abdominal:      Palpations: Abdomen is soft.      Tenderness: There is no abdominal tenderness.      Hernia: A hernia (Medium incisional hernia in the lower aspect of the abdomen.  Stents from the midline to the right lower quadrant.  It is reducible.) is present.   Musculoskeletal:         General: No swelling.      Cervical back: Neck supple.   Skin:     General: Skin is warm and dry.      Capillary Refill: Capillary refill takes less than 2 seconds.   Neurological:      Mental Status: She is alert.   Psychiatric:         Mood and Affect: Mood normal.

## 2024-10-07 NOTE — PROGRESS NOTES
"Ambulatory Visit  Name: Rosemary Houser      : 1950      MRN: [id number]  Encounter Provider: Bhupinder King MD  Encounter Date: 10/7/2024   Encounter department: [facility] GENERAL SURGERY [facility]ION    Assessment & Plan  Incisional hernia without obstruction or gangrene  Recommend operative repair             History of Present Illness     Rosemary Houser is a 74 y.o. female who presents for pre op consult. Scheduled from incisional hernia repair 10/16/2024.  She was seen previously and returns for a preoperative evaluation.  She has been utilizing a binder for incisional hernia.    Examination reveals her abdomen is soft.  The hernia extends from the midline to the right lower quadrant.  He is at least 8 cm in length.  It is reducible.    Operative repair is discussed.  Risks and benefits explained.  All questions answered.      Review of Systems   Constitutional: Negative.  Negative for activity change.   HENT: Negative.     Eyes: Negative.    Respiratory: Negative.     Cardiovascular: Negative.    Gastrointestinal:  Positive for abdominal pain.   Endocrine: Negative.    Genitourinary: Negative.    Musculoskeletal: Negative.    Skin: Negative.    Allergic/Immunologic: Negative.    Neurological: Negative.    Psychiatric/Behavioral:  Negative for agitation, behavioral problems and confusion. The patient is not nervous/anxious.            Objective     /74   Pulse 89   Ht 5' 6\" (1.676 m)   Wt 71.2 kg (157 lb)   SpO2 90%   BMI 25.34 kg/m²     Physical Exam  Vitals and nursing note reviewed.   Constitutional:       General: She is not in acute distress.     Appearance: She is well-developed.   HENT:      Head: Normocephalic and atraumatic.   Eyes:      Conjunctiva/sclera: Conjunctivae normal.   Cardiovascular:      Rate and Rhythm: Normal rate and regular rhythm.      Heart sounds: No murmur heard.  Pulmonary:      Effort: Pulmonary effort is normal. No respiratory distress.      Breath sounds: " Normal breath sounds.   Abdominal:      Palpations: Abdomen is soft.      Tenderness: There is no abdominal tenderness.      Hernia: A hernia (Medium incisional hernia in the lower aspect of the abdomen.  Stents from the midline to the right lower quadrant.  It is reducible.) is present.   Musculoskeletal:         General: No swelling.      Cervical back: Neck supple.   Skin:     General: Skin is warm and dry.      Capillary Refill: Capillary refill takes less than 2 seconds.   Neurological:      Mental Status: She is alert.   Psychiatric:         Mood and Affect: Mood normal.

## 2024-10-09 ENCOUNTER — ANESTHESIA EVENT (OUTPATIENT)
Dept: PERIOP | Facility: HOSPITAL | Age: 74
End: 2024-10-09
Payer: COMMERCIAL

## 2024-10-11 RX ORDER — OXYCODONE AND ACETAMINOPHEN 5; 325 MG/1; MG/1
1 TABLET ORAL EVERY 4 HOURS PRN
Qty: 10 TABLET | Refills: 0 | Status: SHIPPED | OUTPATIENT
Start: 2024-10-11

## 2024-10-11 NOTE — PRE-PROCEDURE INSTRUCTIONS
Pre-Surgery Instructions:   Medication Instructions    albuterol (Ventolin HFA) 90 mcg/act inhaler Uses PRN- OK to take day of surgery    aspirin (ECOTRIN) 325 mg EC tablet Last dose 10/8-Per MD instructions    citalopram (CeleXA) 20 mg tablet Take night before surgery    cyclobenzaprine (FLEXERIL) 10 mg tablet Uses PRN- OK to take day of surgery    divalproex sodium (DEPAKOTE ER) 250 mg 24 hr tablet Take night before surgery    Fluticasone-Salmeterol (Advair) 100-50 mcg/dose inhaler Take day of surgery.    naproxen (Naprosyn) 500 mg tablet Stop taking 7 days prior to surgery.    simvastatin (ZOCOR) 40 mg tablet Take night before surgery    SUMAtriptan (IMITREX) 100 mg tablet Uses PRN- OK to take day of surgery    zolpidem (AMBIEN) 5 mg tablet Take night before surgery      Medication instructions for day surgery reviewed. Please use only a sip of water to take your instructed medications. Avoid all over the counter vitamins, supplements and NSAIDS for one week prior to surgery per anesthesia guidelines. Tylenol is ok to take as needed.     You will receive a call one business day prior to surgery with an arrival time and hospital directions. If your surgery is scheduled on a Monday, the hospital will be calling you on the Friday prior to your surgery. If you have not heard from anyone by 8pm, please call the hospital supervisor through the hospital  at [phone]. ([place] [phone] or [place] [phone]).    Do not eat or drink anything after midnight the night before your surgery, including candy, mints, lifesavers, or chewing gum. Do not drink alcohol 24hrs before your surgery. Try not to smoke at least 24hrs before your surgery.       Follow the pre surgery showering instructions as listed in the “My Surgical Experience Booklet” or otherwise provided by your surgeon's office. Do not use a blade to shave the surgical area 1 week before surgery. It is okay to use a clean electric clippers up to  24 hours before surgery. Do not apply any lotions, creams, including makeup, cologne, deodorant, or perfumes after showering on the day of your surgery. Do not use dry shampoo, hair spray, hair gel, or any type of hair products.     No contact lenses, eye make-up, or artificial eyelashes. Remove nail polish, including gel polish, and any artificial, gel, or acrylic nails if possible. Remove all jewelry including rings and body piercing jewelry.     Wear causal clothing that is easy to take on and off. Consider your type of surgery.    Keep any valuables, jewelry, piercings at home. Please bring any specially ordered equipment (sling, braces) if indicated.    Arrange for a responsible person to drive you to and from the hospital on the day of your surgery. Please confirm the visitor policy for the day of your procedure when you receive your phone call with an arrival time.     Call the surgeon's office with any new illnesses, exposures, or additional questions prior to surgery.    Please reference your “My Surgical Experience Booklet” for additional information to prepare for your upcoming surgery.

## 2024-10-12 NOTE — DISCHARGE INSTR - AVS FIRST PAGE
Please call the office when you leave to schedule an appointment for 2 weeks.              Please call [phone]. [address], [address], [place], [address]. [address] [email or web address] and [email or web address].     Activity:    May lift 10 lb as many times as desired the 1st week,       20 lb in 2 weeks,       30 lb in 3 weeks.                Walking is encouraged  Normal daily activities including climbing steps are okay  Do not engage in strenuous activity ( sit-ups or crutches) or contact sports for 4-6 weeks post-operatively    Return to Work:   Okay to return to work when you feel well if you desire.        Diet:   You may return to your normal healthy diet.    Wound Care:  Your wound is closed with dissolvable stitches and glue.  It is okay to shower. Wash incision gently with soap and water and pat dry. Do not soak incisions in bath water or swim for two weeks. Do not apply any creams or ointments.    Pain Medication:   Please take as directed if needed. May use Advil or Motrin in addition.  Recall, the pain medicine and anesthesia is associated with constipation.    No driving while taking narcotic pain medications.      Other:  It is normal to developed a “healing ridge” / firm incision after surgery.  This is your body making scar tissue.  It is a good sign  Constipation is very common after general anesthesia.  Please use milk of magnesia as needed in order to help prevent constipation.  It is normal to get bruising after surgery.  If you have questions after discharge please call the office.    If you have increased pain, fever >101.5, increased drainage, redness or a bad smell at your surgery site, please call us immediately or come directly to the Emergency Room

## 2024-10-16 ENCOUNTER — HOSPITAL ENCOUNTER (OUTPATIENT)
Facility: HOSPITAL | Age: 74
Setting detail: OUTPATIENT SURGERY
Discharge: HOME | End: 2024-10-16
Attending: SURGERY | Admitting: SURGERY
Payer: COMMERCIAL

## 2024-10-16 ENCOUNTER — ANESTHESIA (OUTPATIENT)
Dept: PERIOP | Facility: HOSPITAL | Age: 74
End: 2024-10-16
Payer: COMMERCIAL

## 2024-10-16 VITALS
HEART RATE: 80 BPM | RESPIRATION RATE: 16 BRPM | SYSTOLIC BLOOD PRESSURE: 158 MMHG | DIASTOLIC BLOOD PRESSURE: 72 MMHG | OXYGEN SATURATION: 95 % | TEMPERATURE: 97.5 F | BODY MASS INDEX: 25.23 KG/M2 | HEIGHT: 66 IN | WEIGHT: 157 LBS

## 2024-10-16 DIAGNOSIS — K43.2 INCISIONAL HERNIA WITHOUT OBSTRUCTION OR GANGRENE: Primary | ICD-10-CM

## 2024-10-16 PROCEDURE — C9290 INJ, BUPIVACAINE LIPOSOME: HCPCS | Performed by: SURGERY

## 2024-10-16 PROCEDURE — 49595 RPR AA HRN 1ST > 10 RDC: CPT | Performed by: SURGERY

## 2024-10-16 RX ORDER — ONDANSETRON 2 MG/ML
4 INJECTION INTRAMUSCULAR; INTRAVENOUS EVERY 4 HOURS PRN
Status: CANCELLED | OUTPATIENT
Start: 2024-10-16

## 2024-10-16 RX ORDER — ONDANSETRON 2 MG/ML
4 INJECTION INTRAMUSCULAR; INTRAVENOUS ONCE AS NEEDED
Status: DISCONTINUED | OUTPATIENT
Start: 2024-10-16 | End: 2024-10-16 | Stop reason: HOSPADM

## 2024-10-16 RX ORDER — ACETAMINOPHEN 325 MG/1
650 TABLET ORAL EVERY 4 HOURS PRN
Status: CANCELLED | OUTPATIENT
Start: 2024-10-16

## 2024-10-16 RX ORDER — FENTANYL CITRATE 50 UG/ML
INJECTION, SOLUTION INTRAMUSCULAR; INTRAVENOUS AS NEEDED
Status: DISCONTINUED | OUTPATIENT
Start: 2024-10-16 | End: 2024-10-16

## 2024-10-16 RX ORDER — HYDROMORPHONE HCL/PF 1 MG/ML
SYRINGE (ML) INJECTION AS NEEDED
Status: DISCONTINUED | OUTPATIENT
Start: 2024-10-16 | End: 2024-10-16

## 2024-10-16 RX ORDER — MAGNESIUM HYDROXIDE 1200 MG/15ML
LIQUID ORAL AS NEEDED
Status: DISCONTINUED | OUTPATIENT
Start: 2024-10-16 | End: 2024-10-16 | Stop reason: HOSPADM

## 2024-10-16 RX ORDER — PROPOFOL 10 MG/ML
INJECTION, EMULSION INTRAVENOUS AS NEEDED
Status: DISCONTINUED | OUTPATIENT
Start: 2024-10-16 | End: 2024-10-16

## 2024-10-16 RX ORDER — FENTANYL CITRATE/PF 50 MCG/ML
25 SYRINGE (ML) INJECTION
Status: DISCONTINUED | OUTPATIENT
Start: 2024-10-16 | End: 2024-10-16 | Stop reason: HOSPADM

## 2024-10-16 RX ORDER — BUPIVACAINE HYDROCHLORIDE 2.5 MG/ML
INJECTION, SOLUTION EPIDURAL; INFILTRATION; INTRACAUDAL AS NEEDED
Status: DISCONTINUED | OUTPATIENT
Start: 2024-10-16 | End: 2024-10-16 | Stop reason: HOSPADM

## 2024-10-16 RX ORDER — ONDANSETRON 2 MG/ML
INJECTION INTRAMUSCULAR; INTRAVENOUS AS NEEDED
Status: DISCONTINUED | OUTPATIENT
Start: 2024-10-16 | End: 2024-10-16

## 2024-10-16 RX ORDER — HYDROMORPHONE HCL/PF 1 MG/ML
0.5 SYRINGE (ML) INJECTION
Status: DISCONTINUED | OUTPATIENT
Start: 2024-10-16 | End: 2024-10-16 | Stop reason: HOSPADM

## 2024-10-16 RX ORDER — DEXAMETHASONE SODIUM PHOSPHATE 10 MG/ML
INJECTION, SOLUTION INTRAMUSCULAR; INTRAVENOUS AS NEEDED
Status: DISCONTINUED | OUTPATIENT
Start: 2024-10-16 | End: 2024-10-16

## 2024-10-16 RX ORDER — LIDOCAINE HYDROCHLORIDE 10 MG/ML
INJECTION, SOLUTION EPIDURAL; INFILTRATION; INTRACAUDAL; PERINEURAL AS NEEDED
Status: DISCONTINUED | OUTPATIENT
Start: 2024-10-16 | End: 2024-10-16

## 2024-10-16 RX ORDER — CLINDAMYCIN PHOSPHATE 600 MG/50ML
600 INJECTION, SOLUTION INTRAVENOUS ONCE
Status: COMPLETED | OUTPATIENT
Start: 2024-10-16 | End: 2024-10-16

## 2024-10-16 RX ORDER — SODIUM CHLORIDE, SODIUM LACTATE, POTASSIUM CHLORIDE, CALCIUM CHLORIDE 600; 310; 30; 20 MG/100ML; MG/100ML; MG/100ML; MG/100ML
INJECTION, SOLUTION INTRAVENOUS CONTINUOUS PRN
Status: DISCONTINUED | OUTPATIENT
Start: 2024-10-16 | End: 2024-10-16

## 2024-10-16 RX ORDER — KETOROLAC TROMETHAMINE 30 MG/ML
INJECTION, SOLUTION INTRAMUSCULAR; INTRAVENOUS AS NEEDED
Status: DISCONTINUED | OUTPATIENT
Start: 2024-10-16 | End: 2024-10-16

## 2024-10-16 RX ORDER — HYDROMORPHONE HCL/PF 1 MG/ML
0.5 SYRINGE (ML) INJECTION
Status: CANCELLED | OUTPATIENT
Start: 2024-10-16

## 2024-10-16 RX ORDER — OXYCODONE AND ACETAMINOPHEN 5; 325 MG/1; MG/1
1 TABLET ORAL EVERY 4 HOURS PRN
Status: CANCELLED | OUTPATIENT
Start: 2024-10-16

## 2024-10-16 RX ADMIN — SODIUM CHLORIDE, SODIUM LACTATE, POTASSIUM CHLORIDE, AND CALCIUM CHLORIDE: .6; .31; .03; .02 INJECTION, SOLUTION INTRAVENOUS at 14:07

## 2024-10-16 RX ADMIN — CLINDAMYCIN PHOSPHATE 600 MG: 600 INJECTION, SOLUTION INTRAVENOUS at 14:12

## 2024-10-16 RX ADMIN — FENTANYL CITRATE 50 MCG: 50 INJECTION INTRAMUSCULAR; INTRAVENOUS at 14:10

## 2024-10-16 RX ADMIN — DEXAMETHASONE SODIUM PHOSPHATE 10 MG: 10 INJECTION INTRAMUSCULAR; INTRAVENOUS at 14:10

## 2024-10-16 RX ADMIN — LIDOCAINE HYDROCHLORIDE 50 MG: 10 INJECTION, SOLUTION EPIDURAL; INFILTRATION; INTRACAUDAL at 14:10

## 2024-10-16 RX ADMIN — PROPOFOL 150 MG: 10 INJECTION, EMULSION INTRAVENOUS at 14:10

## 2024-10-16 RX ADMIN — ONDANSETRON 4 MG: 2 INJECTION INTRAMUSCULAR; INTRAVENOUS at 15:47

## 2024-10-16 RX ADMIN — KETOROLAC TROMETHAMINE 15 MG: 30 INJECTION, SOLUTION INTRAMUSCULAR; INTRAVENOUS at 15:55

## 2024-10-16 RX ADMIN — SODIUM CHLORIDE, SODIUM LACTATE, POTASSIUM CHLORIDE, AND CALCIUM CHLORIDE: .6; .31; .03; .02 INJECTION, SOLUTION INTRAVENOUS at 16:22

## 2024-10-16 RX ADMIN — HYDROMORPHONE HYDROCHLORIDE 0.5 MG: 1 INJECTION, SOLUTION INTRAMUSCULAR; INTRAVENOUS; SUBCUTANEOUS at 15:56

## 2024-10-16 RX ADMIN — FENTANYL CITRATE 50 MCG: 50 INJECTION INTRAMUSCULAR; INTRAVENOUS at 14:31

## 2024-10-16 NOTE — ANESTHESIA PREPROCEDURE EVALUATION
Procedure:  REPAIR HERNIA INCISIONAL (Abdomen)    COPD    Vape usage    Relevant Problems   CARDIO   (+) Chronic migraine without aura without status migrainosus, not intractable   (+) Hyperlipidemia   (+) Migraine without aura and without status migrainosus, not intractable      ENDO   (+) Acquired hypothyroidism      MUSCULOSKELETAL   (+) Acute left-sided low back pain with sciatica   (+) Cervical spondylosis      NEURO/PSYCH   (+) Chronic migraine without aura without status migrainosus, not intractable   (+) Depression   (+) Migraine without aura and without status migrainosus, not intractable      PULMONARY   (+) Acute hypoxic respiratory failure (HCC)   (+) Chronic obstructive pulmonary disease with acute exacerbation (HCC)   (+) Simple chronic bronchitis (HCC)        Physical Exam    Airway    Mallampati score: II  TM Distance: >3 FB  Neck ROM: full     Dental   No notable dental hx     Cardiovascular  Rhythm: regular, Rate: normal, Cardiovascular exam normal    Pulmonary  Pulmonary exam normal Breath sounds clear to auscultation, Decreased breath sounds    Other Findings  post-pubertal.      Anesthesia Plan  ASA Score- 3     Anesthesia Type- general with ASA Monitors.         Additional Monitors:     Airway Plan: LMA.    Comment: Risks of general anesthesia discussed including likely possibility of PONV and sore throat, as well as the rare possibilities of aspiration, dental/oropharyngeal/ocular injuries, or grave/life threatening anesthetic and surgical emergencies..       Plan Factors-Exercise tolerance (METS): >4 METS.    Chart reviewed.    Patient summary reviewed.      Patient instructed to abstain from smoking on day of procedure. Patient did not smoke on day of surgery.            Induction- intravenous.    Postoperative Plan- Plan for postoperative opioid use. Planned trial extubation        Informed Consent- Anesthetic plan and risks discussed with patient.  I personally reviewed this patient with  the CRNA. Discussed and agreed on the Anesthesia Plan with the CRNA..

## 2024-10-16 NOTE — ANESTHESIA POSTPROCEDURE EVALUATION
Post-Op Assessment Note    CV Status:  Stable  Pain Score: 0    Pain management: adequate       Mental Status:  Alert and awake   Hydration Status:  Stable   PONV Controlled:  None   Airway Patency:  Patent     Post Op Vitals Reviewed: Yes    No anethesia notable event occurred.    Staff: CRNA           Last Filed PACU Vitals:  Vitals Value Taken Time   Temp     Pulse     BP     Resp     SpO2         Modified Kuldip:  No data recorded

## 2024-10-16 NOTE — OP NOTE
OPERATIVE REPORT  PATIENT NAME: Rosemary Houser    :  1950  MRN: [id number]  Pt Location: AN OR ROOM 01    SURGERY DATE: 10/16/2024    Surgeons and Role:     * Bhupinder King MD - Primary     * Shelia Mendez MD - Assisting    Preop Diagnosis:  Incisional hernia [K43.2]    Post-Op Diagnosis Codes:     * Incisional hernia [K43.2]    Procedure(s):  REPAIR HERNIA INCISIONAL    Specimen(s):  * No specimens in log *    Estimated Blood Loss:   Minimal    Drains:  * No LDAs found *    Anesthesia Type:   General    Operative Indications:  Incisional hernia [K43.2]      Operative Findings:    Prior to opening the fascia, or reducing the contents of the hernia, the hernia defect was measured. The defect measured 12 cm by 8 cm. This was repaired as described in the body of the report below.      Complications:   None    Procedure and Technique:  Patient was notified visually and armband.  Placed in supine position.  After anesthesia the abdomen was prepped and draped in sterile fashion.  Timeout was completed.  A thrombin pumps in place.  Quarter percent Marcaine mixed with Exparel was utilized through the procedure.    Incision was made in the lower midline abdomen carried down to skin subcutaneous tissue.  The hernia was noted.  The entirety of the lower midline incision had disrupted with exposed mesh in the underlay region.    Lysis of adhesions was completed between the mesh and the fascia.  In addition the large hernia sac was dissected free and amputated.  This extended to the lower aspect of the incision towards the patient's right lower quadrant.    Dissection into the prepared vesicular plane was completed.  It was decided to continue with the previously placed mesh and reapproximate this into the fascia followed by closing the midline fascia over it.    Several interrupted U sutures of #1 Prolene was then utilized circumferentially to pexied the previous mesh to the rectus abdominis fascia.  Once this was  completed the peritoneal defect was also closed with a running 3-0 PDS suture.  Thereafter interrupted #1 Vicryl sutures were then utilized to close linea alba in the lower midline region.  Is completed underlay repair with primary fascia repair in addition.    2-0 Vicryl suture was then utilized to pexied the previous hernia redundant tissue.  This is followed by 3-0 Vicryl subcutaneous and 4 Monocryl sutures.  This followed by Dermabond and abdominal binder.  The patient tolerated this procedure well.  Sponge and instrument count correct x 2.   I was present for the entire procedure.    Patient Disposition:  PACU              SIGNATURE: Bhupinder King MD  DATE: October 16, 2024  TIME: 4:50 PM

## 2024-10-16 NOTE — INTERVAL H&P NOTE
H&P reviewed. After examining the patient I find no changes in the patients condition since the H&P had been written.    Vitals:    10/16/24 1220   BP: 150/71   Pulse: 80   Resp: 16   Temp: 97.7 °F (36.5 °C)   SpO2: 91%

## 2024-10-17 NOTE — ANESTHESIA POSTPROCEDURE EVALUATION
Post-Op Assessment Note    CV Status:  Stable  Pain Score: 0    Pain management: adequate       Mental Status:  Awake and sleepy   Hydration Status:  Stable   PONV Controlled:  None   Airway Patency:  Patent  Airway: intubated     Post Op Vitals Reviewed: Yes    No anethesia notable event occurred.    Staff: Anesthesiologist           Last Filed PACU Vitals:  Vitals Value Taken Time   Temp 97.5 °F (36.4 °C) 10/16/24 1645   Pulse 84 10/16/24 1652   /70 10/16/24 1645   Resp 18 10/16/24 1652   SpO2 97 % 10/16/24 1652   Vitals shown include unfiled device data.    Modified Kuldip:  Activity: 2 (10/16/2024  5:15 PM)  Respiration: 2 (10/16/2024  5:15 PM)  Circulation: 2 (10/16/2024  5:15 PM)  Consciousness: 2 (10/16/2024  5:15 PM)  Oxygen Saturation: 2 (10/16/2024  5:15 PM)  Modified Kuldip Score: 10 (10/16/2024  5:15 PM)

## 2024-10-30 ENCOUNTER — APPOINTMENT (OUTPATIENT)
Dept: LAB | Age: 74
End: 2024-10-30
Payer: COMMERCIAL

## 2024-10-30 DIAGNOSIS — Z00.00 HEALTHCARE MAINTENANCE: ICD-10-CM

## 2024-10-30 DIAGNOSIS — R73.9 HYPERGLYCEMIA: ICD-10-CM

## 2024-10-30 LAB
ALBUMIN SERPL BCG-MCNC: 3.7 G/DL (ref 3.5–5)
ALP SERPL-CCNC: 62 U/L (ref 34–104)
ALT SERPL W P-5'-P-CCNC: 8 U/L (ref 7–52)
ANION GAP SERPL CALCULATED.3IONS-SCNC: 8 MMOL/L (ref 4–13)
AST SERPL W P-5'-P-CCNC: 14 U/L (ref 13–39)
BACTERIA UR QL AUTO: NORMAL /HPF
BASOPHILS # BLD AUTO: 0.06 THOUSANDS/ÂΜL (ref 0–0.1)
BASOPHILS NFR BLD AUTO: 1 % (ref 0–1)
BILIRUB SERPL-MCNC: 0.28 MG/DL (ref 0.2–1)
BILIRUB UR QL STRIP: NEGATIVE
BUN SERPL-MCNC: 17 MG/DL (ref 5–25)
CALCIUM SERPL-MCNC: 9.2 MG/DL (ref 8.4–10.2)
CHLORIDE SERPL-SCNC: 97 MMOL/L (ref 96–108)
CHOLEST SERPL-MCNC: 183 MG/DL
CLARITY UR: CLEAR
CO2 SERPL-SCNC: 29 MMOL/L (ref 21–32)
COLOR UR: COLORLESS
CREAT SERPL-MCNC: 0.83 MG/DL (ref 0.6–1.3)
EOSINOPHIL # BLD AUTO: 0.24 THOUSAND/ÂΜL (ref 0–0.61)
EOSINOPHIL NFR BLD AUTO: 4 % (ref 0–6)
ERYTHROCYTE [DISTWIDTH] IN BLOOD BY AUTOMATED COUNT: 14.3 % (ref 11.6–15.1)
EST. AVERAGE GLUCOSE BLD GHB EST-MCNC: 134 MG/DL
GFR SERPL CREATININE-BSD FRML MDRD: 68 ML/MIN/1.73SQ M
GLUCOSE P FAST SERPL-MCNC: 103 MG/DL (ref 65–99)
GLUCOSE UR STRIP-MCNC: NEGATIVE MG/DL
HBA1C MFR BLD: 6.3 %
HCT VFR BLD AUTO: 38.8 % (ref 34.8–46.1)
HDLC SERPL-MCNC: 55 MG/DL
HGB BLD-MCNC: 12.4 G/DL (ref 11.5–15.4)
HGB UR QL STRIP.AUTO: NEGATIVE
IMM GRANULOCYTES # BLD AUTO: 0.08 THOUSAND/UL (ref 0–0.2)
IMM GRANULOCYTES NFR BLD AUTO: 1 % (ref 0–2)
KETONES UR STRIP-MCNC: NEGATIVE MG/DL
LDLC SERPL CALC-MCNC: 101 MG/DL (ref 0–100)
LEUKOCYTE ESTERASE UR QL STRIP: NEGATIVE
LYMPHOCYTES # BLD AUTO: 2.05 THOUSANDS/ÂΜL (ref 0.6–4.47)
LYMPHOCYTES NFR BLD AUTO: 30 % (ref 14–44)
MCH RBC QN AUTO: 30.4 PG (ref 26.8–34.3)
MCHC RBC AUTO-ENTMCNC: 32 G/DL (ref 31.4–37.4)
MCV RBC AUTO: 95 FL (ref 82–98)
MONOCYTES # BLD AUTO: 0.59 THOUSAND/ÂΜL (ref 0.17–1.22)
MONOCYTES NFR BLD AUTO: 9 % (ref 4–12)
NEUTROPHILS # BLD AUTO: 3.88 THOUSANDS/ÂΜL (ref 1.85–7.62)
NEUTS SEG NFR BLD AUTO: 55 % (ref 43–75)
NITRITE UR QL STRIP: NEGATIVE
NON-SQ EPI CELLS URNS QL MICRO: NORMAL /HPF
NONHDLC SERPL-MCNC: 128 MG/DL
NRBC BLD AUTO-RTO: 0 /100 WBCS
PH UR STRIP.AUTO: 6.5 [PH]
PLATELET # BLD AUTO: 355 THOUSANDS/UL (ref 149–390)
PMV BLD AUTO: 10.5 FL (ref 8.9–12.7)
POTASSIUM SERPL-SCNC: 4.6 MMOL/L (ref 3.5–5.3)
PROT SERPL-MCNC: 7 G/DL (ref 6.4–8.4)
PROT UR STRIP-MCNC: NEGATIVE MG/DL
RBC # BLD AUTO: 4.08 MILLION/UL (ref 3.81–5.12)
RBC #/AREA URNS AUTO: NORMAL /HPF
SODIUM SERPL-SCNC: 134 MMOL/L (ref 135–147)
SP GR UR STRIP.AUTO: 1.01 (ref 1–1.03)
TRIGL SERPL-MCNC: 136 MG/DL
UROBILINOGEN UR STRIP-ACNC: <2 MG/DL
WBC # BLD AUTO: 6.9 THOUSAND/UL (ref 4.31–10.16)
WBC #/AREA URNS AUTO: NORMAL /HPF

## 2024-10-30 PROCEDURE — 83036 HEMOGLOBIN GLYCOSYLATED A1C: CPT

## 2024-10-30 PROCEDURE — 80061 LIPID PANEL: CPT

## 2024-10-30 PROCEDURE — 80053 COMPREHEN METABOLIC PANEL: CPT

## 2024-10-30 PROCEDURE — 81001 URINALYSIS AUTO W/SCOPE: CPT

## 2024-10-30 PROCEDURE — 36415 COLL VENOUS BLD VENIPUNCTURE: CPT

## 2024-10-30 PROCEDURE — 85025 COMPLETE CBC W/AUTO DIFF WBC: CPT

## 2024-11-04 ENCOUNTER — OFFICE VISIT (OUTPATIENT)
Dept: SURGERY | Facility: CLINIC | Age: 74
End: 2024-11-04

## 2024-11-04 DIAGNOSIS — K43.9 VENTRAL HERNIA WITHOUT OBSTRUCTION OR GANGRENE: Primary | ICD-10-CM

## 2024-11-04 PROCEDURE — 99024 POSTOP FOLLOW-UP VISIT: CPT | Performed by: SURGERY

## 2024-11-04 NOTE — PROGRESS NOTES
Ambulatory Visit  Name: Rosemary Houser      : 1950      MRN: [id number]  Encounter Provider: Bhupinder King MD  Encounter Date: 2024   Encounter department: [facility] SURGERY [facility]    Assessment & Plan  Ventral hernia without obstruction or gangrene           History of Present Illness     Rosemary Houser is a 74 y.o. female who presents post operatively.  Incisional hernia repair 10/16/2024.  Overall she feels well.  She denies any pain.  She is interested in returning to work on the .  Incision is clean healing nicely.      Review of Systems   Constitutional: Negative.  Negative for activity change.   HENT: Negative.     Eyes: Negative.    Respiratory: Negative.     Cardiovascular: Negative.    Gastrointestinal: Negative.    Endocrine: Negative.    Genitourinary: Negative.    Musculoskeletal: Negative.    Skin: Negative.    Allergic/Immunologic: Negative.    Neurological: Negative.    Psychiatric/Behavioral:  Negative for agitation, behavioral problems and confusion. The patient is not nervous/anxious.            Objective     There were no vitals taken for this visit.    Physical Exam  Vitals and nursing note reviewed.   Constitutional:       General: She is not in acute distress.     Appearance: She is well-developed.   HENT:      Head: Normocephalic and atraumatic.   Cardiovascular:      Rate and Rhythm: Normal rate.      Heart sounds: No murmur heard.  Pulmonary:      Effort: Pulmonary effort is normal. No respiratory distress.   Abdominal:      Palpations: Abdomen is soft.      Tenderness: There is no abdominal tenderness.      Comments: Wound is clean and dry     Musculoskeletal:         General: No swelling.   Skin:     General: Skin is warm and dry.      Capillary Refill: Capillary refill takes less than 2 seconds.   Neurological:      Mental Status: She is alert.   Psychiatric:         Mood and Affect: Mood normal.

## 2024-11-11 ENCOUNTER — OFFICE VISIT (OUTPATIENT)
Age: 74
End: 2024-11-11
Payer: COMMERCIAL

## 2024-11-11 VITALS
RESPIRATION RATE: 16 BRPM | TEMPERATURE: 98.4 F | HEART RATE: 84 BPM | SYSTOLIC BLOOD PRESSURE: 146 MMHG | HEIGHT: 66 IN | WEIGHT: 169 LBS | DIASTOLIC BLOOD PRESSURE: 88 MMHG | OXYGEN SATURATION: 93 % | BODY MASS INDEX: 27.16 KG/M2

## 2024-11-11 DIAGNOSIS — Z00.00 MEDICARE ANNUAL WELLNESS VISIT, SUBSEQUENT: ICD-10-CM

## 2024-11-11 DIAGNOSIS — K43.2 INCISIONAL HERNIA WITHOUT OBSTRUCTION OR GANGRENE: ICD-10-CM

## 2024-11-11 DIAGNOSIS — F51.01 PRIMARY INSOMNIA: ICD-10-CM

## 2024-11-11 DIAGNOSIS — I65.23 BILATERAL CAROTID ARTERY STENOSIS: ICD-10-CM

## 2024-11-11 DIAGNOSIS — G43.709 CHRONIC MIGRAINE WITHOUT AURA WITHOUT STATUS MIGRAINOSUS, NOT INTRACTABLE: ICD-10-CM

## 2024-11-11 DIAGNOSIS — R73.9 HYPERGLYCEMIA: Primary | ICD-10-CM

## 2024-11-11 DIAGNOSIS — E03.9 ACQUIRED HYPOTHYROIDISM: ICD-10-CM

## 2024-11-11 DIAGNOSIS — E78.01 FAMILIAL HYPERCHOLESTEROLEMIA: ICD-10-CM

## 2024-11-11 DIAGNOSIS — Z72.0 TOBACCO ABUSE: ICD-10-CM

## 2024-11-11 PROCEDURE — 99214 OFFICE O/P EST MOD 30 MIN: CPT | Performed by: INTERNAL MEDICINE

## 2024-11-11 PROCEDURE — G0439 PPPS, SUBSEQ VISIT: HCPCS | Performed by: INTERNAL MEDICINE

## 2024-11-11 RX ORDER — ZOLPIDEM TARTRATE 5 MG/1
TABLET ORAL
Qty: 90 TABLET | Refills: 1 | Status: SHIPPED | OUTPATIENT
Start: 2024-11-11

## 2024-11-11 NOTE — PROGRESS NOTES
Ambulatory Visit  Name: Rosemary Houser      : 1950      MRN: [id number]  Encounter Provider: Edin Choi DO  Encounter Date: 2024   Encounter department: [facility] INTERNAL MEDICINE    Assessment & Plan  Hyperglycemia  Again as noted patient is not always compliant with her diet.  Has a history of hyperglycemia with slight elevation in hemoglobin A1c.  We will continue to monitor this and initiate treatment in the future.  Patient was told to look closely at her caloric intake and try to avoid concentrated sweets and simple carbohydrates is much as possible.  Of note patient has had significant weight gain since she had the hernia repair    Orders:    Basic metabolic panel; Future    Hemoglobin A1C; Future    Familial hypercholesterolemia  History of hyperlipidemia.  Patient remains on Zocor 40 mg daily.  Patient admits that she is not always perfect with her diet but again we reinforced the importance of watching her intake of fats and cholesterol with her diet.  We will continue to monitor her cholesterol and make changes with medication in the future if indicated.         Acquired hypothyroidism  Hypothyroidism.  Patient in the past had an elevation in the TSH level.  She is on no hormonal replacement.  Will continue to monitor this especially in light of recent weight gain         Tobacco abuse         Primary insomnia    Orders:    zolpidem (AMBIEN) 5 mg tablet; One pill at bedtime as needed to help with sleep    Medicare annual wellness visit, subsequent  Patient is a 74-year-old female with a history of medical problems outlined previously who is here today for a repeat Medicare wellness visit.  Patient did have extensive lab testing performed prior to the visit today and we did discuss the results.  Since her last visit patient did have a repair of incisional hernias x 2.  Patient is pleased with the results.  Patient offers no new complaints today but does have concerns about  her 's medical problems         Incisional hernia without obstruction or gangrene  Underwent surgical procedure for repair.  He is very pleased with the results.  No residual pain.  Healing well         Chronic migraine without aura without status migrainosus, not intractable  Continues with treatment as previously taking Imitrex as treatment for her migraine headaches.  Continues to follow-up intermittently with neurology         Bilateral carotid artery stenosis  After finding a bruit to the right carotid artery underwent vascular studies.  Does have bilateral carotid artery stenosis.  Not critical at this point in time patient is no longer smoking cigarettes which hopefully will help prevent progression of the disease.              History of Present Illness     Patient is a 74-year-old female history of multiple medical problems outlined previously who is here today for repeat Medicare wellness visit.  Patient did have extensive lab testing performed prior to the visit today we did discuss the results.  Patient since last seen did undergo surgery for repair of incisional hernias x 2 and is pleased with the results.  Patient offers no complaints but states that she is worried about her  and his medical issues      Review of Systems   Constitutional: Negative.    HENT: Negative.     Eyes: Negative.    Respiratory: Negative.     Cardiovascular: Negative.    Gastrointestinal: Negative.    Endocrine: Negative.    Genitourinary: Negative.    Musculoskeletal: Negative.         Some diffuse arthritic aches and pains but nothing acute   Skin: Negative.    Allergic/Immunologic: Negative.    Neurological: Negative.    Hematological: Negative.    Psychiatric/Behavioral: Negative.       Past Medical History:   Diagnosis Date    Carotid aneurysm, right (HCC)     Hyperlipidemia     Migraine     Pneumonia 02/2024     Past Surgical History:   Procedure Laterality Date    BREAST EXCISIONAL BIOPSY Right 1986     COLONOSCOPY      HARTMANS PROCEDURE N/A 03/08/2020    Procedure: Laparoscopic drainage of intra peritoneal abscess, sigmoid rescetion,;  Surgeon: NOEL Ogden MD;  Location: [facility];  Service: Colorectal    HYSTERECTOMY  1978    age 28    NASAL SEPTUM SURGERY      deviation repair    AK LAPS ABD PRTM&OMENTUM DX W/WO SPEC BR/WA SPX N/A 03/08/2020    Procedure: LAPAROSCOPY DIAGNOSTIC;  Surgeon: NOEL Ogden MD;  Location: [facility];  Service: Colorectal    AK LAPS REPAIR HERNIA EXCEPT INCAL/INGUN REDUCIBLE N/A 01/26/2021    Procedure: REPAIR HERNIA VENTRAL LAPAROSCOPIC;  Surgeon: NOEL Ogden MD;  [facility]: [facility];  Service: Colorectal    AK RPR AA HERNIA 1ST < 3 CM REDUCIBLE N/A 10/16/2024    Procedure: REPAIR HERNIA INCISIONAL;  Surgeon: Bhupinder King MD;  Location[facility];  Service: General    AK SIGMOIDOSCOPY FLX DX W/COLLJ SPEC BR/WA IF PFRMD N/A 03/08/2020    Procedure: SIGMOIDOSCOPY FLEXIBLE;  Surgeon: NOEL Ogden MD;  [facility]: [facility];  Service: Colorectal    SHOULDER SURGERY       Family History   Problem Relation Age of Onset    Breast cancer Mother 50    Heart disease Father     Diabetes Sister     Leukemia Sister     No Known Problems Maternal Grandmother     No Known Problems Maternal Grandfather     No Known Problems Paternal Grandmother     No Known Problems Paternal Grandfather     No Known Problems Sister     Breast cancer Maternal Aunt 50    No Known Problems Maternal Aunt     No Known Problems Paternal Aunt     Colon cancer Maternal Uncle     No Known Problems Son     No Known Problems Son     Lung cancer Other 47     Social History     Tobacco Use    Smoking status: Former     Average packs/day: 0.5 packs/day for 59.1 years (29.6 ttl pk-yrs)     Types: Cigarettes     Start date: 1965    Smokeless tobacco: Never   Vaping Use    Vaping status: Every Day    Substances: Nicotine   Substance and Sexual Activity    Alcohol use: Yes     Comment: Rarely    Drug  use: No    Sexual activity: Not on file     Current Outpatient Medications on File Prior to Visit   Medication Sig    albuterol (Ventolin HFA) 90 mcg/act inhaler Inhale 2 puffs every 6 (six) hours as needed for wheezing    aspirin (ECOTRIN) 325 mg EC tablet Take 1 tablet (325 mg total) by mouth daily (Patient taking differently: Take 325 mg by mouth daily Last dose 10/8 for sx)    citalopram (CeleXA) 20 mg tablet TAKE 1 TABLET DAILY (Patient taking differently: Take 20 mg by mouth daily at bedtime)    cyclobenzaprine (FLEXERIL) 10 mg tablet Take 1 tablet (10 mg total) by mouth daily at bedtime    divalproex sodium (DEPAKOTE ER) 250 mg 24 hr tablet TAKE 1 TABLET TWICE A DAY (Patient taking differently: 250 mg daily at bedtime)    naproxen (Naprosyn) 500 mg tablet Take 1 tablet (500 mg total) by mouth 2 (two) times a day as needed for mild pain    simvastatin (ZOCOR) 40 mg tablet TAKE 1 TABLET DAILY (Patient taking differently: Take 40 mg by mouth daily at bedtime)    SUMAtriptan (IMITREX) 100 mg tablet TAKE 1 TABLET AT ONSET OF MIGRAINE HEADACHE. MAY REPEAT IN 2 HOURS IF NEEDED, NO MORE THAN 2 IN 24 HOURS AND NO MORE THAN 3 IN A WEEK    torsemide (DEMADEX) 10 mg tablet Take 1 tablet (10 mg total) by mouth daily    [DISCONTINUED] zolpidem (AMBIEN) 5 mg tablet One pill at bedtime as needed to help with sleep    Fluticasone-Salmeterol (Advair) 100-50 mcg/dose inhaler Inhale 1 puff 2 (two) times a day Rinse mouth after use.    oxyCODONE-acetaminophen (PERCOCET) 5-325 mg per tablet Take 1 tablet by mouth every 4 (four) hours as needed for moderate pain for up to 10 doses Max Daily Amount: 6 tablets (Patient not taking: Reported on 11/11/2024)    sodium chloride 1 g tablet Take 1 tablet (1 g total) by mouth 2 (two) times a day with meals (Patient not taking: Reported on 5/8/2024)     Allergies   Allergen Reactions    Penicillins Anaphylaxis    Azithromycin Hives    Nisoldipine      Reaction Date: 14Apr2011;     Sulfa  "Antibiotics Hives     Immunization History   Administered Date(s) Administered    COVID-19 PFIZER VACCINE 0.3 ML IM 12/22/2020, 01/12/2021, 10/23/2021    H1N1, All Formulations 11/06/2009    INFLUENZA 10/07/2019, 09/29/2020    Pneumococcal Conjugate 13-Valent 03/31/2016    Pneumococcal Polysaccharide PPV23 11/09/2020     Objective     /88   Pulse 84   Temp 98.4 °F (36.9 °C)   Resp 16   Ht 5' 6\" (1.676 m)   Wt 76.7 kg (169 lb)   SpO2 93%   BMI 27.28 kg/m²     Physical Exam  Vitals and nursing note reviewed.   Constitutional:       General: She is not in acute distress.     Appearance: Normal appearance. She is not ill-appearing, toxic-appearing or diaphoretic.      Comments: Pleasant, overweight 74-year-old female who is awake alert in no acute distress oriented x 3   HENT:      Head: Normocephalic and atraumatic.      Right Ear: Tympanic membrane, ear canal and external ear normal. There is no impacted cerumen.      Left Ear: Tympanic membrane, ear canal and external ear normal. There is no impacted cerumen.      Nose: Nose normal. No congestion or rhinorrhea.      Mouth/Throat:      Mouth: Mucous membranes are moist.      Pharynx: Oropharynx is clear. No oropharyngeal exudate or posterior oropharyngeal erythema.   Eyes:      General: No scleral icterus.        Right eye: No discharge.         Left eye: No discharge.      Extraocular Movements: Extraocular movements intact.      Conjunctiva/sclera: Conjunctivae normal.      Pupils: Pupils are equal, round, and reactive to light.   Neck:      Vascular: Carotid bruit present.   Cardiovascular:      Rate and Rhythm: Normal rate and regular rhythm.      Pulses: Normal pulses.      Heart sounds: Normal heart sounds. No murmur heard.     No friction rub. No gallop.   Pulmonary:      Effort: Pulmonary effort is normal. No respiratory distress.      Breath sounds: No stridor. No wheezing, rhonchi or rales.      Comments: Some decreased breath sounds anteriorly " and posteriorly but no rales rhonchi or wheezes could be appreciated on exam  Chest:      Chest wall: No tenderness.   Abdominal:      General: Abdomen is flat. Bowel sounds are normal. There is no distension.      Palpations: Abdomen is soft. There is no mass.      Tenderness: There is no abdominal tenderness. There is no right CVA tenderness, left CVA tenderness, guarding or rebound.      Hernia: No hernia is present.      Comments: Status post repair of incisional hernias.  Patient has no further problems with bulging no pain to palpation.   Musculoskeletal:         General: No swelling, tenderness, deformity or signs of injury. Normal range of motion.      Cervical back: Normal range of motion and neck supple. No rigidity or tenderness.      Right lower leg: No edema.      Left lower leg: No edema.   Lymphadenopathy:      Cervical: No cervical adenopathy.   Skin:     General: Skin is warm and dry.      Coloration: Skin is not jaundiced or pale.      Findings: No bruising, erythema, lesion or rash.   Neurological:      General: No focal deficit present.      Mental Status: She is alert and oriented to person, place, and time. Mental status is at baseline.      Cranial Nerves: No cranial nerve deficit.      Sensory: No sensory deficit.      Motor: No weakness.      Coordination: Coordination normal.      Gait: Gait normal.      Deep Tendon Reflexes: Reflexes normal.   Psychiatric:         Mood and Affect: Mood normal.         Behavior: Behavior normal.         Thought Content: Thought content normal.         Judgment: Judgment normal.

## 2024-11-11 NOTE — ASSESSMENT & PLAN NOTE
After finding a bruit to the right carotid artery underwent vascular studies.  Does have bilateral carotid artery stenosis.  Not critical at this point in time patient is no longer smoking cigarettes which hopefully will help prevent progression of the disease.

## 2024-11-11 NOTE — PATIENT INSTRUCTIONS
Medicare Preventive Visit Patient Instructions  Thank you for completing your Welcome to Medicare Visit or Medicare Annual Wellness Visit today. Your next wellness visit will be due in one year (11/12/2025).  The screening/preventive services that you may require over the next 5-10 years are detailed below. Some tests may not apply to you based off risk factors and/or age. Screening tests ordered at today's visit but not completed yet may show as past due. Also, please note that scanned in results may not display below.  Preventive Screenings:  Service Recommendations Previous Testing/Comments   Colorectal Cancer Screening  * Colonoscopy    * Fecal Occult Blood Test (FOBT)/Fecal Immunochemical Test (FIT)  * Fecal DNA/Cologuard Test  * Flexible Sigmoidoscopy Age: 45-75 years old   Colonoscopy: every 10 years (may be performed more frequently if at higher risk)  OR  FOBT/FIT: every 1 year  OR  Cologuard: every 3 years  OR  Sigmoidoscopy: every 5 years  Screening may be recommended earlier than age 45 if at higher risk for colorectal cancer. Also, an individualized decision between you and your healthcare provider will decide whether screening between the ages of 76-85 would be appropriate. Colonoscopy: 08/03/2020  FOBT/FIT: 06/12/2023  Cologuard: Not on file  Sigmoidoscopy: 03/08/2020    Screening Current     Breast Cancer Screening Age: 40+ years old  Frequency: every 1-2 years  Not required if history of left and right mastectomy Mammogram: 12/27/2021        Cervical Cancer Screening Between the ages of 21-29, pap smear recommended once every 3 years.   Between the ages of 30-65, can perform pap smear with HPV co-testing every 5 years.   Recommendations may differ for women with a history of total hysterectomy, cervical cancer, or abnormal pap smears in past. Pap Smear: Not on file    Screening Not Indicated   Hepatitis C Screening Once for adults born between 1945 and 1965  More frequently in patients at high risk  for Hepatitis C Hep C Antibody: 03/28/2018    Screening Current   Diabetes Screening 1-2 times per year if you're at risk for diabetes or have pre-diabetes Fasting glucose: 103 mg/dL (10/30/2024)  A1C: 6.3 % (10/30/2024)  Screening Current   Cholesterol Screening Once every 5 years if you don't have a lipid disorder. May order more often based on risk factors. Lipid panel: 10/30/2024    Screening Not Indicated  History Lipid Disorder     Other Preventive Screenings Covered by Medicare:  Abdominal Aortic Aneurysm (AAA) Screening: covered once if your at risk. You're considered to be at risk if you have a family history of AAA.  Lung Cancer Screening: covers low dose CT scan once per year if you meet all of the following conditions: (1) Age 55-77; (2) No signs or symptoms of lung cancer; (3) Current smoker or have quit smoking within the last 15 years; (4) You have a tobacco smoking history of at least 20 pack years (packs per day multiplied by number of years you smoked); (5) You get a written order from a healthcare provider.  Glaucoma Screening: covered annually if you're considered high risk: (1) You have diabetes OR (2) Family history of glaucoma OR (3)  aged 50 and older OR (4)  American aged 65 and older  Osteoporosis Screening: covered every 2 years if you meet one of the following conditions: (1) You're estrogen deficient and at risk for osteoporosis based off medical history and other findings; (2) Have a vertebral abnormality; (3) On glucocorticoid therapy for more than 3 months; (4) Have primary hyperparathyroidism; (5) On osteoporosis medications and need to assess response to drug therapy.   Last bone density test (DXA Scan): 05/25/2016.  HIV Screening: covered annually if you're between the age of 15-65. Also covered annually if you are younger than 15 and older than 65 with risk factors for HIV infection. For pregnant patients, it is covered up to 3 times per  pregnancy.    Immunizations:  Immunization Recommendations   Influenza Vaccine Annual influenza vaccination during flu season is recommended for all persons aged >= 6 months who do not have contraindications   Pneumococcal Vaccine   * Pneumococcal conjugate vaccine = PCV13 (Prevnar 13), PCV15 (Vaxneuvance), PCV20 (Prevnar 20)  * Pneumococcal polysaccharide vaccine = PPSV23 (Pneumovax) Adults 19-65 yo with certain risk factors or if 65+ yo  If never received any pneumonia vaccine: recommend Prevnar 20 (PCV20)  Give PCV20 if previously received 1 dose of PCV13 or PPSV23   Hepatitis B Vaccine 3 dose series if at intermediate or high risk (ex: diabetes, end stage renal disease, liver disease)   Respiratory syncytial virus (RSV) Vaccine - COVERED BY MEDICARE PART D  * RSVPreF3 (Arexvy) CDC recommends that adults 60 years of age and older may receive a single dose of RSV vaccine using shared clinical decision-making (SCDM)   Tetanus (Td) Vaccine - COST NOT COVERED BY MEDICARE PART B Following completion of primary series, a booster dose should be given every 10 years to maintain immunity against tetanus. Td may also be given as tetanus wound prophylaxis.   Tdap Vaccine - COST NOT COVERED BY MEDICARE PART B Recommended at least once for all adults. For pregnant patients, recommended with each pregnancy.   Shingles Vaccine (Shingrix) - COST NOT COVERED BY MEDICARE PART B  2 shot series recommended in those 19 years and older who have or will have weakened immune systems or those 50 years and older     Health Maintenance Due:      Topic Date Due   • Breast Cancer Screening: Mammogram  12/27/2023   • Colorectal Cancer Screening  08/03/2025   • Hepatitis C Screening  Completed   • Lung Cancer Screening  Discontinued     Immunizations Due:      Topic Date Due   • Hepatitis A Vaccine (1 of 2 - Risk 2-dose series) Never done   • Influenza Vaccine (1) 09/01/2024   • COVID-19 Vaccine (4 - 2023-24 season) 09/01/2024     Advance  Directives   What are advance directives?  Advance directives are legal documents that state your wishes and plans for medical care. These plans are made ahead of time in case you lose your ability to make decisions for yourself. Advance directives can apply to any medical decision, such as the treatments you want, and if you want to donate organs.   What are the types of advance directives?  There are many types of advance directives, and each state has rules about how to use them. You may choose a combination of any of the following:  Living will:  This is a written record of the treatment you want. You can also choose which treatments you do not want, which to limit, and which to stop at a certain time. This includes surgery, medicine, IV fluid, and tube feedings.   Durable power of  for healthcare (DPAHC):  This is a written record that states who you want to make healthcare choices for you when you are unable to make them for yourself. This person, called a proxy, is usually a family member or a friend. You may choose more than 1 proxy.  Do not resuscitate (DNR) order:  A DNR order is used in case your heart stops beating or you stop breathing. It is a request not to have certain forms of treatment, such as CPR. A DNR order may be included in other types of advance directives.  Medical directive:  This covers the care that you want if you are in a coma, near death, or unable to make decisions for yourself. You can list the treatments you want for each condition. Treatment may include pain medicine, surgery, blood transfusions, dialysis, IV or tube feedings, and a ventilator (breathing machine).  Values history:  This document has questions about your views, beliefs, and how you feel and think about life. This information can help others choose the care that you would choose.  Why are advance directives important?  An advance directive helps you control your care. Although spoken wishes may be used, it  is better to have your wishes written down. Spoken wishes can be misunderstood, or not followed. Treatments may be given even if you do not want them. An advance directive may make it easier for your family to make difficult choices about your care.   Fall Prevention    Fall prevention  includes ways to make your home and other areas safer. It also includes ways you can move more carefully to prevent a fall. Health conditions that cause changes in your blood pressure, vision, or muscle strength and coordination may increase your risk for falls. Medicines may also increase your risk for falls if they make you dizzy, weak, or sleepy.   Fall prevention tips:   Stand or sit up slowly.    Use assistive devices as directed.    Wear shoes that fit well and have soles that .    Wear a personal alarm.    Stay active.    Manage your medical conditions.    Home Safety Tips:  Add items to prevent falls in the bathroom.    Keep paths clear.    Install bright lights in your home.    Keep items you use often on shelves within reach.    Paint or place reflective tape on the edges of your stairs.    Weight Management   Why it is important to manage your weight:  Being overweight increases your risk of health conditions such as heart disease, high blood pressure, type 2 diabetes, and certain types of cancer. It can also increase your risk for osteoarthritis, sleep apnea, and other respiratory problems. Aim for a slow, steady weight loss. Even a small amount of weight loss can lower your risk of health problems.  How to lose weight safely:  A safe and healthy way to lose weight is to eat fewer calories and get regular exercise. You can lose up about 1 pound a week by decreasing the number of calories you eat by 500 calories each day.   Healthy meal plan for weight management:  A healthy meal plan includes a variety of foods, contains fewer calories, and helps you stay healthy. A healthy meal plan includes the following:  Eat  whole-grain foods more often.  A healthy meal plan should contain fiber. Fiber is the part of grains, fruits, and vegetables that is not broken down by your body. Whole-grain foods are healthy and provide extra fiber in your diet. Some examples of whole-grain foods are whole-wheat breads and pastas, oatmeal, brown rice, and bulgur.  Eat a variety of vegetables every day.  Include dark, leafy greens such as spinach, kale, khoa greens, and mustard greens. Eat yellow and orange vegetables such as carrots, sweet potatoes, and winter squash.   Eat a variety of fruits every day.  Choose fresh or canned fruit (canned in its own juice or light syrup) instead of juice. Fruit juice has very little or no fiber.  Eat low-fat dairy foods.  Drink fat-free (skim) milk or 1% milk. Eat fat-free yogurt and low-fat cottage cheese. Try low-fat cheeses such as mozzarella and other reduced-fat cheeses.  Choose meat and other protein foods that are low in fat.  Choose beans or other legumes such as split peas or lentils. Choose fish, skinless poultry (chicken or turkey), or lean cuts of red meat (beef or pork). Before you cook meat or poultry, cut off any visible fat.   Use less fat and oil.  Try baking foods instead of frying them. Add less fat, such as margarine, sour cream, regular salad dressing and mayonnaise to foods. Eat fewer high-fat foods. Some examples of high-fat foods include french fries, doughnuts, ice cream, and cakes.  Eat fewer sweets.  Limit foods and drinks that are high in sugar. This includes candy, cookies, regular soda, and sweetened drinks.  Exercise:  Exercise at least 30 minutes per day on most days of the week. Some examples of exercise include walking, biking, dancing, and swimming. You can also fit in more physical activity by taking the stairs instead of the elevator or parking farther away from stores. Ask your healthcare provider about the best exercise plan for you.      © Copyright [employer]  2018 Information is for End User's use only and may not be sold, redistributed or otherwise used for commercial purposes. All illustrations and images included in CareNotes® are the copyrighted property of [employer] or [employer]

## 2024-11-11 NOTE — ASSESSMENT & PLAN NOTE
Underwent surgical procedure for repair.  He is very pleased with the results.  No residual pain.  Healing well

## 2024-11-11 NOTE — PROGRESS NOTES
Ambulatory Visit  Name: Rosemary Houser      : 1950      MRN: [id number]  Encounter Provider: Edin Choi DO  Encounter Date: 2024   Encounter department: [facility] INTERNAL MEDICINE    Assessment & Plan       Preventive health issues were discussed with patient, and age appropriate screening tests were ordered as noted in patient's After Visit Summary. Personalized health advice and appropriate referrals for health education or preventive services given if needed, as noted in patient's After Visit Summary.    History of Present Illness     HPI   Patient Care Team:  Edin Choi DO as PCP - General  Corey Copeland MD (Vascular Surgery)  NOEL Ogden MD (Colon and Rectal Surgery)  Armand Elam DO (Nephrology)    Review of Systems  Medical History Reviewed by provider this encounter:       Annual Wellness Visit Questionnaire       Health Risk Assessment:   Patient rates overall health as good. Patient feels that their physical health rating is same. Patient is satisfied with their life. Eyesight was rated as same. Hearing was rated as same. Patient feels that their emotional and mental health rating is same. Patients states they are never, rarely angry. Patient states they are often unusually tired/fatigued. Pain experienced in the last 7 days has been some. Patient's pain rating has been 4/10. Patient states that she has experienced weight loss or gain in last 6 months.     Depression Screening:   PHQ-9 Score: 8      Fall Risk Screening:   In the past year, patient has experienced: history of falling in past year    Number of falls: 1  Injured during fall?: No    Feels unsteady when standing or walking?: No    Worried about falling?: No      Urinary Incontinence Screening:   Patient has not leaked urine accidently in the last six months.     Home Safety:  Patient does not have trouble with stairs inside or outside of their home. Patient has working smoke alarms and has no  working carbon monoxide detector. Home safety hazards include: none.     Nutrition:   Current diet is Regular.     Medications:   Patient is not currently taking any over-the-counter supplements. Patient is able to manage medications.     Activities of Daily Living (ADLs)/Instrumental Activities of Daily Living (IADLs):   Walk and transfer into and out of bed and chair?: Yes  Dress and groom yourself?: Yes    Bathe or shower yourself?: Yes    Feed yourself? Yes  Do your laundry/housekeeping?: Yes  Manage your money, pay your bills and track your expenses?: Yes  Make your own meals?: Yes    Do your own shopping?: No    Previous Hospitalizations:   Any hospitalizations or ED visits within the last 12 months?: Yes    How many hospitalizations have you had in the last year?: 1-2    Advance Care Planning:   Living will: No    Durable POA for healthcare: No      PREVENTIVE SCREENINGS      Cardiovascular Screening:    General: Screening Not Indicated and History Lipid Disorder      Diabetes Screening:     General: Screening Current      Colorectal Cancer Screening:     General: Screening Current      Cervical Cancer Screening:    General: Screening Not Indicated      Lung Cancer Screening:     General: Screening Current      Hepatitis C Screening:    General: Screening Current    Screening, Brief Intervention, and Referral to Treatment (SBIRT)    Screening  Typical number of drinks in a day: 0  Typical number of drinks in a week: 0  Interpretation: Low risk drinking behavior.    Single Item Drug Screening:  How often have you used an illegal drug (including marijuana) or a prescription medication for non-medical reasons in the past year? never    Single Item Drug Screen Score: 0  Interpretation: Negative screen for possible drug use disorder    Social Determinants of Health     Food Insecurity: No Food Insecurity (2/18/2024)    Nursing - Inadequate Food Risk Classification     Worried About Running Out of Food in the Last  "Year: Never true     Ran Out of Food in the Last Year: Never true   Transportation Needs: No Transportation Needs (2/18/2024)    PRAPARE - Transportation     Lack of Transportation (Medical): No     Lack of Transportation (Non-Medical): No   Housing Stability: Low Risk  (2/18/2024)    Housing Stability Vital Sign     Unable to Pay for Housing in the Last Year: No     Number of Times Moved in the Last Year: 1     Homeless in the Last Year: No   Utilities: Not At Risk (2/18/2024)    [facility] Utilities     Threatened with loss of utilities: No     No results found.    Objective     /88   Pulse 84   Temp 98.4 °F (36.9 °C)   Resp 16   Ht 5' 6\" (1.676 m)   Wt 76.7 kg (169 lb)   SpO2 93%   BMI 27.28 kg/m²     Physical Exam    "

## 2024-11-11 NOTE — ASSESSMENT & PLAN NOTE
History of hyperlipidemia.  Patient remains on Zocor 40 mg daily.  Patient admits that she is not always perfect with her diet but again we reinforced the importance of watching her intake of fats and cholesterol with her diet.  We will continue to monitor her cholesterol and make changes with medication in the future if indicated.

## 2024-11-11 NOTE — ASSESSMENT & PLAN NOTE
Continues with treatment as previously taking Imitrex as treatment for her migraine headaches.  Continues to follow-up intermittently with neurology

## 2024-11-11 NOTE — ASSESSMENT & PLAN NOTE
Patient is a 74-year-old female with a history of medical problems outlined previously who is here today for a repeat Medicare wellness visit.  Patient did have extensive lab testing performed prior to the visit today and we did discuss the results.  Since her last visit patient did have a repair of incisional hernias x 2.  Patient is pleased with the results.  Patient offers no new complaints today but does have concerns about her 's medical problems

## 2024-11-11 NOTE — ASSESSMENT & PLAN NOTE
Hypothyroidism.  Patient in the past had an elevation in the TSH level.  She is on no hormonal replacement.  Will continue to monitor this especially in light of recent weight gain

## 2024-11-11 NOTE — ASSESSMENT & PLAN NOTE
Again as noted patient is not always compliant with her diet.  Has a history of hyperglycemia with slight elevation in hemoglobin A1c.  We will continue to monitor this and initiate treatment in the future.  Patient was told to look closely at her caloric intake and try to avoid concentrated sweets and simple carbohydrates is much as possible.  Of note patient has had significant weight gain since she had the hernia repair    Orders:    Basic metabolic panel; Future    Hemoglobin A1C; Future

## 2024-11-12 ENCOUNTER — OFFICE VISIT (OUTPATIENT)
Dept: NEUROLOGY | Facility: CLINIC | Age: 74
End: 2024-11-12
Payer: COMMERCIAL

## 2024-11-12 VITALS
SYSTOLIC BLOOD PRESSURE: 134 MMHG | HEART RATE: 90 BPM | OXYGEN SATURATION: 93 % | BODY MASS INDEX: 27.83 KG/M2 | DIASTOLIC BLOOD PRESSURE: 76 MMHG | WEIGHT: 172.4 LBS | TEMPERATURE: 97.3 F

## 2024-11-12 DIAGNOSIS — I77.3 FIBROMUSCULAR DYSPLASIA OF CERVICOCRANIAL ARTERY (HCC): ICD-10-CM

## 2024-11-12 DIAGNOSIS — G43.709 CHRONIC MIGRAINE WITHOUT AURA WITHOUT STATUS MIGRAINOSUS, NOT INTRACTABLE: Primary | ICD-10-CM

## 2024-11-12 DIAGNOSIS — G43.009 MIGRAINE WITHOUT AURA AND WITHOUT STATUS MIGRAINOSUS, NOT INTRACTABLE: ICD-10-CM

## 2024-11-12 PROCEDURE — 99214 OFFICE O/P EST MOD 30 MIN: CPT | Performed by: PHYSICIAN ASSISTANT

## 2024-11-12 NOTE — PATIENT INSTRUCTIONS
Fibromuscular dysplasia of cervicocranial artery (HCC)  A carotid ultrasound will be ordered.  Orders:    VAS carotid complete study; Future      Migraine without aura and without status migrainosus, not intractable  She will continue sumatriptan for treatment of acute migraines.         Chronic migraine without aura without status migrainosus, not intractable  She will continue Botox, cyclobenzaprine, and depakote for migraine prevention.  Since starting Botox pt has had a reduction in migraine headaches by 7 days as correlated by a headache diary.  Pt has had decreased trips to the ER and decreased use of abortive medications.  She will follow-up in 6 months.

## 2024-11-12 NOTE — PROGRESS NOTES
Ambulatory Visit  Name: Rosemary Houser      : 1950      MRN: [id number]  Encounter Provider: Gissel Arizmendi PA-C  Encounter Date: 2024   Encounter department: [facility] [place]    Assessment & Plan  Chronic migraine without aura without status migrainosus, not intractable  She will continue Botox, cyclobenzaprine, and depakote for migraine prevention.  Since starting Botox pt has had a reduction in migraine headaches by 7 days as correlated by a headache diary.  Pt has had decreased trips to the ER and decreased use of abortive medications.  She will follow-up in 6 months.       Migraine without aura and without status migrainosus, not intractable  She will continue sumatriptan for treatment of acute migraines.       Fibromuscular dysplasia of cervicocranial artery (HCC)  A carotid ultrasound will be ordered.  Orders:    VAS carotid complete study; Future        History of Present Illness     Rosemary Starkey is a 73yo female, with mood disorder and hyperlipidemia, who follows in the office due to migraine headaches. She reports that Botox has been helpful. She remains on Depakote and cyclobenzaprine as well. Since starting Botox pt has had a reduction in migraine headaches by 7 days as correlated by a headache diary. Pt has had decreased trips to the ER and decreased use of abortive medications. She had 4 migraines last week but overall migraines have been stable and well controlled. These migraines were responsive to sumatriptan but would return the following day. Migraines typically start upon awakening. Her  states that she does not snore or have an apneic events. She does not feel overly fatigued during the day. She does take sumatriptan and naproxen to treat the migraines. She denies any side effects of the medications. Headaches are typically 8/10. They will start in the Lt occipital area and extend to the Lt eye.     Since starting Botox pt has had a reduction  in migraine headaches by 7 days as correlated by a headache diary.  Pt has had decreased trips to the ER and decreased use of abortive medications.    PREVENTIVE: Citalopram, Topamax, depakote, Botox, tizanidine  ABORTIVE: Naratriptan, Sumavel, Dexamethasone, maxalt, imtrex     She was following with Vascular Surgery due to possible fibromuscular dysplasia. She is overdue for carotid ultrasound.    I have personally reviewed the MA's review of systems and made changes as necessary.    Medical History Reviewed by provider this encounter:  Tobacco  Allergies  Meds  Problems  Med Hx  Surg Hx  Fam Hx     .  Objective     /76 (BP Location: Left arm, Patient Position: Sitting, Cuff Size: Adult)   Pulse 90   Temp (!) 97.3 °F (36.3 °C) (Temporal)   Wt 78.2 kg (172 lb 6.4 oz)   SpO2 93%   BMI 27.83 kg/m²     Physical Exam  Vitals reviewed.   HENT:      Head: Normocephalic and atraumatic.      Nose: Nose normal.      Mouth/Throat:      Mouth: Mucous membranes are moist.   Eyes:      Extraocular Movements: Extraocular movements intact and EOM normal.      Conjunctiva/sclera: Conjunctivae normal.      Pupils: Pupils are equal, round, and reactive to light.   Pulmonary:      Effort: Pulmonary effort is normal.   Neurological:      Mental Status: She is alert and oriented to person, place, and time.      Motor: Motor strength is normal.     Gait: Gait is intact.      Deep Tendon Reflexes:      Reflex Scores:       Tricep reflexes are 2+ on the right side and 2+ on the left side.       Bicep reflexes are 2+ on the right side and 2+ on the left side.       Brachioradialis reflexes are 2+ on the right side and 2+ on the left side.       Patellar reflexes are 2+ on the right side and 2+ on the left side.       Achilles reflexes are 2+ on the right side and 2+ on the left side.  Psychiatric:         Speech: Speech normal.       Neurologic Exam     Mental Status   Oriented to person, place, and time.   Attention:  normal. Concentration: normal.   Speech: speech is normal   Level of consciousness: alert  Knowledge: good.   Normal comprehension.     Cranial Nerves     CN III, IV, VI   Pupils are equal, round, and reactive to light.  Extraocular motions are normal.   Right pupil: Shape: regular. Reactivity: brisk. Consensual response: intact. Accommodation: intact.   Left pupil: Shape: regular. Reactivity: brisk. Consensual response: intact. Accommodation: intact.   CN III: no CN III palsy  CN VI: no CN VI palsy  Nystagmus: none   Ophthalmoparesis: none  Upgaze: normal  Downgaze: normal  Conjugate gaze: present    CN V   Facial sensation intact.     CN VII   Facial expression full, symmetric.     CN IX, X   CN IX normal.     CN XI   CN XI normal.     CN XII   CN XII normal.     Motor Exam     Strength   Strength 5/5 throughout.     Gait, Coordination, and Reflexes     Gait  Gait: normal    Reflexes   Right brachioradialis: 2+  Left brachioradialis: 2+  Right biceps: 2+  Left biceps: 2+  Right triceps: 2+  Left triceps: 2+  Right patellar: 2+  Left patellar: 2+  Right achilles: 2+  Left achilles: 2+      Results/Data:      [facility] Radiology Results Review : No pertinent imaging studies reviewed.    Administrative Statements   I have spent a total time of 30 minutes in caring for this patient on the day of the visit/encounter including Prognosis, Risks and benefits of tx options, Instructions for management, Patient and family education, Importance of tx compliance, Risk factor reductions, Impressions, Counseling / Coordination of care, Documenting in the medical record, Reviewing / ordering tests, medicine, procedures  , and Communicating with other healthcare professionals .

## 2024-11-12 NOTE — ASSESSMENT & PLAN NOTE
EXAMINATION:
XR PORTABLE CHEST
 
CLINICAL INFORMATION:
AMS.
 
COMPARISON:
None
 
TECHNIQUE:
Portable frontal view of the chest was obtained.
 
FINDINGS:
Both lungs are well-expanded and clear of acute process. The heart size and
pulmonary vascularity is normal. No gross bony abnormality seen.
 
IMPRESSION:
Unremarkable chest exam. She will continue Botox, cyclobenzaprine, and depakote for migraine prevention.  Since starting Botox pt has had a reduction in migraine headaches by 7 days as correlated by a headache diary.  Pt has had decreased trips to the ER and decreased use of abortive medications.  She will follow-up in 6 months.

## 2024-12-06 ENCOUNTER — TELEPHONE (OUTPATIENT)
Age: 74
End: 2024-12-06

## 2024-12-06 NOTE — TELEPHONE ENCOUNTER
Patient called in asking for Ines who is currently on a call. Patient states there were forms that were sent over for the [facility] that need to be completed and faxed back asa. Patient is asking for a return call to let her know we received forms and when they will be completed and faxed back. Fax number for completed forms is [phone]

## 2024-12-11 PROBLEM — Z00.00 MEDICARE ANNUAL WELLNESS VISIT, SUBSEQUENT: Status: RESOLVED | Noted: 2018-05-17 | Resolved: 2024-12-11

## 2024-12-16 ENCOUNTER — TELEPHONE (OUTPATIENT)
Dept: PULMONOLOGY | Facility: CLINIC | Age: 74
End: 2024-12-16

## 2024-12-16 NOTE — TELEPHONE ENCOUNTER
Called patient to schedule appointment for the 1 year FU (around 2/26/2025) from the recall list and left a voicemail message.//Ct Chest

## 2024-12-17 DIAGNOSIS — E78.00 PURE HYPERCHOLESTEROLEMIA: ICD-10-CM

## 2024-12-18 RX ORDER — SIMVASTATIN 40 MG
40 TABLET ORAL DAILY
Qty: 90 TABLET | Refills: 1 | Status: SHIPPED | OUTPATIENT
Start: 2024-12-18

## 2024-12-26 ENCOUNTER — TELEPHONE (OUTPATIENT)
Age: 74
End: 2024-12-26

## 2024-12-26 NOTE — TELEPHONE ENCOUNTER
Patient called iqra advise Dr Choi that she has bronchitis and been coughing up yellow/green mucus. Please advise of PCP recommendation. Pt declined appt.

## 2024-12-27 NOTE — TELEPHONE ENCOUNTER
ROBYN for Pt    that she needs to be seen today.  No appointments, then feel should go to [facility] now.

## 2025-01-07 ENCOUNTER — PROCEDURE VISIT (OUTPATIENT)
Dept: NEUROLOGY | Facility: CLINIC | Age: 75
End: 2025-01-07
Payer: COMMERCIAL

## 2025-01-07 VITALS
SYSTOLIC BLOOD PRESSURE: 132 MMHG | DIASTOLIC BLOOD PRESSURE: 86 MMHG | TEMPERATURE: 97.3 F | HEART RATE: 95 BPM | OXYGEN SATURATION: 89 %

## 2025-01-07 DIAGNOSIS — G43.709 CHRONIC MIGRAINE WITHOUT AURA WITHOUT STATUS MIGRAINOSUS, NOT INTRACTABLE: Primary | ICD-10-CM

## 2025-01-07 DIAGNOSIS — M54.2 CERVICALGIA: ICD-10-CM

## 2025-01-07 PROCEDURE — 64615 CHEMODENERV MUSC MIGRAINE: CPT | Performed by: PHYSICIAN ASSISTANT

## 2025-01-07 RX ORDER — SUMATRIPTAN SUCCINATE 100 MG/1
TABLET ORAL
Qty: 27 TABLET | Refills: 1 | Status: SHIPPED | OUTPATIENT
Start: 2025-01-07

## 2025-01-07 RX ORDER — CYCLOBENZAPRINE HCL 10 MG
10 TABLET ORAL
Qty: 90 TABLET | Refills: 3 | Status: SHIPPED | OUTPATIENT
Start: 2025-01-07

## 2025-01-07 NOTE — PROGRESS NOTES
Chemodenervation     Date/Time  1/7/2025 9:30 AM     Performed by  Gissel Arizmendi PA-C   Authorized by  Gissel Arizmendi PA-C     Pre-procedure details      Prepped With: Alcohol     Anesthesia  (see MAR for exact dosages):     Anesthesia method:  None   Procedure details      Position:  Upright   Botox      Botox Type:  Type A    Brand:  Botox    mL's of Botulinum Toxin:  200    Final Concentration per CC:  200 units    Needle Gauge:  30 G 2.5 inch   Procedures      Botox Procedures: chronic headache      Indications: migraines     Injection Location      Head / Face:  L superior cervical paraspinal, R superior cervical paraspinal, L , R , L frontalis, R frontalis, L medial occipitalis, R medial occipitalis, procerus, L temporalis, R temporalis, L superior trapezius and R superior trapezius    L  injection amount:  5 unit(s)    R  injection amount:  5 unit(s)    L lateral frontalis:  5 unit(s)    R lateral frontalis:  5 unit(s)    L medial frontalis:  5 unit(s)    R medial frontalis:  5 unit(s)    L temporalis injection amount:  20 unit(s)    R temporalis injection amount:  20 unit(s)    Procerus injection amount:  5 unit(s)    L medial occipitalis injection amount:  15 unit(s)    R medial occipitalis injection amount:  15 unit(s)    L superior cervical paraspinal injection amount:  10 unit(s)    R superior cervical paraspinal injection amount:  10 unit(s)    L superior trapezius injection amount:  15 unit(s)    R superior trapezius injection amount:  15 unit(s)   Total Units      Total units used:  200    Total units discarded:  0   Post-procedure details      Chemodenervation:  Chronic migraine    Facial Nerve Location::  Bilateral facial nerve    Patient tolerance of procedure:  Tolerated well, no immediate complications   Comments       45U bilat occipitalis and paraspinal  All medically necessary

## 2025-01-13 DIAGNOSIS — G43.009 MIGRAINE WITHOUT AURA AND WITHOUT STATUS MIGRAINOSUS, NOT INTRACTABLE: ICD-10-CM

## 2025-01-14 RX ORDER — NAPROXEN 500 MG/1
TABLET ORAL
Qty: 30 TABLET | Refills: 3 | Status: SHIPPED | OUTPATIENT
Start: 2025-01-14

## 2025-02-07 DIAGNOSIS — F41.9 ANXIETY: ICD-10-CM

## 2025-02-07 RX ORDER — CITALOPRAM HYDROBROMIDE 20 MG/1
20 TABLET ORAL DAILY
Qty: 90 TABLET | Refills: 1 | Status: SHIPPED | OUTPATIENT
Start: 2025-02-07

## 2025-02-18 ENCOUNTER — TELEPHONE (OUTPATIENT)
Age: 75
End: 2025-02-18

## 2025-02-18 ENCOUNTER — HOSPITAL ENCOUNTER (OUTPATIENT)
Dept: RADIOLOGY | Age: 75
Discharge: HOME | End: 2025-02-18
Payer: COMMERCIAL

## 2025-02-18 DIAGNOSIS — R91.1 LUNG NODULE: ICD-10-CM

## 2025-02-18 DIAGNOSIS — R91.1 LUNG NODULE: Primary | ICD-10-CM

## 2025-02-18 PROCEDURE — 71250 CT THORAX DX C-: CPT

## 2025-02-18 NOTE — TELEPHONE ENCOUNTER
Called left VM ct scan some plugging vs mass will repeat in 3 months--- patient needs follow up its been a nohemy

## 2025-02-18 NOTE — TELEPHONE ENCOUNTER
[facility] radiology, called and stated immediate findings on patients CT scan 2/18/25. Please advise.

## 2025-02-19 ENCOUNTER — TELEPHONE (OUTPATIENT)
Dept: PULMONOLOGY | Facility: MEDICAL CENTER | Age: 75
End: 2025-02-19

## 2025-02-21 DIAGNOSIS — G43.709 CHRONIC MIGRAINE WITHOUT AURA WITHOUT STATUS MIGRAINOSUS, NOT INTRACTABLE: ICD-10-CM

## 2025-02-21 RX ORDER — DIVALPROEX SODIUM 250 MG/1
250 TABLET, FILM COATED, EXTENDED RELEASE ORAL 2 TIMES DAILY
Qty: 180 TABLET | Refills: 3 | Status: SHIPPED | OUTPATIENT
Start: 2025-02-21

## 2025-03-11 ENCOUNTER — APPOINTMENT (OUTPATIENT)
Dept: LAB | Age: 75
End: 2025-03-11
Payer: COMMERCIAL

## 2025-03-11 ENCOUNTER — RA CDI HCC (OUTPATIENT)
Dept: OTHER | Facility: HOSPITAL | Age: 75
End: 2025-03-11

## 2025-03-11 DIAGNOSIS — R73.9 HYPERGLYCEMIA: ICD-10-CM

## 2025-03-11 LAB
ANION GAP SERPL CALCULATED.3IONS-SCNC: 7 MMOL/L (ref 4–13)
BUN SERPL-MCNC: 19 MG/DL (ref 5–25)
CALCIUM SERPL-MCNC: 9.6 MG/DL (ref 8.4–10.2)
CHLORIDE SERPL-SCNC: 98 MMOL/L (ref 96–108)
CO2 SERPL-SCNC: 31 MMOL/L (ref 21–32)
CREAT SERPL-MCNC: 0.84 MG/DL (ref 0.6–1.3)
EST. AVERAGE GLUCOSE BLD GHB EST-MCNC: 137 MG/DL
GFR SERPL CREATININE-BSD FRML MDRD: 67 ML/MIN/1.73SQ M
GLUCOSE P FAST SERPL-MCNC: 112 MG/DL (ref 65–99)
HBA1C MFR BLD: 6.4 %
POTASSIUM SERPL-SCNC: 4.3 MMOL/L (ref 3.5–5.3)
SODIUM SERPL-SCNC: 136 MMOL/L (ref 135–147)

## 2025-03-11 PROCEDURE — 80048 BASIC METABOLIC PNL TOTAL CA: CPT

## 2025-03-11 PROCEDURE — 83036 HEMOGLOBIN GLYCOSYLATED A1C: CPT

## 2025-03-11 PROCEDURE — 36415 COLL VENOUS BLD VENIPUNCTURE: CPT

## 2025-03-11 NOTE — PROGRESS NOTES
HCC coding opportunities       Chart reviewed, no opportunity found: CHART REVIEWED, NO OPPORTUNITY FOUND     2/21/24 Note states no official COPD diagnosis.      Patients Insurance     Medicare Insurance: [employer]     Commercial Insurance: [employer]

## 2025-03-17 ENCOUNTER — OFFICE VISIT (OUTPATIENT)
Age: 75
End: 2025-03-17
Payer: COMMERCIAL

## 2025-03-17 VITALS
HEIGHT: 66 IN | DIASTOLIC BLOOD PRESSURE: 88 MMHG | BODY MASS INDEX: 28.25 KG/M2 | OXYGEN SATURATION: 86 % | RESPIRATION RATE: 16 BRPM | TEMPERATURE: 97.1 F | HEART RATE: 91 BPM | SYSTOLIC BLOOD PRESSURE: 132 MMHG | WEIGHT: 175.8 LBS

## 2025-03-17 DIAGNOSIS — Z72.0 TOBACCO ABUSE: ICD-10-CM

## 2025-03-17 DIAGNOSIS — J41.0 SIMPLE CHRONIC BRONCHITIS (HCC): ICD-10-CM

## 2025-03-17 DIAGNOSIS — R73.9 HYPERGLYCEMIA: ICD-10-CM

## 2025-03-17 DIAGNOSIS — R91.8 GROUND GLASS OPACITY PRESENT ON IMAGING OF LUNG: ICD-10-CM

## 2025-03-17 DIAGNOSIS — E66.3 OVERWEIGHT: Primary | ICD-10-CM

## 2025-03-17 DIAGNOSIS — Z12.31 ENCOUNTER FOR SCREENING MAMMOGRAM FOR BREAST CANCER: ICD-10-CM

## 2025-03-17 PROCEDURE — G2211 COMPLEX E/M VISIT ADD ON: HCPCS | Performed by: INTERNAL MEDICINE

## 2025-03-17 PROCEDURE — 99214 OFFICE O/P EST MOD 30 MIN: CPT | Performed by: INTERNAL MEDICINE

## 2025-03-17 NOTE — ASSESSMENT & PLAN NOTE
With abnormalities found on imaging of the lung and her chronic hypoxia does have an appointment to be seen by her pulmonologist in the very near future.  She continues to monitor her hemoglobin level at home which she states is chronically low

## 2025-03-17 NOTE — ASSESSMENT & PLAN NOTE
Chronic bronchitis.  Patient is showing evidence of hypoxia and recently underwent CT Scan of the chest showing multiple abnormalities.  Patient's O2 sat today on room air is 86% and she states she continues to monitor this at home and has an appointment in the near future to be seen by her pulmonologist for further evaluation and/or treatment.  The concerns at this point in time are possible underlying malignancy.

## 2025-03-17 NOTE — ASSESSMENT & PLAN NOTE
Despite that her chronic lung disease patient is continuing to vape.  She states she is not inhaling which she knows would cause difficulties with cough.  She admits that she is addicted to nicotine.  Was was told should continue to wean herself off by decreasing the concentration of nicotine in her vape

## 2025-03-17 NOTE — PROGRESS NOTES
Name: Rosemary Houser      : 1950      MRN: [id number]  Encounter Provider: Edin Choi DO  Encounter Date: 3/17/2025   Encounter department: [facility] INTERNAL MEDICINE    Assessment & Plan  Encounter for screening mammogram for breast cancer    Orders:  •  Mammo screening bilateral w 3d and cad; Future    Simple chronic bronchitis (HCC)  Chronic bronchitis.  Patient is showing evidence of hypoxia and recently underwent CT Scan of the chest showing multiple abnormalities.  Patient's O2 sat today on room air is 86% and she states she continues to monitor this at home and has an appointment in the near future to be seen by her pulmonologist for further evaluation and/or treatment.  The concerns at this point in time are possible underlying malignancy.         Hyperglycemia  Continue to monitor her blood sugars, hemoglobin A1c         Tobacco abuse  Despite that her chronic lung disease patient is continuing to vape.  She states she is not inhaling which she knows would cause difficulties with cough.  She admits that she is addicted to nicotine.  Was was told should continue to wean herself off by decreasing the concentration of nicotine in her vape         Overweight  But the patient just being overweight she did ask for referral along with her  to be seen by weight management.  Consult has been sent    Orders:  •  Ambulatory Referral to Weight Management; Future    Ground glass opacity present on imaging of lung  With abnormalities found on imaging of the lung and her chronic hypoxia does have an appointment to be seen by her pulmonologist in the very near future.  She continues to monitor her hemoglobin level at home which she states is chronically low              History of Present Illness     Patient is a 74-year-old female history of multiple medical problems outlined previously who is here today for follow-up accompanied by her .  Patient apparently is been having some  problems from a respiratory standpoint.  Recent CT scan shows multiple abnormalities and patient is presenting today with hypoxia with an O2 sat of 86% on room air.  She states that her pulmonologist is aware and she does have an appointment in the near future to be seen and reevaluated.  Review of Systems   Constitutional:  Positive for activity change and fatigue. Negative for appetite change, chills, diaphoresis, fever and unexpected weight change.        Limited with activity level.  Gets more fatigued with exertion.  Is continuing to work   HENT: Negative.     Eyes: Negative.    Respiratory:  Positive for shortness of breath. Negative for apnea, cough, choking, chest tightness, wheezing and stridor.         Hypoxia   Cardiovascular: Negative.    Gastrointestinal: Negative.    Endocrine: Negative.    Genitourinary: Negative.    Musculoskeletal: Negative.    Skin: Negative.    Allergic/Immunologic: Negative.    Neurological: Negative.    Hematological: Negative.    Psychiatric/Behavioral: Negative.     Past Medical History:   Diagnosis Date   • Carotid aneurysm, right (HCC)    • Hyperlipidemia    • Migraine    • Pneumonia 02/2024     Past Surgical History:   Procedure Laterality Date   • BREAST EXCISIONAL BIOPSY Right 1986   • COLONOSCOPY     • HARTMANS PROCEDURE N/A 03/08/2020    Procedure: Laparoscopic drainage of intra peritoneal abscess, sigmoid rescetion,;  Surgeon: NOEL Ogden MD;  Location: [facility];  Service: Colorectal   • HYSTERECTOMY  1978    age 28   • NASAL SEPTUM SURGERY      deviation repair   • CT LAPS ABD PRTM&OMENTUM DX W/WO SPEC BR/WA SPX N/A 03/08/2020    Procedure: LAPAROSCOPY DIAGNOSTIC;  Surgeon: NOEL Ogden MD;  Location: [facility];  Service: Colorectal   • CT LAPS REPAIR HERNIA EXCEPT INCAL/INGUN REDUCIBLE N/A 01/26/2021    Procedure: REPAIR HERNIA VENTRAL LAPAROSCOPIC;  Surgeon: NOEL Ogden MD;  [facility]: [facility];  Service: Colorectal   • CT RPR AA HERNIA 1ST < 3  CM REDUCIBLE N/A 10/16/2024    Procedure: REPAIR HERNIA INCISIONAL;  Surgeon: Bhupinder King MD;  Location: [facility];  Service: General   • CT SIGMOIDOSCOPY FLX DX W/COLLJ SPEC BR/WA IF PFRMD N/A 03/08/2020    Procedure: SIGMOIDOSCOPY FLEXIBLE;  Surgeon: NOEL Ogden MD;  Location: [facility];  Service: Colorectal   • SHOULDER SURGERY       Family History   Problem Relation Age of Onset   • Breast cancer Mother 50   • Heart disease Father    • Diabetes Sister    • Leukemia Sister    • No Known Problems Maternal Grandmother    • No Known Problems Maternal Grandfather    • No Known Problems Paternal Grandmother    • No Known Problems Paternal Grandfather    • No Known Problems Sister    • Breast cancer Maternal Aunt 50   • No Known Problems Maternal Aunt    • No Known Problems Paternal Aunt    • Colon cancer Maternal Uncle    • No Known Problems Son    • No Known Problems Son    • Lung cancer Other 47     Social History     Tobacco Use   • Smoking status: Former     Average packs/day: 0.5 packs/day for 59.1 years (29.6 ttl pk-yrs)     Types: Cigarettes     Start date: 1965   • Smokeless tobacco: Never   Vaping Use   • Vaping status: Every Day   • Substances: Nicotine, Flavoring   Substance and Sexual Activity   • Alcohol use: Yes     Comment: Rarely   • Drug use: No   • Sexual activity: Not on file     Current Outpatient Medications on File Prior to Visit   Medication Sig   • albuterol (Ventolin HFA) 90 mcg/act inhaler Inhale 2 puffs every 6 (six) hours as needed for wheezing   • aspirin (ECOTRIN) 325 mg EC tablet Take 1 tablet (325 mg total) by mouth daily (Patient taking differently: Take 325 mg by mouth daily Last dose 10/8 for sx)   • citalopram (CeleXA) 20 mg tablet TAKE 1 TABLET DAILY   • cyclobenzaprine (FLEXERIL) 10 mg tablet Take 1 tablet (10 mg total) by mouth daily at bedtime   • divalproex sodium (DEPAKOTE ER) 250 mg 24 hr tablet TAKE 1 TABLET TWICE A DAY   • naproxen (NAPROSYN) 500 mg tablet  "TAKE 1 TABLET TWICE A DAY AS NEEDED FOR HEADACHES   • simvastatin (ZOCOR) 40 mg tablet TAKE 1 TABLET DAILY   • SUMAtriptan (IMITREX) 100 mg tablet TAKE 1 TABLET AT ONSET OF MIGRAINE HEADACHE. MAY REPEAT IN 2 HOURS IF NEEDED, NO MORE THAN 2 IN 24 HOURS AND NO MORE THAN 3 IN A WEEK   • torsemide (DEMADEX) 10 mg tablet Take 1 tablet (10 mg total) by mouth daily   • zolpidem (AMBIEN) 5 mg tablet One pill at bedtime as needed to help with sleep   • Fluticasone-Salmeterol (Advair) 100-50 mcg/dose inhaler Inhale 1 puff 2 (two) times a day Rinse mouth after use.   • oxyCODONE-acetaminophen (PERCOCET) 5-325 mg per tablet Take 1 tablet by mouth every 4 (four) hours as needed for moderate pain for up to 10 doses Max Daily Amount: 6 tablets (Patient not taking: Reported on 11/11/2024)   • sodium chloride 1 g tablet Take 1 tablet (1 g total) by mouth 2 (two) times a day with meals (Patient not taking: Reported on 11/12/2024)     Allergies   Allergen Reactions   • Penicillins Anaphylaxis   • Azithromycin Hives   • Nisoldipine      Reaction Date: 14Apr2011;    • Sulfa Antibiotics Hives     Immunization History   Administered Date(s) Administered   • COVID-19 PFIZER VACCINE 0.3 ML IM 12/22/2020, 01/12/2021, 10/23/2021   • H1N1, All Formulations 11/06/2009   • INFLUENZA 10/07/2019, 09/29/2020   • Pneumococcal Conjugate 13-Valent 03/31/2016   • Pneumococcal Polysaccharide PPV23 11/09/2020     Objective   /88   Pulse 91   Temp (!) 97.1 °F (36.2 °C)   Resp 16   Ht 5' 6\" (1.676 m)   Wt 79.7 kg (175 lb 12.8 oz)   SpO2 (!) 86%   BMI 28.37 kg/m²     Physical Exam  Vitals and nursing note reviewed.   Constitutional:       General: She is not in acute distress.     Appearance: Normal appearance. She is not ill-appearing, toxic-appearing or diaphoretic.      Comments: Patient is 74-year-old female who is awake alert accompanied by her .  Despite hypoxia in no acute distress, oriented x 3, overweight   HENT:      Head: " Normocephalic and atraumatic.      Right Ear: Tympanic membrane, ear canal and external ear normal. There is no impacted cerumen.      Left Ear: Tympanic membrane, ear canal and external ear normal. There is no impacted cerumen.      Nose: Nose normal. No congestion or rhinorrhea.      Mouth/Throat:      Mouth: Mucous membranes are moist.      Pharynx: Oropharynx is clear. No oropharyngeal exudate or posterior oropharyngeal erythema.   Eyes:      General: No scleral icterus.        Right eye: No discharge.         Left eye: No discharge.      Extraocular Movements: Extraocular movements intact.      Conjunctiva/sclera: Conjunctivae normal.      Pupils: Pupils are equal, round, and reactive to light.   Neck:      Vascular: Carotid bruit present.   Cardiovascular:      Rate and Rhythm: Normal rate and regular rhythm.      Pulses: Normal pulses.      Heart sounds: Normal heart sounds. No murmur heard.     No friction rub. No gallop.   Pulmonary:      Effort: Pulmonary effort is normal. No respiratory distress.      Breath sounds: No stridor. Wheezing and rhonchi present. No rales.      Comments: Some decreased breath sounds throughout anteriorly and posteriorly with some rhonchi and end expiratory wheezes heard throughout both lung fields.  Chest:      Chest wall: No tenderness.   Abdominal:      General: Abdomen is flat. Bowel sounds are normal. There is no distension.      Palpations: Abdomen is soft. There is no mass.      Tenderness: There is no abdominal tenderness. There is no right CVA tenderness, left CVA tenderness, guarding or rebound.      Hernia: No hernia is present.      Comments: Status post repair of incisional hernias.  Patient has no further problems with bulging no pain to palpation.   Musculoskeletal:         General: No swelling, tenderness, deformity or signs of injury. Normal range of motion.      Cervical back: Normal range of motion and neck supple. No rigidity or tenderness.      Right lower  leg: No edema.      Left lower leg: No edema.   Lymphadenopathy:      Cervical: No cervical adenopathy.   Skin:     General: Skin is warm and dry.      Coloration: Skin is not jaundiced or pale.      Findings: No bruising, erythema, lesion or rash.   Neurological:      General: No focal deficit present.      Mental Status: She is alert and oriented to person, place, and time. Mental status is at baseline.      Cranial Nerves: No cranial nerve deficit.      Sensory: No sensory deficit.      Motor: No weakness.      Coordination: Coordination normal.      Gait: Gait normal.      Deep Tendon Reflexes: Reflexes normal.   Psychiatric:         Mood and Affect: Mood normal.         Behavior: Behavior normal.         Thought Content: Thought content normal.         Judgment: Judgment normal.

## 2025-03-17 NOTE — ASSESSMENT & PLAN NOTE
But the patient just being overweight she did ask for referral along with her  to be seen by weight management.  Consult has been sent    Orders:    Ambulatory Referral to Weight Management; Future

## 2025-03-21 ENCOUNTER — TELEPHONE (OUTPATIENT)
Age: 75
End: 2025-03-21

## 2025-03-21 DIAGNOSIS — R39.9 UTI SYMPTOMS: Primary | ICD-10-CM

## 2025-03-21 DIAGNOSIS — R82.81 PYURIA: Primary | ICD-10-CM

## 2025-03-21 RX ORDER — NITROFURANTOIN 25; 75 MG/1; MG/1
100 CAPSULE ORAL 2 TIMES DAILY
Qty: 10 CAPSULE | Refills: 0 | Status: SHIPPED | OUTPATIENT
Start: 2025-03-21 | End: 2025-03-26

## 2025-03-21 NOTE — ASSESSMENT & PLAN NOTE
Complains of pressure, burning with urination.  Order has been sent for the patient to have a urinalysis performed and we will send a prescription into the pharmacy for antibiotics to take as soon as the urine sample is been obtained.  Will call on Monday with an update

## 2025-03-21 NOTE — TELEPHONE ENCOUNTER
Rosemary would like to know if something can be called in for her. She said she has a UTI and she feels pressure when she urinates and she notice blood on tissue. She asked if something is ordered for her to send it to the [facility] on [address].

## 2025-03-22 ENCOUNTER — APPOINTMENT (OUTPATIENT)
Dept: LAB | Age: 75
End: 2025-03-22
Payer: COMMERCIAL

## 2025-03-22 DIAGNOSIS — R39.9 UTI SYMPTOMS: ICD-10-CM

## 2025-03-22 LAB
BACTERIA UR QL AUTO: ABNORMAL /HPF
BILIRUB UR QL STRIP: NEGATIVE
CLARITY UR: CLEAR
COLOR UR: ABNORMAL
GLUCOSE UR STRIP-MCNC: NEGATIVE MG/DL
HGB UR QL STRIP.AUTO: NEGATIVE
KETONES UR STRIP-MCNC: NEGATIVE MG/DL
LEUKOCYTE ESTERASE UR QL STRIP: ABNORMAL
NITRITE UR QL STRIP: NEGATIVE
NON-SQ EPI CELLS URNS QL MICRO: ABNORMAL /HPF
PH UR STRIP.AUTO: 7 [PH]
PROT UR STRIP-MCNC: NEGATIVE MG/DL
RBC #/AREA URNS AUTO: ABNORMAL /HPF
SP GR UR STRIP.AUTO: 1.01 (ref 1–1.03)
UROBILINOGEN UR STRIP-ACNC: <2 MG/DL
WBC #/AREA URNS AUTO: ABNORMAL /HPF

## 2025-03-22 PROCEDURE — 87086 URINE CULTURE/COLONY COUNT: CPT

## 2025-03-22 PROCEDURE — 81001 URINALYSIS AUTO W/SCOPE: CPT

## 2025-03-22 PROCEDURE — 87186 SC STD MICRODIL/AGAR DIL: CPT

## 2025-03-22 PROCEDURE — 87077 CULTURE AEROBIC IDENTIFY: CPT

## 2025-03-24 LAB — BACTERIA UR CULT: ABNORMAL

## 2025-03-25 ENCOUNTER — OFFICE VISIT (OUTPATIENT)
Dept: PULMONOLOGY | Facility: CLINIC | Age: 75
End: 2025-03-25
Payer: COMMERCIAL

## 2025-03-25 VITALS
BODY MASS INDEX: 28.12 KG/M2 | HEART RATE: 88 BPM | TEMPERATURE: 97.7 F | OXYGEN SATURATION: 91 % | WEIGHT: 175 LBS | HEIGHT: 66 IN

## 2025-03-25 DIAGNOSIS — R91.1 LUNG NODULE: Primary | ICD-10-CM

## 2025-03-25 DIAGNOSIS — F17.290 VAPING NICOTINE DEPENDENCE, TOBACCO PRODUCT: ICD-10-CM

## 2025-03-25 DIAGNOSIS — F17.211 NICOTINE DEPENDENCE, CIGARETTES, IN REMISSION: ICD-10-CM

## 2025-03-25 DIAGNOSIS — R93.89 ABNORMAL CT OF THE CHEST: ICD-10-CM

## 2025-03-25 PROCEDURE — 99214 OFFICE O/P EST MOD 30 MIN: CPT | Performed by: INTERNAL MEDICINE

## 2025-03-25 NOTE — PROGRESS NOTES
"Office Progress Note - Pulmonary    Rosemary Houser 74 y.o. female MRN: [id number]    Encounter: [id number]      Assessment:  Lung nodule.  Abnormal CT scan of the chest.  History of tobacco use.  Ongoing tobacco vaping.    Plan:   CT scan of the chest without contrast in the middle of May.  CT of the chest without contrast in 2 years to follow-up on the right upper lobe groundglass nodular opacity.  Vaping cessation.  Follow-up in 2 months.    Discussion:   The patient's right upper lob nodule has not changed.  We will order a CT scan of the chest without contrast to be done in 2 years.  The left lower lobe soft tissue opacity is of unclear etiology.  It is hard to discern if it is a mucous plug.  CT scan of the chest without contrast ordered to be done in May.  I talked her about vaping habit.  Encouraged her to quit vaping.  We talked about potential complications of ongoing vaping.  She does not seem to be motivated.  I will see her after the CT of the chest is done.      Subjective:   The patient is here for a follow-up visit.  She quit smoking after her last visit.  She is vaping nicotine, tobacco product.  She has occasional cough.  She had a recent respiratory infection.    Review of systems:  A 12 point system review is done and aside from what is stated above the rest of the review of systems is negative.      Family history and social history are reviewed.    Medications list is reviewed.      Vitals: Pulse 88, temperature 97.7 °F (36.5 °C), temperature source Tympanic, height 5' 6\" (1.676 m), weight 79.4 kg (175 lb), SpO2 91%, not currently breastfeeding.,     Physical Exam  Gen: Awake, alert, oriented x 3, no acute distress  HEENT: Mucous membranes moist, no oral lesions, no thrush  NECK: No accessory muscle use, JVP not elevated  Cardiac: Regular, single S1, single S2, no murmurs, no rubs, no gallops  Lungs: Rhonchi cleared with coughing.  No wheezing.  Abdomen: normoactive bowel sounds, soft nontender, " nondistended, no rebound or rigidity, no guarding  Extremities: no cyanosis, no clubbing, no edema  Neuro:  Grossly nonfocal.  Skin:  No rash.    CT of the chest is reviewed on the PACS system.  It showed stable right upper lobe groundglass nodular opacity.  Left lower lobe soft tissue opacity.      Lab Results   Component Value Date    SODIUM 136 03/11/2025    K 4.3 03/11/2025    CL 98 03/11/2025    CO2 31 03/11/2025    BUN 19 03/11/2025    CREATININE 0.84 03/11/2025    GLUC 85 02/23/2024    CALCIUM 9.6 03/11/2025

## 2025-04-03 DIAGNOSIS — F51.01 PRIMARY INSOMNIA: Primary | ICD-10-CM

## 2025-04-03 RX ORDER — TRAZODONE HYDROCHLORIDE 50 MG/1
50 TABLET ORAL
Qty: 90 TABLET | Refills: 0 | Status: SHIPPED | OUTPATIENT
Start: 2025-04-03

## 2025-04-03 RX ORDER — TRAZODONE HYDROCHLORIDE 50 MG/1
50 TABLET ORAL
COMMUNITY
End: 2025-04-03 | Stop reason: SDUPTHER

## 2025-04-03 NOTE — TELEPHONE ENCOUNTER
Rosemary said that the trazodone would be better for her. She also wanted to let Jennifer know that she has a UTI again.

## 2025-04-03 NOTE — TELEPHONE ENCOUNTER
Spoke with [facility] [phone] Nessa, in regards to alternatives for Zolpidem Tartrate (151.03 copay) lower copay-     1) zaleplon $0 copay needs prior auth  2) doxepin $298 copay  3) trazodone $0 copay NO prior auth    A script for trazodone will be sent to pharmacy for trazodone 50 mg, called and left message for Pt

## 2025-04-08 ENCOUNTER — PROCEDURE VISIT (OUTPATIENT)
Dept: NEUROLOGY | Facility: CLINIC | Age: 75
End: 2025-04-08
Payer: COMMERCIAL

## 2025-04-08 VITALS
HEART RATE: 86 BPM | DIASTOLIC BLOOD PRESSURE: 90 MMHG | OXYGEN SATURATION: 93 % | TEMPERATURE: 98 F | SYSTOLIC BLOOD PRESSURE: 150 MMHG

## 2025-04-08 DIAGNOSIS — G43.709 CHRONIC MIGRAINE WITHOUT AURA WITHOUT STATUS MIGRAINOSUS, NOT INTRACTABLE: Primary | ICD-10-CM

## 2025-04-08 PROCEDURE — 64615 CHEMODENERV MUSC MIGRAINE: CPT | Performed by: PHYSICIAN ASSISTANT

## 2025-04-08 NOTE — PROGRESS NOTES
Chemodenervation     Date/Time  4/8/2025 9:30 AM     Performed by  Gissel Arizmendi PA-C   Authorized by  Gissel Arizmendi PA-C     Pre-procedure details      Prepped With: Alcohol     Anesthesia  (see MAR for exact dosages):     Anesthesia method:  None   Procedure details      Position:  Upright   Botox      Botox Type:  Type A    Brand:  Botox    mL's of Botulinum Toxin:  200    Final Concentration per CC:  200 units    Needle Gauge:  30 G 2.5 inch   Procedures      Botox Procedures: chronic headache      Indications: migraines     Injection Location      Head / Face:  L superior cervical paraspinal, R superior cervical paraspinal, L , R , L frontalis, R frontalis, L medial occipitalis, R medial occipitalis, procerus, L temporalis, R temporalis, L superior trapezius and R superior trapezius    L  injection amount:  5 unit(s)    R  injection amount:  5 unit(s)    L lateral frontalis:  5 unit(s)    R lateral frontalis:  5 unit(s)    L medial frontalis:  5 unit(s)    R medial frontalis:  5 unit(s)    L temporalis injection amount:  20 unit(s)    R temporalis injection amount:  20 unit(s)    Procerus injection amount:  5 unit(s)    L medial occipitalis injection amount:  15 unit(s)    R medial occipitalis injection amount:  15 unit(s)    L superior cervical paraspinal injection amount:  10 unit(s)    R superior cervical paraspinal injection amount:  10 unit(s)    L superior trapezius injection amount:  15 unit(s)    R superior trapezius injection amount:  15 unit(s)   Total Units      Total units used:  200    Total units discarded:  0   Post-procedure details      Chemodenervation:  Chronic migraine    Facial Nerve Location::  Bilateral facial nerve    Patient tolerance of procedure:  Tolerated well, no immediate complications   Comments       45U bilat occipitalis and paraspinal  All medically necessary

## 2025-05-09 ENCOUNTER — APPOINTMENT (OUTPATIENT)
Dept: RADIOLOGY | Age: 75
End: 2025-05-09
Payer: COMMERCIAL

## 2025-05-09 ENCOUNTER — HOSPITAL ENCOUNTER (OUTPATIENT)
Dept: RADIOLOGY | Age: 75
Discharge: HOME | End: 2025-05-09
Payer: COMMERCIAL

## 2025-05-09 DIAGNOSIS — R91.1 LUNG NODULE: ICD-10-CM

## 2025-05-09 PROCEDURE — 71250 CT THORAX DX C-: CPT

## 2025-05-20 ENCOUNTER — OFFICE VISIT (OUTPATIENT)
Dept: NEUROLOGY | Facility: CLINIC | Age: 75
End: 2025-05-20
Payer: COMMERCIAL

## 2025-05-20 ENCOUNTER — OFFICE VISIT (OUTPATIENT)
Dept: PULMONOLOGY | Facility: CLINIC | Age: 75
End: 2025-05-20
Payer: COMMERCIAL

## 2025-05-20 VITALS
WEIGHT: 177.6 LBS | SYSTOLIC BLOOD PRESSURE: 130 MMHG | HEART RATE: 85 BPM | DIASTOLIC BLOOD PRESSURE: 96 MMHG | TEMPERATURE: 98.6 F | OXYGEN SATURATION: 98 % | BODY MASS INDEX: 28.67 KG/M2

## 2025-05-20 VITALS
OXYGEN SATURATION: 93 % | HEIGHT: 66 IN | TEMPERATURE: 97.7 F | HEART RATE: 90 BPM | BODY MASS INDEX: 28.57 KG/M2 | WEIGHT: 177.8 LBS | DIASTOLIC BLOOD PRESSURE: 78 MMHG | SYSTOLIC BLOOD PRESSURE: 140 MMHG

## 2025-05-20 DIAGNOSIS — G43.009 MIGRAINE WITHOUT AURA AND WITHOUT STATUS MIGRAINOSUS, NOT INTRACTABLE: Primary | ICD-10-CM

## 2025-05-20 DIAGNOSIS — F17.211 NICOTINE DEPENDENCE, CIGARETTES, IN REMISSION: ICD-10-CM

## 2025-05-20 DIAGNOSIS — R93.89 ABNORMAL CT OF THE CHEST: ICD-10-CM

## 2025-05-20 DIAGNOSIS — F17.290 VAPING NICOTINE DEPENDENCE, TOBACCO PRODUCT: ICD-10-CM

## 2025-05-20 DIAGNOSIS — G43.709 CHRONIC MIGRAINE WITHOUT AURA WITHOUT STATUS MIGRAINOSUS, NOT INTRACTABLE: ICD-10-CM

## 2025-05-20 DIAGNOSIS — R91.1 LUNG NODULE: Primary | ICD-10-CM

## 2025-05-20 PROCEDURE — 99214 OFFICE O/P EST MOD 30 MIN: CPT | Performed by: PHYSICIAN ASSISTANT

## 2025-05-20 PROCEDURE — 99214 OFFICE O/P EST MOD 30 MIN: CPT | Performed by: INTERNAL MEDICINE

## 2025-05-20 RX ORDER — SUMATRIPTAN SUCCINATE 100 MG/1
TABLET ORAL
Qty: 27 TABLET | Refills: 1 | Status: SHIPPED | OUTPATIENT
Start: 2025-05-20

## 2025-05-20 NOTE — PROGRESS NOTES
"Office Progress Note - Pulmonary    Rosemary Houser 75 y.o. female MRN: [id number]    Encounter: [id number]      Assessment:  Lung nodule.  Abnormal CT of the chest.  Ongoing vaping.  History of tobacco use.    Plan:   Bronchoscopy.  CT of the chest without contrast in 2 years to follow-up on the groundglass opacity of the right upper lobe.  Vaping cessation.  Follow-up in 2 years after the CT of the chest is done.    Discussion:   The patient was right upper lobe groundglass opacity has not changed.  She needs follow-up CT in 2 years.  The left lower lobe has atelectatic changes with possible mucous plugging.  Cannot rule out endobronchial lesion.  She needs a bronchoscopy.  We will arrange for bronchoscopy that fits the patient's schedule.  I have not scheduled her for another appointment however if the bronchoscopy shows abnormality then we will schedule an appointment after the procedure.  She will call us to schedule the CT in 2 years.      Subjective:   The patient is here for a follow-up visit.  No significant cough, wheezing or sputum production.  Denies chest pain.  She is still vaping.    Review of systems:  A 12 point system review is done and aside from what is stated above the rest of the review of systems is negative.      Family history and social history are reviewed.    Medications list is reviewed.      Vitals: Blood pressure 140/78, pulse 90, temperature 97.7 °F (36.5 °C), temperature source Tympanic, height 5' 6\" (1.676 m), weight 80.6 kg (177 lb 12.8 oz), SpO2 93%, not currently breastfeeding.,     Physical Exam  Gen: Awake, alert, oriented x 3, no acute distress  HEENT: Mucous membranes moist, no oral lesions, no thrush  NECK: No accessory muscle use, JVP not elevated  Cardiac: Regular, single S1, single S2, no murmurs, no rubs, no gallops  Lungs: Diminished breath sounds.  No wheezing or rhonchi.  Abdomen: normoactive bowel sounds, soft nontender, nondistended, no rebound or rigidity, no " guarding  Extremities: no cyanosis, no clubbing, no edema  Neuro:  Grossly nonfocal.  Skin:  No rash.    CT of the chest is reviewed on the PACS system I compared to the prior study.  Rounded groundglass opacity in the right upper lobe.  Left lower lobe has atelectatic changes with tree-in-bud opacity as well as possible endobronchial mucous plug or lesion.

## 2025-05-20 NOTE — H&P (VIEW-ONLY)
"Office Progress Note - Pulmonary    Rosemary Houser 75 y.o. female MRN: [id number]    Encounter: [id number]      Assessment:  Lung nodule.  Abnormal CT of the chest.  Ongoing vaping.  History of tobacco use.    Plan:   Bronchoscopy.  CT of the chest without contrast in 2 years to follow-up on the groundglass opacity of the right upper lobe.  Vaping cessation.  Follow-up in 2 years after the CT of the chest is done.    Discussion:   The patient was right upper lobe groundglass opacity has not changed.  She needs follow-up CT in 2 years.  The left lower lobe has atelectatic changes with possible mucous plugging.  Cannot rule out endobronchial lesion.  She needs a bronchoscopy.  We will arrange for bronchoscopy that fits the patient's schedule.  I have not scheduled her for another appointment however if the bronchoscopy shows abnormality then we will schedule an appointment after the procedure.  She will call us to schedule the CT in 2 years.      Subjective:   The patient is here for a follow-up visit.  No significant cough, wheezing or sputum production.  Denies chest pain.  She is still vaping.    Review of systems:  A 12 point system review is done and aside from what is stated above the rest of the review of systems is negative.      Family history and social history are reviewed.    Medications list is reviewed.      Vitals: Blood pressure 140/78, pulse 90, temperature 97.7 °F (36.5 °C), temperature source Tympanic, height 5' 6\" (1.676 m), weight 80.6 kg (177 lb 12.8 oz), SpO2 93%, not currently breastfeeding.,     Physical Exam  Gen: Awake, alert, oriented x 3, no acute distress  HEENT: Mucous membranes moist, no oral lesions, no thrush  NECK: No accessory muscle use, JVP not elevated  Cardiac: Regular, single S1, single S2, no murmurs, no rubs, no gallops  Lungs: Diminished breath sounds.  No wheezing or rhonchi.  Abdomen: normoactive bowel sounds, soft nontender, nondistended, no rebound or rigidity, no " guarding  Extremities: no cyanosis, no clubbing, no edema  Neuro:  Grossly nonfocal.  Skin:  No rash.    CT of the chest is reviewed on the PACS system I compared to the prior study.  Rounded groundglass opacity in the right upper lobe.  Left lower lobe has atelectatic changes with tree-in-bud opacity as well as possible endobronchial mucous plug or lesion.

## 2025-05-20 NOTE — PATIENT INSTRUCTIONS
Chronic migraine without aura without status migrainosus, not intractable  Pt reports stability of migraines since starting Botox injections.  Since starting Botox pt has had a reduction in migraine headaches by 7 days as correlated by a headache diary.  Pt has had decreased trips to the ER and decreased use of abortive medications.  She will continue Depakote for prophylaxis as well.  She will follow-up in 6 months.  Orders:    SUMAtriptan (IMITREX) 100 mg tablet; TAKE 1 TABLET AT ONSET OF MIGRAINE HEADACHE. MAY REPEAT IN 2 HOURS IF NEEDED, NO MORE THAN 2 IN 24 HOURS AND NO MORE THAN 3 IN A WEEK      Migraine without aura and without status migrainosus, not intractable  She will continue sumatriptan for treatment of acute migraines.

## 2025-05-20 NOTE — PROGRESS NOTES
Name: Rosemary Houser      : 1950      MRN: [id number]  Encounter Provider: Gissel Arizmendi PA-C  Encounter Date: 2025   Encounter department: [facility] [place]  :  Assessment & Plan  Chronic migraine without aura without status migrainosus, not intractable  Pt reports stability of migraines since starting Botox injections.  Since starting Botox pt has had a reduction in migraine headaches by 7 days as correlated by a headache diary.  Pt has had decreased trips to the ER and decreased use of abortive medications.  She will continue Depakote for prophylaxis as well.  She will follow-up in 6 months.  Orders:    SUMAtriptan (IMITREX) 100 mg tablet; TAKE 1 TABLET AT ONSET OF MIGRAINE HEADACHE. MAY REPEAT IN 2 HOURS IF NEEDED, NO MORE THAN 2 IN 24 HOURS AND NO MORE THAN 3 IN A WEEK    Migraine without aura and without status migrainosus, not intractable  She will continue sumatriptan for treatment of acute migraines.             History of Present Illness     Rosemary Starkey is a 74yo female, with mood disorder and hyperlipidemia, who follows in the office due to migraine headaches. She reports that Botox has been helpful. She remains on Depakote and cyclobenzaprine as well. Since starting Botox pt has had a reduction in migraine headaches by 7 days as correlated by a headache diary. Pt has had decreased trips to the ER and decreased use of abortive medications. She had 4 migraines last week but overall migraines have been stable and well controlled. She has about 10 migraines per month.These migraines were responsive to sumatriptan but would return the following day. Migraines typically start upon awakening. Her  states that she does not snore or have an apneic events. She does not feel overly fatigued during the day. She does take sumatriptan and naproxen to treat the migraines. She denies any side effects of the medications. Headaches are typically 10/10. They will start in  the Lt occipital area and extend to the Lt eye.     Since starting Botox pt has had a reduction in migraine headaches by 7 days as correlated by a headache diary.  Pt has had decreased trips to the ER and decreased use of abortive medications.     PREVENTIVE: Citalopram, Topamax, depakote, Botox, tizanidine  ABORTIVE: Naratriptan, Sumavel, Dexamethasone, maxalt, imtrex     She was following with Vascular Surgery due to possible fibromuscular dysplasia. She is overdue for carotid ultrasound. It has been ordered.    Review of Systems   Constitutional:  Negative for appetite change, fatigue and fever.   HENT: Negative.  Negative for hearing loss, tinnitus, trouble swallowing and voice change.    Eyes: Negative.  Negative for photophobia, pain and visual disturbance.   Respiratory: Negative.  Negative for shortness of breath.    Cardiovascular: Negative.  Negative for palpitations.   Gastrointestinal: Negative.  Negative for nausea and vomiting.   Endocrine: Negative.  Negative for cold intolerance.   Genitourinary: Negative.  Negative for dysuria, frequency and urgency.   Musculoskeletal:  Negative for back pain, gait problem, myalgias, neck pain and neck stiffness.   Skin: Negative.  Negative for rash.   Allergic/Immunologic: Negative.    Neurological:  Positive for headaches (Last Migraine on sunday). Negative for dizziness, tremors, seizures, syncope, facial asymmetry, speech difficulty, weakness, light-headedness and numbness.   Hematological: Negative.  Does not bruise/bleed easily.   Psychiatric/Behavioral: Negative.  Negative for confusion, hallucinations and sleep disturbance.    All other systems reviewed and are negative.   I have personally reviewed the MA's review of systems and made changes as necessary.    Medical History Reviewed by provider this encounter:  Tobacco  Allergies  Meds  Problems  Med Hx  Surg Hx  Fam Hx     .     Objective   There were no vitals taken for this visit.    Physical  Exam  Vitals reviewed.   Constitutional:       Appearance: Normal appearance.   HENT:      Head: Normocephalic and atraumatic.      Nose: Nose normal.      Mouth/Throat:      Mouth: Mucous membranes are moist.      Pharynx: Oropharynx is clear.     Eyes:      General: Lids are normal.      Extraocular Movements: Extraocular movements intact.      Conjunctiva/sclera: Conjunctivae normal.      Pupils: Pupils are equal, round, and reactive to light.     Pulmonary:      Effort: Pulmonary effort is normal.     Neurological:      Mental Status: She is alert.      Motor: Motor strength is normal.     Deep Tendon Reflexes: Reflexes are normal and symmetric.     Psychiatric:         Speech: Speech normal.       Neurological Exam  Mental Status  Alert. Oriented to person, place, time and situation. Recent and remote memory are intact. Speech is normal. Language is fluent with no aphasia. Attention and concentration are normal. Fund of knowledge is appropriate for level of education. Apraxia absent.    Cranial Nerves  CN III, IV, VI: Extraocular movements intact bilaterally. Normal lids and orbits bilaterally. Pupils equal round and reactive to light bilaterally.  CN V: Facial sensation is normal.  CN VII: Full and symmetric facial movement.  CN IX, X: Palate elevates symmetrically  CN XI: Shoulder shrug strength is normal.  CN XII: Tongue midline without atrophy or fasciculations.    Motor   Strength is 5/5 throughout all four extremities.    Reflexes  Deep tendon reflexes are 2+ and symmetric in all four extremities.    Gait  Casual gait is normal including stance, stride, and arm swing.      Radiology Results Review : No pertinent imaging studies reviewed.    Administrative Statements   I have spent a total time of 30 minutes in caring for this patient on the day of the visit/encounter including Prognosis, Risks and benefits of tx options, Instructions for management, Patient and family education, Importance of tx  compliance, Risk factor reductions, Impressions, Counseling / Coordination of care, Documenting in the medical record, Reviewing/placing orders in the medical record (including tests, medications, and/or procedures), and Obtaining or reviewing history  .

## 2025-05-20 NOTE — ASSESSMENT & PLAN NOTE
Pt reports stability of migraines since starting Botox injections.  Since starting Botox pt has had a reduction in migraine headaches by 7 days as correlated by a headache diary.  Pt has had decreased trips to the ER and decreased use of abortive medications.  She will continue Depakote for prophylaxis as well.  She will follow-up in 6 months.  Orders:    SUMAtriptan (IMITREX) 100 mg tablet; TAKE 1 TABLET AT ONSET OF MIGRAINE HEADACHE. MAY REPEAT IN 2 HOURS IF NEEDED, NO MORE THAN 2 IN 24 HOURS AND NO MORE THAN 3 IN A WEEK

## 2025-05-27 ENCOUNTER — TELEPHONE (OUTPATIENT)
Age: 75
End: 2025-05-27

## 2025-05-27 NOTE — TELEPHONE ENCOUNTER
Patient called to schedule bronchoscopy. Please review and assist patient with scheduling. Thank you.

## 2025-05-28 ENCOUNTER — PREP FOR PROCEDURE (OUTPATIENT)
Dept: PULMONOLOGY | Facility: CLINIC | Age: 75
End: 2025-05-28

## 2025-05-28 DIAGNOSIS — R91.1 LUNG NODULE: Primary | ICD-10-CM

## 2025-05-28 DIAGNOSIS — R93.89 ABNORMAL CT OF THE CHEST: ICD-10-CM

## 2025-06-03 ENCOUNTER — OFFICE VISIT (OUTPATIENT)
Age: 75
End: 2025-06-03
Payer: COMMERCIAL

## 2025-06-03 VITALS
TEMPERATURE: 98.7 F | DIASTOLIC BLOOD PRESSURE: 78 MMHG | HEART RATE: 97 BPM | HEIGHT: 64 IN | BODY MASS INDEX: 29.91 KG/M2 | SYSTOLIC BLOOD PRESSURE: 128 MMHG | WEIGHT: 175.2 LBS | OXYGEN SATURATION: 98 %

## 2025-06-03 DIAGNOSIS — R73.03 PREDIABETES: ICD-10-CM

## 2025-06-03 DIAGNOSIS — E66.811 OBESITY, CLASS I, BMI 30-34.9: Primary | ICD-10-CM

## 2025-06-03 DIAGNOSIS — E78.5 HYPERLIPIDEMIA: ICD-10-CM

## 2025-06-03 PROCEDURE — 99204 OFFICE O/P NEW MOD 45 MIN: CPT | Performed by: PHYSICIAN ASSISTANT

## 2025-06-03 RX ORDER — METFORMIN HYDROCHLORIDE 750 MG/1
750 TABLET, EXTENDED RELEASE ORAL
Qty: 90 TABLET | Refills: 1 | Status: SHIPPED | OUTPATIENT
Start: 2025-06-03

## 2025-06-10 ENCOUNTER — HOSPITAL ENCOUNTER (OUTPATIENT)
Dept: GASTROENTEROLOGY | Facility: HOSPITAL | Age: 75
Discharge: HOME | End: 2025-06-10
Attending: INTERNAL MEDICINE
Payer: COMMERCIAL

## 2025-06-10 ENCOUNTER — ANESTHESIA EVENT (OUTPATIENT)
Dept: GASTROENTEROLOGY | Facility: HOSPITAL | Age: 75
End: 2025-06-10
Payer: COMMERCIAL

## 2025-06-10 ENCOUNTER — ANESTHESIA (OUTPATIENT)
Dept: GASTROENTEROLOGY | Facility: HOSPITAL | Age: 75
End: 2025-06-10
Payer: COMMERCIAL

## 2025-06-10 VITALS
TEMPERATURE: 97.2 F | BODY MASS INDEX: 29.88 KG/M2 | HEART RATE: 98 BPM | OXYGEN SATURATION: 97 % | WEIGHT: 175 LBS | SYSTOLIC BLOOD PRESSURE: 150 MMHG | HEIGHT: 64 IN | DIASTOLIC BLOOD PRESSURE: 72 MMHG | RESPIRATION RATE: 18 BRPM

## 2025-06-10 DIAGNOSIS — R91.1 LUNG NODULE: ICD-10-CM

## 2025-06-10 DIAGNOSIS — R93.89 ABNORMAL CT OF THE CHEST: ICD-10-CM

## 2025-06-10 PROCEDURE — 88112 CYTOPATH CELL ENHANCE TECH: CPT | Performed by: PATHOLOGY

## 2025-06-10 PROCEDURE — 87205 SMEAR GRAM STAIN: CPT | Performed by: INTERNAL MEDICINE

## 2025-06-10 PROCEDURE — 87116 MYCOBACTERIA CULTURE: CPT | Performed by: INTERNAL MEDICINE

## 2025-06-10 PROCEDURE — 87077 CULTURE AEROBIC IDENTIFY: CPT | Performed by: INTERNAL MEDICINE

## 2025-06-10 PROCEDURE — 87206 SMEAR FLUORESCENT/ACID STAI: CPT | Performed by: INTERNAL MEDICINE

## 2025-06-10 PROCEDURE — 87070 CULTURE OTHR SPECIMN AEROBIC: CPT | Performed by: INTERNAL MEDICINE

## 2025-06-10 RX ORDER — FENTANYL CITRATE 50 UG/ML
INJECTION, SOLUTION INTRAMUSCULAR; INTRAVENOUS AS NEEDED
Status: DISCONTINUED | OUTPATIENT
Start: 2025-06-10 | End: 2025-06-10

## 2025-06-10 RX ORDER — PROPOFOL 10 MG/ML
INJECTION, EMULSION INTRAVENOUS AS NEEDED
Status: DISCONTINUED | OUTPATIENT
Start: 2025-06-10 | End: 2025-06-10

## 2025-06-10 RX ORDER — ONDANSETRON 2 MG/ML
INJECTION INTRAMUSCULAR; INTRAVENOUS AS NEEDED
Status: DISCONTINUED | OUTPATIENT
Start: 2025-06-10 | End: 2025-06-10

## 2025-06-10 RX ORDER — DEXAMETHASONE SODIUM PHOSPHATE 10 MG/ML
INJECTION, SOLUTION INTRAMUSCULAR; INTRAVENOUS AS NEEDED
Status: DISCONTINUED | OUTPATIENT
Start: 2025-06-10 | End: 2025-06-10

## 2025-06-10 RX ORDER — LIDOCAINE HYDROCHLORIDE 20 MG/ML
INJECTION, SOLUTION EPIDURAL; INFILTRATION; INTRACAUDAL; PERINEURAL AS NEEDED
Status: DISCONTINUED | OUTPATIENT
Start: 2025-06-10 | End: 2025-06-10

## 2025-06-10 RX ORDER — SODIUM CHLORIDE, SODIUM LACTATE, POTASSIUM CHLORIDE, CALCIUM CHLORIDE 600; 310; 30; 20 MG/100ML; MG/100ML; MG/100ML; MG/100ML
INJECTION, SOLUTION INTRAVENOUS CONTINUOUS PRN
Status: DISCONTINUED | OUTPATIENT
Start: 2025-06-10 | End: 2025-06-10

## 2025-06-10 RX ADMIN — PROPOFOL 150 MG: 10 INJECTION, EMULSION INTRAVENOUS at 09:27

## 2025-06-10 RX ADMIN — ONDANSETRON 4 MG: 2 INJECTION INTRAMUSCULAR; INTRAVENOUS at 09:27

## 2025-06-10 RX ADMIN — SODIUM CHLORIDE, SODIUM LACTATE, POTASSIUM CHLORIDE, AND CALCIUM CHLORIDE: .6; .31; .03; .02 INJECTION, SOLUTION INTRAVENOUS at 09:23

## 2025-06-10 RX ADMIN — LIDOCAINE HYDROCHLORIDE 100 MG: 20 INJECTION, SOLUTION EPIDURAL; INFILTRATION; INTRACAUDAL at 09:27

## 2025-06-10 RX ADMIN — PROPOFOL 120 MCG/KG/MIN: 10 INJECTION, EMULSION INTRAVENOUS at 09:28

## 2025-06-10 RX ADMIN — DEXAMETHASONE SODIUM PHOSPHATE 10 MG: 10 INJECTION INTRAMUSCULAR; INTRAVENOUS at 09:27

## 2025-06-10 RX ADMIN — FENTANYL CITRATE 50 MCG: 50 INJECTION INTRAMUSCULAR; INTRAVENOUS at 09:27

## 2025-06-10 NOTE — INTERVAL H&P NOTE
H&P reviewed. After examining the patient I find no changes in the patients condition since the H&P had been written.    Vitals:    06/10/25 0851   BP: 163/77   Pulse: 88   Resp: 15   Temp: (!) 96.8 °F (36 °C)   SpO2: 95%     Physical exam:  Cardiovascular: RRR, no murmurs.  Pulmonology: Clear lungs bilaterally, no wheezing, no rhonchi.  Extremities: No edema  Neurologic: Alert and oriented x3. No focal neurologic findings.    Bronchoscopy with bronchoalveaolar lavage +/- biopsy discussed with patient in detail. Patient understands risks of the procedure including bleeding, infection and pneumothorax. She is agreeable to proceed. Consent signed.

## 2025-06-10 NOTE — ANESTHESIA PREPROCEDURE EVALUATION
Procedure:  BRONCHOSCOPY    Relevant Problems   ANESTHESIA (within normal limits)      CARDIO   (+) Bilateral carotid artery stenosis   (+) Chronic migraine without aura without status migrainosus, not intractable   (+) Fibromuscular dysplasia of cervicocranial artery (HCC)   (+) Hyperlipidemia   (+) Migraine without aura and without status migrainosus, not intractable      ENDO   (+) Acquired hypothyroidism      MUSCULOSKELETAL   (+) Acute left-sided low back pain with sciatica      NEURO/PSYCH   (+) Chronic migraine without aura without status migrainosus, not intractable   (+) Depression   (+) Migraine without aura and without status migrainosus, not intractable      PULMONARY   (+) Acute hypoxic respiratory failure (HCC)   (+) Chronic obstructive pulmonary disease with acute exacerbation (HCC)   (+) Simple chronic bronchitis (HCC)        Physical Exam    Airway     Mallampati score: II  TM Distance: >3 FB  Neck ROM: full      Cardiovascular      Dental   Comment: Denies loose teeth     Pulmonary      Neurological    She appears awake, alert and oriented x3.      Other Findings  post-pubertal.      Anesthesia Plan  ASA Score- 3     Anesthesia Type- general with ASA Monitors.         Additional Monitors:     Airway Plan: LMA and LMA.           Plan Factors-Exercise tolerance (METS): >4 METS.    Chart reviewed. EKG reviewed.  Existing labs reviewed. Patient summary reviewed.    Patient is a current smoker.              Induction- intravenous.    Postoperative Plan- .   Monitoring Plan - Monitoring plan - standard ASA monitoring  Post Operative Pain Plan - multimodal analgesia        Informed Consent- Anesthetic plan and risks discussed with patient.  I personally reviewed this patient with the CRNA. Discussed and agreed on the Anesthesia Plan with the CRNA..      NPO Status:  Vitals Value Taken Time   Date of last liquid 06/09/25 06/10/25 08:26   Time of last liquid 2330 06/10/25 08:26   Date of last solid 06/09/25  06/10/25 08:26   Time of last solid 2000 06/10/25 08:26

## 2025-06-10 NOTE — RESPIRATORY THERAPY NOTE
Assisted bronchoscopy with Dr. Deal. A total of 8ml of 1% lidocaine was admnistered: 4ml at the vocal cords and 4ml at the jana. BAL was performed in the left lower lobe with 50ml normal saline x3 and in the right upper lobe with 50ml normal saline x2. All specimens are ordered and sent to lab.

## 2025-06-10 NOTE — ANESTHESIA POSTPROCEDURE EVALUATION
Post-Op Assessment Note    CV Status:  Stable  Pain Score: 0    Pain management: adequate       Mental Status:  Alert and awake   Hydration Status:  Euvolemic   PONV Controlled:  Controlled   Airway Patency:  Patent     Post Op Vitals Reviewed: Yes    No anethesia notable event occurred.    Staff: Anesthesiologist, CRNA           Last Filed PACU Vitals:  Vitals Value Taken Time   Temp 97.2    Pulse 109    /95    Resp 16    SpO2 96

## 2025-06-11 LAB
RHODAMINE-AURAMINE STN SPEC: NORMAL

## 2025-06-12 LAB
BACTERIA BRONCH AEROBE CULT: ABNORMAL
GRAM STN SPEC: ABNORMAL

## 2025-06-12 PROCEDURE — 88112 CYTOPATH CELL ENHANCE TECH: CPT | Performed by: PATHOLOGY

## 2025-06-15 NOTE — PROGRESS NOTES
I have called the patient and updated her about the results of the bronchoscopy on the cultures.  She is not feeling sick.  She has significant allergies to different antibiotics.  For now we will observe her without any treatment.  Follow-up in the next scheduled appointment.

## 2025-06-16 DIAGNOSIS — E78.00 PURE HYPERCHOLESTEROLEMIA: ICD-10-CM

## 2025-06-16 RX ORDER — SIMVASTATIN 40 MG
40 TABLET ORAL DAILY
Qty: 90 TABLET | Refills: 3 | Status: SHIPPED | OUTPATIENT
Start: 2025-06-16

## 2025-06-17 ENCOUNTER — TELEPHONE (OUTPATIENT)
Age: 75
End: 2025-06-17

## 2025-06-17 DIAGNOSIS — R93.89 ABNORMAL CT OF THE CHEST: Primary | ICD-10-CM

## 2025-06-17 LAB
MYCOBACTERIUM SPEC CULT: NORMAL
RHODAMINE-AURAMINE STN SPEC: NORMAL

## 2025-06-17 RX ORDER — DOXYCYCLINE 100 MG/1
100 TABLET ORAL 2 TIMES DAILY
Qty: 14 TABLET | Refills: 0 | Status: SHIPPED | OUTPATIENT
Start: 2025-06-17 | End: 2025-06-24

## 2025-06-17 NOTE — TELEPHONE ENCOUNTER
Patient calling stating she recently has a bronchoscopy done, she spoke with Dallin Mena on Sunday and they decided to ride it out without an antibiotic, but patient states her cough is getting worse and now thinks she may need an antibiotic.  Patient asking what Dr. Zamarripa would want there to do

## 2025-06-17 NOTE — TELEPHONE ENCOUNTER
Patient called to provide Natividad Pérez  with the name of the abx medication it is - Doxycycline hyclate 100 mg .

## 2025-06-24 LAB
MYCOBACTERIUM SPEC CULT: NORMAL
RHODAMINE-AURAMINE STN SPEC: NORMAL

## 2025-07-02 LAB
MYCOBACTERIUM SPEC CULT: NORMAL
RHODAMINE-AURAMINE STN SPEC: NORMAL

## 2025-07-08 LAB
MYCOBACTERIUM SPEC CULT: NORMAL
RHODAMINE-AURAMINE STN SPEC: NORMAL

## 2025-07-14 ENCOUNTER — OFFICE VISIT (OUTPATIENT)
Age: 75
End: 2025-07-14
Payer: COMMERCIAL

## 2025-07-14 VITALS
BODY MASS INDEX: 29.19 KG/M2 | OXYGEN SATURATION: 92 % | HEART RATE: 88 BPM | SYSTOLIC BLOOD PRESSURE: 130 MMHG | WEIGHT: 171 LBS | RESPIRATION RATE: 18 BRPM | TEMPERATURE: 98.3 F | HEIGHT: 64 IN | DIASTOLIC BLOOD PRESSURE: 82 MMHG

## 2025-07-14 DIAGNOSIS — E78.01 FAMILIAL HYPERCHOLESTEROLEMIA: ICD-10-CM

## 2025-07-14 DIAGNOSIS — E03.9 ACQUIRED HYPOTHYROIDISM: ICD-10-CM

## 2025-07-14 DIAGNOSIS — R73.9 HYPERGLYCEMIA: ICD-10-CM

## 2025-07-14 DIAGNOSIS — Z00.00 HEALTHCARE MAINTENANCE: Primary | ICD-10-CM

## 2025-07-14 DIAGNOSIS — J44.1 CHRONIC OBSTRUCTIVE PULMONARY DISEASE WITH ACUTE EXACERBATION (HCC): ICD-10-CM

## 2025-07-14 DIAGNOSIS — F51.01 PRIMARY INSOMNIA: ICD-10-CM

## 2025-07-14 DIAGNOSIS — G43.709 CHRONIC MIGRAINE WITHOUT AURA WITHOUT STATUS MIGRAINOSUS, NOT INTRACTABLE: ICD-10-CM

## 2025-07-14 DIAGNOSIS — J41.0 SIMPLE CHRONIC BRONCHITIS (HCC): ICD-10-CM

## 2025-07-14 PROCEDURE — G2211 COMPLEX E/M VISIT ADD ON: HCPCS | Performed by: INTERNAL MEDICINE

## 2025-07-14 PROCEDURE — 99214 OFFICE O/P EST MOD 30 MIN: CPT | Performed by: INTERNAL MEDICINE

## 2025-07-14 NOTE — ASSESSMENT & PLAN NOTE
History of hyperglycemia but not overt diabetes.  Will continue to monitor blood sugar readings and initiate further treatment as indicated she remains on metformin 750 mg daily.  States that since she quit smoking has been gaining more weight    Orders:    Hemoglobin A1C; Future

## 2025-07-14 NOTE — ASSESSMENT & PLAN NOTE
Check thyroid function with her next visit and initiate medication if indicated.    Orders:    TSH, 3rd generation with Free T4 reflex; Future

## 2025-07-14 NOTE — ASSESSMENT & PLAN NOTE
Patient continues to follow-up with her pulmonologist.  Was recently seen did have bronchoscopy and was shown to have some mucous plugging.  Patient is no longer smoking but is vaping which is still considered a risk factor as far as her lung disease is concerned.  Patient states she was doing better after the bronchoscopy but now is beginning again to have some chest congestion and cough.  Patient is afebrile with O2 sat is at 92%.  Decreased breath sounds anterior and posteriorly with faint rhonchi heard.  I suggested highly that she discuss this with her pulmonologist and discussion as to what the next step as far as treatment is concerned.

## 2025-07-14 NOTE — ASSESSMENT & PLAN NOTE
Patient has a history of hyperlipidemia.  She remains on simvastatin 40 mg daily.  She admits that she is not always perfect with her diet and we tried to reinforce the importance of watching her intake of fats and cholesterol with her diet.  Check a lipid profile with her next visit and make adjustment medication in the future if needed.  May benefit from Zetia along with the simvastatin

## 2025-07-14 NOTE — ASSESSMENT & PLAN NOTE
Patient will be due for repeat Medicare wellness visit when she returns to the office in 4 months.  She was given a slip for complete labs to be performed prior to that visit.  In the interim she was told if any new medical problems or concerns to please call.    Orders:    Comprehensive metabolic panel; Future    CBC and differential; Future    Lipid panel; Future    TSH, 3rd generation with Free T4 reflex; Future    Hemoglobin A1C; Future

## 2025-07-14 NOTE — PROGRESS NOTES
Name: Rosemary Houser      : 1950      MRN: [id number]  Encounter Provider: Edin hCoi DO  Encounter Date: 2025   Encounter department: [facility] INTERNAL MEDICINE    Assessment & Plan  Simple chronic bronchitis (HCC)  Patient continues to follow-up with her pulmonologist.  Was recently seen did have bronchoscopy and was shown to have some mucous plugging.  Patient is no longer smoking but is vaping which is still considered a risk factor as far as her lung disease is concerned.  Patient states she was doing better after the bronchoscopy but now is beginning again to have some chest congestion and cough.  Patient is afebrile with O2 sat is at 92%.  Decreased breath sounds anterior and posteriorly with faint rhonchi heard.  I suggested highly that she discuss this with her pulmonologist and discussion as to what the next step as far as treatment is concerned.         Familial hypercholesterolemia  Patient has a history of hyperlipidemia.  She remains on simvastatin 40 mg daily.  She admits that she is not always perfect with her diet and we tried to reinforce the importance of watching her intake of fats and cholesterol with her diet.  Check a lipid profile with her next visit and make adjustment medication in the future if needed.  May benefit from Zetia along with the simvastatin         Hyperglycemia  History of hyperglycemia but not overt diabetes.  Will continue to monitor blood sugar readings and initiate further treatment as indicated she remains on metformin 750 mg daily.  States that since she quit smoking has been gaining more weight    Orders:  •  Hemoglobin A1C; Future    Chronic obstructive pulmonary disease with acute exacerbation (HCC)         Acquired hypothyroidism  Check thyroid function with her next visit and initiate medication if indicated.    Orders:  •  TSH, 3rd generation with Free T4 reflex; Future    Healthcare maintenance  Patient will be due for repeat Medicare  wellness visit when she returns to the office in 4 months.  She was given a slip for complete labs to be performed prior to that visit.  In the interim she was told if any new medical problems or concerns to please call.    Orders:  •  Comprehensive metabolic panel; Future  •  CBC and differential; Future  •  Lipid panel; Future  •  TSH, 3rd generation with Free T4 reflex; Future  •  Hemoglobin A1C; Future    Primary insomnia  Continues to take Ambien at nighttime to help with sleep.         Chronic migraine without aura without status migrainosus, not intractable   has continued to follow-up with her neurologist.  She remains on Depakote as preventative medication and has Imitrex on hand for acute migrainous headaches.              History of Present Illness     Patient is a [age]-year-old female history of multiple medical problems outlined previously who is here today for a follow-up visit after a 4-month period of time.  She did have labs performed prior to the visit today we did discuss results.  Patient relates in general doing about the same.  She states she did have some improvement with her respiratory status after her bronchoscopy but now is having some return of her chest congestion and cough.  Review of Systems   Constitutional: Negative.    HENT: Negative.     Eyes: Negative.    Respiratory:  Positive for cough. Negative for apnea, choking, chest tightness, shortness of breath, wheezing and stridor.         Chest congestion no wheezing   Cardiovascular: Negative.    Gastrointestinal: Negative.    Endocrine: Negative.    Genitourinary: Negative.    Musculoskeletal: Negative.    Skin: Negative.    Allergic/Immunologic: Negative.    Neurological: Negative.    Hematological: Negative.    Psychiatric/Behavioral: Negative.     Past Medical History[1]  Past Surgical History[2]  Family History[3]  Social History[4]  Medications[5]  Allergies   Allergen Reactions   • Penicillins Anaphylaxis   • Azithromycin Hives  "  • Nisoldipine      Reaction Date: 14Apr2011;    • Sulfa Antibiotics Hives     Immunization History   Administered Date(s) Administered   • COVID-19 PFIZER VACCINE 0.3 ML IM 12/22/2020, 01/12/2021, 10/23/2021   • H1N1, All Formulations 11/06/2009   • INFLUENZA 10/07/2019, 09/29/2020, 10/03/2022, 10/17/2023   • Pneumococcal Conjugate 13-Valent 03/31/2016   • Pneumococcal Polysaccharide PPV23 11/09/2020     Objective   /82   Pulse 88   Temp 98.3 °F (36.8 °C)   Resp 18   Ht 5' 4\" (1.626 m)   Wt 77.6 kg (171 lb)   SpO2 92%   BMI 29.35 kg/m²     Physical Exam  Vitals and nursing note reviewed.   Constitutional:       General: She is not in acute distress.     Appearance: Normal appearance. She is not ill-appearing, toxic-appearing or diaphoretic.      Comments: Pleasant overweight 75-year-old female who is awake alert oriented x 3 accompanied by her  in no acute distress   HENT:      Head: Normocephalic and atraumatic.      Right Ear: Tympanic membrane, ear canal and external ear normal. There is no impacted cerumen.      Left Ear: Tympanic membrane, ear canal and external ear normal. There is no impacted cerumen.      Nose: Nose normal. No congestion or rhinorrhea.      Mouth/Throat:      Mouth: Mucous membranes are moist.      Pharynx: Oropharynx is clear. No oropharyngeal exudate or posterior oropharyngeal erythema.     Eyes:      General: No scleral icterus.        Right eye: No discharge.         Left eye: No discharge.      Extraocular Movements: Extraocular movements intact.      Conjunctiva/sclera: Conjunctivae normal.      Pupils: Pupils are equal, round, and reactive to light.     Neck:      Vascular: Carotid bruit present.     Cardiovascular:      Rate and Rhythm: Normal rate and regular rhythm.      Pulses: Normal pulses.      Heart sounds: Normal heart sounds. No murmur heard.     No friction rub. No gallop.   Pulmonary:      Effort: Pulmonary effort is normal. No respiratory distress. "      Breath sounds: No stridor. Rhonchi present. No wheezing or rales.      Comments: Some decreased breath sounds throughout with some rhonchi heard throughout both lung fields.  No wheezes  Chest:      Chest wall: No tenderness.   Abdominal:      General: Abdomen is flat. Bowel sounds are normal. There is no distension.      Palpations: Abdomen is soft. There is no mass.      Tenderness: There is no abdominal tenderness. There is no right CVA tenderness, left CVA tenderness, guarding or rebound.      Hernia: No hernia is present.      Comments: Status post repair of incisional hernias.  Patient has no further problems with bulging no pain to palpation.     Musculoskeletal:         General: No swelling, tenderness, deformity or signs of injury. Normal range of motion.      Cervical back: Normal range of motion and neck supple. No rigidity or tenderness.      Right lower leg: No edema.      Left lower leg: No edema.   Lymphadenopathy:      Cervical: No cervical adenopathy.     Skin:     General: Skin is warm and dry.      Coloration: Skin is not jaundiced or pale.      Findings: No bruising, erythema, lesion or rash.     Neurological:      General: No focal deficit present.      Mental Status: She is alert and oriented to person, place, and time. Mental status is at baseline.      Cranial Nerves: No cranial nerve deficit.      Sensory: No sensory deficit.      Motor: No weakness.      Coordination: Coordination normal.      Gait: Gait normal.      Deep Tendon Reflexes: Reflexes normal.     Psychiatric:         Mood and Affect: Mood normal.         Behavior: Behavior normal.         Thought Content: Thought content normal.         Judgment: Judgment normal.            [1]  Past Medical History:  Diagnosis Date   • Carotid aneurysm, right (HCC)    • Colon polyp    • Hyperlipidemia    • Migraine    • Pneumonia 02/2024   [2]  Past Surgical History:  Procedure Laterality Date   • BREAST EXCISIONAL BIOPSY Right 1986   •  COLONOSCOPY     • HARTMANS PROCEDURE N/A 03/08/2020    Procedure: Laparoscopic drainage of intra peritoneal abscess, sigmoid rescetion,;  Surgeon: NOEL Ogden MD;  Location: [facility];  Service: Colorectal   • HERNIA REPAIR     • HYSTERECTOMY  1978    age 28   • NASAL SEPTUM SURGERY      deviation repair   • WY LAPS ABD PRTM&OMENTUM DX W/WO SPEC BR/WA SPX N/A 03/08/2020    Procedure: LAPAROSCOPY DIAGNOSTIC;  Surgeon: NOEL Ogden MD;  Location: [facility];  Service: Colorectal   • WY LAPS REPAIR HERNIA EXCEPT INCAL/INGUN REDUCIBLE N/A 01/26/2021    Procedure: REPAIR HERNIA VENTRAL LAPAROSCOPIC;  Surgeon: NOEL Ogden MD;  Location: [facility];  Service: Colorectal   • WY RPR AA HERNIA 1ST < 3 CM REDUCIBLE N/A 10/16/2024    Procedure: REPAIR HERNIA INCISIONAL;  Surgeon: Bhupinder King MD;  Location: [facility];  Service: General   • WY SIGMOIDOSCOPY FLX DX W/COLLJ SPEC BR/WA IF PFRMD N/A 03/08/2020    Procedure: SIGMOIDOSCOPY FLEXIBLE;  Surgeon: NOEL Ogden MD;  Location: [facility];  Service: Colorectal   • SHOULDER SURGERY     [3]  Family History  Problem Relation Name Age of Onset   • Breast cancer Mother  50   • Other (breast cancer) Mother     • Heart disease Father     • Heart attack Father     • Diabetes Sister     • Leukemia Sister     • No Known Problems Sister     • No Known Problems Son     • No Known Problems Son     • No Known Problems Maternal Grandmother     • No Known Problems Maternal Grandfather     • No Known Problems Paternal Grandmother     • No Known Problems Paternal Grandfather     • Breast cancer Maternal Aunt  50   • No Known Problems Maternal Aunt     • Colon cancer Maternal Uncle     • No Known Problems Paternal Aunt     • Lung cancer Other niece 47   [4]  Social History  Tobacco Use   • Smoking status: Former     Average packs/day: 0.5 packs/day for 59.1 years (29.6 ttl pk-yrs)     Types: Cigarettes     Start date: 1965   • Smokeless tobacco: Never   Vaping Use    • Vaping status: Every Day   • Substances: Nicotine, Flavoring   Substance and Sexual Activity   • Alcohol use: Not Currently     Comment: Rarely   • Drug use: No   [5]  Current Outpatient Medications on File Prior to Visit   Medication Sig   • aspirin (ECOTRIN) 325 mg EC tablet Take 1 tablet (325 mg total) by mouth daily   • citalopram (CeleXA) 20 mg tablet TAKE 1 TABLET DAILY   • cyclobenzaprine (FLEXERIL) 10 mg tablet Take 1 tablet (10 mg total) by mouth daily at bedtime   • divalproex sodium (DEPAKOTE ER) 250 mg 24 hr tablet TAKE 1 TABLET TWICE A DAY   • naproxen (NAPROSYN) 500 mg tablet TAKE 1 TABLET TWICE A DAY AS NEEDED FOR HEADACHES   • simvastatin (ZOCOR) 40 mg tablet TAKE 1 TABLET DAILY   • SUMAtriptan (IMITREX) 100 mg tablet TAKE 1 TABLET AT ONSET OF MIGRAINE HEADACHE. MAY REPEAT IN 2 HOURS IF NEEDED, NO MORE THAN 2 IN 24 HOURS AND NO MORE THAN 3 IN A WEEK   • torsemide (DEMADEX) 10 mg tablet Take 1 tablet (10 mg total) by mouth daily   • zolpidem (AMBIEN) 5 mg tablet One pill at bedtime as needed to help with sleep   • metFORMIN (GLUCOPHAGE-XR) 750 mg 24 hr tablet Take 1 tablet (750 mg total) by mouth daily with breakfast   • traZODone (DESYREL) 50 mg tablet Take 1 tablet (50 mg total) by mouth daily at bedtime

## 2025-07-14 NOTE — ASSESSMENT & PLAN NOTE
has continued to follow-up with her neurologist.  She remains on Depakote as preventative medication and has Imitrex on hand for acute migrainous headaches.

## 2025-07-15 ENCOUNTER — PROCEDURE VISIT (OUTPATIENT)
Dept: NEUROLOGY | Facility: CLINIC | Age: 75
End: 2025-07-15
Payer: COMMERCIAL

## 2025-07-15 VITALS
HEART RATE: 93 BPM | OXYGEN SATURATION: 87 % | TEMPERATURE: 97.5 F | SYSTOLIC BLOOD PRESSURE: 124 MMHG | DIASTOLIC BLOOD PRESSURE: 82 MMHG

## 2025-07-15 DIAGNOSIS — G43.709 CHRONIC MIGRAINE WITHOUT AURA WITHOUT STATUS MIGRAINOSUS, NOT INTRACTABLE: Primary | ICD-10-CM

## 2025-07-15 LAB
MYCOBACTERIUM SPEC CULT: NORMAL
RHODAMINE-AURAMINE STN SPEC: NORMAL

## 2025-07-15 PROCEDURE — 64615 CHEMODENERV MUSC MIGRAINE: CPT | Performed by: PHYSICIAN ASSISTANT

## 2025-07-22 LAB
MYCOBACTERIUM SPEC CULT: NORMAL
MYCOBACTERIUM SPEC CULT: NORMAL
RHODAMINE-AURAMINE STN SPEC: NORMAL
RHODAMINE-AURAMINE STN SPEC: NORMAL

## 2025-07-24 LAB
MYCOBACTERIUM SPEC CULT: ABNORMAL
RHODAMINE-AURAMINE STN SPEC: ABNORMAL

## 2025-08-06 DIAGNOSIS — F41.9 ANXIETY: ICD-10-CM

## 2025-08-06 LAB
M AVIUM CMPLX DNA SPEC QL NAA+PROBE: NORMAL
M TB CMPLX DNA ISLT/SPM QL: NORMAL
MICROORGANISM ISLT MS.MALDI-TOF: ABNORMAL
MICROORGANISM ISLT MS.MALDI-TOF: NORMAL
REFLEX ID BY MALDI: NORMAL
UNIDENT CELLS NFR BLD MANUAL: NORMAL %

## 2025-08-07 RX ORDER — CITALOPRAM HYDROBROMIDE 20 MG/1
20 TABLET ORAL DAILY
Qty: 90 TABLET | Refills: 1 | Status: SHIPPED | OUTPATIENT
Start: 2025-08-07

## 2025-08-12 LAB
MYCOBACTERIUM SPEC CULT: ABNORMAL
MYCOBACTERIUM SPEC CULT: ABNORMAL
RHODAMINE-AURAMINE STN SPEC: ABNORMAL

## 2025-08-13 PROBLEM — Z00.00 HEALTHCARE MAINTENANCE: Status: RESOLVED | Noted: 2018-05-17 | Resolved: 2025-08-13

## 2025-08-18 ENCOUNTER — TELEPHONE (OUTPATIENT)
Age: 75
End: 2025-08-18

## (undated) DEVICE — STRL COTTON TIP APPLCTR 6IN PK: Brand: CARDINAL HEALTH

## (undated) DEVICE — ADHESIVE SKIN HIGH VISCOSITY EXOFIN 1ML

## (undated) DEVICE — PLUMEPEN PRO 10FT

## (undated) DEVICE — GLOVE INDICATOR PI UNDERGLOVE SZ 8 BLUE

## (undated) DEVICE — ELECTRODE BLADE MOD E-Z CLEAN 2.5IN 6.4CM -0012M

## (undated) DEVICE — ECHELON FLEX 60 ARTICULATING ENDOSCOPIC LINEAR CUTTER (NO CARTRIDGE): Brand: ECHELON FLEX ENDOPATH

## (undated) DEVICE — GLOVE SRG BIOGEL ECLIPSE 7

## (undated) DEVICE — STERILE EMESIS BASIN                 070: Brand: CARDINAL HEALTH

## (undated) DEVICE — PENCIL ELECTROSURG E-Z CLEAN -0035H

## (undated) DEVICE — 3M™ IOBAN™ 2 ANTIMICROBIAL INCISE DRAPE 6650EZ: Brand: IOBAN™ 2

## (undated) DEVICE — PACK PBDS STERILE LAP LITHOTOMY RF

## (undated) DEVICE — BINDER ABDOMINAL 30-45 IN

## (undated) DEVICE — INTENDED FOR TISSUE SEPARATION, AND OTHER PROCEDURES THAT REQUIRE A SHARP SURGICAL BLADE TO PUNCTURE OR CUT.: Brand: BARD-PARKER SAFETY BLADES SIZE 10, STERILE

## (undated) DEVICE — Device: Brand: OMNICLOSE TROCAR SITE CLOSURE DEVICE

## (undated) DEVICE — CO2 AND WATER TUBING/CAP SET FOR OLYMPUS® SCOPES & UCR: Brand: ERBE

## (undated) DEVICE — SUT VICRYL 2-0 SH 27 IN UNDYED J417H

## (undated) DEVICE — SUT MONOCRYL 4-0 PS-2 27 IN Y426H

## (undated) DEVICE — BETHLEHEM UNIVERSAL MINOR GEN: Brand: CARDINAL HEALTH

## (undated) DEVICE — TROCARS: Brand: KII® BALLOON BLUNT TIP SYSTEM

## (undated) DEVICE — VIOLET BRAIDED (POLYGLACTIN 910), SYNTHETIC ABSORBABLE SUTURE: Brand: COATED VICRYL

## (undated) DEVICE — TUBING SUCTION 5MM X 12 FT

## (undated) DEVICE — TOWEL SURG XR DETECT GREEN STRL RFD

## (undated) DEVICE — SUT PROLENE 2-0 KS 30 IN 8623H

## (undated) DEVICE — ANTI-FOG SOLUTION WITH FOAM PAD: Brand: DEVON

## (undated) DEVICE — CHLORAPREP HI-LITE 26ML ORANGE

## (undated) DEVICE — DECANTER: Brand: UNBRANDED

## (undated) DEVICE — POOLE SUCTION HANDLE: Brand: CARDINAL HEALTH

## (undated) DEVICE — 3M™ TEGADERM™ TRANSPARENT FILM DRESSING FRAME STYLE, 1626W, 4 IN X 4-3/4 IN (10 CM X 12 CM), 50/CT 4CT/CASE: Brand: 3M™ TEGADERM™

## (undated) DEVICE — DRESSING GUAZE ADH BORDER 4 X 4 IN

## (undated) DEVICE — TROCAR: Brand: KII FIOS FIRST ENTRY

## (undated) DEVICE — CURVED INTRALUMINAL STAPLER (ILS) 24 TITANIUM ADJUSTABLE HEIGHT STAPLES

## (undated) DEVICE — ENDOPATH ECHELON ENDOSCOPIC LINEAR CUTTER RELOADS, BLUE, 60MM: Brand: ECHELON ENDOPATH

## (undated) DEVICE — ENSEAL LAPAROSCOPIC TISSUE SEALER G2 CURVED JAW FOR USE WITH G2 GENERATOR 5MM DIAMETER 35CM SHAFT LENGTH: Brand: ENSEAL

## (undated) DEVICE — CONTOUR CURVED CUTTER STAPLER RELOAD: Brand: CONTOUR

## (undated) DEVICE — TROCAR: Brand: KII SLEEVE

## (undated) DEVICE — MAYO STAND COVER: Brand: CONVERTORS

## (undated) DEVICE — GLOVE SRG BIOGEL 7.5

## (undated) DEVICE — SUT VICRYL PLUS 2-0 54IN VCP286G

## (undated) DEVICE — 3000CC GUARDIAN II: Brand: GUARDIAN

## (undated) DEVICE — DRAPE LAPAROTOMY W/POUCHES

## (undated) DEVICE — SUT PROLENE 0 CT-1 30 IN 8424H

## (undated) DEVICE — ENDOPATH 5MM CURVED SCISSORS WITH MONOPOLAR CAUTERY: Brand: ENDOPATH

## (undated) DEVICE — SUT MONOCRYL 4-0 PS-2 18 IN Y496G

## (undated) DEVICE — IRRIG ENDO FLO TUBING

## (undated) DEVICE — SUT VICRYL 0 UR-6 27 IN J603H

## (undated) DEVICE — ENDOPATH ECHELON ENDOSCOPIC LINEAR CUTTER RELOADS, WHITE, 60MM: Brand: ECHELON ENDOPATH

## (undated) DEVICE — SKIN MARKER DUAL TIP WITH RULER CAP, FLEXIBLE RULER AND LABELS: Brand: DEVON

## (undated) DEVICE — CONTOUR CURVED CUTTER STAPLER: Brand: CONTOUR

## (undated) DEVICE — INSUFLATION TUBING INSUFLOW (LEXION)

## (undated) DEVICE — Device: Brand: DEFENDO AIR/WATER/SUCTION AND BIOPSY VALVE

## (undated) DEVICE — NEEDLE HYPO 23G X 1-1/2 IN

## (undated) DEVICE — SUT SILK 2-0 TIES 144 IN LA55G

## (undated) DEVICE — ACCESS PLATFORM FOR MINIMALLY INVASIVE SURGERY.: Brand: GELPORT® LAPAROSCOPIC  SYSTEM

## (undated) DEVICE — INTENDED FOR TISSUE SEPARATION, AND OTHER PROCEDURES THAT REQUIRE A SHARP SURGICAL BLADE TO PUNCTURE OR CUT.: Brand: BARD-PARKER SAFETY BLADES SIZE 11, STERILE

## (undated) DEVICE — INTENDED FOR TISSUE SEPARATION, AND OTHER PROCEDURES THAT REQUIRE A SHARP SURGICAL BLADE TO PUNCTURE OR CUT.: Brand: BARD-PARKER SAFETY BLADES SIZE 15, STERILE

## (undated) DEVICE — SUT PROLENE 3-0 SH 36 IN 8522H

## (undated) DEVICE — EXOFIN PRECISION PEN HIGH VISCOSITY TOPICAL SKIN ADHESIVE: Brand: EXOFIN PRECISION PEN, 1G

## (undated) DEVICE — BULB SYRINGE,IRRIGATION WITH PROTECTIVE CAP: Brand: DOVER

## (undated) DEVICE — TRAVELKIT CONTAINS FIRST STEP KIT (200ML EP-4 KIT) AND SOILED SCOPE BAG - 1 KIT: Brand: TRAVELKIT CONTAINS FIRST STEP KIT AND SOILED SCOPE BAG

## (undated) DEVICE — UNDER BUTTOCKS DRAPE W/FLUID CONTROL POUCH: Brand: CONVERTORS

## (undated) DEVICE — TRAY FOLEY 16FR URIMETER SURESTEP